# Patient Record
Sex: FEMALE | Race: WHITE | NOT HISPANIC OR LATINO | Employment: OTHER | URBAN - METROPOLITAN AREA
[De-identification: names, ages, dates, MRNs, and addresses within clinical notes are randomized per-mention and may not be internally consistent; named-entity substitution may affect disease eponyms.]

---

## 2022-09-16 ENCOUNTER — TELEPHONE (OUTPATIENT)
Dept: TRANSPLANT | Facility: CLINIC | Age: 72
End: 2022-09-16

## 2022-09-16 NOTE — TELEPHONE ENCOUNTER
----- Message from Trent Garza sent at 9/16/2022  3:42 PM CDT -----    Hepatology referral received and scanned into media; pt chart sent to referral nurse for medical review.       Referring Provider: Iggy Contreras MD   Phone: [phone]   Fax: [phone]        .

## 2022-09-21 ENCOUNTER — TELEPHONE (OUTPATIENT)
Dept: TRANSPLANT | Facility: CLINIC | Age: 72
End: 2022-09-21
Payer: MEDICARE

## 2022-09-21 NOTE — TELEPHONE ENCOUNTER
Referral received from Referring Provider: Iggy Contreras MD   Phone: [phone]   Fax: [phone]     Patient with BROWN. MELD 23  ICD-10:  k74.60  Referred for liver transplant for EVALUATION    Referral completed and forwarded to [facility].          Insurance: medicare     ID:[id number]  Contact #           SECONDARY:   ID:  Contact #

## 2022-09-22 ENCOUNTER — TELEPHONE (OUTPATIENT)
Dept: HEPATOLOGY | Facility: CLINIC | Age: 72
End: 2022-09-22
Payer: MEDICARE

## 2022-09-22 NOTE — TELEPHONE ENCOUNTER
Patient been calling to the red phone to schedule an appointment. She said that if there is available this week she will be happy to come. She said she badly need this appointment.

## 2022-09-26 ENCOUNTER — TELEPHONE (OUTPATIENT)
Dept: TRANSPLANT | Facility: CLINIC | Age: 72
End: 2022-09-26
Payer: MEDICARE

## 2022-09-26 NOTE — TELEPHONE ENCOUNTER
----- Message from Trent Garza sent at 9/26/2022 10:39 AM CDT -----    Called and sp to pt; appt yasmin'ed 10/3.  .

## 2022-09-30 DIAGNOSIS — Z76.82 ORGAN TRANSPLANT CANDIDATE: ICD-10-CM

## 2022-09-30 DIAGNOSIS — K74.60 HEPATIC CIRRHOSIS, UNSPECIFIED HEPATIC CIRRHOSIS TYPE, UNSPECIFIED WHETHER ASCITES PRESENT: Primary | ICD-10-CM

## 2022-09-30 RX ORDER — CHOLECALCIFEROL (VITAMIN D3) 50 MCG
2000 TABLET ORAL DAILY
Status: ON HOLD | COMMUNITY
End: 2023-02-15 | Stop reason: HOSPADM

## 2022-09-30 RX ORDER — AZILSARTAN KAMEDOXOMIL 40 MG/1
1 TABLET ORAL DAILY
Status: ON HOLD | COMMUNITY
Start: 2022-08-26 | End: 2023-02-15 | Stop reason: HOSPADM

## 2022-09-30 RX ORDER — PROCHLORPERAZINE MALEATE 10 MG
10 TABLET ORAL EVERY 6 HOURS PRN
COMMUNITY
Start: 2022-08-23 | End: 2022-09-30

## 2022-09-30 RX ORDER — PNV NO.95/FERROUS FUM/FOLIC AC 28MG-0.8MG
1000 TABLET ORAL DAILY
Status: ON HOLD | COMMUNITY
End: 2023-02-15 | Stop reason: HOSPADM

## 2022-09-30 RX ORDER — MECLIZINE HCL 12.5 MG 12.5 MG/1
12.5 TABLET ORAL 3 TIMES DAILY PRN
COMMUNITY
Start: 2021-12-20 | End: 2022-09-30

## 2022-09-30 RX ORDER — ASPIRIN 81 MG/1
81 TABLET ORAL
Status: ON HOLD | COMMUNITY
End: 2023-02-15 | Stop reason: SDUPTHER

## 2022-09-30 RX ORDER — LORATADINE 10 MG/1
10 TABLET ORAL DAILY PRN
COMMUNITY
End: 2022-09-30

## 2022-09-30 RX ORDER — ESCITALOPRAM OXALATE 10 MG/1
10 TABLET ORAL
COMMUNITY
Start: 2022-08-25 | End: 2022-09-30

## 2022-09-30 RX ORDER — HYDROCHLOROTHIAZIDE 25 MG/1
12.5 TABLET ORAL DAILY
COMMUNITY
Start: 2022-07-08 | End: 2022-12-23

## 2022-09-30 RX ORDER — FOLIC ACID 1 MG/1
1 TABLET ORAL DAILY
COMMUNITY
Start: 2022-08-22 | End: 2022-09-30

## 2022-09-30 RX ORDER — PANTOPRAZOLE SODIUM 40 MG/10ML
INJECTION, POWDER, LYOPHILIZED, FOR SOLUTION INTRAVENOUS
COMMUNITY
End: 2022-10-20

## 2022-10-03 ENCOUNTER — OFFICE VISIT (OUTPATIENT)
Dept: TRANSPLANT | Facility: CLINIC | Age: 72
End: 2022-10-03
Payer: MEDICARE

## 2022-10-03 ENCOUNTER — LAB VISIT (OUTPATIENT)
Dept: LAB | Facility: HOSPITAL | Age: 72
End: 2022-10-03
Payer: MEDICARE

## 2022-10-03 VITALS
DIASTOLIC BLOOD PRESSURE: 72 MMHG | TEMPERATURE: 97 F | HEART RATE: 80 BPM | HEIGHT: 62 IN | WEIGHT: 174.38 LBS | SYSTOLIC BLOOD PRESSURE: 171 MMHG | BODY MASS INDEX: 32.09 KG/M2 | OXYGEN SATURATION: 98 % | RESPIRATION RATE: 17 BRPM

## 2022-10-03 DIAGNOSIS — Z76.82 ORGAN TRANSPLANT CANDIDATE: ICD-10-CM

## 2022-10-03 DIAGNOSIS — K74.60 HEPATIC CIRRHOSIS, UNSPECIFIED HEPATIC CIRRHOSIS TYPE, UNSPECIFIED WHETHER ASCITES PRESENT: ICD-10-CM

## 2022-10-03 LAB
ABO + RH BLD: NORMAL
AFP SERPL-MCNC: 7.6 NG/ML (ref 0–8.4)
ALBUMIN SERPL BCP-MCNC: 3.2 G/DL (ref 3.5–5.2)
ALP SERPL-CCNC: 158 U/L (ref 55–135)
ALT SERPL W/O P-5'-P-CCNC: 35 U/L (ref 10–44)
AMPHET+METHAMPHET UR QL: NEGATIVE
ANION GAP SERPL CALC-SCNC: 12 MMOL/L (ref 8–16)
AST SERPL-CCNC: 69 U/L (ref 10–40)
BARBITURATES UR QL SCN>200 NG/ML: NEGATIVE
BASOPHILS # BLD AUTO: 0.14 K/UL (ref 0–0.2)
BASOPHILS NFR BLD: 2.1 % (ref 0–1.9)
BENZODIAZ UR QL SCN>200 NG/ML: NEGATIVE
BILIRUB DIRECT SERPL-MCNC: 2.1 MG/DL (ref 0.1–0.3)
BILIRUB SERPL-MCNC: 8.2 MG/DL (ref 0.1–1)
BILIRUB UR QL STRIP: NEGATIVE
BLD GP AB SCN CELLS X3 SERPL QL: NORMAL
BUN SERPL-MCNC: 8 MG/DL (ref 8–23)
BZE UR QL SCN: NEGATIVE
CALCIUM SERPL-MCNC: 8.8 MG/DL (ref 8.7–10.5)
CANNABINOIDS UR QL SCN: NEGATIVE
CHLORIDE SERPL-SCNC: 95 MMOL/L (ref 95–110)
CLARITY UR REFRACT.AUTO: ABNORMAL
CO2 SERPL-SCNC: 26 MMOL/L (ref 23–29)
COLOR UR AUTO: YELLOW
CREAT SERPL-MCNC: 0.6 MG/DL (ref 0.5–1.4)
CREAT UR-MCNC: 109 MG/DL (ref 15–325)
DIFFERENTIAL METHOD: ABNORMAL
EOSINOPHIL # BLD AUTO: 0.9 K/UL (ref 0–0.5)
EOSINOPHIL NFR BLD: 12.5 % (ref 0–8)
ERYTHROCYTE [DISTWIDTH] IN BLOOD BY AUTOMATED COUNT: 14.9 % (ref 11.5–14.5)
EST. GFR  (NO RACE VARIABLE): >60 ML/MIN/1.73 M^2
ETHANOL UR-MCNC: <10 MG/DL
GGT SERPL-CCNC: 36 U/L (ref 8–55)
GLUCOSE SERPL-MCNC: 91 MG/DL (ref 70–110)
GLUCOSE UR QL STRIP: NEGATIVE
HAV IGG SER QL IA: NORMAL
HBV CORE AB SERPL QL IA: NORMAL
HBV SURFACE AB SER-ACNC: <3 MIU/ML
HBV SURFACE AB SER-ACNC: NORMAL M[IU]/ML
HBV SURFACE AG SERPL QL IA: NORMAL
HCT VFR BLD AUTO: 35.9 % (ref 37–48.5)
HCV AB SERPL QL IA: NORMAL
HGB BLD-MCNC: 12.9 G/DL (ref 12–16)
HGB UR QL STRIP: NEGATIVE
IMM GRANULOCYTES # BLD AUTO: 0.02 K/UL (ref 0–0.04)
IMM GRANULOCYTES NFR BLD AUTO: 0.3 % (ref 0–0.5)
INR PPP: 1.5 (ref 0.8–1.2)
KETONES UR QL STRIP: NEGATIVE
LEUKOCYTE ESTERASE UR QL STRIP: NEGATIVE
LYMPHOCYTES # BLD AUTO: 2.1 K/UL (ref 1–4.8)
LYMPHOCYTES NFR BLD: 30.4 % (ref 18–48)
MCH RBC QN AUTO: 35.9 PG (ref 27–31)
MCHC RBC AUTO-ENTMCNC: 35.9 G/DL (ref 32–36)
MCV RBC AUTO: 100 FL (ref 82–98)
METHADONE UR QL SCN>300 NG/ML: NEGATIVE
MONOCYTES # BLD AUTO: 0.9 K/UL (ref 0.3–1)
MONOCYTES NFR BLD: 13.2 % (ref 4–15)
NEUTROPHILS # BLD AUTO: 2.8 K/UL (ref 1.8–7.7)
NEUTROPHILS NFR BLD: 41.5 % (ref 38–73)
NITRITE UR QL STRIP: NEGATIVE
NRBC BLD-RTO: 0 /100 WBC
OPIATES UR QL SCN: NEGATIVE
PCP UR QL SCN>25 NG/ML: NEGATIVE
PH UR STRIP: 7 [PH] (ref 5–8)
PLATELET # BLD AUTO: 111 K/UL (ref 150–450)
PMV BLD AUTO: 10.6 FL (ref 9.2–12.9)
POTASSIUM SERPL-SCNC: 3.1 MMOL/L (ref 3.5–5.1)
PROT SERPL-MCNC: 6.5 G/DL (ref 6–8.4)
PROT UR QL STRIP: NEGATIVE
PROTHROMBIN TIME: 15.6 SEC (ref 9–12.5)
RBC # BLD AUTO: 3.59 M/UL (ref 4–5.4)
SODIUM SERPL-SCNC: 133 MMOL/L (ref 136–145)
SP GR UR STRIP: 1.01 (ref 1–1.03)
TOXICOLOGY INFORMATION: NORMAL
URN SPEC COLLECT METH UR: ABNORMAL
WBC # BLD AUTO: 6.82 K/UL (ref 3.9–12.7)

## 2022-10-03 PROCEDURE — 86706 HEP B SURFACE ANTIBODY: CPT | Mod: TXP | Performed by: INTERNAL MEDICINE

## 2022-10-03 PROCEDURE — 99205 PR OFFICE/OUTPT VISIT, NEW, LEVL V, 60-74 MIN: ICD-10-PCS | Mod: S$PBB,TXP,, | Performed by: INTERNAL MEDICINE

## 2022-10-03 PROCEDURE — 99999 PR PBB SHADOW E&M-EST. PATIENT-LVL III: ICD-10-PCS | Mod: PBBFAC,TXP,, | Performed by: INTERNAL MEDICINE

## 2022-10-03 PROCEDURE — 80321 ALCOHOLS BIOMARKERS 1OR 2: CPT | Mod: TXP | Performed by: INTERNAL MEDICINE

## 2022-10-03 PROCEDURE — 82248 BILIRUBIN DIRECT: CPT | Mod: TXP | Performed by: INTERNAL MEDICINE

## 2022-10-03 PROCEDURE — 85025 COMPLETE CBC W/AUTO DIFF WBC: CPT | Mod: TXP | Performed by: INTERNAL MEDICINE

## 2022-10-03 PROCEDURE — 99205 OFFICE O/P NEW HI 60 MIN: CPT | Mod: S$PBB,TXP,, | Performed by: INTERNAL MEDICINE

## 2022-10-03 PROCEDURE — 86803 HEPATITIS C AB TEST: CPT | Mod: TXP | Performed by: INTERNAL MEDICINE

## 2022-10-03 PROCEDURE — 80307 DRUG TEST PRSMV CHEM ANLYZR: CPT | Mod: TXP | Performed by: INTERNAL MEDICINE

## 2022-10-03 PROCEDURE — 82977 ASSAY OF GGT: CPT | Mod: TXP | Performed by: INTERNAL MEDICINE

## 2022-10-03 PROCEDURE — 85610 PROTHROMBIN TIME: CPT | Mod: TXP | Performed by: INTERNAL MEDICINE

## 2022-10-03 PROCEDURE — 81003 URINALYSIS AUTO W/O SCOPE: CPT | Mod: TXP | Performed by: INTERNAL MEDICINE

## 2022-10-03 PROCEDURE — 99999 PR PBB SHADOW E&M-EST. PATIENT-LVL III: CPT | Mod: PBBFAC,TXP,, | Performed by: INTERNAL MEDICINE

## 2022-10-03 PROCEDURE — 87340 HEPATITIS B SURFACE AG IA: CPT | Mod: TXP | Performed by: INTERNAL MEDICINE

## 2022-10-03 PROCEDURE — 80053 COMPREHEN METABOLIC PANEL: CPT | Mod: TXP | Performed by: INTERNAL MEDICINE

## 2022-10-03 PROCEDURE — 86901 BLOOD TYPING SEROLOGIC RH(D): CPT | Mod: TXP | Performed by: INTERNAL MEDICINE

## 2022-10-03 PROCEDURE — 36415 COLL VENOUS BLD VENIPUNCTURE: CPT | Mod: TXP | Performed by: INTERNAL MEDICINE

## 2022-10-03 PROCEDURE — 86704 HEP B CORE ANTIBODY TOTAL: CPT | Mod: TXP | Performed by: INTERNAL MEDICINE

## 2022-10-03 PROCEDURE — 99213 OFFICE O/P EST LOW 20 MIN: CPT | Mod: PBBFAC,TXP | Performed by: INTERNAL MEDICINE

## 2022-10-03 PROCEDURE — 82105 ALPHA-FETOPROTEIN SERUM: CPT | Mod: TXP | Performed by: INTERNAL MEDICINE

## 2022-10-03 PROCEDURE — 86790 VIRUS ANTIBODY NOS: CPT | Mod: TXP | Performed by: INTERNAL MEDICINE

## 2022-10-03 RX ORDER — PANTOPRAZOLE SODIUM 40 MG/1
40 TABLET, DELAYED RELEASE ORAL DAILY
Status: ON HOLD | COMMUNITY
End: 2023-02-15 | Stop reason: HOSPADM

## 2022-10-03 NOTE — PROGRESS NOTES
Subjective:       Patient ID: Argelia Sevilla is a 72 y.o. female.    Chief Complaint: No chief complaint on file.    HPI  I saw this 72 y.o. lady who was referred to us with a diagnosis of BROWN related cirrhosis.    She was diagnosed with cirrhosis in June of 2020 when she first developed abdominal pain generalized in nature.  In addition she started feeling very weak and tired all the time.  She was found to be jaundiced but had no significant ascites or edema.  She was referred to Hematology who felt that she was hemolyzing.    She continues to feel tired and I note her prolonged INR as well as her high bilirubin.  She describes ankle edema and abdominal distension although there is no obvious ascites.    She had imaging with a CT scan and an MRI that confirmed cirrhosis but did not show ascites.  An EGD showed only PHG..    Since June 2022, she has been able to lose 30 lb in weight following a Mediterranean diet.  She believes that her weight loss is intentional but it is possible that this has coincided with her deterioration of her liver condition.    She has not had any hepatic encephalopathy although her  describes an odor that would be typical of fetor hepaticus.      MELD-Na score: 22 at 10/3/2022 12:31 PM  MELD score: 19 at 10/3/2022 12:31 PM  Calculated from:  Serum Creatinine: 0.6 mg/dL (Using min of 1 mg/dL) at 10/3/2022 12:31 PM  Serum Sodium: 133 mmol/L at 10/3/2022 12:31 PM  Total Bilirubin: 8.2 mg/dL at 10/3/2022 12:31 PM  INR(ratio): 1.5 at 10/3/2022 12:31 PM  Age: 72 years    PMH:  CAD stent - Sep 2021  Back surgery  Lap cholecystectomy- 1996  Hysterectomy    No DM    SH:  No alcohol  Never smoked    2 kids- 45 + 42- healthy      FH:  Grandmother had cirrhosis      Review of Systems   Constitutional:  Positive for activity change and fatigue. Negative for appetite change, chills, fever and unexpected weight change.   HENT:  Negative for ear pain, hearing loss, nosebleeds, sore throat  and trouble swallowing.    Eyes:  Negative for redness and visual disturbance.   Respiratory:  Negative for cough, chest tightness, shortness of breath and wheezing.    Cardiovascular:  Positive for leg swelling. Negative for chest pain and palpitations.   Gastrointestinal:  Positive for abdominal distention. Negative for abdominal pain, blood in stool, constipation, diarrhea, nausea and vomiting.   Genitourinary:  Negative for difficulty urinating, dysuria, frequency, hematuria and urgency.   Musculoskeletal:  Negative for arthralgias, back pain, gait problem, joint swelling and myalgias.   Skin:  Negative for rash.   Neurological:  Negative for tremors, seizures, speech difficulty, weakness and headaches.   Hematological:  Negative for adenopathy.   Psychiatric/Behavioral:  Negative for confusion, decreased concentration and sleep disturbance. The patient is not nervous/anxious.          Lab Results   Component Value Date    ALT 35 10/03/2022    AST 69 (H) 10/03/2022    GGT 36 10/03/2022    ALKPHOS 158 (H) 10/03/2022    BILITOT 8.2 (H) 10/03/2022     Past Medical History:   Diagnosis Date    Anxiety disorder, unspecified     CAD (coronary artery disease)     DIC (disseminated intravascular coagulation)     Dyslipidemia     GERD (gastroesophageal reflux disease)     Hereditary hemolytic anemia, unspecified     History of coronary artery stent placement     HTN (hypertension)     Liver cirrhosis secondary to BROWN     Portal hypertensive gastropathy     Primary localized osteoarthrosis of right lower leg     Statin intolerance      Past Surgical History:   Procedure Laterality Date    ADENOIDECTOMY      ANTERIOR CRUCIATE LIGAMENT REPAIR      BACK SURGERY      CARDIAC CATHETERIZATION  05/31/2018    CARDIAC CATHETERIZATION  09/28/2021    coronary angioplasty    CHOLECYSTECTOMY      COLONOSCOPY  05/25/2022    GALLBLADDER SURGERY      HYSTERECTOMY      KNEE ARTHROSCOPY Right     OOPHORECTOMY      REDUCTION OF BOTH  BREASTS  01/01/2011    TONSILLECTOMY      TUBAL LIGATION       Current Outpatient Medications   Medication Sig    aspirin (ECOTRIN) 81 MG EC tablet Take 81 mg by mouth.    cholecalciferol, vitamin D3, (VITAMIN D3) 50 mcg (2,000 unit) Tab Take 2,000 Units by mouth once daily.    EDARBI 40 mg Tab Take 1 tablet by mouth once daily.    hydroCHLOROthiazide (HYDRODIURIL) 25 MG tablet Take 12.5 mg by mouth once daily.    omega-3s/dha/epa/fish oil/D3 (VITAMIN-D + OMEGA-3 ORAL) Take 1 tablet by mouth once daily.    pantoprazole (PROTONIX) 40 MG tablet Take 40 mg by mouth once daily.    vitamin E 1000 UNIT capsule Take 1,000 Units by mouth once daily.    pantoprazole (PROTONIX) 40 mg injection      No current facility-administered medications for this visit.       Objective:      Physical Exam  Constitutional:       General: She is not in acute distress.  HENT:      Head: Normocephalic.   Eyes:      Pupils: Pupils are equal, round, and reactive to light.   Neck:      Thyroid: No thyromegaly.      Vascular: No JVD.      Trachea: No tracheal deviation.   Cardiovascular:      Rate and Rhythm: Normal rate and regular rhythm.      Heart sounds: Normal heart sounds. No murmur heard.  Pulmonary:      Effort: Pulmonary effort is normal.      Breath sounds: Normal breath sounds. No stridor.   Abdominal:      General: There is distension.      Palpations: Abdomen is soft.      Tenderness: There is left CVA tenderness.      Comments: Appears distended but no obvious ascites.   Lymphadenopathy:      Head:      Right side of head: No submental, submandibular, tonsillar, preauricular, posterior auricular or occipital adenopathy.      Left side of head: No submental, submandibular, tonsillar, preauricular, posterior auricular or occipital adenopathy.      Cervical: No cervical adenopathy.   Neurological:      Mental Status: She is alert. She is not disoriented.      Cranial Nerves: No cranial nerve deficit.      Sensory: No sensory deficit.        Assessment:       1. Hepatic cirrhosis, unspecified hepatic cirrhosis type, unspecified whether ascites present    2. Organ transplant candidate          Plan:   This lady with BROWN cirrhosis is currently jaundiced and has a MELD score of 22 due to her prolonged INR and elevated bilirubin.    Although she was found to be hemolyzing, I think that she has worsening liver function and we agreed that she needs an evaluation for liver transplant.    She does have a cardiac history and will likely need a left heart cath but we will initially proceed with a stress test.    Her functional status is good although she occasionally uses a cane due to severe right knee ostaoarthritis.    We will proceed with a liver Tx evaluation and I already discussed with her the possibility of living donor liver Tx.    UNOS Patient Status  Functional Status: 80% - Normal activity with effort: some symptoms of disease  Physical Capacity: No Limitations    Patient on life support: No  Diabetes: No  Any previous malignancy: No  Neoadjuvant Therapy: no  Has patient ever had a dx of HCC: no  Previous Abdominal Surgery: yes  Spontaneous Bacterial Peritonitis: no  History of Portal Vein Thrombosis: no  Transjugular Intrahepatic Portosystemic Shunt: no

## 2022-10-03 NOTE — LETTER
October 4, 2022        Iggy Contreras  [address] [facility] [place] [address]  Phone: [phone]  Fax: [phone]             [facility]  [address]  [facility] [address]  Phone: [phone]   Patient: Argelia Sevilla   MR Number: [id number]   YOB: 1950   Date of Visit: 10/3/2022       Dear Dr. Iggy Contreras    Thank you for referring Argelia Sevilla to me for evaluation. Attached you will find relevant portions of my assessment and plan of care.    If you have questions, please do not hesitate to call me. I look forward to following Argelia Sevilla along with you.    Sincerely,    Houston Crawford MD    Enclosure    If you would like to receive this communication electronically, please contact [email or web address] or [phone] to request [employer] access.    [employer] is a tool which provides read-only access to select patient information with whom you have a relationship. Its easy to use and provides real time access to review your patients record including encounter summaries, notes, results, and demographic information.    If you feel you have received this communication in error or would no longer like to receive these types of communications, please e-mail [email or web address]

## 2022-10-06 ENCOUNTER — PATIENT MESSAGE (OUTPATIENT)
Dept: TRANSPLANT | Facility: CLINIC | Age: 72
End: 2022-10-06
Payer: MEDICARE

## 2022-10-06 LAB
PETH 16:0/18.1 (POPETH): <10 NG/ML
PETH 16:0/18.2 (PLPETH): <10 NG/ML

## 2022-10-11 ENCOUNTER — TELEPHONE (OUTPATIENT)
Dept: TRANSPLANT | Facility: CLINIC | Age: 72
End: 2022-10-11
Payer: MEDICARE

## 2022-10-11 ENCOUNTER — DOCUMENTATION ONLY (OUTPATIENT)
Dept: TRANSPLANT | Facility: CLINIC | Age: 72
End: 2022-10-11
Payer: MEDICARE

## 2022-10-11 DIAGNOSIS — K75.81 NONALCOHOLIC STEATOHEPATITIS (NASH): ICD-10-CM

## 2022-10-11 DIAGNOSIS — Z94.4 LIVER TRANSPLANT STATUS: Primary | ICD-10-CM

## 2022-10-11 DIAGNOSIS — Z76.82 ORGAN TRANSPLANT CANDIDATE: ICD-10-CM

## 2022-10-11 NOTE — TELEPHONE ENCOUNTER
UPDATE:  Financially cleared for outpatient liver transplant evaluation.   Called pt to discuss Fast Pass liver transplant evaluation.  Informed patient  that evaluation will take approx  2 to 3 days to complete, depending on rather additional consult.  Informed her that all testing will be performed outpatient.  Patient notified that some testing may be completed outside.  Patient informed of which tests that can be scheduled locally such as DEXA scan and OBGYN appt.  Patient voiced understanding of the need to have a caregiver present for the  and surgeon consult, as well as for the patient education.    All questions/ concerns regarding liver transplant evaluation answered/ addressed.     Patient states that she has had a complete hysterectomy.  Informed patient that she does not need a pap smear since she has had a hysterectomy however she does need an updated visit with OBGYN or PCP for a female wellness visit and checkup. Patient states she will scheduled that appointment.    Patient has a history of CAD.  Patient  had LHC and drug eluting stent in September 2021.  Patient states that she was taken off Plavix in June 2022.   She takes 81 mg Asprin daily.   Cardiology H&P in Care Everywhere.     Patient states she had a recent mammogram last December or January at [facility].     Had EGD and Colonoscopy in May 2022. Done by GI doctor, Dr. Kev Altamirano.     FAST PASS EVALUATION DATES October 20th and 21st.   Coordinator will put orders of OLT eval in [facility] and ask transplant  to schedule appts.

## 2022-10-11 NOTE — TELEPHONE ENCOUNTER
Cardiology Note from Care Everywhere:    Progress Notes  - documented in this encounter  Nilton Rodriguez MD - 07/08/2022 11:00 AM CDT  Formatting of this note is different from the original.  REASON FOR VISIT: Follow-up abnormal calcium score and chest pain and shortness of breath    HPI: The patient is a 72 y.o.female.  She has a history of chronic intermittent chest pain. She was previously seen by Dr. Farias but is switched for convenience since I see her family members. She had a PET scan 5/26/2017 that was normal. She was admitted in May 2018 with chest pain. SPECT scan 5/5/2018 showed normal perfusion and ejection fraction. Calcium score in 2012 was 98. She continued to have chest pain. Left heart catheterization 5/31/2018 showed mild nonobstructive coronary artery disease. EDP 18. EF 80%. Holter monitor showed rare PVCs and rare PACs.    She was seen preoperatively. She had occasional chest pain and chronic shortness of breath she has untreated sleep apnea. Echocardiogram 6/18/2021 showed an EF of 70% with grade 1 diastolic dysfunction. Estimated RVSP normal. No significant valvular disease. PET scan 9/15/2021 was abnormal with possible ischemia in the mid anterior, anteroapical, apical septal, and apical segments. EF was 52% at rest and 59% at stress. Left heart catheterization was performed 9/28/2021. There was a 70% distal LAD stenosis. She underwent PCI with a 2.5 x 20 mm Synergy XD drug-eluting stent.    From a cardiac standpoint, she has been stable. She has had no chest pain, shortness of breath, orthopnea, PND, lower extremity edema, palpitations or syncope. She does feel depressed, though. She is tearful in the office today. She reports she was recently diagnosed with nonalcoholic cirrhosis due to fatty liver disease. She has had an MRI of her liver and has followup with Dr. Altamirano next week. She feels tired and fatigued all the time. She has been diagnosed with sleep apnea, but is still awaiting her  CPAP. She has also been having increased ecchymoses with little or no trauma.  She has having lightheadedness when she stands. Her blood pressure at home has been as low as 118/44.    Patient Active Problem List   Diagnosis    Essential hypertension    GERD (gastroesophageal reflux disease)    Coronary artery disease involving native coronary artery of native heart without angina pectoris    Pure hypercholesterolemia    Elevated SGOT (AST)    Alexandra bullosa    Hypertrophy of inferior nasal turbinate    Anxiety and depression    Vitamin D deficiency    Intrinsic atopic dermatitis    Statin intolerance    Fatty liver    Thrombocytopenia (HCC Code)    Grade I diastolic dysfunction    Primary localized osteoarthrosis of right lower leg     Current Outpatient Medications on File Prior to Visit   Medication Sig Dispense Refill    aspirin 81 MG EC tablet Take 81 mg by mouth daily.    azilsartan (EDARBI) 80 MG TABS Take 1 tablet (80 mg total) by mouth daily. (Patient taking differently: Take 40 mg by mouth daily.) 90 tablet 1    Cholecalciferol (Vitamin D3) 50 MCG (2000 UT) TABS Take 2,000 Units by mouth daily.    levalbuterol (XOPENEX) 1.25 MG/3ML nebulizer solution Take 3 mLs (1.25 mg total) by nebulization every 6 (six) hours as needed for Wheezing or Shortness of Breath. 72 mL 0    loratadine (CLARITIN) 10 MG tablet Take 10 mg by mouth daily as needed.    meclizine (ANTIVERT) 12.5 MG tablet Take 1 tablet (12.5 mg total) by mouth 3 (three) times daily as needed for Dizziness or Nausea. 30 tablet 1    pantoprazole (PROTONIX) 40 MG tablet TAKE 1 TABLET (40 MG TOTAL) BY MOUTH DAILY. 90 tablet 1    VITAMIN E PO    [DISCONTINUED] clopidogrel (PLAVIX) 75 MG tablet Take 1 tablet (75 mg total) by mouth daily. 90 tablet 3    [DISCONTINUED] hydroCHLOROthiazide (HYDRODIURIL) 25 MG tablet Take 1 tablet (25 mg total) by mouth daily. 30 tablet 5    C-PAP ResMed AirSense 10 or 11 AutoSet;  CPAP 9 cmH20  EPR 3;  Ramp AUTO;  Humidifier  "AUTO;  MASK -   resmed F20 airtouch or  Mask of choice;  Chin strap if needed (Patient taking differently: ResMed AirSense 10 or 11 AutoSet;HAS NOT STARTED  CPAP 9 cmH20  EPR 3;  Ramp AUTO;  Humidifier AUTO;  MASK -   resmed F20 airtouch or  Mask of choice;  Chin strap if needed) 1 each 0    [DISCONTINUED] acetaminophen (TYLENOL) 500 MG tablet Take 2 tablets morning of surgery with sip of water 2 tablet 0    [DISCONTINUED] famotidine (PEPCID) 20 MG tablet Take 1 tablet at bedtime the night before surgery and 1 tablet morning of surgery with sip of water. 2 tablet 0    [DISCONTINUED] metoclopramide (REGLAN) 10 MG tablet Take 1 tablet at bedtime the night before surgery and 1 tablet morning of surgery with sip of water. 2 tablet 0    [DISCONTINUED] simvastatin (ZOCOR) 40 MG tablet Take 1 tablet (40 mg total) by mouth every evening. 30 tablet 11     No current facility-administered medications on file prior to visit.     Past medical history, problem list, medications, allergies, family history, and social history were reviewed and updated as appropriate in EPIC.    PHYSICAL EXAMINATION:  Vitals:   07/08/22 1123   BP: 122/66   Pulse: 71   Weight: 179 lb 12.8 oz (81.6 kg)   Height: 5' 2" (1.575 m)     Body mass index is 32.89 kg/m².    GENERAL: No acute distress. Morbidly obese.  LUNGS: Clear to ascultation bilaterally.  CARDIOVASCULAR: Regular rate and rhythm. No murmurs gallops or rubs. No edema.    LABS/STUDIES:    Lab Results   Component Value Date   CHOL 145 11/22/2021   HDL 60 11/22/2021   LDL 74 11/22/2021   TRIG 49 11/22/2021   CHOLHDL 2 09/22/2021   HDLPERCENT 41 11/22/2021   HDLPERCENT 37 05/05/2021   HDLPERCENT 41 01/29/2020     Lab Results   Component Value Date   CREATS 0.48 (L) 06/09/2022     Lab Results   Component Value Date   K 4.0 06/09/2022     Lab Results   Component Value Date   ALT 30 05/02/2022   AST 55 (H) 05/02/2022    (H) 05/02/2022     Lab Results   Component Value Date   WBC 4.3 (L) " 06/09/2022   HGB 12.3 06/09/2022   HCT 35.2 (L) 06/09/2022    (H) 06/09/2022   PLTA 110 (L) 06/09/2022     IMPRESSION:   1. Coronary artery disease involving native coronary artery of native heart with other form of angina pectoris (HCC Code)   2. S/P coronary artery stent placement   3. Essential hypertension   4. Pure hypercholesterolemia   5. PAC (premature atrial contraction)   6. PVC (premature ventricular contraction)   7. Peripheral edema   8. Statin intolerance   9. Bilateral lower extremity edema   10. Non-alcoholic cirrhosis (HCC Code)   11. Depression, unspecified depression type   12. Orthostasis     PLAN:  Orders Placed This Encounter    Ambulatory referral to Primary Care    hydroCHLOROthiazide (HYDRODIURIL) 25 MG tablet     It has been over 6 months since PCI. Given her thrombocytopenia, ecchymoses, and recent diagnoses of cirrhosis. We will discontinue her Plavix. Continue aspirin 81 mg daily. Given her orthostasis, I am going to decrease her hydrochlorothiazide to 12.5 mg daily. I will refer her back to her primary care provider to follow-up on her depression. She is not had this before but she seems overwhelmed with her recent medical diagnoses.    Follow up in 3 months.    Patient instructed to call with questions or problems.      Electronically signed by Nilton Rodriguez MD at 07/11/2022 7:14 AM CDT

## 2022-10-14 ENCOUNTER — TELEPHONE (OUTPATIENT)
Dept: TRANSPLANT | Facility: CLINIC | Age: 72
End: 2022-10-14
Payer: MEDICARE

## 2022-10-14 NOTE — TELEPHONE ENCOUNTER
[facility] Pre-Liver Transplant Clinical  Caller: chris (Today,  9:06 AM)  Pt is calling to speak with coordinator..[phone] (home) [phone] (work)     Appt with OBGYN at 1:20pm today.  H/O complete hysterectomy. Having a well-woman's exam today to clear.

## 2022-10-19 ENCOUNTER — TELEPHONE (OUTPATIENT)
Dept: CARDIOLOGY | Facility: HOSPITAL | Age: 72
End: 2022-10-19
Payer: MEDICARE

## 2022-10-19 ENCOUNTER — TELEPHONE (OUTPATIENT)
Dept: TRANSPLANT | Facility: CLINIC | Age: 72
End: 2022-10-19
Payer: MEDICARE

## 2022-10-19 NOTE — TELEPHONE ENCOUNTER
Patient called to confirm that they will be attending the scheduled appointments for Liver Transplant Fast Pass Evaluation scheduled to start 10/20/22  at 0730.  Patient confirms that caregiver will be present for the scheduled appointments.  Patient appointments reviewed along with location and special instructions.  Patient questions answered at this time and number provided to call the office if there is any problem.

## 2022-10-20 ENCOUNTER — HOSPITAL ENCOUNTER (OUTPATIENT)
Dept: RADIOLOGY | Facility: HOSPITAL | Age: 72
Discharge: HOME | End: 2022-10-20
Attending: INTERNAL MEDICINE
Payer: MEDICARE

## 2022-10-20 ENCOUNTER — CLINICAL SUPPORT (OUTPATIENT)
Dept: TRANSPLANT | Facility: CLINIC | Age: 72
End: 2022-10-20
Payer: MEDICARE

## 2022-10-20 ENCOUNTER — EVALUATION (OUTPATIENT)
Dept: TRANSPLANT | Facility: CLINIC | Age: 72
End: 2022-10-20
Payer: MEDICARE

## 2022-10-20 VITALS
TEMPERATURE: 97 F | OXYGEN SATURATION: 97 % | SYSTOLIC BLOOD PRESSURE: 153 MMHG | OXYGEN SATURATION: 97 % | SYSTOLIC BLOOD PRESSURE: 153 MMHG | HEIGHT: 62 IN | HEIGHT: 62 IN | DIASTOLIC BLOOD PRESSURE: 70 MMHG | WEIGHT: 171.94 LBS | RESPIRATION RATE: 16 BRPM | RESPIRATION RATE: 16 BRPM | HEART RATE: 89 BPM | DIASTOLIC BLOOD PRESSURE: 70 MMHG | HEART RATE: 89 BPM | BODY MASS INDEX: 31.64 KG/M2 | TEMPERATURE: 97 F | BODY MASS INDEX: 31.64 KG/M2 | WEIGHT: 171.94 LBS

## 2022-10-20 DIAGNOSIS — Z76.82 ORGAN TRANSPLANT CANDIDATE: ICD-10-CM

## 2022-10-20 DIAGNOSIS — Z94.4 LIVER TRANSPLANT STATUS: ICD-10-CM

## 2022-10-20 DIAGNOSIS — Z01.818 ENCOUNTER FOR PRE-TRANSPLANT EVALUATION FOR LIVER TRANSPLANT: Primary | ICD-10-CM

## 2022-10-20 LAB
AMPHET+METHAMPHET UR QL: NEGATIVE
BARBITURATES UR QL SCN>200 NG/ML: NEGATIVE
BENZODIAZ UR QL SCN>200 NG/ML: NEGATIVE
BILIRUB UR QL STRIP: NEGATIVE
BZE UR QL SCN: NEGATIVE
CANNABINOIDS UR QL SCN: NEGATIVE
CLARITY UR REFRACT.AUTO: ABNORMAL
COLOR UR AUTO: YELLOW
CREAT UR-MCNC: 133 MG/DL (ref 15–325)
ETHANOL UR-MCNC: <10 MG/DL
GLUCOSE UR QL STRIP: NEGATIVE
HGB UR QL STRIP: NEGATIVE
KETONES UR QL STRIP: ABNORMAL
LEUKOCYTE ESTERASE UR QL STRIP: NEGATIVE
METHADONE UR QL SCN>300 NG/ML: NEGATIVE
NITRITE UR QL STRIP: NEGATIVE
OPIATES UR QL SCN: NEGATIVE
PCP UR QL SCN>25 NG/ML: NEGATIVE
PH UR STRIP: 6 [PH] (ref 5–8)
PROT UR QL STRIP: ABNORMAL
SP GR UR STRIP: 1.01 (ref 1–1.03)
TOXICOLOGY INFORMATION: NORMAL
URN SPEC COLLECT METH UR: ABNORMAL

## 2022-10-20 PROCEDURE — 99999 PR PBB SHADOW E&M-EST. PATIENT-LVL III: ICD-10-PCS | Mod: PBBFAC,TXP,,

## 2022-10-20 PROCEDURE — 99244 OFF/OP CNSLTJ NEW/EST MOD 40: CPT | Mod: S$PBB,TXP,, | Performed by: SURGERY

## 2022-10-20 PROCEDURE — 25500020 PHARM REV CODE 255: Mod: TXP | Performed by: INTERNAL MEDICINE

## 2022-10-20 PROCEDURE — 71046 XR CHEST PA AND LATERAL: ICD-10-PCS | Mod: 26,TXP,, | Performed by: RADIOLOGY

## 2022-10-20 PROCEDURE — 99213 OFFICE O/P EST LOW 20 MIN: CPT | Mod: PBBFAC,25,TXP

## 2022-10-20 PROCEDURE — 99999 PR PBB SHADOW E&M-EST. PATIENT-LVL III: CPT | Mod: PBBFAC,TXP,,

## 2022-10-20 PROCEDURE — 99204 OFFICE O/P NEW MOD 45 MIN: CPT | Mod: S$PBB,TXP,, | Performed by: SURGERY

## 2022-10-20 PROCEDURE — 80307 DRUG TEST PRSMV CHEM ANLYZR: CPT | Mod: TXP | Performed by: INTERNAL MEDICINE

## 2022-10-20 PROCEDURE — 99244 PR OFFICE CONSULTATION,LEVEL IV: ICD-10-PCS | Mod: S$PBB,TXP,, | Performed by: SURGERY

## 2022-10-20 PROCEDURE — 71046 X-RAY EXAM CHEST 2 VIEWS: CPT | Mod: TC,FY,TXP

## 2022-10-20 PROCEDURE — 99213 OFFICE O/P EST LOW 20 MIN: CPT | Mod: PBBFAC,25,27,TXP

## 2022-10-20 PROCEDURE — 71046 X-RAY EXAM CHEST 2 VIEWS: CPT | Mod: 26,TXP,, | Performed by: RADIOLOGY

## 2022-10-20 PROCEDURE — 74160 CT ABDOMEN WITH CONTRAST: ICD-10-PCS | Mod: 26,TXP,, | Performed by: RADIOLOGY

## 2022-10-20 PROCEDURE — 74160 CT ABDOMEN W/CONTRAST: CPT | Mod: 26,TXP,, | Performed by: RADIOLOGY

## 2022-10-20 PROCEDURE — 81003 URINALYSIS AUTO W/O SCOPE: CPT | Mod: TXP | Performed by: INTERNAL MEDICINE

## 2022-10-20 PROCEDURE — 97802 MEDICAL NUTRITION INDIV IN: CPT | Mod: PBBFAC,TXP | Performed by: DIETITIAN, REGISTERED

## 2022-10-20 PROCEDURE — 74160 CT ABDOMEN W/CONTRAST: CPT | Mod: TC,TXP

## 2022-10-20 PROCEDURE — 99204 PR OFFICE/OUTPT VISIT, NEW, LEVL IV, 45-59 MIN: ICD-10-PCS | Mod: S$PBB,TXP,, | Performed by: SURGERY

## 2022-10-20 RX ADMIN — IOHEXOL 100 ML: 350 INJECTION, SOLUTION INTRAVENOUS at 05:10

## 2022-10-20 NOTE — PROGRESS NOTES
Transplant Surgery Liver Transplant Recipient Evaluation    Referring Physician: Iggy Contreras  Corresponding Physician: Iggy Contreras    Subjective:     Reason for Visit: evaluate liver transplant candidacy    History of Present Illness: Argelia Sevilla is a 72 y.o. year old female who is being evaluated for liver transplantation.    Transplant History: N/A    Native Liver Disease (UNOS terminology):  (based on medical records from referral).    Manifestations of liver disease: fatigue and muscle wasting and jaundice and hyponatremia  MELD-Na score: 23 at 10/20/2022  8:05 AM  MELD score: 19 at 10/20/2022  8:05 AM  Calculated from:  Serum Creatinine: 0.7 mg/dL (Using min of 1 mg/dL) at 10/20/2022  8:05 AM  Serum Sodium: 131 mmol/L at 10/20/2022  8:05 AM  Total Bilirubin: 8.8 mg/dL at 10/20/2022  8:05 AM  INR(ratio): 1.5 at 10/20/2022  8:05 AM  Age: 72 years    External provider notes reviewed: No    PMH: reviewed  PSH: perla kirk  Review of Systems   Constitutional:  Positive for fatigue.   HENT:  Negative for drooling, postnasal drip and sore throat.    Eyes:  Negative for discharge and itching.   Respiratory:  Negative for choking and stridor.    Gastrointestinal:  Negative for rectal pain.   Endocrine: Negative for polydipsia.   Genitourinary:  Negative for enuresis and genital sores.   Musculoskeletal:  Negative for back pain, neck pain and neck stiffness.   Allergic/Immunologic: Negative for immunocompromised state.   Neurological:  Negative for facial asymmetry and numbness.   Hematological:  Negative for adenopathy.   Psychiatric/Behavioral:  Negative for behavioral problems, self-injury and suicidal ideas.    Objective:     Physical Exam:  Constitutional:   Vitals reviewed: yes   Well-nourished and well-groomed: yes  Eyes:   Sclerae icteric: no   Extraocular movements intact: yes  GI:    Bowel sounds normal: yes   Tenderness: no    If yes, quadrant/location: not applicable   Palpable masses: no    If  yes, quadrant/location: not applicable   Hepatosplenomegaly: no   Ascites: no   Hernia: no    If yes, type/location: not applicable   Surgical scars: yes    If yes, type/location: Pfannenstiel  laparoscopic port sites  Resp:   Effort normal: yes   Breath sounds normal: yes    CV:   Regular rate and rhythm: yes   Heart sounds normal: yes   Femoral pulses normal: yes   Extremities edematous: no  Skin:   Rashes or lesions present: no    If yes, describe:not applicable   Jaundice:: no    Musculoskeletal:   Gait normal: yes   Strength normal: yes  Psych:   Oriented to person, place, and time: yes   Affect and mood normal: yes    Additional comments: not applicable    Diagnostics:  The following labs have been reviewed: CBC  CMP           Transplant Surgery - Candidacy   Assessment/Plan:   I see no surgical contraindication to liver transplantation. Based on available information, Argelia Sevilla is a suitable liver transplant candidate. Final determination of transplant candidacy will be made once evaluation is complete and reviewed by the Liver Transplant Selection Committee.    Patient advised that it is recommended that all transplant candidates, and their close contacts and household members receive Covid vaccination.    Additional testing to be completed according to Liver : Written Order Guideline for Adult Liver Transplant Evaluation (LI-02)    Interpretation of tests and discussion of patient management involves all members of the multidisciplinary transplant team.    Mark Brasher MD       Surgcial complexity ? - imaging not done yet  Medical comlexity B - cardiac stent    Counseling: We provided Argelia Sevilla with a group education session today.  We discussed liver transplantation at length with her, including risks, potential complications, and alternatives in the management of her liver disease.  The discussion included complications related to anesthesia, bleeding, infection, primary nonfunction,  and vascular thrombosis.  I discussed the typical postoperative course, length of hospitalization, the need for long-term immunosuppression, and the need for long-term routine follow-up.  I discussed living-donor and -donor transplantation and the relative advantages and disadvantages of each.  I also discussed average waiting times for both living donation and  donation.  I discussed national and center-specific survival rates.  I also mentioned the potential benefit of multicenter listing to candidates listed with centers within more than one organ procurement organization.  All questions were answered.    Coronavirus disease (COVID-19) caused by severe acute respiratory virus coronavirus 2 (SARS-C0V 2) is associated with increased mortality in solid organ transplant recipients (SOT) compared to non-transplant patients. Vaccine responses to vaccination are depressed against SARS-CoV2 compared to normal individuals but improve with third vaccination doses. Vaccination prior to SOT provides both the best opportunity for transplant candidates to develop protective immunity and to reduce the risk of serious COVID19 infections post transplantation. Organ transplant candidates at [facility] will be required to receive SARS-CoV-2 vaccination prior to being listed with a an active status, whenever possible. Exceptions will be made for disability related reasons or for sincerely held L[employer]-day beliefs.     PHS: I discussed the use of organs from donors with PHS risk criteria, including the testing protocols utilized, as well as data from the literature regarding the likelihood of transmission of hepatitis or HIV.  The patient that she is willing to consider such grafts.  DCD: I discussed the use of organs recovered by donation after cardiac death (DCD), including slightly decreased graft survival and greater risk of arterial and biliary complications. The potential  advantage to the recipient is possibly receiving a transplant sooner by accepting such an organ. The patient that she is willing to consider such grafts.  HBcAb: I discussed the use of organs from donors with HBcAb in conjunction with long term use of HBV antiviral drugs, such as lamivudine. The small but measurable risk of hepatitis B seroconversion was discussed as well as the potentially life long need to continue antiviral drugs. The patient that she is willing to consider such grafts.  HCV Non-viremic recipient: I discussed the use of HCV-positive organs in naive recipients, including the risk of viral transmission to the patients or others, potential insurance barriers for antiviral medication coverage, risk for fibrosing cholestatic hepatitis, death or graft loss. The potential advantage to the recipient is the possibility of receiving a transplant sooner with decreased mortality risk by accepting such an organ. The patient that she is willing to consider such grafts.  LDLT: I discussed the nature of living donor liver transplant, including donor risks and more frequent recipient complications. The patient that she is willing to consider such grafts.  Covid: I discussed that this donor has tested positive for the covid virus, but with very low levels of virus and no evidence of covid disease. Although the risk of transmission is unknown, we believe this donor is not infectious and use of the abdominal organs is safe.  To date, these organs have been used with no evidence of transmission. The patient that she is willing to consider such grafts.

## 2022-10-20 NOTE — PROGRESS NOTES
"TRANSPLANT NUTRITIONAL ASSESSMENT    Referring Provider: Houston Crawford MD    Reason for Visit: Pre-liver transplant work-up    Age: 72 y.o.  Sex: female    There is no problem list on file for this patient.    Past Medical History:   Diagnosis Date    Anxiety disorder, unspecified     CAD (coronary artery disease)     DIC (disseminated intravascular coagulation)     Dyslipidemia     GERD (gastroesophageal reflux disease)     Hereditary hemolytic anemia, unspecified     History of coronary artery stent placement     HTN (hypertension)     Liver cirrhosis secondary to BROWN     Portal hypertensive gastropathy     Primary localized osteoarthrosis of right lower leg     Statin intolerance      Lab Results   Component Value Date    GLU 91 10/20/2022    K 3.1 (L) 10/20/2022    ALBUMIN 3.3 (L) 10/20/2022    HGBA1C 4.3 10/20/2022    CALCIUM 9.0 10/20/2022     Other Pertinent Labs: none    Current Outpatient Medications   Medication Sig    aspirin (ECOTRIN) 81 MG EC tablet Take 81 mg by mouth.    cholecalciferol, vitamin D3, (VITAMIN D3) 50 mcg (2,000 unit) Tab Take 2,000 Units by mouth once daily.    EDARBI 40 mg Tab Take 1 tablet by mouth once daily.    hydroCHLOROthiazide (HYDRODIURIL) 25 MG tablet Take 12.5 mg by mouth once daily.    pantoprazole (PROTONIX) 40 MG tablet Take 40 mg by mouth once daily.    vitamin E 1000 UNIT capsule Take 1,000 Units by mouth once daily.     No current facility-administered medications for this visit.     Allergies: Codeine    Ht Readings from Last 1 Encounters:   10/20/22 5' 2.01" (1.575 m)     Wt Readings from Last 1 Encounters:   10/20/22 78 kg (171 lb 15.3 oz)      BMI: Body mass index is 31.44 kg/m².    Usual Weight: 170-175 lb  Weight Change/Time: generally stable  Current Diet: regular  Appetite/Current Intake: good   Exercise/Physical Activity: some knee pain, can walk, has stationary bike at home, uses can for stability  LFI: 3.96    Nutritional/Herbal Supplements: Vit D, Vit " "E  Potential Food/Medication Interactions: reviewed  Chewing/Swallowing Problems: none  Symptoms: constipation (takes mirilax as needed)  Assessment of Lab Values: K 3.1, Alb 3.3  Support System: caregiver present, supportive    Estimated Kcal Need: 4176-2406 kcal (20-25 kcal/kg)  Estimated Protein Need:  gm (1-1.5 gm/kg)    Nutritional History:   Pt reports to have a good appetite. She goes out to eat about 1 x /week. She does not eat fast food. Pt has been previously advised to follow a "Mediterranean diet" and has made some changes to adhere better to this. She reported the following current food recall:  B: fruit, whoel meat/ mutli grain bread w/ peanut butter 1 Tbsp, water o r 1 egg w/ toast  Snack; fresh fruit, multi grain crackers, walnuts/almonds, likes yogurt/asking if OK to have  L: chicken/ / left overs  D: chicken/ fish/ occasional spaghetti whole wheat  with meat sauce / crock pot or baking mostly / butter beans, corn, potatoes, salad w/ LF or NF Ranch dressing / saltines  Snack: oranges, nectarines  Beverages      Nutritional Diagnoses  Problem: food- and nutrition-related knowledge deficit  Etiology: r/t no prior edu on pre liver transplant nutrition recommendations  Symptoms: aeb diet recall    Educational Need? yes  Barriers: none identified  Discussed with: patient and spouse  Interventions: Patient taught nutrition information regarding Pre-liver transplant work-up  .  Pre liver transplant nutrition packet provided and discussed. Pt advised on the recommendations to follow a low sodium diet while taking in adequate protein.  This packet includes label low sodium reading tips, seasoning suggestions, protein intake goal amount (gm) for the individual patient, as well as oral supplement suggestions.   Exercise and physical activity are encouraged.    Goals/Recommendations: diet adherence and small frequent meals and snacks  Actions Taken: instruct/provide written information  Strategies Used: " problem solving, goal setting, motivational interviewing  Patient and/or family comprehend instructions: yes , adherence expected  Outcome: Verbalizes understanding  Monitoring: Contact information provided, will f/u in clinic and communicate with the care team as needed.     Counseling Time: 15 minutes

## 2022-10-20 NOTE — PROGRESS NOTES
Arianna was  seen in clinic for [facility] evaluation.  Handbook on pre-liver transplant information (see outline below) was given to the patient.  Patient's caregiver accompanied her to scheduled appointments.. Patient viewed pre-liver transplant education slides via desktop in transplant clinic.  Informed consent signed and written information given on selection criteria.    LIVER TRANSPLANT WORK-UP EDUCATION   I. UNDERSTANDING THE TRANSPLANT PROCESS     A. Transplant team      B. MELD score      C. Balancing urgency and outcome     D. Liver Transplant Options         1.  Donor         2. Living Donor--rationale, benefits     E. Transplant Work-up         1. Medical         2. Psychological and Social--lifetime commitment, life changes, personal plan/ goal         3. Financial--fundraising     F.  Completed work-up and Next Steps    G. Wait Time         1.  Can be listed at more than one center         2.  Can transfer wait time     H. The Call       I. Possible donor options         1. DCD         2. Hep B Core and Hep C Positive         3. Increased Risk     J.  Liver Transplant Surgery         1. Length         2. Transfusions, cell saver         3. Surgical risks         4. What to expect after sugery--Central lines, drains, Mckenna catheter, incision, endotracheal              tube, NG tube, length of stay in ICU/ TSU  II.  HOW TO BEST CARE FOR YOURSELF (Take Five To Thrive)  III. UNDERSTANDING LIFE AFTER TRANSPLANT  A. Medicines after transplant      1. Immunosuppression--infection and rejection  B. Labs   IV. ADULT LIVER SURVIVAL RATES

## 2022-10-20 NOTE — PROGRESS NOTES
Patient seen in clinic with caregiver.  Consents reviewed and signatures obtained.   Patient given supplies needed to obtain urine specimen.    Patient's name and date of birth verified along with contact information..   Instructions reviewed with the patient on the way the specimen should be obtained.   Patient stated that  they understood the information provided for the collection and  placement of the specimen. Specimen collected in clinic.  Patient given supplies and printed instructions for the collection of the 24 hour urine specimen.  Patient reports understanding the instructions provided to obtain the specimen and the storage of the specimen while at home.  Patient given instructed to bring the container to 2nd floor lab when the collection is completed.  Patient questions answered and concerns addressed.

## 2022-10-20 NOTE — PROGRESS NOTES
Transplant Recipient Adult Psychosocial Assessment    [address]  [place] [address]  Telephone Information:   Mobile [phone]   Home  [phone] (home)  Work  [phone] (work)  E-mail  [email or web address]    Sex: female  YOB: 1950  Age: 72 y.o.    Encounter Date: 10/20/2022  [place] Citizen: yes  Primary Language: English   Needed: no    Emergency Contact:  Name: Iggy Sevilla  Relationship:   Address: same as patient  Phone Numbers:  [phone] (mobile)    Family/Social Support:   Number of dependents/: Patient has no dependents in need of   Marital history:   Other family dynamics: Patient and , Iggy, have been  for 50 years. Patient has 2 adults sons, Jayjay (age 46) and Arnoldo (age 42), who live in [place], [place]. Patient's parents are . Patient has 1 brother who lives in [place] and had 1 sister who is . Patient has 6 grandchildren. Patient reports having a large family and very strong support from family members, friends and Yazidi members.     Household Composition:  Name: Arianna Sevilla  Age: 72  Relationship: patient  Does person drive? yes    Name: Iggy Sevilla  Age: 72  Relationship:   Does person drive? yes    Patient has 2 dogs in her home who will have pet care from family/friends.     Do you and your caregivers have access to reliable transportation? yes    PRIMARY CAREGIVER: Iggy Sevilla () will be primary caregiver, phone number [phone]. Patient's  was present during this assessment and verbalizes commitment and availability to be patient's primary caregiver.      provided in-depth information to patient and caregiver regarding pre- and post-transplant caregiver role.   strongly encourages patient and caregiver to have concrete plan regarding post-transplant care giving, including back-up caregiver(s) to ensure care giving needs are met  as needed.    Patient and Caregiver states understanding all aspects of caregiver role/commitment and is able/willing/committed to being caregiver to the fullest extent necessary.    Patient and Caregiver verbalizes understanding of the education provided today and caregiver responsibilities.         remains available. Patient and Caregiver agree to contact  in a timely manner if concerns arise.      Able to take time off work without financial concerns: yes.     Additional Significant Others who will Assist with Transplant:  Name: Bernadette Sevilla (confirmed via phone call)  Age: 42  City: [place] State: [place]  Relationship:  daughter-in-law  Does person drive? yes  Phone: [phone]    Name: Renata Rolon  City: [place] State: [place]  Relationship: friend  Does person drive? yes  Phone: [phone]    Living Will: yes  Healthcare Power of : yes  Advance Directives on file: <<no information> per medical record.  Patient reports her adult sons, Arnoldo Sevilla and Jayjay Sevilla, are her POA's. Patient reports having documents at home and will file will medical records.    Highest Education Level: Associate/Bachelor Degree  Reading Ability: college  Reports difficulty with:  vision (wears prescription glasses)  Learns Best By: visual information     Status: no  VA Benefits: no     Working for Income: No  If no, reason not working: Patient Choice - Retired  Patient is retired from teaching.    Spouse/Significant Other Employment: retired    Disabled: no    Monthly Income:  residential: $6,300/mo  Able to afford all costs now and if transplanted, including medications: yes  Patient and Caregiver verbalizes understanding of personal responsibilities related to transplant costs and the importance of having a financial plan to ensure that patients transplant costs are fully covered.       provided fundraising information/education. Patient and  Caregiververbalizes understanding.   remains available.    Insurance:   Payer/Plan Subscr  Sex Relation Sub. Ins. ID Effective Group Num   1. MEDICARE - [place]* DELANO MOSES* 1950 Female Self [id number] 3/1/17                                    [address]   2. GENERIC COMME* DELANO MOSES* 1950 Female Self [id number] 3/1/17                                    [address] [facility] [place] [address]     Primary Insurance (for UNOS reporting): Public Insurance - Medicare & Choice  Secondary Insurance (for UNOS reporting): Public Insurance - Medicare & Choice  Patient and Caregiver verbalizes clear understanding that patient may experience difficulty obtaining and/or be denied insurance coverage post-surgery. This includes and is not limited to disability insurance, life insurance, health insurance, burial insurance, long term care insurance, and other insurances.      Patient and Caregiver also reports understanding that future health concerns related to or unrelated to transplantation may not be covered by patient's insurance.  Resources and information provided and reviewed.     Patient and Caregiver provides verbal permission to release any necessary information to outside resources for patient care and discharge planning.  Resources and information provided are reviewed.      Dialysis Adherence: Patient denies any history of dialysis  Infusion Service: patient utilizing? no  Home Health: patient utilizing? no  DME:  Patient utilizes a cane for knee/balance issues  Pulmonary/Cardiac Rehab: no   ADLS: Patient reports being independent with all ADLs and drives.     Adherence: Patient reports adhering with all medical and medication regimens.  Adherence education and counseling provided.     Per History Section:  Past Medical History:   Diagnosis Date    Anxiety disorder, unspecified     CAD (coronary artery disease)     DIC (disseminated intravascular coagulation)     Dyslipidemia     GERD  (gastroesophageal reflux disease)     Hereditary hemolytic anemia, unspecified     History of coronary artery stent placement     HTN (hypertension)     Liver cirrhosis secondary to BROWN     Portal hypertensive gastropathy     Primary localized osteoarthrosis of right lower leg     Statin intolerance      Social History     Tobacco Use    Smoking status: Never    Smokeless tobacco: Never   Substance Use Topics    Alcohol use: Never     Social History     Substance and Sexual Activity   Drug Use Not on file     Social History     Substance and Sexual Activity   Sexual Activity Not on file       Per Today's Psychosocial:  Tobacco: none, patient denies any use.  Alcohol: none, patient denies any use.  Illicit Drugs/Non-prescribed Medications: none, patient denies any use.    Patient and Caregiver states clear understanding of the potential impact of substance use as it relates to transplant candidacy and is aware of possible random substance screening.  Substance abstinence/cessation counseling, education and resources provided and reviewed.     Arrests/DWI/Treatment/Rehab: patient denies    Psychiatric History:    Mental Health:  Patient reports some mild anxiety since learning of cirrhosis. Patient reports she is coping well with support from family and [employer].  Psychiatrist/Counselor: Patient denies any mental health services  Medications: Patient reports she was given medication for anxiety but does not take it due to feeling dizzy. Patient reports no needs for medications for management.   Suicide/Homicide Issues: Patient denies any past or current SI or HI   Safety at home: Patient reports living in a safe environment at home and denies any safety concerns.     Knowledge: Patient and Caregiver states having clear understanding and realistic expectations regarding the potential risks and potential benefits of organ transplantation and organ donation and agrees to discuss with health care team members and support  system members, as well as to utilize available resources and express questions and/or concerns in order to further facilitate the pt informed decision-making.  Resources and information provided and reviewed.    Patient and Caregiver is aware of [facility]'s affiliation and/or partnership with agencies in home health care, LTAC, SNF, [facility], and other hospitals and clinics.    Understanding: Patient and Caregiver reports having a clear understanding of the many lifetime commitments involved with being a transplant recipient, including costs, compliance, medications, lab work, procedures, appointments, concrete and financial planning, preparedness, timely and appropriate communication of concerns, abstinence (ETOH, tobacco, illicit non-prescribed drugs), adherence to all health care team recommendations, support system and caregiver involvement, appropriate and timely resource utilization and follow-through, mental health counseling as needed/recommended, and patient and caregiver responsibilities.  Social Service Handbook, resources and detailed educational information provided and reviewed.  Educational information provided.    Patient and Caregiver also reports current and expected compliance with health care regime and states having a clear understanding of the importance of compliance.      Patient and Caregiver reports a clear understanding that risks and benefits may be involved with organ transplantation and with organ donation.       Patient and Caregiver also reports clear understanding that psychosocial risk factors may affect patient, and include but are not limited to feelings of depression, generalized anxiety, anxiety regarding dependence on others, post traumatic stress disorder, feelings of guilt and other emotional and/or mental concerns, and/or exacerbation of existing mental health concerns.  Detailed resources provided and discussed.      Patient and Caregiver agrees to access appropriate resources in  a timely manner as needed and/or as recommended, and to communicate concerns appropriately.  Patient and Caregiver also reports a clear understanding of treatment options available.     Patient and Caregiver received education in a group setting.   reviewed education, provided additional information, and answered questions.    Feelings or Concerns: Patient reports being ready for transplant     Coping: Identify Patient & Caregiver Strategies to [place]:   1. Currently & Pre-transplant - Patient enjoys reading, cooking, traveling and visiting family land in [place]   2. At the time of surgery - Patient plans to have support from family   3. During post-Transplant & Recovery Period - Patient plans to have support from family and read     Goals: Patient has goals to improve her health and life. Patient is also hoping to have her knee issue fixed after transplant. Patient and  plan to travel to the [facility].      Interview Behavior: Patient and Caregiver presents as alert and oriented x 4, pleasant, calm, communicative, and asking and answering questions appropriately.          Transplant Social Work - Candidacy  Assessment/Plan:     Psychosocial Suitability: Patient presents as  a low risk  candidate for transplant at this time. Based on psychosocial risk factors, patient presents as low risk, due to having strong support system and confirmed caregiver plan. Patient denies any tobacco, alcohol or illicit substance use. Patient reports stable finances and denies financial concerns. Patient denies significant mental health concerns.    Recommendations/Additional Comments:   -Fundraising  -Local Lodging (patient has a son who lives about 1 hour away that she may stay with if LR does not have availability)  -Patient to call transplant SW with any concerns or obstacles  -Plan was created in collaboration with patient and patient/caregiver verbalize agreement with plan as discussed.      MEKHI GIFFORD,  LCSW

## 2022-10-21 ENCOUNTER — OFFICE VISIT (OUTPATIENT)
Dept: INFECTIOUS DISEASES | Facility: CLINIC | Age: 72
End: 2022-10-21
Payer: MEDICARE

## 2022-10-21 ENCOUNTER — HOSPITAL ENCOUNTER (OUTPATIENT)
Dept: CARDIOLOGY | Facility: HOSPITAL | Age: 72
Discharge: HOME | End: 2022-10-21
Attending: INTERNAL MEDICINE
Payer: MEDICARE

## 2022-10-21 ENCOUNTER — HOSPITAL ENCOUNTER (OUTPATIENT)
Dept: RADIOLOGY | Facility: HOSPITAL | Age: 72
Discharge: HOME | End: 2022-10-21
Attending: INTERNAL MEDICINE
Payer: MEDICARE

## 2022-10-21 VITALS
HEART RATE: 73 BPM | DIASTOLIC BLOOD PRESSURE: 72 MMHG | HEIGHT: 62 IN | WEIGHT: 174 LBS | SYSTOLIC BLOOD PRESSURE: 190 MMHG | BODY MASS INDEX: 32.02 KG/M2

## 2022-10-21 VITALS
DIASTOLIC BLOOD PRESSURE: 73 MMHG | BODY MASS INDEX: 32.02 KG/M2 | HEIGHT: 62 IN | HEART RATE: 82 BPM | SYSTOLIC BLOOD PRESSURE: 148 MMHG | WEIGHT: 174 LBS

## 2022-10-21 VITALS
SYSTOLIC BLOOD PRESSURE: 144 MMHG | BODY MASS INDEX: 32.14 KG/M2 | WEIGHT: 174.63 LBS | DIASTOLIC BLOOD PRESSURE: 65 MMHG | HEART RATE: 80 BPM | HEIGHT: 62 IN | TEMPERATURE: 98 F

## 2022-10-21 DIAGNOSIS — Z76.82 ORGAN TRANSPLANT CANDIDATE: ICD-10-CM

## 2022-10-21 DIAGNOSIS — Z01.818 ENCOUNTER FOR PRE-TRANSPLANT EVALUATION FOR CHRONIC LIVER DISEASE: Primary | ICD-10-CM

## 2022-10-21 LAB
ASCENDING AORTA: 3.46 CM
AV INDEX (PROSTH): 0.83
AV MEAN GRADIENT: 5 MMHG
AV PEAK GRADIENT: 11 MMHG
AV VALVE AREA: 2.78 CM2
AV VELOCITY RATIO: 0.75
BSA FOR ECHO PROCEDURE: 1.86 M2
CV ECHO LV RWT: 0.33 CM
CV PHARM DOSE: 0.4 MG
CV STRESS BASE HR: 73 BPM
DIASTOLIC BLOOD PRESSURE: 72 MMHG
DOP CALC AO PEAK VEL: 1.69 M/S
DOP CALC AO VTI: 34.85 CM
DOP CALC LVOT AREA: 3.3 CM2
DOP CALC LVOT DIAMETER: 2.06 CM
DOP CALC LVOT PEAK VEL: 1.27 M/S
DOP CALC LVOT STROKE VOLUME: 96.81 CM3
DOP CALCLVOT PEAK VEL VTI: 29.06 CM
E WAVE DECELERATION TIME: 227 MSEC
E/A RATIO: 0.86
E/E' RATIO: 9.37 M/S
ECHO LV POSTERIOR WALL: 0.72 CM (ref 0.6–1.1)
EJECTION FRACTION- HIGH: 65 %
EJECTION FRACTION: 65 %
END DIASTOLIC INDEX-HIGH: 153 ML/M2
END DIASTOLIC INDEX-LOW: 93 ML/M2
END SYSTOLIC INDEX-HIGH: 71 ML/M2
END SYSTOLIC INDEX-LOW: 31 ML/M2
FRACTIONAL SHORTENING: 34 % (ref 28–44)
INTERVENTRICULAR SEPTUM: 0.7 CM (ref 0.6–1.1)
IVRT: 82.78 MSEC
LA MAJOR: 4.78 CM
LA MINOR: 5.27 CM
LA WIDTH: 4.11 CM
LEFT ATRIUM SIZE: 4.19 CM
LEFT ATRIUM VOLUME INDEX MOD: 33.3 ML/M2
LEFT ATRIUM VOLUME INDEX: 40.8 ML/M2
LEFT ATRIUM VOLUME MOD: 59.85 CM3
LEFT ATRIUM VOLUME: 73.38 CM3
LEFT INTERNAL DIMENSION IN SYSTOLE: 2.92 CM (ref 2.1–4)
LEFT VENTRICLE DIASTOLIC VOLUME INDEX: 49.47 ML/M2
LEFT VENTRICLE DIASTOLIC VOLUME: 89.05 ML
LEFT VENTRICLE MASS INDEX: 53 G/M2
LEFT VENTRICLE SYSTOLIC VOLUME INDEX: 18.1 ML/M2
LEFT VENTRICLE SYSTOLIC VOLUME: 32.64 ML
LEFT VENTRICULAR INTERNAL DIMENSION IN DIASTOLE: 4.43 CM (ref 3.5–6)
LEFT VENTRICULAR MASS: 94.84 G
LV LATERAL E/E' RATIO: 8.09 M/S
LV SEPTAL E/E' RATIO: 11.13 M/S
MV A" WAVE DURATION": 12.84 MSEC
MV PEAK A VEL: 1.03 M/S
MV PEAK E VEL: 0.89 M/S
MV STENOSIS PRESSURE HALF TIME: 65.83 MS
MV VALVE AREA P 1/2 METHOD: 3.34 CM2
OHS CV CPX 1 MINUTE RECOVERY HEART RATE: 93 BPM
OHS CV CPX 85 PERCENT MAX PREDICTED HEART RATE MALE: 123
OHS CV CPX MAX PREDICTED HEART RATE: 144
OHS CV CPX PATIENT IS FEMALE: 1
OHS CV CPX PATIENT IS MALE: 0
OHS CV CPX PEAK DIASTOLIC BLOOD PRESSURE: 61 MMHG
OHS CV CPX PEAK HEAR RATE: 94 BPM
OHS CV CPX PEAK RATE PRESSURE PRODUCT: NORMAL
OHS CV CPX PEAK SYSTOLIC BLOOD PRESSURE: 196 MMHG
OHS CV CPX PERCENT MAX PREDICTED HEART RATE ACHIEVED: 65
OHS CV CPX RATE PRESSURE PRODUCT PRESENTING: NORMAL
PISA TR MAX VEL: 2.6 M/S
PULM VEIN S/D RATIO: 2.02
PV PEAK D VEL: 0.48 M/S
PV PEAK S VEL: 0.97 M/S
RA MAJOR: 4.24 CM
RA PRESSURE: 3 MMHG
RA WIDTH: 3.86 CM
RETIRED EF AND QEF - SEE NOTES: 53 %
RIGHT VENTRICULAR END-DIASTOLIC DIMENSION: 3.75 CM
RV TISSUE DOPPLER FREE WALL SYSTOLIC VELOCITY 1 (APICAL 4 CHAMBER VIEW): 16.2 CM/S
SINUS: 3.32 CM
STJ: 2.76 CM
SUMMED DIFFERENCE: 0
SUMMED REST SCORE: 0
SUMMED STRESS SCORE: 0
SYSTOLIC BLOOD PRESSURE: 190 MMHG
TDI LATERAL: 0.11 M/S
TDI SEPTAL: 0.08 M/S
TDI: 0.1 M/S
TR MAX PG: 27 MMHG
TRICUSPID ANNULAR PLANE SYSTOLIC EXCURSION: 2.92 CM
TV REST PULMONARY ARTERY PRESSURE: 30 MMHG

## 2022-10-21 PROCEDURE — 99213 OFFICE O/P EST LOW 20 MIN: CPT | Mod: PBBFAC,25,TXP | Performed by: INTERNAL MEDICINE

## 2022-10-21 PROCEDURE — 99205 OFFICE O/P NEW HI 60 MIN: CPT | Mod: S$PBB,TXP,, | Performed by: INTERNAL MEDICINE

## 2022-10-21 PROCEDURE — A9502 TC99M TETROFOSMIN: HCPCS | Mod: TXP

## 2022-10-21 PROCEDURE — 93018 NUCLEAR STRESS - CARDIOLOGY INTERPRETED (CUPID ONLY): ICD-10-PCS | Mod: TXP,,, | Performed by: INTERNAL MEDICINE

## 2022-10-21 PROCEDURE — 93975 US ABDOMEN COMP WITH DOPPLER_PRE LIVER TRANSPLANT: ICD-10-PCS | Mod: 26,TXP,, | Performed by: STUDENT IN AN ORGANIZED HEALTH CARE EDUCATION/TRAINING PROGRAM

## 2022-10-21 PROCEDURE — 78452 NUCLEAR STRESS - CARDIOLOGY INTERPRETED (CUPID ONLY): ICD-10-PCS | Mod: 26,TXP,, | Performed by: INTERNAL MEDICINE

## 2022-10-21 PROCEDURE — 93306 ECHO (CUPID ONLY): ICD-10-PCS | Mod: 26,TXP,, | Performed by: INTERNAL MEDICINE

## 2022-10-21 PROCEDURE — 93016 CV STRESS TEST SUPVJ ONLY: CPT | Mod: TXP,,, | Performed by: INTERNAL MEDICINE

## 2022-10-21 PROCEDURE — 76700 US EXAM ABDOM COMPLETE: CPT | Mod: 26,TXP,, | Performed by: STUDENT IN AN ORGANIZED HEALTH CARE EDUCATION/TRAINING PROGRAM

## 2022-10-21 PROCEDURE — 99999 PR PBB SHADOW E&M-EST. PATIENT-LVL III: ICD-10-PCS | Mod: PBBFAC,TXP,, | Performed by: INTERNAL MEDICINE

## 2022-10-21 PROCEDURE — 99205 PR OFFICE/OUTPT VISIT, NEW, LEVL V, 60-74 MIN: ICD-10-PCS | Mod: S$PBB,TXP,, | Performed by: INTERNAL MEDICINE

## 2022-10-21 PROCEDURE — 99999 PR PBB SHADOW E&M-EST. PATIENT-LVL III: CPT | Mod: PBBFAC,TXP,, | Performed by: INTERNAL MEDICINE

## 2022-10-21 PROCEDURE — 93975 VASCULAR STUDY: CPT | Mod: 26,TXP,, | Performed by: STUDENT IN AN ORGANIZED HEALTH CARE EDUCATION/TRAINING PROGRAM

## 2022-10-21 PROCEDURE — 76700 US ABDOMEN COMP WITH DOPPLER_PRE LIVER TRANSPLANT: ICD-10-PCS | Mod: 26,TXP,, | Performed by: STUDENT IN AN ORGANIZED HEALTH CARE EDUCATION/TRAINING PROGRAM

## 2022-10-21 PROCEDURE — 93306 TTE W/DOPPLER COMPLETE: CPT | Mod: TXP

## 2022-10-21 PROCEDURE — 93018 CV STRESS TEST I&R ONLY: CPT | Mod: TXP,,, | Performed by: INTERNAL MEDICINE

## 2022-10-21 PROCEDURE — 63600175 PHARM REV CODE 636 W HCPCS: Mod: TXP | Performed by: INTERNAL MEDICINE

## 2022-10-21 PROCEDURE — 93016 NUCLEAR STRESS - CARDIOLOGY INTERPRETED (CUPID ONLY): ICD-10-PCS | Mod: TXP,,, | Performed by: INTERNAL MEDICINE

## 2022-10-21 PROCEDURE — 93975 VASCULAR STUDY: CPT | Mod: TC,TXP

## 2022-10-21 PROCEDURE — 78452 HT MUSCLE IMAGE SPECT MULT: CPT | Mod: 26,TXP,, | Performed by: INTERNAL MEDICINE

## 2022-10-21 PROCEDURE — 93306 TTE W/DOPPLER COMPLETE: CPT | Mod: 26,TXP,, | Performed by: INTERNAL MEDICINE

## 2022-10-21 RX ORDER — CAFFEINE CITRATE 20 MG/ML
60 SOLUTION INTRAVENOUS
Status: COMPLETED | OUTPATIENT
Start: 2022-10-21 | End: 2022-10-21

## 2022-10-21 RX ORDER — PANTOPRAZOLE SODIUM 40 MG/1
40 TABLET, DELAYED RELEASE ORAL DAILY
COMMUNITY
Start: 2022-08-25 | End: 2023-04-17 | Stop reason: SDUPTHER

## 2022-10-21 RX ORDER — REGADENOSON 0.08 MG/ML
0.4 INJECTION, SOLUTION INTRAVENOUS ONCE
Status: COMPLETED | OUTPATIENT
Start: 2022-10-21 | End: 2022-10-21

## 2022-10-21 RX ORDER — AZILSARTAN KAMEDOXOMIL 80 MG/1
1 TABLET ORAL DAILY
Status: ON HOLD | COMMUNITY
Start: 2022-06-09 | End: 2023-02-15 | Stop reason: HOSPADM

## 2022-10-21 RX ADMIN — REGADENOSON 0.4 MG: 0.08 INJECTION, SOLUTION INTRAVENOUS at 10:10

## 2022-10-21 RX ADMIN — CAFFEINE CITRATE 60 MG: 20 INJECTION INTRAVENOUS at 10:10

## 2022-10-21 NOTE — PROGRESS NOTES
PRE-TRANSPLANT INFECTIOUS DISEASE CONSULT    Reason for Visit:  Pre-transplant evaluation  Referring Provider: Aaareferral Self     History of Present Illness:    72 y.o. female with a history of BROWN presents for pre-liver transplant evaluation.    Infectious History:  Recent hospital admissions: No  Recent infections: No  Recent or current antibiotic use: No  History of recurrent infections *(sinus / pneumonia / UTI / SBP)*: No  Recent dental infections, issues or procedures: No  History of chicken pox or shingles: Yes  History of STI: No  History of COVID infection: Yes    History of Immunosuppression:  Prior chemotherapy / immunosuppression: No  Prior transplant: No  History of splenectomy: No    Tuberculosis:  Prior screening for latent TB: No  Prior diagnosis of latent TB: No  Risk factors for TB *known exposure, incarceration, homelessness*: No    Geographical exposures:  Currently lives in [place] with   Lived in the following states: [place]  Lived in [facility] [place]: No  International travel: No  Travel-associated illness: No    Social/Environmental:  Occupation:  retired teacher  Pets: Yes - dogs  Livestock: No  Fishing / hunting: No  Hobbies: reading  Water: [place]  Consumption of raw/undercooked meat or seafood?  No  Tobacco: No  Alcohol: No  Recreational drug use:  No  Sexual partners:       Past Histories:   Past Medical History:   Diagnosis Date    Anxiety disorder, unspecified     CAD (coronary artery disease)     DIC (disseminated intravascular coagulation)     Dyslipidemia     GERD (gastroesophageal reflux disease)     Hereditary hemolytic anemia, unspecified     History of coronary artery stent placement     HTN (hypertension)     Liver cirrhosis secondary to BROWN     Portal hypertensive gastropathy     Primary localized osteoarthrosis of right lower leg     Statin intolerance      Past Surgical History:   Procedure Laterality Date    ADENOIDECTOMY      ANTERIOR CRUCIATE LIGAMENT REPAIR       BACK SURGERY      CARDIAC CATHETERIZATION  05/31/2018    CARDIAC CATHETERIZATION  09/28/2021    coronary angioplasty    CHOLECYSTECTOMY      COLONOSCOPY  05/25/2022    GALLBLADDER SURGERY      HYSTERECTOMY      KNEE ARTHROSCOPY Right     OOPHORECTOMY      REDUCTION OF BOTH BREASTS  01/01/2011    TONSILLECTOMY      TUBAL LIGATION       Family History   Problem Relation Age of Onset    Stroke Mother     Hyperlipidemia Mother     Asthma Mother     Hyperlipidemia Father     Stroke Father     Hyperlipidemia Brother     Cancer Maternal Grandmother      Social History     Tobacco Use    Smoking status: Never    Smokeless tobacco: Never   Substance Use Topics    Alcohol use: Never     Review of patient's allergies indicates:   Allergen Reactions    Codeine Anxiety         Immunization History:  Received all childhood vaccines: Yes  All household members receive annual flu vaccine: Yes  All household members are up to date on COVID vaccine: Yes    Immunization History   Administered Date(s) Administered    COVID-19, MRNA, LN-S, PF (MODERNA FULL 0.5 ML DOSE) 01/29/2021, 02/26/2021, 12/07/2021    Influenza - High Dose - PF (65 years and older) 09/13/2018, 11/02/2021    Influenza - Quadrivalent - PF *Preferred* (6 months and older) 09/26/2013, 10/08/2014, 11/19/2015, 10/03/2016, 09/05/2017, 10/02/2019, 09/14/2020    Influenza - Trivalent - PF (ADULT) 10/28/2008, 11/09/2009, 10/22/2010, 09/25/2012    Pneumococcal Conjugate - 13 Valent 04/20/2017    Pneumococcal Polysaccharide - 23 Valent 06/15/2018    Tdap 09/19/2013    Zoster 07/21/2015          Current antibiotics:  Antibiotics (From admission, onward)      None              Review of Systems  Review of Systems   All other systems reviewed and are negative.       Objective  Physical Exam  Constitutional:       Appearance: She is well-developed.   HENT:      Head: Normocephalic and atraumatic.   Eyes:      Pupils: Pupils are equal, round, and reactive to light.    Cardiovascular:      Rate and Rhythm: Normal rate.   Pulmonary:      Effort: Pulmonary effort is normal.      Breath sounds: Normal breath sounds.   Abdominal:      General: Bowel sounds are normal.      Palpations: Abdomen is soft.   Musculoskeletal:         General: No tenderness.      Cervical back: Normal range of motion and neck supple.   Skin:     General: Skin is warm and dry.   Neurological:      Mental Status: She is alert and oriented to person, place, and time.         MELD: *liver transplant only*  MELD-Na score: 23 at 10/20/2022  8:05 AM  MELD score: 19 at 10/20/2022  8:05 AM  Calculated from:  Serum Creatinine: 0.7 mg/dL (Using min of 1 mg/dL) at 10/20/2022  8:05 AM  Serum Sodium: 131 mmol/L at 10/20/2022  8:05 AM  Total Bilirubin: 8.8 mg/dL at 10/20/2022  8:05 AM  INR(ratio): 1.5 at 10/20/2022  8:05 AM  Age: 72 years      Labs:    CBC:   Lab Results   Component Value Date    WBC 6.82 10/03/2022    HGB 12.9 10/03/2022    HCT 35.9 (L) 10/03/2022     (H) 10/03/2022     (L) 10/03/2022    GRAN 2.8 10/03/2022    GRAN 41.5 10/03/2022    LYMPH 2.1 10/03/2022    LYMPH 30.4 10/03/2022    MONO 0.9 10/03/2022    MONO 13.2 10/03/2022    EOSINOPHIL 12.5 (H) 10/03/2022       CMP:   Lab Results   Component Value Date     (L) 10/20/2022    K 3.1 (L) 10/20/2022    CL 97 10/20/2022    CO2 25 10/20/2022    GLU 91 10/20/2022    BUN 8 10/20/2022    CREATININE 0.7 10/20/2022    CALCIUM 9.0 10/20/2022    ALBUMIN 3.3 (L) 10/20/2022    PROT 6.9 10/20/2022    ALT 27 10/20/2022    AST 56 (H) 10/20/2022    GGT 35 10/20/2022    ALKPHOS 163 (H) 10/20/2022    BILITOT 8.8 (H) 10/20/2022       Syphilis screening: No results found for: RPR, PRPQ, FTAABS     TB screening: No results found for: TBGOLDPLUS, TSPOTSCREN    HIV screening:   Lab Results   Component Value Date    [id number] Non-reactive 10/20/2022       Strongyloides IgG: No results found for: STRONGANTIGG    Hepatitis Serologies:   Lab Results   Component  Value Date    HEPAIGG Non-reactive 10/03/2022    HEPBCAB Non-reactive 10/03/2022    HEPBSAB <3.00 10/20/2022    HEPBSAB Non-reactive 10/20/2022    HEPCAB Non-reactive 10/03/2022        Varicella IgG: No results found for: VARICELLAINT    Recent Microbiology:   Microbiology Results (last 7 days)       ** No results found for the last 168 hours. **              Radiology:          Assessment and Plan    1. Risks of Infection: Available serologies were reviewed. No unusual risks of infection or significant barriers to transplantation were identified from the infectious disease standpoint.   - Pending serologies: none    2. Immunizations:  Based on the patient's immunization history and serologies, the following immunizations are recommended:  - Hepatitis A    Patient does not have immunity to hepatitis A    Vaccination required: Yes (getting 3rd dose of twinrix 2/2023   - Hepatitis B    Patient does not have immunity to hepatitis B    Vaccination ordered today: No    If not ordered, reason for not vaccinating: Other (specify) getting 3rd dose of twinrix 1/2023   - COVID    Current CDC vaccination recommendations were discussed with the patient   - Influenza     Annual, high dose preferred   - Prevnar 20   - Tdap   - Shingrix    Recommended Pre-Transplant Immunization Schedule  Vaccine  0m 1m 2m 6m   Pneumococcal conjugate vaccine (Prevnar 20) X      Tetanus-diphtheria-pertussis (Tdap)* X      Hepatitis A Vaccine (Havrix)** X   X   Hepatitis B Vaccine (Heplisav)** X X     Influenza X      Zoster Recombinant Vaccine (Shingrix) X  X           *Administer booster every 10 years.       **Administer if no immunity demonstrated on serologies                 3. Counseling:   I discussed with the patient the risk for increased susceptibility to infections following transplantation including increased risk for infection right after transplant and if rejection should occur.  The patient has been counseled on the importance of  vaccinations including but not limited to a yearly flu vaccine. Patient was also instructed to encourage that family/caretakers receive their flu vaccine yearly. The patient was encouraged to contact us about any problems that may develop after immunizations and possible side effects were reviewed.     Specific guidance has been provided to the patient regarding the patient's occupation, hobbies and activities to avoid future infectious complications. These include but are not limited to: avoiding raw/undercooked meats and seafood, avoiding unpasteurized milk/cheeses, proper (hand) hygiene, contact with animals and appropriate vaccination of animals, use of mosquito/tick precautions, avoiding walking barefoot, avoiding sick contacts, and seeking medical advice prior to foreign travel (specifically developing countries).     4. Transplant Candidacy: Based on available information, there are no identified significant barriers to transplantation from an infectious disease standpoint.  Final determination of transplant candidacy will be made once evaluation is complete and reviewed by the Selection Committee.      Follow up with infectious disease as needed. Please call if any pending serologic testing is positive.    Discussed care with txp team/provider

## 2022-10-28 ENCOUNTER — DOCUMENTATION ONLY (OUTPATIENT)
Dept: TRANSPLANT | Facility: CLINIC | Age: 72
End: 2022-10-28

## 2022-11-01 ENCOUNTER — DOCUMENTATION ONLY (OUTPATIENT)
Dept: TRANSPLANT | Facility: CLINIC | Age: 72
End: 2022-11-01
Payer: MEDICARE

## 2022-11-02 ENCOUNTER — TELEPHONE (OUTPATIENT)
Dept: TRANSPLANT | Facility: CLINIC | Age: 72
End: 2022-11-02
Payer: MEDICARE

## 2022-11-02 ENCOUNTER — COMMITTEE REVIEW (OUTPATIENT)
Dept: TRANSPLANT | Facility: CLINIC | Age: 72
End: 2022-11-02

## 2022-11-02 ENCOUNTER — DOCUMENTATION ONLY (OUTPATIENT)
Dept: TRANSPLANT | Facility: CLINIC | Age: 72
End: 2022-11-02
Payer: MEDICARE

## 2022-11-03 NOTE — TELEPHONE ENCOUNTER
Patient notified that she has been approved for liver transplant pending insurance drug coverage that will take affect on January 1, 2023

## 2022-11-03 NOTE — COMMITTEE REVIEW
Argelia Sevilla's case presented to selection committee.  Patient has been accepted for liver transplant due to BROWN cirrhosis and complications of end stage liver disease including Anemia, Coagulopathy, Thrombocytopenia, Hyponatremia, Hypoalbuminemia, Hyperbilirubinemia, Ascites, splenomegaly with a MELD score of 23.  Patient has no absolute contraindications for liver transplant.  Patient will be listed pending financial approval. Patient can be listed when her medication insurance coverage begins on January 1, 2022.     Patient will accept HBcAb positive livers.  Patient will accept HCVAB positive livers.  Patient will accept DCD livers.  Patient will accept HCV FRANNIE positive livers  Patient will accept HBV FRANNIE positive livers  I was present at the committee meeting and attest to the decision of the committee.    Houston Crawford  11/04/2022

## 2022-11-09 NOTE — PROGRESS NOTES
Pre-liver transplant education provided via slide show in exam room of transplant clinic.  E-consents obtained by Aishwarya Sinclair LPN and can be found under the Media tab.  HEYDI and HIV consent submitted to MIKO Singh MA for scanning.

## 2022-12-05 ENCOUNTER — TELEPHONE (OUTPATIENT)
Dept: TRANSPLANT | Facility: CLINIC | Age: 72
End: 2022-12-05
Payer: MEDICARE

## 2022-12-05 DIAGNOSIS — Z76.82 ORGAN TRANSPLANT CANDIDATE: Primary | ICD-10-CM

## 2022-12-05 NOTE — TELEPHONE ENCOUNTER
Update:  Liver transplant committee APPROVED patient on 11/4/22.  Insurance approved patient for listing.   [facility] will  list patient for transplant after January 1st when drug insurance coverage takes affect.     Patient has outpatient MELD labs scheduled at [facility] on 12/6/22.    Patient has follow-up appointment with Dr. Crawford on 12/23/22  with labs and is aware of appt.

## 2022-12-06 LAB
EXT ALBUMIN: 3.4
EXT ALT: 23
EXT AST: 54
EXT BILIRUBIN DIRECT: 1.9 MG/DL
EXT BILIRUBIN TOTAL: 7.7
EXT BUN: 9
EXT CHLORIDE: 96
EXT CO2: 96
EXT GFR MDRD NON AF AMER: 112
EXT GLUCOSE: 96
EXT HEMATOCRIT: 34
EXT HEMOGLOBIN: 11.9
EXT INR: 2
EXT PLATELETS: 98
EXT POTASSIUM: 3.4
EXT PROTEIN TOTAL: 6.9
EXT PT: 23.4
EXT SODIUM: 134 MMOL/L
EXT WBC: 3.2

## 2022-12-08 ENCOUNTER — DOCUMENTATION ONLY (OUTPATIENT)
Dept: TRANSPLANT | Facility: CLINIC | Age: 72
End: 2022-12-08
Payer: MEDICARE

## 2022-12-12 ENCOUNTER — TELEPHONE (OUTPATIENT)
Dept: TRANSPLANT | Facility: CLINIC | Age: 72
End: 2022-12-12
Payer: MEDICARE

## 2022-12-12 NOTE — TELEPHONE ENCOUNTER
----- Message from Zarina Sinclair sent at 12/12/2022  3:22 PM CST -----  Regarding: Pt Call Back  Pt called stating that she received a letter and didn't understand it. She would like to speak with Paul Andersen      Contact Arianna [phone]

## 2022-12-12 NOTE — TELEPHONE ENCOUNTER
"Patient received a text from "[facility]" & was wondering if it came from [facility]. Explained to pt that our messages would have "[facility]" in the message. Asked if this was perhaps a place she gets labs done for Paul. Pt stated that it was not. Advised her that if it's not somewhere she's been recently, it's probably OK to ignore as it is not from the transplant team & is likely a scam or a mistake. Understanding expressed. No other questions at this time.  "

## 2022-12-23 ENCOUNTER — OFFICE VISIT (OUTPATIENT)
Dept: TRANSPLANT | Facility: CLINIC | Age: 72
End: 2022-12-23
Payer: MEDICARE

## 2022-12-23 ENCOUNTER — LAB VISIT (OUTPATIENT)
Dept: LAB | Facility: HOSPITAL | Age: 72
End: 2022-12-23
Attending: INTERNAL MEDICINE
Payer: MEDICARE

## 2022-12-23 VITALS
RESPIRATION RATE: 18 BRPM | DIASTOLIC BLOOD PRESSURE: 70 MMHG | HEART RATE: 84 BPM | OXYGEN SATURATION: 97 % | HEIGHT: 62 IN | SYSTOLIC BLOOD PRESSURE: 152 MMHG | WEIGHT: 168.19 LBS | TEMPERATURE: 97 F | BODY MASS INDEX: 30.95 KG/M2

## 2022-12-23 DIAGNOSIS — Z76.82 ORGAN TRANSPLANT CANDIDATE: ICD-10-CM

## 2022-12-23 DIAGNOSIS — R18.8 OTHER ASCITES: Primary | ICD-10-CM

## 2022-12-23 LAB
ALBUMIN SERPL BCP-MCNC: 2.9 G/DL (ref 3.5–5.2)
ALP SERPL-CCNC: 114 U/L (ref 55–135)
ALT SERPL W/O P-5'-P-CCNC: 23 U/L (ref 10–44)
AMPHET+METHAMPHET UR QL: NEGATIVE
ANION GAP SERPL CALC-SCNC: 13 MMOL/L (ref 8–16)
AST SERPL-CCNC: 55 U/L (ref 10–40)
BARBITURATES UR QL SCN>200 NG/ML: NEGATIVE
BASOPHILS # BLD AUTO: 0.1 K/UL (ref 0–0.2)
BASOPHILS NFR BLD: 1.5 % (ref 0–1.9)
BENZODIAZ UR QL SCN>200 NG/ML: NEGATIVE
BILIRUB SERPL-MCNC: 11.2 MG/DL (ref 0.1–1)
BUN SERPL-MCNC: 8 MG/DL (ref 8–23)
BZE UR QL SCN: NEGATIVE
CALCIUM SERPL-MCNC: 8.4 MG/DL (ref 8.7–10.5)
CANNABINOIDS UR QL SCN: NEGATIVE
CHLORIDE SERPL-SCNC: 95 MMOL/L (ref 95–110)
CO2 SERPL-SCNC: 27 MMOL/L (ref 23–29)
CREAT SERPL-MCNC: 0.7 MG/DL (ref 0.5–1.4)
CREAT UR-MCNC: 116 MG/DL (ref 15–325)
DIFFERENTIAL METHOD: ABNORMAL
EOSINOPHIL # BLD AUTO: 0.5 K/UL (ref 0–0.5)
EOSINOPHIL NFR BLD: 7.3 % (ref 0–8)
ERYTHROCYTE [DISTWIDTH] IN BLOOD BY AUTOMATED COUNT: 15.3 % (ref 11.5–14.5)
EST. GFR  (NO RACE VARIABLE): >60 ML/MIN/1.73 M^2
ETHANOL UR-MCNC: <10 MG/DL
GLUCOSE SERPL-MCNC: 98 MG/DL (ref 70–110)
HCT VFR BLD AUTO: 32.1 % (ref 37–48.5)
HGB BLD-MCNC: 11.2 G/DL (ref 12–16)
IMM GRANULOCYTES # BLD AUTO: 0.11 K/UL (ref 0–0.04)
IMM GRANULOCYTES NFR BLD AUTO: 1.6 % (ref 0–0.5)
INR PPP: 1.7 (ref 0.8–1.2)
LYMPHOCYTES # BLD AUTO: 1.7 K/UL (ref 1–4.8)
LYMPHOCYTES NFR BLD: 24.7 % (ref 18–48)
MCH RBC QN AUTO: 37.2 PG (ref 27–31)
MCHC RBC AUTO-ENTMCNC: 34.9 G/DL (ref 32–36)
MCV RBC AUTO: 107 FL (ref 82–98)
METHADONE UR QL SCN>300 NG/ML: NEGATIVE
MONOCYTES # BLD AUTO: 0.9 K/UL (ref 0.3–1)
MONOCYTES NFR BLD: 13.5 % (ref 4–15)
NEUTROPHILS # BLD AUTO: 3.6 K/UL (ref 1.8–7.7)
NEUTROPHILS NFR BLD: 51.4 % (ref 38–73)
NRBC BLD-RTO: 0 /100 WBC
OPIATES UR QL SCN: NEGATIVE
PCP UR QL SCN>25 NG/ML: NEGATIVE
PLATELET # BLD AUTO: 121 K/UL (ref 150–450)
PMV BLD AUTO: 9.9 FL (ref 9.2–12.9)
POTASSIUM SERPL-SCNC: 2.8 MMOL/L (ref 3.5–5.1)
PROT SERPL-MCNC: 6.7 G/DL (ref 6–8.4)
PROTHROMBIN TIME: 17.2 SEC (ref 9–12.5)
RBC # BLD AUTO: 3.01 M/UL (ref 4–5.4)
SODIUM SERPL-SCNC: 135 MMOL/L (ref 136–145)
TOXICOLOGY INFORMATION: NORMAL
WBC # BLD AUTO: 6.89 K/UL (ref 3.9–12.7)

## 2022-12-23 PROCEDURE — 36415 COLL VENOUS BLD VENIPUNCTURE: CPT | Mod: TXP | Performed by: INTERNAL MEDICINE

## 2022-12-23 PROCEDURE — 99213 OFFICE O/P EST LOW 20 MIN: CPT | Mod: PBBFAC,TXP | Performed by: INTERNAL MEDICINE

## 2022-12-23 PROCEDURE — 85025 COMPLETE CBC W/AUTO DIFF WBC: CPT | Mod: TXP | Performed by: INTERNAL MEDICINE

## 2022-12-23 PROCEDURE — 99215 PR OFFICE/OUTPT VISIT, EST, LEVL V, 40-54 MIN: ICD-10-PCS | Mod: S$PBB,TXP,, | Performed by: INTERNAL MEDICINE

## 2022-12-23 PROCEDURE — 99999 PR PBB SHADOW E&M-EST. PATIENT-LVL III: ICD-10-PCS | Mod: PBBFAC,TXP,, | Performed by: INTERNAL MEDICINE

## 2022-12-23 PROCEDURE — 80307 DRUG TEST PRSMV CHEM ANLYZR: CPT | Mod: TXP | Performed by: INTERNAL MEDICINE

## 2022-12-23 PROCEDURE — 99215 OFFICE O/P EST HI 40 MIN: CPT | Mod: S$PBB,TXP,, | Performed by: INTERNAL MEDICINE

## 2022-12-23 PROCEDURE — 80053 COMPREHEN METABOLIC PANEL: CPT | Mod: TXP | Performed by: INTERNAL MEDICINE

## 2022-12-23 PROCEDURE — 80321 ALCOHOLS BIOMARKERS 1OR 2: CPT | Mod: TXP | Performed by: INTERNAL MEDICINE

## 2022-12-23 PROCEDURE — 99999 PR PBB SHADOW E&M-EST. PATIENT-LVL III: CPT | Mod: PBBFAC,TXP,, | Performed by: INTERNAL MEDICINE

## 2022-12-23 PROCEDURE — 85610 PROTHROMBIN TIME: CPT | Mod: TXP | Performed by: INTERNAL MEDICINE

## 2022-12-23 RX ORDER — SPIRONOLACTONE 50 MG/1
50 TABLET, FILM COATED ORAL DAILY
Qty: 30 TABLET | Refills: 11 | Status: SHIPPED | OUTPATIENT
Start: 2022-12-23 | End: 2022-12-23 | Stop reason: SDUPTHER

## 2022-12-23 RX ORDER — FUROSEMIDE 20 MG/1
20 TABLET ORAL DAILY
Qty: 30 TABLET | Refills: 11 | Status: ON HOLD | OUTPATIENT
Start: 2022-12-23 | End: 2023-02-15 | Stop reason: HOSPADM

## 2022-12-23 RX ORDER — SPIRONOLACTONE 50 MG/1
50 TABLET, FILM COATED ORAL DAILY
Qty: 30 TABLET | Refills: 11 | Status: ON HOLD | OUTPATIENT
Start: 2022-12-23 | End: 2023-02-15 | Stop reason: HOSPADM

## 2022-12-23 RX ORDER — FUROSEMIDE 20 MG/1
20 TABLET ORAL DAILY
Qty: 30 TABLET | Refills: 11 | Status: SHIPPED | OUTPATIENT
Start: 2022-12-23 | End: 2022-12-23 | Stop reason: SDUPTHER

## 2022-12-23 NOTE — LETTER
December 23, 2022        Iggy Contreras  [address]  [facility] [place] [address]  Phone: [phone]  Fax: [phone]             [facility]st Fl  [address]  [facility] [address]  Phone: [phone]   Patient: Argelia Sevilla   MR Number: [id number]   YOB: 1950   Date of Visit: 12/23/2022       Dear Dr. Iggy Contreras    Thank you for referring Argelia Sevilla to me for evaluation. Attached you will find relevant portions of my assessment and plan of care.    If you have questions, please do not hesitate to call me. I look forward to following Argelia Sevilla along with you.    Sincerely,    Houston Crawford MD    Enclosure    If you would like to receive this communication electronically, please contact [email or web address] or [phone] to request [facility] access.    [facility] is a tool which provides read-only access to select patient information with whom you have a relationship. Its easy to use and provides real time access to review your patients record including encounter summaries, notes, results, and demographic information.    If you feel you have received this communication in error or would no longer like to receive these types of communications, please e-mail [email or web address]

## 2022-12-23 NOTE — PROGRESS NOTES
Subjective:       Patient ID: Argelia Sevilla is a 72 y.o. female.    Chief Complaint: Liver Transplant Evaluation    HPI  I saw this 72 y.o. lady who was referred to us with a diagnosis of BROWN related cirrhosis.    She was diagnosed with cirrhosis in June of 2020 when she first developed abdominal pain generalized in nature.  In addition she started feeling very weak and tired all the time.  She was found to be jaundiced but had no significant ascites or edema.  She was referred to Hematology who felt that she was hemolyzing.    She continues to feel tired and I note her prolonged INR as well as her high bilirubin.  She describes ankle edema and abdominal distension although there is no obvious ascites.    She had imaging with a CT scan and an MRI that confirmed cirrhosis but did not show ascites.  An EGD showed only PHG..    Since June 2022, she has been able to lose 30 lb in weight following a Mediterranean diet.  She believes that her weight loss is intentional but it is possible that this has coincided with her deterioration of her liver condition.    She has not had any hepatic encephalopathy although her  describes an odor that would be typical of fetor hepaticus.    She describes increasing lethargy and abdominal distension.      MELD-Na score: 23 at 12/23/2022 10:56 AM  MELD score: 22 at 12/23/2022 10:56 AM  Calculated from:  Serum Creatinine: 0.7 mg/dL (Using min of 1 mg/dL) at 12/23/2022 10:56 AM  Serum Sodium: 135 mmol/L at 12/23/2022 10:56 AM  Total Bilirubin: 11.2 mg/dL at 12/23/2022 10:56 AM  INR(ratio): 1.7 at 12/23/2022 10:56 AM  Age: 72 years    PMH:  CAD stent - Sep 2021  Back surgery  Lap cholecystectomy- 1996  Hysterectomy    No DM    SH:  No alcohol  Never smoked    2 kids- 45 + 42- healthy      FH:  Grandmother had cirrhosis      Review of Systems   Constitutional:  Positive for activity change and fatigue. Negative for appetite change, chills, fever and unexpected weight change.    HENT:  Negative for ear pain, hearing loss, nosebleeds, sore throat and trouble swallowing.    Eyes:  Negative for redness and visual disturbance.   Respiratory:  Negative for cough, chest tightness, shortness of breath and wheezing.    Cardiovascular:  Positive for leg swelling. Negative for chest pain and palpitations.   Gastrointestinal:  Positive for abdominal distention. Negative for abdominal pain, blood in stool, constipation, diarrhea, nausea and vomiting.   Genitourinary:  Negative for difficulty urinating, dysuria, frequency, hematuria and urgency.   Musculoskeletal:  Negative for arthralgias, back pain, gait problem, joint swelling and myalgias.   Skin:  Negative for rash.   Neurological:  Negative for tremors, seizures, speech difficulty, weakness and headaches.   Hematological:  Negative for adenopathy.   Psychiatric/Behavioral:  Negative for confusion, decreased concentration and sleep disturbance. The patient is not nervous/anxious.          Lab Results   Component Value Date    ALT 23 12/23/2022    AST 55 (H) 12/23/2022    GGT 35 10/20/2022    ALKPHOS 114 12/23/2022    BILITOT 11.2 (H) 12/23/2022     Past Medical History:   Diagnosis Date    Anxiety disorder, unspecified     CAD (coronary artery disease)     DIC (disseminated intravascular coagulation)     Dyslipidemia     GERD (gastroesophageal reflux disease)     Hereditary hemolytic anemia, unspecified     History of coronary artery stent placement     HTN (hypertension)     Liver cirrhosis secondary to BROWN     Portal hypertensive gastropathy     Primary localized osteoarthrosis of right lower leg     Statin intolerance      Past Surgical History:   Procedure Laterality Date    ADENOIDECTOMY      ANTERIOR CRUCIATE LIGAMENT REPAIR      BACK SURGERY      CARDIAC CATHETERIZATION  05/31/2018    CARDIAC CATHETERIZATION  09/28/2021    coronary angioplasty    CHOLECYSTECTOMY      COLONOSCOPY  05/25/2022    GALLBLADDER SURGERY      HYSTERECTOMY       KNEE ARTHROSCOPY Right     OOPHORECTOMY      REDUCTION OF BOTH BREASTS  01/01/2011    TONSILLECTOMY      TUBAL LIGATION       Current Outpatient Medications   Medication Sig    aspirin (ECOTRIN) 81 MG EC tablet Take 81 mg by mouth.    cholecalciferol, vitamin D3, (VITAMIN D3) 50 mcg (2,000 unit) Tab Take 2,000 Units by mouth once daily.    EDARBI 40 mg Tab Take 1 tablet by mouth once daily.    EDARBI 80 mg Tab Take 1 tablet by mouth once daily.    pantoprazole (PROTONIX) 40 MG tablet Take 40 mg by mouth.    vitamin E 1000 UNIT capsule Take 1,000 Units by mouth once daily.    furosemide (LASIX) 20 MG tablet Take 1 tablet (20 mg total) by mouth once daily.    pantoprazole (PROTONIX) 40 MG tablet Take 40 mg by mouth once daily.    spironolactone (ALDACTONE) 50 MG tablet Take 1 tablet (50 mg total) by mouth once daily.     No current facility-administered medications for this visit.       Objective:      Physical Exam  Constitutional:       General: She is not in acute distress.  HENT:      Head: Normocephalic.   Eyes:      Pupils: Pupils are equal, round, and reactive to light.   Neck:      Thyroid: No thyromegaly.      Vascular: No JVD.      Trachea: No tracheal deviation.   Cardiovascular:      Rate and Rhythm: Normal rate and regular rhythm.      Heart sounds: Normal heart sounds. No murmur heard.  Pulmonary:      Effort: Pulmonary effort is normal.      Breath sounds: Normal breath sounds. No stridor.   Abdominal:      General: There is distension.      Palpations: Abdomen is soft.      Tenderness: There is no left CVA tenderness.      Comments: Appears distended but no obvious ascites.   Lymphadenopathy:      Head:      Right side of head: No submental, submandibular, tonsillar, preauricular, posterior auricular or occipital adenopathy.      Left side of head: No submental, submandibular, tonsillar, preauricular, posterior auricular or occipital adenopathy.      Cervical: No cervical adenopathy.   Neurological:       Mental Status: She is alert. She is not disoriented.      Cranial Nerves: No cranial nerve deficit.      Sensory: No sensory deficit.       Assessment:       1. Other ascites    2. Organ transplant candidate          Plan:   This lady with BROWN cirrhosis is currently jaundiced and has a MELD score of 23 due to her prolonged INR and elevated bilirubin.    She is approved for liver transplant and we are waiting for financial approval before listing.    Her functional status is good although she occasionally uses a cane due to severe right knee ostaoarthritis.    - she is on hydrochlorothiazide and I note that she is hypokalemic today.  1) Stop hydrocholorothiazide  2) Start low dose furosemide (20mg) and wood 50 mg daily.  3) Labs next week     UNOS Patient Status  Functional Status: 80% - Normal activity with effort: some symptoms of disease  Physical Capacity: No Limitations    Patient on life support: No  Diabetes: No  Any previous malignancy: No  Neoadjuvant Therapy: no  Has patient ever had a dx of HCC: no  Previous Abdominal Surgery: yes  Spontaneous Bacterial Peritonitis: no  History of Portal Vein Thrombosis: no  Transjugular Intrahepatic Portosystemic Shunt: no

## 2022-12-28 LAB
CLINICAL BIOCHEMIST REVIEW: NORMAL
PLPETH BLD-MCNC: <10 NG/ML
POPETH BLD-MCNC: <10 NG/ML

## 2022-12-29 ENCOUNTER — TELEPHONE (OUTPATIENT)
Dept: TRANSPLANT | Facility: CLINIC | Age: 72
End: 2022-12-29
Payer: MEDICARE

## 2022-12-29 ENCOUNTER — DOCUMENTATION ONLY (OUTPATIENT)
Dept: TRANSPLANT | Facility: CLINIC | Age: 72
End: 2022-12-29
Payer: MEDICARE

## 2022-12-29 ENCOUNTER — DOCUMENTATION ONLY (OUTPATIENT)
Dept: TRANSPLANT | Facility: CLINIC | Age: 72
End: 2022-12-29

## 2022-12-29 LAB
EXT ALBUMIN: 2.9
EXT ALKALINE PHOSPHATASE: 82
EXT ALT: 28
EXT AST: 60
EXT BILIRUBIN DIRECT: 4.2 MG/DL
EXT BILIRUBIN TOTAL: 17.6
EXT BUN: 13
EXT CALCIUM: 9.4
EXT CHLORIDE: 95
EXT CO2: 22
EXT CREATININE: 0.68 MG/DL
EXT GFR MDRD NON AF AMER: >60
EXT GLUCOSE: 102
EXT HEMATOCRIT: 31.5
EXT HEMOGLOBIN: 11
EXT INR: 2.94
EXT PLATELETS: 101
EXT POTASSIUM: 3.1
EXT PROTEIN TOTAL: 6.7
EXT PT: 30.9
EXT SODIUM: 133 MMOL/L
EXT WBC: 19.9

## 2022-12-29 NOTE — TELEPHONE ENCOUNTER
[facility] called with critical Total bili of 17.6.    Also reviewed with lab verbally on the phone with read back  other MELD labs:  Na 133  Creatinine 0.68  INR 2.94    MELD 30  Patient reports that she is feeling much better today. Feels stronger. Able to ambulate with cane. Less nauseous. Mild stomach pain compared to yesterday when her stomach pain was moderate.   Explained to patient that as soon as her drug coverage starts next week, we will ask transplant if we can list patient in UNET.   Discussed the above with Dr. Crawford. He would like patient to repeat labs on Tuesday Jan 3rd.

## 2022-12-29 NOTE — TELEPHONE ENCOUNTER
Spoke to patient and  this week.  Patient has been complaining of weakness and some abdominal pain with nausea.  Patient states she is feeling much better today. Sending patient to local clinic to get labs on her. Dr. Crawford notified

## 2022-12-30 ENCOUNTER — DOCUMENTATION ONLY (OUTPATIENT)
Dept: TRANSPLANT | Facility: CLINIC | Age: 72
End: 2022-12-30

## 2022-12-30 ENCOUNTER — TELEPHONE (OUTPATIENT)
Dept: TRANSPLANT | Facility: CLINIC | Age: 72
End: 2022-12-30
Payer: MEDICARE

## 2023-01-03 ENCOUNTER — TELEPHONE (OUTPATIENT)
Dept: TRANSPLANT | Facility: CLINIC | Age: 73
End: 2023-01-03
Payer: MEDICARE

## 2023-01-03 ENCOUNTER — DOCUMENTATION ONLY (OUTPATIENT)
Dept: TRANSPLANT | Facility: CLINIC | Age: 73
End: 2023-01-03
Payer: MEDICARE

## 2023-01-03 LAB
EXT ALBUMIN: 2.7
EXT ALT: 25
EXT AST: 52
EXT BILIRUBIN DIRECT: 3.4 MG/DL
EXT BILIRUBIN TOTAL: 12.8
EXT BUN: 10
EXT CALCIUM: 8.5
EXT CHLORIDE: 99
EXT CO2: 24
EXT CREATININE: 0.69 MG/DL
EXT GLUCOSE: 92
EXT HEMATOCRIT: 33
EXT HEMOGLOBIN: 11.5
EXT INR: 2.02
EXT PLATELETS: 133
EXT POTASSIUM: 3.4
EXT PT: 23
EXT SODIUM: 135 MMOL/L
EXT WBC: 7.6

## 2023-01-03 NOTE — TELEPHONE ENCOUNTER
"  LIVER WAIT LISTING NOTE    **NOTE:   IF ANY EXTERNAL LABS ARE USED FOR LISTING THE VALUES AND DATES MUST BE ENTERED IN EPIC TO GENERATE THIS NOTE**    Date of Financial clearance to list: 1/3/2023    [facility]:        Organ: Liver    Last Name: Roel  First Name: Argelia     : 1950      Gender:     female         MRN#: [id number]                                   State of Permanent Residence:  [address] [place] [address]  [place] [address]    Ethnicity: Not  or /a   Race:      White    CLINICAL INFORMATION       ABO  ABO Blood Group:   O POS     ABO Confirmation: (THESE DATES MUST BE PRIOR TO THE LIST DATE AND SUPPORTED BY SEPARATE LAB REPORTS)    Internal Results    Lab Results   Component Value Date    GROUPTRH O POS 10/21/2022    GROUPTRH O POS 10/20/2022     No results found for: ABO    External Results    ABO Date 1:  ABO Result 1:  ABO Date 2:  ABO Result 2:     Are either of these ABO results based on External Labs? no  (If Yes, STOP and go to source document in Media Tab for verification).    VITALS  Height:    Ht Readings from Last 1 Encounters:   22 5' 2" (1.575 m)     Weight:    Wt Readings from Last 1 Encounters:   22 76.3 kg (168 lb 3.4 oz)       LIVER ORGAN INFORMATION  Candidate Medical Urgency Status:  active    Number of Previous Transplants: 0    MELD/PELD Data Collection:  Had dialysis twice, or 24 hours of CVVHD, within 1 week prior to the serum creatinine test: No  Encephalopathy: none Date: 1/3/2023  Ascites: controlled Date: 1/3/23          MELD Score:   25    Lab Results   Component Value Date    EXTINR 2.02 (H) 2023    EXTCREATININ 0.69 2023    EXTSODIUM 135 2023    EXTBILITOTAL 12.8 (H) 2023    EXTALBUMIN 2.7 (L) 2023       Additional Organs: none  Kidney: No    If Kidney is "Yes" above, check Diagnosis and enter the medical eligibility below for a simultaneous liver/kidney:    Diagnosis: Chronic kidney disease (CKD) with " measured or calculated GFR less than or equal to 60 ml/min for greater than 90 consecutive days. At least one of the following must qualify for CKD:  Date Begun Dialysis CrCl (ml/min)  Must be < or = 30 eGFR (ml/min) Must be < or = 30    Yes/no:                    Diagnosis: Sustained acute kidney injury (must be confirmed at least once every 7 days). Please select at least one of the following criteria:  Date of test or treatment Dialysis received CrCl (ml/min) Must be < or = 30 eGFR (ml/min) Must be < or = 25 Number of days since previous test or treatment (must be less than or equal to 7 days)    Yes/No:                  Diagnosis: Metabolic disease, Check all diagnosis that apply:     Hyperoxaluria    Atypical hemolytic uremic syndrome (HUS) from mutations in factor H or factor I    Familial non-neuropathic systemic amyloidosis    Methylmalonic aciduria     Transplant nephrologist confirming candidate's most recent diagnosis for SLK: N/A               ## Please submit a separate Kidney Listing note for combined Liver/Kidney patients. ##    Will Recipient Accept?   Accept HBcAB Positive Organ:  yes  Accept HBV FRANNIE Positive Organ:  yes  Accept HCV Antibody Positive Organ: yes   Accept HCV FRANNIE Positive Organ:  yes                        Local: No                           Import: No  Accept DCD Organ:    yes  Minimum acceptable donor age:  5 years  Maximum acceptable donor age:  99 years  Minimum acceptable donor weight:  40 lbs    Maximum acceptable donor weight:  440 lb  Maximum miles the organ or  Recovery team will travel:   5000 miles    TCR Information    Citizenship: [place] Citizen   Country of permanent residence:   Year of entry to the [place]:   Highest education level: Associate/Bachelor Degree    Patient on Life Support: No  Functional Status: 80% - normal activity with effort: some symptoms of disease   Working for income: No  If no, reason not working: Patient Choice - Retired  Previous [facility]  - No  Source of payment: Public Insurance - Medicare & Choice  Diabetes: No  Any previous malignancy: No  Neoadjuvant Therapy: No  Has candidate ever had a diagnosis of HCC: No    Liver Medical Factors  Previous abdominal surgery: Yes  Spontaneous Bacterial Peritonitis: No  History of Portal Vein Thrombosis: No  Transjugular Intrahepatic Portosystemic Shunt: No    Blood Type x2 was verified by myself and (Akila Tee RN).  Blood type determination and reporting was completed according to the programs protocols and OPTN requirements.

## 2023-01-04 NOTE — TELEPHONE ENCOUNTER
Patient notified that she has been listed in UNOS.  Patient expressed how happy she is to be listed.  Patient has not further questions.

## 2023-01-10 ENCOUNTER — TELEPHONE (OUTPATIENT)
Dept: TRANSPLANT | Facility: CLINIC | Age: 73
End: 2023-01-10
Payer: MEDICARE

## 2023-01-10 DIAGNOSIS — Z76.82 ORGAN TRANSPLANT CANDIDATE: Primary | ICD-10-CM

## 2023-01-11 ENCOUNTER — TELEPHONE (OUTPATIENT)
Dept: TRANSPLANT | Facility: CLINIC | Age: 73
End: 2023-01-11
Payer: MEDICARE

## 2023-01-11 LAB
EXT ALBUMIN: 2.8
EXT ALKALINE PHOSPHATASE: 91
EXT ALT: 27
EXT AST: 53
EXT BILIRUBIN DIRECT: 2.8 MG/DL
EXT BILIRUBIN TOTAL: 9.1
EXT BUN: 7
EXT CALCIUM: 9.9
EXT CHLORIDE: 102
EXT CO2: 26
EXT CREATININE: 0.69 MG/DL
EXT GLUCOSE: 84
EXT HEMATOCRIT: 32.5
EXT HEMOGLOBIN: 11.2
EXT PLATELETS: 122
EXT POTASSIUM: 4.1
EXT PROTEIN TOTAL: 6.8
EXT SODIUM: 137 MMOL/L
EXT WBC: 6.7

## 2023-01-12 ENCOUNTER — TELEPHONE (OUTPATIENT)
Dept: TRANSPLANT | Facility: CLINIC | Age: 73
End: 2023-01-12
Payer: MEDICARE

## 2023-01-12 NOTE — TELEPHONE ENCOUNTER
PATIENT NAME: Argelia Sevilla  [facility] #: [id number]    Lab Results   Component Value Date    EXTINR 2.00 01/11/2023    EXTCREATININ 0.69 01/11/2023    EXTSODIUM 137 01/11/2023    EXTBILITOTAL 9.1 01/11/2023    EXTALBUMIN 2.8 01/11/2023     MELD 23  Encephalopathy: none  Ascites: slight  Dialysis: no     Recertification requestor: Aishwarya Sinclair

## 2023-01-19 ENCOUNTER — DOCUMENTATION ONLY (OUTPATIENT)
Dept: TRANSPLANT | Facility: CLINIC | Age: 73
End: 2023-01-19
Payer: MEDICARE

## 2023-02-02 ENCOUNTER — TELEPHONE (OUTPATIENT)
Dept: TRANSPLANT | Facility: CLINIC | Age: 73
End: 2023-02-02
Payer: MEDICARE

## 2023-02-02 NOTE — TELEPHONE ENCOUNTER
Called to inform patient of upcoming appointments in March for MELD labs, abdominal US and hepatology appointment.    Patient updated me on her symptoms. States that her local GI doctor sent her for x-ray for SOB 2 weeks ago. X-ray showed fluid on left and right lung.   States that diuretics are helping to improve her SOB. Patient will discuss with Dr. Altamirano if she needs a thoracentesis scheduled. Patient will keep me updated.

## 2023-02-07 ENCOUNTER — TELEPHONE (OUTPATIENT)
Dept: TRANSPLANT | Facility: CLINIC | Age: 73
End: 2023-02-07
Payer: MEDICARE

## 2023-02-07 NOTE — TELEPHONE ENCOUNTER
Patient called and informed that the labs have been received and she can call and make her appointment prior to 2/14 when her MELD score expires

## 2023-02-07 NOTE — TELEPHONE ENCOUNTER
Spoke with patient about having labs done at [facility].  Orders faxed to the clinic for the patient.  Spoke with the lab to confirm receipt of the order.

## 2023-02-07 NOTE — TELEPHONE ENCOUNTER
----- Message from Yosvany Isbell sent at 2/7/2023  2:25 PM CST -----  Regarding: call back  Pt call to speak with Paul in regards to needing las order    Call

## 2023-02-08 ENCOUNTER — HOSPITAL ENCOUNTER (INPATIENT)
Facility: HOSPITAL | Age: 73
LOS: 7 days | Discharge: HOME HEALTH | DRG: 006 | End: 2023-02-15
Attending: TRANSPLANT SURGERY | Admitting: SURGERY
Payer: MEDICARE

## 2023-02-08 ENCOUNTER — TELEPHONE (OUTPATIENT)
Dept: TRANSPLANT | Facility: CLINIC | Age: 73
End: 2023-02-08
Payer: MEDICARE

## 2023-02-08 ENCOUNTER — DOCUMENTATION ONLY (OUTPATIENT)
Dept: TRANSPLANT | Facility: CLINIC | Age: 73
End: 2023-02-08
Payer: MEDICARE

## 2023-02-08 ENCOUNTER — ANESTHESIA EVENT (OUTPATIENT)
Dept: SURGERY | Facility: HOSPITAL | Age: 73
DRG: 006 | End: 2023-02-08
Payer: MEDICARE

## 2023-02-08 DIAGNOSIS — Z94.4 S/P LIVER TRANSPLANT: Primary | ICD-10-CM

## 2023-02-08 DIAGNOSIS — R73.9 STEROID-INDUCED HYPERGLYCEMIA: ICD-10-CM

## 2023-02-08 DIAGNOSIS — D63.8 ANEMIA, CHRONIC DISEASE: ICD-10-CM

## 2023-02-08 DIAGNOSIS — R18.8 OTHER ASCITES: ICD-10-CM

## 2023-02-08 DIAGNOSIS — Z91.89 AT RISK FOR INFECTION TRANSMITTED FROM DONOR: ICD-10-CM

## 2023-02-08 DIAGNOSIS — D62 ACUTE BLOOD LOSS ANEMIA: ICD-10-CM

## 2023-02-08 DIAGNOSIS — T38.0X5A STEROID-INDUCED HYPERGLYCEMIA: ICD-10-CM

## 2023-02-08 DIAGNOSIS — Z79.60 LONG-TERM USE OF IMMUNOSUPPRESSANT MEDICATION: ICD-10-CM

## 2023-02-08 DIAGNOSIS — I25.10 CORONARY ARTERY DISEASE INVOLVING NATIVE CORONARY ARTERY OF NATIVE HEART WITHOUT ANGINA PECTORIS: ICD-10-CM

## 2023-02-08 DIAGNOSIS — K72.10 END STAGE LIVER DISEASE: ICD-10-CM

## 2023-02-08 DIAGNOSIS — Z91.89 AT RISK FOR OPPORTUNISTIC INFECTIONS: ICD-10-CM

## 2023-02-08 DIAGNOSIS — D69.6 THROMBOCYTOPENIA: ICD-10-CM

## 2023-02-08 DIAGNOSIS — K72.10 END STAGE LIVER DISEASE: Primary | ICD-10-CM

## 2023-02-08 DIAGNOSIS — Z01.818 PRE-OP TESTING: ICD-10-CM

## 2023-02-08 DIAGNOSIS — R94.31 QT PROLONGATION: ICD-10-CM

## 2023-02-08 DIAGNOSIS — Z29.89 PROPHYLACTIC IMMUNOTHERAPY: ICD-10-CM

## 2023-02-08 LAB
ABO + RH BLD: NORMAL
ALBUMIN SERPL BCP-MCNC: 2.9 G/DL (ref 3.5–5.2)
ALP SERPL-CCNC: 111 U/L (ref 55–135)
ALT SERPL W/O P-5'-P-CCNC: 23 U/L (ref 10–44)
ANION GAP SERPL CALC-SCNC: 12 MMOL/L (ref 8–16)
APTT BLDCRRT: 28.8 SEC (ref 21–32)
AST SERPL-CCNC: 44 U/L (ref 10–40)
BASOPHILS # BLD AUTO: 0.08 K/UL (ref 0–0.2)
BASOPHILS NFR BLD: 1.2 % (ref 0–1.9)
BILIRUB DIRECT SERPL-MCNC: 2.7 MG/DL (ref 0.1–0.3)
BILIRUB SERPL-MCNC: 9.7 MG/DL (ref 0.1–1)
BLD GP AB SCN CELLS X3 SERPL QL: NORMAL
BLOOD BANK HEPATITIS FREEZE AND HOLD: NORMAL
BUN SERPL-MCNC: 13 MG/DL (ref 8–23)
CALCIUM SERPL-MCNC: 9.1 MG/DL (ref 8.7–10.5)
CHLORIDE SERPL-SCNC: 101 MMOL/L (ref 95–110)
CO2 SERPL-SCNC: 20 MMOL/L (ref 23–29)
CREAT SERPL-MCNC: 0.7 MG/DL (ref 0.5–1.4)
DIFFERENTIAL METHOD: ABNORMAL
EOSINOPHIL # BLD AUTO: 0.1 K/UL (ref 0–0.5)
EOSINOPHIL NFR BLD: 2.1 % (ref 0–8)
ERYTHROCYTE [DISTWIDTH] IN BLOOD BY AUTOMATED COUNT: 14.6 % (ref 11.5–14.5)
EST. GFR  (NO RACE VARIABLE): >60 ML/MIN/1.73 M^2
ESTIMATED AVG GLUCOSE: ABNORMAL MG/DL (ref 68–131)
FIBRINOGEN PPP-MCNC: 117 MG/DL (ref 182–400)
GLUCOSE SERPL-MCNC: 104 MG/DL (ref 70–110)
HBA1C MFR BLD: <4 % (ref 4–5.6)
HBV CORE AB SERPL QL IA: NORMAL
HBV SURFACE AB SER-ACNC: <3 MIU/ML
HBV SURFACE AB SER-ACNC: NORMAL M[IU]/ML
HBV SURFACE AG SERPL QL IA: NORMAL
HCT VFR BLD AUTO: 30.3 % (ref 37–48.5)
HCV AB SERPL QL IA: NORMAL
HGB BLD-MCNC: 10.4 G/DL (ref 12–16)
HIV 1+2 AB+HIV1 P24 AG SERPL QL IA: NORMAL
IMM GRANULOCYTES # BLD AUTO: 0.05 K/UL (ref 0–0.04)
IMM GRANULOCYTES NFR BLD AUTO: 0.7 % (ref 0–0.5)
INR PPP: 1.7 (ref 0.8–1.2)
LYMPHOCYTES # BLD AUTO: 1 K/UL (ref 1–4.8)
LYMPHOCYTES NFR BLD: 14 % (ref 18–48)
MAGNESIUM SERPL-MCNC: 1.3 MG/DL (ref 1.6–2.6)
MCH RBC QN AUTO: 36.2 PG (ref 27–31)
MCHC RBC AUTO-ENTMCNC: 34.3 G/DL (ref 32–36)
MCV RBC AUTO: 106 FL (ref 82–98)
MONOCYTES # BLD AUTO: 1.1 K/UL (ref 0.3–1)
MONOCYTES NFR BLD: 15.6 % (ref 4–15)
NEUTROPHILS # BLD AUTO: 4.5 K/UL (ref 1.8–7.7)
NEUTROPHILS NFR BLD: 66.4 % (ref 38–73)
NRBC BLD-RTO: 0 /100 WBC
PLATELET # BLD AUTO: 132 K/UL (ref 150–450)
PMV BLD AUTO: 10.7 FL (ref 9.2–12.9)
POTASSIUM SERPL-SCNC: 4.1 MMOL/L (ref 3.5–5.1)
PROT SERPL-MCNC: 7.2 G/DL (ref 6–8.4)
PROTHROMBIN TIME: 17.5 SEC (ref 9–12.5)
RBC # BLD AUTO: 2.87 M/UL (ref 4–5.4)
SARS-COV-2 RNA RESP QL NAA+PROBE: NOT DETECTED
SODIUM SERPL-SCNC: 133 MMOL/L (ref 136–145)
WBC # BLD AUTO: 6.81 K/UL (ref 3.9–12.7)

## 2023-02-08 PROCEDURE — 86920 COMPATIBILITY TEST SPIN: CPT | Mod: NTX | Performed by: PHYSICIAN ASSISTANT

## 2023-02-08 PROCEDURE — 20600001 HC STEP DOWN PRIVATE ROOM: Mod: NTX

## 2023-02-08 PROCEDURE — 80053 COMPREHEN METABOLIC PANEL: CPT | Mod: NTX | Performed by: PHYSICIAN ASSISTANT

## 2023-02-08 PROCEDURE — 87522 HEPATITIS C REVRS TRNSCRPJ: CPT | Mod: NTX | Performed by: PHYSICIAN ASSISTANT

## 2023-02-08 PROCEDURE — 85025 COMPLETE CBC W/AUTO DIFF WBC: CPT | Mod: NTX | Performed by: PHYSICIAN ASSISTANT

## 2023-02-08 PROCEDURE — 86704 HEP B CORE ANTIBODY TOTAL: CPT | Mod: NTX | Performed by: PHYSICIAN ASSISTANT

## 2023-02-08 PROCEDURE — 85384 FIBRINOGEN ACTIVITY: CPT | Mod: NTX | Performed by: PHYSICIAN ASSISTANT

## 2023-02-08 PROCEDURE — 36415 COLL VENOUS BLD VENIPUNCTURE: CPT | Mod: NTX | Performed by: PHYSICIAN ASSISTANT

## 2023-02-08 PROCEDURE — 93010 ELECTROCARDIOGRAM REPORT: CPT | Mod: NTX,,, | Performed by: INTERNAL MEDICINE

## 2023-02-08 PROCEDURE — 93005 ELECTROCARDIOGRAM TRACING: CPT | Mod: NTX

## 2023-02-08 PROCEDURE — U0005 INFEC AGEN DETEC AMPLI PROBE: HCPCS | Performed by: PHYSICIAN ASSISTANT

## 2023-02-08 PROCEDURE — 85610 PROTHROMBIN TIME: CPT | Mod: NTX | Performed by: PHYSICIAN ASSISTANT

## 2023-02-08 PROCEDURE — 82248 BILIRUBIN DIRECT: CPT | Mod: NTX | Performed by: PHYSICIAN ASSISTANT

## 2023-02-08 PROCEDURE — 85730 THROMBOPLASTIN TIME PARTIAL: CPT | Mod: NTX | Performed by: PHYSICIAN ASSISTANT

## 2023-02-08 PROCEDURE — 83735 ASSAY OF MAGNESIUM: CPT | Mod: NTX | Performed by: PHYSICIAN ASSISTANT

## 2023-02-08 PROCEDURE — 83036 HEMOGLOBIN GLYCOSYLATED A1C: CPT | Mod: NTX | Performed by: PHYSICIAN ASSISTANT

## 2023-02-08 PROCEDURE — 86900 BLOOD TYPING SEROLOGIC ABO: CPT | Mod: NTX | Performed by: PHYSICIAN ASSISTANT

## 2023-02-08 PROCEDURE — 87340 HEPATITIS B SURFACE AG IA: CPT | Mod: NTX | Performed by: PHYSICIAN ASSISTANT

## 2023-02-08 PROCEDURE — 86803 HEPATITIS C AB TEST: CPT | Mod: NTX | Performed by: PHYSICIAN ASSISTANT

## 2023-02-08 PROCEDURE — 86706 HEP B SURFACE ANTIBODY: CPT | Mod: NTX | Performed by: PHYSICIAN ASSISTANT

## 2023-02-08 PROCEDURE — 99000 SPECIMEN HANDLING OFFICE-LAB: CPT | Mod: NTX | Performed by: PHYSICIAN ASSISTANT

## 2023-02-08 PROCEDURE — 87389 HIV-1 AG W/HIV-1&-2 AB AG IA: CPT | Mod: NTX | Performed by: PHYSICIAN ASSISTANT

## 2023-02-08 PROCEDURE — 93010 EKG 12-LEAD: ICD-10-PCS | Mod: NTX,,, | Performed by: INTERNAL MEDICINE

## 2023-02-08 RX ORDER — HYDROCODONE BITARTRATE AND ACETAMINOPHEN 500; 5 MG/1; MG/1
TABLET ORAL
Status: DISCONTINUED | OUTPATIENT
Start: 2023-02-08 | End: 2023-02-09

## 2023-02-08 RX ORDER — MUPIROCIN 20 MG/G
OINTMENT TOPICAL
Status: DISCONTINUED | OUTPATIENT
Start: 2023-02-08 | End: 2023-02-09

## 2023-02-08 RX ORDER — METHYLPREDNISOLONE SODIUM SUCCINATE 500 MG/8ML
500 INJECTION INTRAMUSCULAR; INTRAVENOUS
Status: DISCONTINUED | OUTPATIENT
Start: 2023-02-08 | End: 2023-02-09

## 2023-02-08 NOTE — HPI
Ms. 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 She presents as a primary candidate for liver transplant with a MELD of 23. Denies recent fever/chills and recent hospitalizations. OR time is 7:30/9:00 AM with Dr. Adhikari. No intraop HD. Of note, donor is RPR+, recipient will need ID consult.

## 2023-02-08 NOTE — ASSESSMENT & PLAN NOTE
- 2/2 BROWN   - primary candidate for LTX   - /9AM with Dr. Adhikari  - pre op labs and imaging pending, to be reviewed

## 2023-02-08 NOTE — TELEPHONE ENCOUNTER
ORGAN OFFER NOTE    Notified by Andrez Minor, , of liver offer with donor information.  Donor and recipient information read back and verified.  Spoke with Argelia Sevilla and identified no acute medical issues during telephone assessment.  Patient instructed she can still eat at this time.  . Patient instructed to leave within 30 minutes of this phone call and report to the ER/admit office.  Patient verbalized understanding and willingness to come in for transplant.  ETA: 2hours 530pm .    Patient was asked if they have had a positive COVID-19 test or if they have any signs or symptoms. Informed patient that they will be tested for COVID-19 upon arrival to the hospital, unless have a previous positive result. If tested and result is positive, the transplant will not be able to occur, they will be inactivated on the wait list for 28 days per protocol and required to quarantine.

## 2023-02-09 ENCOUNTER — ANESTHESIA (OUTPATIENT)
Dept: SURGERY | Facility: HOSPITAL | Age: 73
DRG: 006 | End: 2023-02-09
Payer: MEDICARE

## 2023-02-09 PROBLEM — T38.0X5A STEROID-INDUCED HYPERGLYCEMIA: Status: ACTIVE | Noted: 2023-02-09

## 2023-02-09 PROBLEM — Z91.89 AT RISK FOR OPPORTUNISTIC INFECTIONS: Status: ACTIVE | Noted: 2023-02-09

## 2023-02-09 PROBLEM — R73.9 STEROID-INDUCED HYPERGLYCEMIA: Status: ACTIVE | Noted: 2023-02-09

## 2023-02-09 PROBLEM — Z94.4 S/P LIVER TRANSPLANT: Status: ACTIVE | Noted: 2023-02-09

## 2023-02-09 PROBLEM — Z29.89 PROPHYLACTIC IMMUNOTHERAPY: Status: ACTIVE | Noted: 2023-02-09

## 2023-02-09 LAB
ALBUMIN SERPL BCP-MCNC: 2 G/DL (ref 3.5–5.2)
ALBUMIN SERPL BCP-MCNC: 2.3 G/DL (ref 3.5–5.2)
ALBUMIN SERPL BCP-MCNC: 2.7 G/DL (ref 3.5–5.2)
ALBUMIN SERPL BCP-MCNC: 2.8 G/DL (ref 3.5–5.2)
ALLENS TEST: ABNORMAL
ALLENS TEST: ABNORMAL
ALLENS TEST: NORMAL
ALP SERPL-CCNC: 75 U/L (ref 55–135)
ALT SERPL W/O P-5'-P-CCNC: 514 U/L (ref 10–44)
ALT SERPL W/O P-5'-P-CCNC: 866 U/L (ref 10–44)
ALT SERPL W/O P-5'-P-CCNC: 876 U/L (ref 10–44)
AMYLASE SERPL-CCNC: 33 U/L (ref 20–110)
ANION GAP SERPL CALC-SCNC: 11 MMOL/L (ref 8–16)
ANION GAP SERPL CALC-SCNC: 12 MMOL/L (ref 8–16)
APPEARANCE FLD: CLEAR
APTT BLDCRRT: 121.8 SEC (ref 21–32)
APTT BLDCRRT: 33.8 SEC (ref 21–32)
APTT BLDCRRT: 45.3 SEC (ref 21–32)
APTT BLDCRRT: 50.8 SEC (ref 21–32)
AST SERPL-CCNC: 1630 U/L (ref 10–40)
AST SERPL-CCNC: 2771 U/L (ref 10–40)
AST SERPL-CCNC: 3042 U/L (ref 10–40)
AST SERPL-CCNC: 3083 U/L (ref 10–40)
BASOPHILS # BLD AUTO: 0.01 K/UL (ref 0–0.2)
BASOPHILS # BLD AUTO: 0.02 K/UL (ref 0–0.2)
BASOPHILS NFR BLD: 0.1 % (ref 0–1.9)
BASOPHILS NFR BLD: 0.2 % (ref 0–1.9)
BILIRUB SERPL-MCNC: 7.8 MG/DL (ref 0.1–1)
BILIRUB UR QL STRIP: NEGATIVE
BLD PROD TYP BPU: NORMAL
BLOOD UNIT EXPIRATION DATE: NORMAL
BLOOD UNIT TYPE CODE: 1700
BLOOD UNIT TYPE CODE: 5100
BLOOD UNIT TYPE: NORMAL
BODY FLD TYPE: NORMAL
BUN SERPL-MCNC: 12 MG/DL (ref 8–23)
BUN SERPL-MCNC: 14 MG/DL (ref 8–23)
CA-I BLDV-SCNC: 1.02 MMOL/L (ref 1.06–1.42)
CA-I BLDV-SCNC: 1.14 MMOL/L (ref 1.06–1.42)
CA-I BLDV-SCNC: 1.19 MMOL/L (ref 1.06–1.42)
CALCIUM SERPL-MCNC: 8.2 MG/DL (ref 8.7–10.5)
CALCIUM SERPL-MCNC: 8.4 MG/DL (ref 8.7–10.5)
CHLORIDE SERPL-SCNC: 102 MMOL/L (ref 95–110)
CHLORIDE SERPL-SCNC: 103 MMOL/L (ref 95–110)
CLARITY UR REFRACT.AUTO: CLEAR
CO2 SERPL-SCNC: 22 MMOL/L (ref 23–29)
CO2 SERPL-SCNC: 23 MMOL/L (ref 23–29)
CODING SYSTEM: NORMAL
COLOR FLD: YELLOW
COLOR UR AUTO: YELLOW
CREAT SERPL-MCNC: 0.7 MG/DL (ref 0.5–1.4)
CREAT SERPL-MCNC: 0.7 MG/DL (ref 0.5–1.4)
CROSSMATCH INTERPRETATION: NORMAL
DELSYS: ABNORMAL
DIFFERENTIAL METHOD: ABNORMAL
DIFFERENTIAL METHOD: ABNORMAL
DISPENSE STATUS: NORMAL
EOSINOPHIL # BLD AUTO: 0 K/UL (ref 0–0.5)
EOSINOPHIL # BLD AUTO: 0 K/UL (ref 0–0.5)
EOSINOPHIL NFR BLD: 0 % (ref 0–8)
EOSINOPHIL NFR BLD: 0.1 % (ref 0–8)
ERYTHROCYTE [DISTWIDTH] IN BLOOD BY AUTOMATED COUNT: 18.8 % (ref 11.5–14.5)
ERYTHROCYTE [DISTWIDTH] IN BLOOD BY AUTOMATED COUNT: 19.5 % (ref 11.5–14.5)
EST. GFR  (NO RACE VARIABLE): >60 ML/MIN/1.73 M^2
EST. GFR  (NO RACE VARIABLE): >60 ML/MIN/1.73 M^2
FACT X PPP CHRO-ACNC: <0.1 IU/ML (ref 0.3–0.7)
FIBRINOGEN PPP-MCNC: 252 MG/DL (ref 182–400)
FIBRINOGEN PPP-MCNC: 92 MG/DL (ref 182–400)
FIBRINOGEN PPP-MCNC: <70 MG/DL (ref 182–400)
GLUCOSE SERPL-MCNC: 139 MG/DL (ref 70–110)
GLUCOSE SERPL-MCNC: 144 MG/DL (ref 70–110)
GLUCOSE SERPL-MCNC: 156 MG/DL (ref 70–110)
GLUCOSE SERPL-MCNC: 227 MG/DL (ref 70–110)
GLUCOSE SERPL-MCNC: 96 MG/DL (ref 70–110)
GLUCOSE UR QL STRIP: NEGATIVE
GRAM STN SPEC: NORMAL
GRAM STN SPEC: NORMAL
HCO3 UR-SCNC: 25.2 MMOL/L (ref 24–28)
HCO3 UR-SCNC: 25.7 MMOL/L (ref 24–28)
HCT VFR BLD AUTO: 22 % (ref 37–48.5)
HCT VFR BLD AUTO: 23.4 % (ref 37–48.5)
HCT VFR BLD AUTO: 24.8 % (ref 37–48.5)
HCT VFR BLD AUTO: 25 % (ref 37–48.5)
HCT VFR BLD AUTO: 26.5 % (ref 37–48.5)
HCT VFR BLD CALC: 25 %PCV (ref 36–54)
HCV RNA SERPL QL NAA+PROBE: NOT DETECTED
HCV RNA SPEC NAA+PROBE-ACNC: NOT DETECTED IU/ML
HGB BLD-MCNC: 7.6 G/DL (ref 12–16)
HGB BLD-MCNC: 8.2 G/DL (ref 12–16)
HGB BLD-MCNC: 8.4 G/DL (ref 12–16)
HGB BLD-MCNC: 8.8 G/DL (ref 12–16)
HGB BLD-MCNC: 9.4 G/DL (ref 12–16)
HGB UR QL STRIP: NEGATIVE
IMM GRANULOCYTES # BLD AUTO: 0.04 K/UL (ref 0–0.04)
IMM GRANULOCYTES # BLD AUTO: 0.04 K/UL (ref 0–0.04)
IMM GRANULOCYTES NFR BLD AUTO: 0.5 % (ref 0–0.5)
IMM GRANULOCYTES NFR BLD AUTO: 0.5 % (ref 0–0.5)
INR PPP: 1.4 (ref 0.8–1.2)
INR PPP: 1.6 (ref 0.8–1.2)
INR PPP: 1.7 (ref 0.8–1.2)
INR PPP: 1.7 (ref 0.8–1.2)
INR PPP: 1.9 (ref 0.8–1.2)
INR PPP: 3 (ref 0.8–1.2)
KETONES UR QL STRIP: NEGATIVE
LDH SERPL L TO P-CCNC: 1.12 MMOL/L (ref 0.36–1.25)
LDH SERPL L TO P-CCNC: 5983 U/L (ref 110–260)
LEUKOCYTE ESTERASE UR QL STRIP: NEGATIVE
LYMPHOCYTES # BLD AUTO: 0.3 K/UL (ref 1–4.8)
LYMPHOCYTES # BLD AUTO: 0.3 K/UL (ref 1–4.8)
LYMPHOCYTES NFR BLD: 2.9 % (ref 18–48)
LYMPHOCYTES NFR BLD: 3.6 % (ref 18–48)
LYMPHOCYTES NFR FLD MANUAL: 46 %
MAGNESIUM SERPL-MCNC: 1.3 MG/DL (ref 1.6–2.6)
MAGNESIUM SERPL-MCNC: 1.6 MG/DL (ref 1.6–2.6)
MAGNESIUM SERPL-MCNC: 1.7 MG/DL (ref 1.6–2.6)
MAGNESIUM SERPL-MCNC: 1.8 MG/DL (ref 1.6–2.6)
MCH RBC QN AUTO: 33.1 PG (ref 27–31)
MCH RBC QN AUTO: 34.9 PG (ref 27–31)
MCHC RBC AUTO-ENTMCNC: 33.6 G/DL (ref 32–36)
MCHC RBC AUTO-ENTMCNC: 35 G/DL (ref 32–36)
MCV RBC AUTO: 100 FL (ref 82–98)
MCV RBC AUTO: 98 FL (ref 82–98)
MONOCYTES # BLD AUTO: 0.4 K/UL (ref 0.3–1)
MONOCYTES # BLD AUTO: 1.1 K/UL (ref 0.3–1)
MONOCYTES NFR BLD: 13.3 % (ref 4–15)
MONOCYTES NFR BLD: 5 % (ref 4–15)
MONOS+MACROS NFR FLD MANUAL: 38 %
NEUTROPHILS # BLD AUTO: 6.9 K/UL (ref 1.8–7.7)
NEUTROPHILS # BLD AUTO: 7.9 K/UL (ref 1.8–7.7)
NEUTROPHILS NFR BLD: 82.3 % (ref 38–73)
NEUTROPHILS NFR BLD: 91.5 % (ref 38–73)
NEUTROPHILS NFR FLD MANUAL: 16 %
NITRITE UR QL STRIP: NEGATIVE
NRBC BLD-RTO: 0 /100 WBC
NRBC BLD-RTO: 0 /100 WBC
PCO2 BLDA: 38.1 MMHG (ref 35–45)
PCO2 BLDA: 38.2 MMHG (ref 35–45)
PH SMN: 7.43 [PH] (ref 7.35–7.45)
PH SMN: 7.44 [PH] (ref 7.35–7.45)
PH UR STRIP: 7 [PH] (ref 5–8)
PHOSPHATE SERPL-MCNC: 3.7 MG/DL (ref 2.7–4.5)
PLATELET # BLD AUTO: 120 K/UL (ref 150–450)
PLATELET # BLD AUTO: 159 K/UL (ref 150–450)
PLATELET # BLD AUTO: 61 K/UL (ref 150–450)
PLATELET # BLD AUTO: 86 K/UL (ref 150–450)
PLATELET # BLD AUTO: 95 K/UL (ref 150–450)
PMV BLD AUTO: 10.2 FL (ref 9.2–12.9)
PMV BLD AUTO: 10.5 FL (ref 9.2–12.9)
PMV BLD AUTO: 10.5 FL (ref 9.2–12.9)
PMV BLD AUTO: 10.6 FL (ref 9.2–12.9)
PMV BLD AUTO: 9.9 FL (ref 9.2–12.9)
PO2 BLDA: 39 MMHG (ref 40–60)
PO2 BLDA: 83 MMHG (ref 80–100)
POC BE: 1 MMOL/L
POC BE: 1 MMOL/L
POC IONIZED CALCIUM: 1.16 MMOL/L (ref 1.06–1.42)
POC SATURATED O2: 74 % (ref 95–100)
POC SATURATED O2: 97 % (ref 95–100)
POC TCO2: 26 MMOL/L (ref 24–29)
POC TCO2: 27 MMOL/L (ref 23–27)
POCT GLUCOSE: 171 MG/DL (ref 70–110)
POCT GLUCOSE: 192 MG/DL (ref 70–110)
POCT GLUCOSE: 216 MG/DL (ref 70–110)
POCT GLUCOSE: 239 MG/DL (ref 70–110)
POCT GLUCOSE: 251 MG/DL (ref 70–110)
POCT GLUCOSE: 253 MG/DL (ref 70–110)
POCT GLUCOSE: 256 MG/DL (ref 70–110)
POCT GLUCOSE: 258 MG/DL (ref 70–110)
POCT GLUCOSE: 267 MG/DL (ref 70–110)
POOLED CRYOPPT GVN BPU: NORMAL
POOLED CRYOPPT GVN BPU: NORMAL
POTASSIUM BLD-SCNC: 3.6 MMOL/L (ref 3.5–5.1)
POTASSIUM SERPL-SCNC: 3.5 MMOL/L (ref 3.5–5.1)
POTASSIUM SERPL-SCNC: 3.7 MMOL/L (ref 3.5–5.1)
POTASSIUM SERPL-SCNC: 3.7 MMOL/L (ref 3.5–5.1)
POTASSIUM SERPL-SCNC: 3.8 MMOL/L (ref 3.5–5.1)
POTASSIUM SERPL-SCNC: 3.9 MMOL/L (ref 3.5–5.1)
PROT SERPL-MCNC: 4.6 G/DL (ref 6–8.4)
PROT UR QL STRIP: NEGATIVE
PROTHROMBIN TIME: 14 SEC (ref 9–12.5)
PROTHROMBIN TIME: 16 SEC (ref 9–12.5)
PROTHROMBIN TIME: 17.4 SEC (ref 9–12.5)
PROTHROMBIN TIME: 17.6 SEC (ref 9–12.5)
PROTHROMBIN TIME: 18.8 SEC (ref 9–12.5)
PROTHROMBIN TIME: 29.6 SEC (ref 9–12.5)
RBC # BLD AUTO: 2.35 M/UL (ref 4–5.4)
RBC # BLD AUTO: 2.54 M/UL (ref 4–5.4)
SAMPLE: ABNORMAL
SAMPLE: ABNORMAL
SAMPLE: NORMAL
SITE: ABNORMAL
SITE: ABNORMAL
SITE: NORMAL
SODIUM BLD-SCNC: 137 MMOL/L (ref 136–145)
SODIUM SERPL-SCNC: 133 MMOL/L (ref 136–145)
SODIUM SERPL-SCNC: 134 MMOL/L (ref 136–145)
SODIUM SERPL-SCNC: 135 MMOL/L (ref 136–145)
SODIUM SERPL-SCNC: 138 MMOL/L (ref 136–145)
SODIUM SERPL-SCNC: 138 MMOL/L (ref 136–145)
SP GR UR STRIP: 1.01 (ref 1–1.03)
UNIT NUMBER: NORMAL
UNIT NUMBER: NORMAL
URN SPEC COLLECT METH UR: NORMAL
WBC # BLD AUTO: 8.35 K/UL (ref 3.9–12.7)
WBC # BLD AUTO: 8.66 K/UL (ref 3.9–12.7)
WBC # FLD: 202 /CU MM

## 2023-02-09 PROCEDURE — 63600175 PHARM REV CODE 636 W HCPCS: Performed by: NURSE ANESTHETIST, CERTIFIED REGISTERED

## 2023-02-09 PROCEDURE — D9220A PRA ANESTHESIA: ICD-10-PCS | Mod: ANES,,, | Performed by: ANESTHESIOLOGY

## 2023-02-09 PROCEDURE — 27100088 HC CELL SAVER

## 2023-02-09 PROCEDURE — 25000003 PHARM REV CODE 250: Performed by: NURSE ANESTHETIST, CERTIFIED REGISTERED

## 2023-02-09 PROCEDURE — D9220A PRA ANESTHESIA: ICD-10-PCS | Mod: CRNA,,, | Performed by: NURSE ANESTHETIST, CERTIFIED REGISTERED

## 2023-02-09 PROCEDURE — 36620 INSERTION CATHETER ARTERY: CPT | Mod: 59,,, | Performed by: ANESTHESIOLOGY

## 2023-02-09 PROCEDURE — 85049 AUTOMATED PLATELET COUNT: CPT | Performed by: SURGERY

## 2023-02-09 PROCEDURE — 85730 THROMBOPLASTIN TIME PARTIAL: CPT | Mod: 91 | Performed by: STUDENT IN AN ORGANIZED HEALTH CARE EDUCATION/TRAINING PROGRAM

## 2023-02-09 PROCEDURE — 27201423 OPTIME MED/SURG SUP & DEVICES STERILE SUPPLY: Performed by: TRANSPLANT SURGERY

## 2023-02-09 PROCEDURE — 63600175 PHARM REV CODE 636 W HCPCS: Performed by: STUDENT IN AN ORGANIZED HEALTH CARE EDUCATION/TRAINING PROGRAM

## 2023-02-09 PROCEDURE — 85384 FIBRINOGEN ACTIVITY: CPT | Performed by: SURGERY

## 2023-02-09 PROCEDURE — 47135 PR TRANSPLANT LIVER,ALLOTRANSPLANT: ICD-10-PCS | Mod: 82,,, | Performed by: TRANSPLANT SURGERY

## 2023-02-09 PROCEDURE — 85025 COMPLETE CBC W/AUTO DIFF WBC: CPT | Performed by: SURGERY

## 2023-02-09 PROCEDURE — 36620: ICD-10-PCS | Mod: 59,,, | Performed by: ANESTHESIOLOGY

## 2023-02-09 PROCEDURE — 80048 BASIC METABOLIC PNL TOTAL CA: CPT | Mod: 91,XB | Performed by: STUDENT IN AN ORGANIZED HEALTH CARE EDUCATION/TRAINING PROGRAM

## 2023-02-09 PROCEDURE — 81300005 HC LIVER TRANSPORT, GROUND 4-5 HOURS

## 2023-02-09 PROCEDURE — 85014 HEMATOCRIT: CPT | Mod: 91 | Performed by: SURGERY

## 2023-02-09 PROCEDURE — 85610 PROTHROMBIN TIME: CPT | Mod: 91 | Performed by: STUDENT IN AN ORGANIZED HEALTH CARE EDUCATION/TRAINING PROGRAM

## 2023-02-09 PROCEDURE — 85730 THROMBOPLASTIN TIME PARTIAL: CPT | Mod: 91 | Performed by: SURGERY

## 2023-02-09 PROCEDURE — 88309 PR  SURG PATH,LEVEL VI: ICD-10-PCS | Mod: 26,,, | Performed by: PATHOLOGY

## 2023-02-09 PROCEDURE — 25000003 PHARM REV CODE 250: Performed by: TRANSPLANT SURGERY

## 2023-02-09 PROCEDURE — 81003 URINALYSIS AUTO W/O SCOPE: CPT | Performed by: PHYSICIAN ASSISTANT

## 2023-02-09 PROCEDURE — 88309 TISSUE EXAM BY PATHOLOGIST: CPT | Mod: 26,,, | Performed by: PATHOLOGY

## 2023-02-09 PROCEDURE — 27000221 HC OXYGEN, UP TO 24 HOURS

## 2023-02-09 PROCEDURE — P9017 PLASMA 1 DONOR FRZ W/IN 8 HR: HCPCS | Performed by: PHYSICIAN ASSISTANT

## 2023-02-09 PROCEDURE — C1751 CATH, INF, PER/CENT/MIDLINE: HCPCS | Mod: NTX | Performed by: ANESTHESIOLOGY

## 2023-02-09 PROCEDURE — 25000003 PHARM REV CODE 250: Performed by: SURGERY

## 2023-02-09 PROCEDURE — P9045 ALBUMIN (HUMAN), 5%, 250 ML: HCPCS | Mod: JG | Performed by: TRANSPLANT SURGERY

## 2023-02-09 PROCEDURE — 93503 SWAN GANZ LINE: ICD-10-PCS | Mod: 59,,, | Performed by: ANESTHESIOLOGY

## 2023-02-09 PROCEDURE — 85520 HEPARIN ASSAY: CPT

## 2023-02-09 PROCEDURE — P9047 ALBUMIN (HUMAN), 25%, 50ML: HCPCS | Mod: JG | Performed by: NURSE ANESTHETIST, CERTIFIED REGISTERED

## 2023-02-09 PROCEDURE — 82150 ASSAY OF AMYLASE: CPT | Performed by: STUDENT IN AN ORGANIZED HEALTH CARE EDUCATION/TRAINING PROGRAM

## 2023-02-09 PROCEDURE — D9220A PRA ANESTHESIA: Mod: CRNA,,, | Performed by: NURSE ANESTHETIST, CERTIFIED REGISTERED

## 2023-02-09 PROCEDURE — 25000242 PHARM REV CODE 250 ALT 637 W/ HCPCS: Performed by: STUDENT IN AN ORGANIZED HEALTH CARE EDUCATION/TRAINING PROGRAM

## 2023-02-09 PROCEDURE — 83735 ASSAY OF MAGNESIUM: CPT | Mod: 91 | Performed by: SURGERY

## 2023-02-09 PROCEDURE — 27800506 HC CATH, RAPID INFUSION (7.5&8.5): Mod: NTX | Performed by: ANESTHESIOLOGY

## 2023-02-09 PROCEDURE — 80053 COMPREHEN METABOLIC PANEL: CPT | Performed by: STUDENT IN AN ORGANIZED HEALTH CARE EDUCATION/TRAINING PROGRAM

## 2023-02-09 PROCEDURE — 82330 ASSAY OF CALCIUM: CPT

## 2023-02-09 PROCEDURE — P9012 CRYOPRECIPITATE EACH UNIT: HCPCS | Performed by: ANESTHESIOLOGY

## 2023-02-09 PROCEDURE — 36000931 HC OR TIME LEV VII EA ADD 15 MIN: Performed by: TRANSPLANT SURGERY

## 2023-02-09 PROCEDURE — 88307 TISSUE EXAM BY PATHOLOGIST: CPT | Performed by: PATHOLOGY

## 2023-02-09 PROCEDURE — 85002 BLEEDING TIME TEST: CPT

## 2023-02-09 PROCEDURE — 36415 COLL VENOUS BLD VENIPUNCTURE: CPT | Performed by: SURGERY

## 2023-02-09 PROCEDURE — 87205 SMEAR GRAM STAIN: CPT | Performed by: TRANSPLANT SURGERY

## 2023-02-09 PROCEDURE — 36000930 HC OR TIME LEV VII 1ST 15 MIN: Performed by: TRANSPLANT SURGERY

## 2023-02-09 PROCEDURE — 88309 TISSUE EXAM BY PATHOLOGIST: CPT | Performed by: PATHOLOGY

## 2023-02-09 PROCEDURE — P9045 ALBUMIN (HUMAN), 5%, 250 ML: HCPCS | Mod: JG | Performed by: STUDENT IN AN ORGANIZED HEALTH CARE EDUCATION/TRAINING PROGRAM

## 2023-02-09 PROCEDURE — 93503 INSERT/PLACE HEART CATHETER: CPT | Mod: 59,,, | Performed by: ANESTHESIOLOGY

## 2023-02-09 PROCEDURE — 25000003 PHARM REV CODE 250: Performed by: STUDENT IN AN ORGANIZED HEALTH CARE EDUCATION/TRAINING PROGRAM

## 2023-02-09 PROCEDURE — 47135 TRANSPLANTATION OF LIVER: CPT | Mod: ,,, | Performed by: TRANSPLANT SURGERY

## 2023-02-09 PROCEDURE — 87075 CULTR BACTERIA EXCEPT BLOOD: CPT | Performed by: TRANSPLANT SURGERY

## 2023-02-09 PROCEDURE — 85520 HEPARIN ASSAY: CPT | Performed by: SURGERY

## 2023-02-09 PROCEDURE — 85014 HEMATOCRIT: CPT

## 2023-02-09 PROCEDURE — 99900035 HC TECH TIME PER 15 MIN (STAT)

## 2023-02-09 PROCEDURE — 82330 ASSAY OF CALCIUM: CPT | Performed by: SURGERY

## 2023-02-09 PROCEDURE — 88313 SPECIAL STAINS GROUP 2: CPT | Performed by: PATHOLOGY

## 2023-02-09 PROCEDURE — 20000000 HC ICU ROOM

## 2023-02-09 PROCEDURE — 27100021 HC MULTIPORT INFUSION MANIFOLD: Mod: NTX | Performed by: ANESTHESIOLOGY

## 2023-02-09 PROCEDURE — 27100025 HC TUBING, SET FLUID WARMER: Mod: NTX | Performed by: ANESTHESIOLOGY

## 2023-02-09 PROCEDURE — 82800 BLOOD PH: CPT

## 2023-02-09 PROCEDURE — 84100 ASSAY OF PHOSPHORUS: CPT | Performed by: SURGERY

## 2023-02-09 PROCEDURE — 37000009 HC ANESTHESIA EA ADD 15 MINS: Performed by: TRANSPLANT SURGERY

## 2023-02-09 PROCEDURE — 84132 ASSAY OF SERUM POTASSIUM: CPT | Mod: 91 | Performed by: SURGERY

## 2023-02-09 PROCEDURE — 99233 SBSQ HOSP IP/OBS HIGH 50: CPT | Mod: 24,,, | Performed by: STUDENT IN AN ORGANIZED HEALTH CARE EDUCATION/TRAINING PROGRAM

## 2023-02-09 PROCEDURE — 86965 POOLING BLOOD PLATELETS: CPT | Performed by: ANESTHESIOLOGY

## 2023-02-09 PROCEDURE — 27201041 HC RESERVOIR, CARDIOTOMY

## 2023-02-09 PROCEDURE — P9035 PLATELET PHERES LEUKOREDUCED: HCPCS | Performed by: ANESTHESIOLOGY

## 2023-02-09 PROCEDURE — 85610 PROTHROMBIN TIME: CPT | Performed by: SURGERY

## 2023-02-09 PROCEDURE — 76937 US GUIDE VASCULAR ACCESS: CPT | Mod: 26,,, | Performed by: ANESTHESIOLOGY

## 2023-02-09 PROCEDURE — 84295 ASSAY OF SERUM SODIUM: CPT

## 2023-02-09 PROCEDURE — 76937: ICD-10-PCS | Mod: 26,,, | Performed by: ANESTHESIOLOGY

## 2023-02-09 PROCEDURE — 84450 TRANSFERASE (AST) (SGOT): CPT | Mod: 91 | Performed by: SURGERY

## 2023-02-09 PROCEDURE — 63600175 PHARM REV CODE 636 W HCPCS: Mod: JG | Performed by: STUDENT IN AN ORGANIZED HEALTH CARE EDUCATION/TRAINING PROGRAM

## 2023-02-09 PROCEDURE — 63600175 PHARM REV CODE 636 W HCPCS: Performed by: SURGERY

## 2023-02-09 PROCEDURE — 36415 COLL VENOUS BLD VENIPUNCTURE: CPT | Mod: NTX | Performed by: PHYSICIAN ASSISTANT

## 2023-02-09 PROCEDURE — S5010 5% DEXTROSE AND 0.45% SALINE: HCPCS | Performed by: STUDENT IN AN ORGANIZED HEALTH CARE EDUCATION/TRAINING PROGRAM

## 2023-02-09 PROCEDURE — 82803 BLOOD GASES ANY COMBINATION: CPT

## 2023-02-09 PROCEDURE — 99223 1ST HOSP IP/OBS HIGH 75: CPT | Mod: ,,, | Performed by: NURSE PRACTITIONER

## 2023-02-09 PROCEDURE — 88307 TISSUE EXAM BY PATHOLOGIST: CPT | Mod: 26,,, | Performed by: PATHOLOGY

## 2023-02-09 PROCEDURE — 87102 FUNGUS ISOLATION CULTURE: CPT | Performed by: TRANSPLANT SURGERY

## 2023-02-09 PROCEDURE — 87206 SMEAR FLUORESCENT/ACID STAI: CPT | Performed by: TRANSPLANT SURGERY

## 2023-02-09 PROCEDURE — 37000008 HC ANESTHESIA 1ST 15 MINUTES: Performed by: TRANSPLANT SURGERY

## 2023-02-09 PROCEDURE — D9220A PRA ANESTHESIA: Mod: ANES,,, | Performed by: ANESTHESIOLOGY

## 2023-02-09 PROCEDURE — C1729 CATH, DRAINAGE: HCPCS | Performed by: TRANSPLANT SURGERY

## 2023-02-09 PROCEDURE — 84460 ALANINE AMINO (ALT) (SGPT): CPT | Performed by: SURGERY

## 2023-02-09 PROCEDURE — 27000191 HC C-V MONITORING

## 2023-02-09 PROCEDURE — 82947 ASSAY GLUCOSE BLOOD QUANT: CPT | Performed by: SURGERY

## 2023-02-09 PROCEDURE — 84132 ASSAY OF SERUM POTASSIUM: CPT

## 2023-02-09 PROCEDURE — 87070 CULTURE OTHR SPECIMN AEROBIC: CPT | Performed by: TRANSPLANT SURGERY

## 2023-02-09 PROCEDURE — 85018 HEMOGLOBIN: CPT | Mod: 91 | Performed by: SURGERY

## 2023-02-09 PROCEDURE — 83605 ASSAY OF LACTIC ACID: CPT

## 2023-02-09 PROCEDURE — 82040 ASSAY OF SERUM ALBUMIN: CPT | Mod: 91 | Performed by: SURGERY

## 2023-02-09 PROCEDURE — 80048 BASIC METABOLIC PNL TOTAL CA: CPT | Mod: XB | Performed by: SURGERY

## 2023-02-09 PROCEDURE — 47143 PR TRANSPLANT,PREP DONOR LIVER, WHOLE: ICD-10-PCS | Mod: 51,,, | Performed by: TRANSPLANT SURGERY

## 2023-02-09 PROCEDURE — 89051 BODY FLUID CELL COUNT: CPT | Performed by: TRANSPLANT SURGERY

## 2023-02-09 PROCEDURE — 85025 COMPLETE CBC W/AUTO DIFF WBC: CPT | Mod: 91 | Performed by: STUDENT IN AN ORGANIZED HEALTH CARE EDUCATION/TRAINING PROGRAM

## 2023-02-09 PROCEDURE — 88313 PR  SPECIAL STAINS,GROUP II: ICD-10-PCS | Mod: 26,,, | Performed by: PATHOLOGY

## 2023-02-09 PROCEDURE — 83615 LACTATE (LD) (LDH) ENZYME: CPT | Performed by: STUDENT IN AN ORGANIZED HEALTH CARE EDUCATION/TRAINING PROGRAM

## 2023-02-09 PROCEDURE — 94761 N-INVAS EAR/PLS OXIMETRY MLT: CPT

## 2023-02-09 PROCEDURE — 63600175 PHARM REV CODE 636 W HCPCS: Mod: NTX | Performed by: PHYSICIAN ASSISTANT

## 2023-02-09 PROCEDURE — P9016 RBC LEUKOCYTES REDUCED: HCPCS | Performed by: PHYSICIAN ASSISTANT

## 2023-02-09 PROCEDURE — 27800505 HC CATH, RADIAL ARTERY KIT: Mod: NTX | Performed by: ANESTHESIOLOGY

## 2023-02-09 PROCEDURE — 87116 MYCOBACTERIA CULTURE: CPT | Performed by: TRANSPLANT SURGERY

## 2023-02-09 PROCEDURE — 63600175 PHARM REV CODE 636 W HCPCS: Performed by: TRANSPLANT SURGERY

## 2023-02-09 PROCEDURE — 84295 ASSAY OF SERUM SODIUM: CPT | Mod: 91 | Performed by: SURGERY

## 2023-02-09 PROCEDURE — 25000003 PHARM REV CODE 250: Performed by: NURSE PRACTITIONER

## 2023-02-09 PROCEDURE — 27200656 HC CATH, FEMORAL ARTERY: Mod: NTX | Performed by: ANESTHESIOLOGY

## 2023-02-09 PROCEDURE — 99223 PR INITIAL HOSPITAL CARE,LEVL III: ICD-10-PCS | Mod: ,,, | Performed by: NURSE PRACTITIONER

## 2023-02-09 PROCEDURE — 99233 PR SUBSEQUENT HOSPITAL CARE,LEVL III: ICD-10-PCS | Mod: 24,,, | Performed by: STUDENT IN AN ORGANIZED HEALTH CARE EDUCATION/TRAINING PROGRAM

## 2023-02-09 PROCEDURE — 88313 SPECIAL STAINS GROUP 2: CPT | Mod: 26,,, | Performed by: PATHOLOGY

## 2023-02-09 PROCEDURE — 81300000 HC LIVER ACQUISITION CHARGE

## 2023-02-09 PROCEDURE — 88307 PR  SURG PATH,LEVEL V: ICD-10-PCS | Mod: 26,,, | Performed by: PATHOLOGY

## 2023-02-09 PROCEDURE — 37799 UNLISTED PX VASCULAR SURGERY: CPT

## 2023-02-09 PROCEDURE — 47135 TRANSPLANTATION OF LIVER: CPT | Mod: 82,,, | Performed by: TRANSPLANT SURGERY

## 2023-02-09 PROCEDURE — 47135 PR TRANSPLANT LIVER,ALLOTRANSPLANT: ICD-10-PCS | Mod: ,,, | Performed by: TRANSPLANT SURGERY

## 2023-02-09 RX ORDER — VALGANCICLOVIR 450 MG/1
450 TABLET, FILM COATED ORAL DAILY
Status: DISCONTINUED | OUTPATIENT
Start: 2023-02-19 | End: 2023-02-15 | Stop reason: HOSPADM

## 2023-02-09 RX ORDER — HEPARIN SODIUM 1000 [USP'U]/ML
INJECTION, SOLUTION INTRAVENOUS; SUBCUTANEOUS
Status: DISCONTINUED | OUTPATIENT
Start: 2023-02-09 | End: 2023-02-09 | Stop reason: HOSPADM

## 2023-02-09 RX ORDER — HEPARIN SODIUM 5000 [USP'U]/ML
5000 INJECTION, SOLUTION INTRAVENOUS; SUBCUTANEOUS EVERY 8 HOURS
Status: DISCONTINUED | OUTPATIENT
Start: 2023-02-09 | End: 2023-02-15 | Stop reason: HOSPADM

## 2023-02-09 RX ORDER — EPINEPHRINE 1 MG/ML
INJECTION, SOLUTION, CONCENTRATE INTRAVENOUS
Status: DISCONTINUED | OUTPATIENT
Start: 2023-02-09 | End: 2023-02-09

## 2023-02-09 RX ORDER — MAGNESIUM SULFATE HEPTAHYDRATE 40 MG/ML
2 INJECTION, SOLUTION INTRAVENOUS ONCE
Status: COMPLETED | OUTPATIENT
Start: 2023-02-09 | End: 2023-02-09

## 2023-02-09 RX ORDER — ALBUMIN HUMAN 50 G/1000ML
SOLUTION INTRAVENOUS CONTINUOUS PRN
Status: DISCONTINUED | OUTPATIENT
Start: 2023-02-09 | End: 2023-02-09

## 2023-02-09 RX ORDER — MYCOPHENOLATE MOFETIL 200 MG/ML
1000 POWDER, FOR SUSPENSION ORAL 2 TIMES DAILY
Status: DISCONTINUED | OUTPATIENT
Start: 2023-02-09 | End: 2023-02-10

## 2023-02-09 RX ORDER — ONDANSETRON 2 MG/ML
INJECTION INTRAMUSCULAR; INTRAVENOUS
Status: DISCONTINUED | OUTPATIENT
Start: 2023-02-09 | End: 2023-02-09

## 2023-02-09 RX ORDER — ALBUMIN HUMAN 250 G/1000ML
SOLUTION INTRAVENOUS CONTINUOUS PRN
Status: DISCONTINUED | OUTPATIENT
Start: 2023-02-09 | End: 2023-02-09

## 2023-02-09 RX ORDER — NOREPINEPHRINE BITARTRATE 1 MG/ML
INJECTION, SOLUTION INTRAVENOUS
Status: DISCONTINUED | OUTPATIENT
Start: 2023-02-09 | End: 2023-02-09

## 2023-02-09 RX ORDER — ALBUMIN HUMAN 50 G/1000ML
25 SOLUTION INTRAVENOUS ONCE
Status: COMPLETED | OUTPATIENT
Start: 2023-02-09 | End: 2023-02-09

## 2023-02-09 RX ORDER — MANNITOL 250 MG/ML
INJECTION, SOLUTION INTRAVENOUS
Status: DISCONTINUED | OUTPATIENT
Start: 2023-02-09 | End: 2023-02-09

## 2023-02-09 RX ORDER — FUROSEMIDE 10 MG/ML
INJECTION INTRAMUSCULAR; INTRAVENOUS
Status: DISCONTINUED | OUTPATIENT
Start: 2023-02-09 | End: 2023-02-09

## 2023-02-09 RX ORDER — NYSTATIN 100000 [USP'U]/ML
500000 SUSPENSION ORAL
Status: DISCONTINUED | OUTPATIENT
Start: 2023-02-09 | End: 2023-02-15 | Stop reason: HOSPADM

## 2023-02-09 RX ORDER — HALOPERIDOL 5 MG/ML
INJECTION INTRAMUSCULAR
Status: DISCONTINUED | OUTPATIENT
Start: 2023-02-09 | End: 2023-02-09

## 2023-02-09 RX ORDER — KETAMINE HCL IN 0.9 % NACL 50 MG/5 ML
SYRINGE (ML) INTRAVENOUS
Status: DISCONTINUED | OUTPATIENT
Start: 2023-02-09 | End: 2023-02-09

## 2023-02-09 RX ORDER — LAMIVUDINE 150 MG/1
150 TABLET, FILM COATED ORAL DAILY
Status: DISCONTINUED | OUTPATIENT
Start: 2023-02-09 | End: 2023-02-15 | Stop reason: HOSPADM

## 2023-02-09 RX ORDER — OXYCODONE HYDROCHLORIDE 5 MG/1
5 TABLET ORAL EVERY 4 HOURS PRN
Status: DISCONTINUED | OUTPATIENT
Start: 2023-02-09 | End: 2023-02-15 | Stop reason: HOSPADM

## 2023-02-09 RX ORDER — OXYCODONE HCL 5 MG/5 ML
5 SOLUTION, ORAL ORAL EVERY 4 HOURS PRN
Status: DISCONTINUED | OUTPATIENT
Start: 2023-02-09 | End: 2023-02-10

## 2023-02-09 RX ORDER — PHENYLEPHRINE HYDROCHLORIDE 10 MG/ML
INJECTION INTRAVENOUS
Status: DISCONTINUED | OUTPATIENT
Start: 2023-02-09 | End: 2023-02-09

## 2023-02-09 RX ORDER — LABETALOL HCL 20 MG/4 ML
10 SYRINGE (ML) INTRAVENOUS EVERY 4 HOURS PRN
Status: DISCONTINUED | OUTPATIENT
Start: 2023-02-09 | End: 2023-02-15 | Stop reason: HOSPADM

## 2023-02-09 RX ORDER — SODIUM CHLORIDE 9 MG/ML
INJECTION, SOLUTION INTRAVENOUS
Status: DISCONTINUED | OUTPATIENT
Start: 2023-02-09 | End: 2023-02-15 | Stop reason: HOSPADM

## 2023-02-09 RX ORDER — AMPICILLIN AND SULBACTAM 2; 1 G/1; G/1
INJECTION, POWDER, FOR SOLUTION INTRAMUSCULAR; INTRAVENOUS
Status: DISCONTINUED | OUTPATIENT
Start: 2023-02-09 | End: 2023-02-09

## 2023-02-09 RX ORDER — MAGNESIUM SULFATE HEPTAHYDRATE 500 MG/ML
INJECTION, SOLUTION INTRAMUSCULAR; INTRAVENOUS
Status: DISCONTINUED | OUTPATIENT
Start: 2023-02-09 | End: 2023-02-09

## 2023-02-09 RX ORDER — SULFAMETHOXAZOLE AND TRIMETHOPRIM 400; 80 MG/1; MG/1
1 TABLET ORAL EVERY MORNING
Status: DISCONTINUED | OUTPATIENT
Start: 2023-02-16 | End: 2023-02-15 | Stop reason: HOSPADM

## 2023-02-09 RX ORDER — DEXMEDETOMIDINE HYDROCHLORIDE 100 UG/ML
INJECTION, SOLUTION INTRAVENOUS
Status: DISCONTINUED | OUTPATIENT
Start: 2023-02-09 | End: 2023-02-09

## 2023-02-09 RX ORDER — LIDOCAINE HCL/PF 100 MG/5ML
SYRINGE (ML) INTRAVENOUS
Status: DISCONTINUED | OUTPATIENT
Start: 2023-02-09 | End: 2023-02-09

## 2023-02-09 RX ORDER — MIDAZOLAM HYDROCHLORIDE 1 MG/ML
INJECTION INTRAMUSCULAR; INTRAVENOUS
Status: DISCONTINUED | OUTPATIENT
Start: 2023-02-09 | End: 2023-02-09

## 2023-02-09 RX ORDER — METHYLPREDNISOLONE SOD SUCC 125 MG
120 VIAL (EA) INJECTION DAILY
Status: COMPLETED | OUTPATIENT
Start: 2023-02-12 | End: 2023-02-12

## 2023-02-09 RX ORDER — DEXTROSE MONOHYDRATE AND SODIUM CHLORIDE 5; .9 G/100ML; G/100ML
INJECTION, SOLUTION INTRAVENOUS CONTINUOUS
Status: DISCONTINUED | OUTPATIENT
Start: 2023-02-09 | End: 2023-02-09

## 2023-02-09 RX ORDER — HEPARIN SODIUM 1000 [USP'U]/ML
INJECTION, SOLUTION INTRAVENOUS; SUBCUTANEOUS
Status: DISCONTINUED | OUTPATIENT
Start: 2023-02-09 | End: 2023-02-09

## 2023-02-09 RX ORDER — ROCURONIUM BROMIDE 10 MG/ML
INJECTION, SOLUTION INTRAVENOUS
Status: DISCONTINUED | OUTPATIENT
Start: 2023-02-09 | End: 2023-02-09

## 2023-02-09 RX ORDER — METHYLPREDNISOLONE SOD SUCC 125 MG
200 VIAL (EA) INJECTION DAILY
Status: COMPLETED | OUTPATIENT
Start: 2023-02-10 | End: 2023-02-10

## 2023-02-09 RX ORDER — DEXTROSE MONOHYDRATE AND SODIUM CHLORIDE 5; .45 G/100ML; G/100ML
INJECTION, SOLUTION INTRAVENOUS CONTINUOUS
Status: DISCONTINUED | OUTPATIENT
Start: 2023-02-09 | End: 2023-02-10

## 2023-02-09 RX ORDER — VASOPRESSIN 20 [USP'U]/ML
INJECTION, SOLUTION INTRAMUSCULAR; SUBCUTANEOUS
Status: DISCONTINUED | OUTPATIENT
Start: 2023-02-09 | End: 2023-02-09

## 2023-02-09 RX ORDER — FAMOTIDINE 10 MG/ML
20 INJECTION INTRAVENOUS EVERY 12 HOURS
Status: DISCONTINUED | OUTPATIENT
Start: 2023-02-09 | End: 2023-02-10

## 2023-02-09 RX ORDER — HYDROMORPHONE HYDROCHLORIDE 1 MG/ML
0.2 INJECTION, SOLUTION INTRAMUSCULAR; INTRAVENOUS; SUBCUTANEOUS
Status: DISCONTINUED | OUTPATIENT
Start: 2023-02-09 | End: 2023-02-10

## 2023-02-09 RX ORDER — FENTANYL CITRATE 50 UG/ML
INJECTION, SOLUTION INTRAMUSCULAR; INTRAVENOUS
Status: DISCONTINUED | OUTPATIENT
Start: 2023-02-09 | End: 2023-02-09

## 2023-02-09 RX ORDER — DEXTROSE MONOHYDRATE 100 MG/ML
INJECTION, SOLUTION INTRAVENOUS CONTINUOUS PRN
Status: DISCONTINUED | OUTPATIENT
Start: 2023-02-09 | End: 2023-02-09

## 2023-02-09 RX ORDER — SODIUM CHLORIDE 0.9 % (FLUSH) 0.9 %
10 SYRINGE (ML) INJECTION
Status: DISCONTINUED | OUTPATIENT
Start: 2023-02-09 | End: 2023-02-15 | Stop reason: HOSPADM

## 2023-02-09 RX ORDER — MUPIROCIN 20 MG/G
1 OINTMENT TOPICAL 2 TIMES DAILY
Status: COMPLETED | OUTPATIENT
Start: 2023-02-09 | End: 2023-02-14

## 2023-02-09 RX ORDER — BUPIVACAINE HYDROCHLORIDE 2.5 MG/ML
INJECTION, SOLUTION EPIDURAL; INFILTRATION; INTRACAUDAL
Status: DISCONTINUED | OUTPATIENT
Start: 2023-02-09 | End: 2023-02-09 | Stop reason: HOSPADM

## 2023-02-09 RX ORDER — POTASSIUM CHLORIDE 14.9 MG/ML
20 INJECTION INTRAVENOUS ONCE
Status: COMPLETED | OUTPATIENT
Start: 2023-02-09 | End: 2023-02-09

## 2023-02-09 RX ORDER — ALBUMIN HUMAN 50 G/1000ML
SOLUTION INTRAVENOUS
Status: COMPLETED | OUTPATIENT
Start: 2023-02-09 | End: 2023-02-09

## 2023-02-09 RX ORDER — PREDNISONE 20 MG/1
20 TABLET ORAL DAILY
Status: DISCONTINUED | OUTPATIENT
Start: 2023-02-15 | End: 2023-02-15 | Stop reason: HOSPADM

## 2023-02-09 RX ORDER — PROPOFOL 10 MG/ML
VIAL (ML) INTRAVENOUS
Status: DISCONTINUED | OUTPATIENT
Start: 2023-02-09 | End: 2023-02-09

## 2023-02-09 RX ADMIN — ALBUMIN (HUMAN) 25 G: 12.5 SOLUTION INTRAVENOUS at 04:02

## 2023-02-09 RX ADMIN — METHYLPREDNISOLONE SODIUM SUCCINATE 500 MG: 1 INJECTION, POWDER, LYOPHILIZED, FOR SOLUTION INTRAMUSCULAR; INTRAVENOUS at 09:02

## 2023-02-09 RX ADMIN — MAGNESIUM SULFATE HEPTAHYDRATE 1 G: 500 INJECTION, SOLUTION INTRAMUSCULAR; INTRAVENOUS at 08:02

## 2023-02-09 RX ADMIN — VASOPRESSIN 2 UNITS: 20 INJECTION INTRAVENOUS at 10:02

## 2023-02-09 RX ADMIN — CALCIUM CHLORIDE 1000 MG: 100 INJECTION, SOLUTION INTRAVENOUS at 10:02

## 2023-02-09 RX ADMIN — SUGAMMADEX 200 MG: 100 INJECTION, SOLUTION INTRAVENOUS at 02:02

## 2023-02-09 RX ADMIN — PROPOFOL 70 MG: 10 INJECTION, EMULSION INTRAVENOUS at 07:02

## 2023-02-09 RX ADMIN — METHOCARBAMOL 500 MG: 100 INJECTION, SOLUTION INTRAMUSCULAR; INTRAVENOUS at 11:02

## 2023-02-09 RX ADMIN — FUROSEMIDE 70 MG: 10 INJECTION, SOLUTION INTRAMUSCULAR; INTRAVENOUS at 09:02

## 2023-02-09 RX ADMIN — EPINEPHRINE 10 MCG: 1 INJECTION, SOLUTION, CONCENTRATE INTRAVENOUS at 11:02

## 2023-02-09 RX ADMIN — HALOPERIDOL LACTATE 0.5 MG: 5 INJECTION, SOLUTION INTRAMUSCULAR at 09:02

## 2023-02-09 RX ADMIN — HEPARIN SODIUM 5000 UNITS: 5000 INJECTION INTRAVENOUS; SUBCUTANEOUS at 09:02

## 2023-02-09 RX ADMIN — FENTANYL CITRATE 100 MCG: 50 INJECTION, SOLUTION INTRAMUSCULAR; INTRAVENOUS at 09:02

## 2023-02-09 RX ADMIN — DEXTROSE AND SODIUM CHLORIDE: 5; .45 INJECTION, SOLUTION INTRAVENOUS at 03:02

## 2023-02-09 RX ADMIN — VASOPRESSIN 0.04 UNITS/MIN: 20 INJECTION INTRAVENOUS at 09:02

## 2023-02-09 RX ADMIN — AMPICILLIN SODIUM AND SULBACTAM SODIUM 3 G: 2; 1 INJECTION, POWDER, FOR SOLUTION INTRAMUSCULAR; INTRAVENOUS at 05:02

## 2023-02-09 RX ADMIN — MANNITOL 50 ML: 12.5 INJECTION, SOLUTION INTRAVENOUS at 11:02

## 2023-02-09 RX ADMIN — AMPICILLIN SODIUM AND SULBACTAM SODIUM 3 G: 2; 1 INJECTION, POWDER, FOR SOLUTION INTRAMUSCULAR; INTRAVENOUS at 09:02

## 2023-02-09 RX ADMIN — VASOPRESSIN 3 UNITS: 20 INJECTION INTRAVENOUS at 10:02

## 2023-02-09 RX ADMIN — LIDOCAINE HYDROCHLORIDE 75 MG: 20 INJECTION, SOLUTION INTRAVENOUS at 07:02

## 2023-02-09 RX ADMIN — FENTANYL CITRATE 50 MCG: 50 INJECTION, SOLUTION INTRAMUSCULAR; INTRAVENOUS at 11:02

## 2023-02-09 RX ADMIN — MYCOPHENOLATE MOFETIL 1000 MG: 200 POWDER, FOR SUSPENSION ORAL at 09:02

## 2023-02-09 RX ADMIN — DEXTROSE MONOHYDRATE: 10 INJECTION, SOLUTION INTRAVENOUS at 10:02

## 2023-02-09 RX ADMIN — INSULIN HUMAN 10 UNITS: 100 INJECTION, SOLUTION PARENTERAL at 11:02

## 2023-02-09 RX ADMIN — PHENYLEPHRINE HYDROCHLORIDE 200 MCG: 10 INJECTION INTRAVENOUS at 10:02

## 2023-02-09 RX ADMIN — NYSTATIN 500000 UNITS: 500000 SUSPENSION ORAL at 07:02

## 2023-02-09 RX ADMIN — PHENYLEPHRINE HYDROCHLORIDE 150 MCG: 10 INJECTION INTRAVENOUS at 08:02

## 2023-02-09 RX ADMIN — PHYTONADIONE 10 MG: 10 INJECTION, EMULSION INTRAMUSCULAR; INTRAVENOUS; SUBCUTANEOUS at 10:02

## 2023-02-09 RX ADMIN — FUROSEMIDE 70 MG: 10 INJECTION, SOLUTION INTRAMUSCULAR; INTRAVENOUS at 11:02

## 2023-02-09 RX ADMIN — HEPARIN SODIUM 2000 UNITS: 1000 INJECTION, SOLUTION INTRAVENOUS; SUBCUTANEOUS at 10:02

## 2023-02-09 RX ADMIN — INSULIN HUMAN 1 UNITS/HR: 1 INJECTION, SOLUTION INTRAVENOUS at 05:02

## 2023-02-09 RX ADMIN — NOREPINEPHRINE BITARTRATE 32 MCG: 1 INJECTION, SOLUTION, CONCENTRATE INTRAVENOUS at 12:02

## 2023-02-09 RX ADMIN — MUPIROCIN 1 G: 20 OINTMENT TOPICAL at 09:02

## 2023-02-09 RX ADMIN — LABETALOL HYDROCHLORIDE 10 MG: 5 INJECTION, SOLUTION INTRAVENOUS at 08:02

## 2023-02-09 RX ADMIN — Medication 20 MG: at 11:02

## 2023-02-09 RX ADMIN — DEXMEDETOMIDINE HYDROCHLORIDE 12 MCG: 100 INJECTION, SOLUTION INTRAVENOUS at 10:02

## 2023-02-09 RX ADMIN — ROCURONIUM BROMIDE 100 MG: 10 INJECTION INTRAVENOUS at 07:02

## 2023-02-09 RX ADMIN — HYDROMORPHONE HYDROCHLORIDE 0.2 MG: 1 INJECTION, SOLUTION INTRAMUSCULAR; INTRAVENOUS; SUBCUTANEOUS at 08:02

## 2023-02-09 RX ADMIN — MANNITOL 75 ML: 12.5 INJECTION, SOLUTION INTRAVENOUS at 09:02

## 2023-02-09 RX ADMIN — POTASSIUM CHLORIDE 20 MEQ: 14.9 INJECTION, SOLUTION INTRAVENOUS at 08:02

## 2023-02-09 RX ADMIN — PHYTONADIONE 10 MG: 10 INJECTION, EMULSION INTRAMUSCULAR; INTRAVENOUS; SUBCUTANEOUS at 04:02

## 2023-02-09 RX ADMIN — FAMOTIDINE 20 MG: 10 INJECTION INTRAVENOUS at 08:02

## 2023-02-09 RX ADMIN — AMPICILLIN SODIUM AND SULBACTAM SODIUM 3 G: 2; 1 INJECTION, POWDER, FOR SOLUTION INTRAMUSCULAR; INTRAVENOUS at 12:02

## 2023-02-09 RX ADMIN — TACROLIMUS 1 MG: 1 CAPSULE ORAL at 05:02

## 2023-02-09 RX ADMIN — MIDAZOLAM HYDROCHLORIDE 2 MG: 1 INJECTION, SOLUTION INTRAMUSCULAR; INTRAVENOUS at 07:02

## 2023-02-09 RX ADMIN — ALBUMIN HUMAN: 50 SOLUTION INTRAVENOUS at 12:02

## 2023-02-09 RX ADMIN — ALBUMIN HUMAN: 0.25 SOLUTION INTRAVENOUS at 09:02

## 2023-02-09 RX ADMIN — METHOCARBAMOL 500 MG: 100 INJECTION, SOLUTION INTRAMUSCULAR; INTRAVENOUS at 05:02

## 2023-02-09 RX ADMIN — NOREPINEPHRINE BITARTRATE 32 MCG: 1 INJECTION, SOLUTION, CONCENTRATE INTRAVENOUS at 11:02

## 2023-02-09 RX ADMIN — LAMIVUDINE 150 MG: 150 TABLET, FILM COATED ORAL at 05:02

## 2023-02-09 RX ADMIN — CALCIUM CHLORIDE 1000 MG: 100 INJECTION, SOLUTION INTRAVENOUS at 11:02

## 2023-02-09 RX ADMIN — DEXMEDETOMIDINE HYDROCHLORIDE 12 MCG: 100 INJECTION, SOLUTION INTRAVENOUS at 02:02

## 2023-02-09 RX ADMIN — ALBUMIN HUMAN: 50 SOLUTION INTRAVENOUS at 10:02

## 2023-02-09 RX ADMIN — HYDROMORPHONE HYDROCHLORIDE 0.2 MG: 1 INJECTION, SOLUTION INTRAMUSCULAR; INTRAVENOUS; SUBCUTANEOUS at 03:02

## 2023-02-09 RX ADMIN — EPINEPHRINE 10 MCG: 1 INJECTION, SOLUTION, CONCENTRATE INTRAVENOUS at 10:02

## 2023-02-09 RX ADMIN — NYSTATIN 500000 UNITS: 500000 SUSPENSION ORAL at 05:02

## 2023-02-09 RX ADMIN — ONDANSETRON 4 MG: 2 INJECTION INTRAMUSCULAR; INTRAVENOUS at 01:02

## 2023-02-09 RX ADMIN — HYDROMORPHONE HYDROCHLORIDE 0.2 MG: 1 INJECTION, SOLUTION INTRAMUSCULAR; INTRAVENOUS; SUBCUTANEOUS at 05:02

## 2023-02-09 RX ADMIN — FENTANYL CITRATE 50 MCG: 50 INJECTION, SOLUTION INTRAMUSCULAR; INTRAVENOUS at 07:02

## 2023-02-09 RX ADMIN — Medication 30 MG: at 09:02

## 2023-02-09 RX ADMIN — MAGNESIUM SULFATE 2 G: 2 INJECTION INTRAVENOUS at 08:02

## 2023-02-09 RX ADMIN — HEPARIN SODIUM 5000 UNITS: 5000 INJECTION INTRAVENOUS; SUBCUTANEOUS at 04:02

## 2023-02-09 RX ADMIN — SODIUM CHLORIDE: 9 INJECTION, SOLUTION INTRAVENOUS at 04:02

## 2023-02-09 RX ADMIN — OXYCODONE HYDROCHLORIDE 5 MG: 5 SOLUTION ORAL at 05:02

## 2023-02-09 RX ADMIN — NOREPINEPHRINE BITARTRATE 0.02 MCG/KG/MIN: 1 INJECTION, SOLUTION, CONCENTRATE INTRAVENOUS at 09:02

## 2023-02-09 NOTE — HPI
Reason for Consult: Management of Hyperglycemia     Surgical Procedure and Date:   2/9/23  -  TRANSPLANT, LIVER (N/A)      HPI:   Patient is a 72 y.o. 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 She presents as a primary candidate for liver transplant with a MELD of 23. Endocrinology consulted for management of hyperglycemia.       Lab Results   Component Value Date    HGBA1C <4.0 (A) 02/08/2023

## 2023-02-09 NOTE — TRANSFER OF CARE
"Anesthesia Transfer of Care Note    Patient: Argelia Sevilla    Procedure(s) Performed: Procedure(s) (LRB):  TRANSPLANT, LIVER (N/A)    Patient location: ICU    Anesthesia Type: general    Transport from OR: Transported from OR on 6-10 L/min O2 by face mask with adequate spontaneous ventilation. Continuous ECG monitoring in transport. Continuous SpO2 monitoring in transport. Continuos invasive BP monitoring in transport    Post pain: adequate analgesia    Post assessment: no apparent anesthetic complications and tolerated procedure well    Post vital signs: stable    Level of consciousness: awake    Nausea/Vomiting: no nausea/vomiting    Complications: none    Transfer of care protocol was followed      Last vitals:   Visit Vitals  /60   Pulse 91   Temp 37 °C (98.6 °F)   Resp 16   Ht 5' 2" (1.575 m)   Wt 75.7 kg (166 lb 14.6 oz)   SpO2 97%   BMI 30.53 kg/m²     "

## 2023-02-09 NOTE — PROGRESS NOTES
Autotransfusion/Rapid Infusion Record:      02/09/2023  Autotransfusionist:  Iggy Núñez    Surgeon(s) and Role:     * Iggy Adhikari MD - Primary     * Laureano Lipscomb MD - Assisting     * Damian Santos MD - Fellow  Anesthesiologist:  No responsible provider has been recorded for the case.    Past Medical History:   Diagnosis Date    Anxiety disorder, unspecified     CAD (coronary artery disease)     DIC (disseminated intravascular coagulation)     Dyslipidemia     GERD (gastroesophageal reflux disease)     Hereditary hemolytic anemia, unspecified     History of coronary artery stent placement     HTN (hypertension)     Liver cirrhosis secondary to BROWN     Portal hypertensive gastropathy     Primary localized osteoarthrosis of right lower leg     Statin intolerance        Procedure(s) (LRB):  TRANSPLANT, LIVER (N/A)     2:49 PM    Equipment:    Cell Saver     R.I.S.  : [employer] Model: CATSmart or CATSplus : [place]   Model: [id number]     Serial number: [id number]   Serial number:    Disposable lot #: [id number]   Disposable lot #:      Were extra cardiotomies used for cell saver?  yes   if yes, #:  1    Solutions:  Anticoagulant: ACD-A   Expiration date: 02/24 Volume used: 2000ml   Wash solution: 0.9% NaCl   Expiration date: 07/25 Volume used: 2000ml     Cell saver checklist  Time completed:           []   Circuit assembled correctly     []   Cell saver powered and operational     []   Vacuum connected, functional, adjust to max -150mmHg     []   Anticoagulant drip rate adjusted     []   Transfer bag properly labeled with patient name, expiration time, volume,       anticoagulant, OR number, and initials     []   Cell saver disinfected after use (completed at end of case)       Cell Saver volumes:    Total volume processed:     6332 mL     Total volume pRBCs recovered     781 mL     Volume pRBCs infused     781 mL         RIS checklist   Time completed:  []   RIS circuit assembled  correctly     []   RIS power and operational     []   RIS disinfected after use (completed at end of case)       RIS volumes:    Total volume infused:    (see anesthesia record for blood       product information)    mL       Additional comments:

## 2023-02-09 NOTE — SUBJECTIVE & OBJECTIVE
Follow-up For: Procedure(s) (LRB):  TRANSPLANT, LIVER (N/A)    Post-Operative Day: Day of Surgery    Objective:     Vital Signs (Most Recent):  Temp: 98.5 °F (36.9 °C) (02/09/23 1455)  Pulse: 98 (02/09/23 1455)  Resp: (!) 26 (02/09/23 1502)  BP: 133/60 (02/09/23 0322)  SpO2: 98 % (02/09/23 1455)   Vital Signs (24h Range):  Temp:  [98.3 °F (36.8 °C)-98.6 °F (37 °C)] 98.5 °F (36.9 °C)  Pulse:  [89-98] 98  Resp:  [16-28] 26  SpO2:  [96 %-98 %] 98 %  BP: (133-140)/(60-64) 133/60  Arterial Line BP: (136-154)/(42-46) 154/46     Weight: 75.7 kg (166 lb 14.6 oz)  Body mass index is 30.53 kg/m².      Intake/Output Summary (Last 24 hours) at 2/9/2023 1509  Last data filed at 2/9/2023 1445  Gross per 24 hour   Intake 7995 ml   Output 8285 ml   Net -290 ml       Physical Exam  Vitals and nursing note reviewed.   Constitutional:       Appearance: She is ill-appearing.   HENT:      Head: Normocephalic and atraumatic.   Neck:      Comments: CVC present with introducer and swan  Cardiovascular:      Rate and Rhythm: Normal rate and regular rhythm.      Pulses: Normal pulses.   Pulmonary:      Effort: Pulmonary effort is normal.   Abdominal:      Palpations: Abdomen is soft.      Comments: Appropriately tender to palpation. ZACKERY drains in place with serosanguinous output. Chevron incision with dressings in place, clean, dry, intact.   Genitourinary:     Comments: Mckenna in place  Musculoskeletal:         General: Normal range of motion.      Cervical back: Normal range of motion and neck supple.      Comments: Radial and femoral a-lines in place   Skin:     General: Skin is warm and dry.      Capillary Refill: Capillary refill takes less than 2 seconds.      Coloration: Skin is jaundiced.   Neurological:      General: No focal deficit present.      Mental Status: She is alert.       Vents:       Lines/Drains/Airways       Central Venous Catheter Line  Duration                  Hemodialysis Catheter 02/09/23 0935 <1 day    Pulmonary  Artery Catheter Assessment  02/09/23 0822 <1 day              Drain  Duration                  Closed/Suction Drain 02/09/23 1403 Right RLQ Bulb 19 Fr. <1 day         Closed/Suction Drain 02/09/23 1404 Right;Anterior RLQ Bulb 19 Fr. <1 day         Urethral Catheter 02/09/23 0750 Silicone;Non-latex 16 Fr. <1 day              Arterial Line  Duration             Arterial Line 02/09/23 0758 Left Radial <1 day    Arterial Line 02/09/23 0803 Right Femoral <1 day              Peripheral Intravenous Line  Duration                  Peripheral IV - Single Lumen 02/09/23 0030 18 G Right Antecubital <1 day         CARMEN 02/09/23 0823 Right Antecubital <1 day                    Significant Labs:    CBC/Anemia Profile:  Recent Labs   Lab 02/08/23 2007 02/09/23 0813 02/09/23 1200 02/09/23  1317   WBC 6.81  --   --   --    HGB 10.4* 8.8* 9.4* 7.6*   HCT 30.3* 24.8* 26.5* 22.0*   * 86* 61* 159   *  --   --   --    RDW 14.6*  --   --   --         Chemistries:  Recent Labs   Lab 02/08/23 2007 02/09/23 0813 02/09/23 1200 02/09/23  1317   * 133* 134* 138   K 4.1 3.5 3.9 3.7     --   --   --    CO2 20*  --   --   --    BUN 13  --   --   --    CREATININE 0.7  --   --   --    CALCIUM 9.1  --   --   --    ALBUMIN 2.9* 2.3* 2.0* 2.7*   PROT 7.2  --   --   --    BILITOT 9.7*  --   --   --    ALKPHOS 111  --   --   --    ALT 23  --  514* 866*   AST 44*  --  1,630* 2,771*   MG 1.3* 1.3* 1.7 1.8       All pertinent labs within the past 24 hours have been reviewed.    Significant Imaging:  I have reviewed all pertinent imaging results/findings within the past 24 hours.

## 2023-02-09 NOTE — ANESTHESIA PROCEDURE NOTES
[facility]    Diagnosis: BROWN Cirrhosis  Patient location during procedure: done in OR  Timeout: 2/9/2023 8:22 AM  Procedure end time: 2/9/2023 9:00 AM    Staffing  Authorizing Provider: David Anderson MD  Performing Provider: David Anderson MD    Staffing  Performed: anesthesiologist   Anesthesiologist: David Anderson MD  Anesthesiologist was present at the time of the procedure.  Preanesthetic Checklist  Completed: patient identified, IV checked, site marked, risks and benefits discussed, surgical consent, monitors and equipment checked, pre-op evaluation, timeout performed and anesthesia consent given  Indication   Indication: vascular access, hemodialysis, med administration     Anesthesia   general anesthesia    Central Line   Skin Prep: skin prepped with ChloraPrep, skin prep agent completely dried prior to procedure  Sterile Barriers Followed: Yes    All five maximal barriers used- gloves, gown, cap, mask, and large sterile sheet    hand hygiene performed prior to central venous catheter insertion  Location: right internal jugular.   Catheter type: dialysis  Catheter Size: 12 Fr  Ultrasound: vascular probe with ultrasound   Vessel Caliber: large, patent  Vascular Doppler:  not done, compressibility normal  Needle advanced into vessel with real time Ultrasound guidance.  Guidewire confirmed in vessel.  sterile gel and probe cover used in ultrasound-guided central venous catheter insertion   Manometry: Venous cannualation confirmed by visual estimation of blood vessel pressure using manometry.  Insertion Attempts: 1   Securement:line sutured, sterile dressing applied and blood return through all ports (stat seal patch)    Post-Procedure    Adverse Events:none      Guidewire  Guidewire removed intact, verified with nurse.

## 2023-02-09 NOTE — H&P
[facility] - Telemetry Stepdown  Liver Transplant  History & Physical    Patient Name: Argelia Sevilla  MRN: [id number]  Admission Date: 2/8/2023  Code Status: Full Code  Primary Care Provider: Primary Doctor No    Subjective:     History of Present Illness:  Ms. 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 She presents as a primary candidate for liver transplant with a MELD of 23. Denies recent fever/chills and recent hospitalizations. OR time is 7:30/9:00 AM with Dr. Adhikari. No intraop HD. Of note, donor is RPR+, recipient will need ID consult.     Of note, pre op EKG pre gardner reading Qtc 712. Reviewed with on call cardiology who calculated Qtc at 401; within normal limits. Discsussed with Dr Adhikari, no need to repeat.       Past Medical History:   Diagnosis Date    Anxiety disorder, unspecified     CAD (coronary artery disease)     DIC (disseminated intravascular coagulation)     Dyslipidemia     GERD (gastroesophageal reflux disease)     Hereditary hemolytic anemia, unspecified     History of coronary artery stent placement     HTN (hypertension)     Liver cirrhosis secondary to BROWN     Portal hypertensive gastropathy     Primary localized osteoarthrosis of right lower leg     Statin intolerance        Past Surgical History:   Procedure Laterality Date    ADENOIDECTOMY      ANTERIOR CRUCIATE LIGAMENT REPAIR      BACK SURGERY      CARDIAC CATHETERIZATION  05/31/2018    CARDIAC CATHETERIZATION  09/28/2021    coronary angioplasty    CHOLECYSTECTOMY      COLONOSCOPY  05/25/2022    GALLBLADDER SURGERY      HYSTERECTOMY      KNEE ARTHROSCOPY Right     OOPHORECTOMY      REDUCTION OF BOTH BREASTS  01/01/2011    TONSILLECTOMY      TUBAL LIGATION         Review of patient's allergies indicates:   Allergen Reactions    Codeine Anxiety    Adhesive tape-silicones Rash       Family History       Problem Relation (Age of Onset)    Asthma Mother    Cancer Maternal  Grandmother    Hyperlipidemia Mother, Father, Brother    Stroke Mother, Father          Tobacco Use    Smoking status: Never    Smokeless tobacco: Never   Substance and Sexual Activity    Alcohol use: Never    Drug use: Not on file    Sexual activity: Not on file       PTA Medications   Medication Sig    aspirin (ECOTRIN) 81 MG EC tablet Take 81 mg by mouth.    cholecalciferol, vitamin D3, (VITAMIN D3) 50 mcg (2,000 unit) Tab Take 2,000 Units by mouth once daily.    EDARBI 40 mg Tab Take 1 tablet by mouth once daily.    EDARBI 80 mg Tab Take 1 tablet by mouth once daily.    furosemide (LASIX) 20 MG tablet Take 1 tablet (20 mg total) by mouth once daily.    pantoprazole (PROTONIX) 40 MG tablet Take 40 mg by mouth once daily.    pantoprazole (PROTONIX) 40 MG tablet Take 40 mg by mouth.    spironolactone (ALDACTONE) 50 MG tablet Take 1 tablet (50 mg total) by mouth once daily.    vitamin E 1000 UNIT capsule Take 1,000 Units by mouth once daily.       Review of Systems   Constitutional:  Positive for fatigue. Negative for activity change, appetite change, chills, diaphoresis and fever.   HENT:  Negative for congestion, sinus pressure, sinus pain, sore throat and trouble swallowing.    Eyes:  Negative for visual disturbance.   Respiratory:  Negative for chest tightness, shortness of breath and stridor.    Cardiovascular:  Positive for leg swelling. Negative for chest pain and palpitations.   Gastrointestinal:  Positive for nausea. Negative for abdominal distention, abdominal pain, constipation, diarrhea and vomiting.   Genitourinary:  Negative for decreased urine volume, difficulty urinating, dysuria and flank pain.   Musculoskeletal:  Negative for neck pain and neck stiffness.   Skin:  Negative for color change and rash.   Neurological:  Negative for dizziness, tremors, syncope, light-headedness and headaches.   Psychiatric/Behavioral:  Negative for agitation, confusion, decreased concentration and hallucinations. The  patient is not nervous/anxious.    Objective:     Vital Signs (Most Recent):  Temp: 98.4 °F (36.9 °C) (02/08/23 1953)  Pulse: 95 (02/08/23 1953)  Resp: 16 (02/08/23 1953)  BP: 138/64 (02/08/23 1953)  SpO2: 98 % (02/08/23 1953) Vital Signs (24h Range):  Temp:  [98.4 °F (36.9 °C)] 98.4 °F (36.9 °C)  Pulse:  [95] 95  Resp:  [16] 16  SpO2:  [98 %] 98 %  BP: (138)/(64) 138/64     Weight: 75.7 kg (166 lb 14.6 oz)  Body mass index is 30.53 kg/m².    No intake or output data in the 24 hours ending 02/08/23 2123    Physical Exam  Vitals and nursing note reviewed.   Constitutional:       General: She is not in acute distress.     Appearance: She is not diaphoretic.   HENT:      Head: Normocephalic and atraumatic.   Eyes:      General: No scleral icterus.        Right eye: No discharge.         Left eye: No discharge.   Cardiovascular:      Rate and Rhythm: Normal rate and regular rhythm.      Heart sounds: Normal heart sounds.   Pulmonary:      Effort: Pulmonary effort is normal.      Breath sounds: Normal breath sounds. No wheezing or rales.   Abdominal:      General: Bowel sounds are normal. There is no distension.      Palpations: Abdomen is soft.      Tenderness: There is no abdominal tenderness. There is no guarding.   Musculoskeletal:         General: Swelling present.      Cervical back: Normal range of motion and neck supple.      Right lower leg: Edema present.      Left lower leg: Edema present.      Comments: Bilateral 1-2+ pitting edema in LE   Skin:     General: Skin is warm and dry.      Capillary Refill: Capillary refill takes less than 2 seconds.   Neurological:      Mental Status: She is alert and oriented to person, place, and time.      Cranial Nerves: No cranial nerve deficit.   Psychiatric:         Thought Content: Thought content normal.         Judgment: Judgment normal.       Laboratory: pending  CBC:   Recent Labs   Lab 02/08/23 2007   WBC 6.81   RBC 2.87*   HGB 10.4*   HCT 30.3*   *   MCV  106*   [facility] 36.2[facility]   [facility]     Diagnostic Results:  I have personally reviewed all pertinent imaging studies.    Assessment/Plan:     * End stage liver disease  - 2/2 BROWN   - primary candidate for LTX   - /9AM with Dr. Adhikari  - Of note, pre op EKG pre gardner reading Qtc 712. Reviewed with on call cardiology who calculated Qtc at 401; within normal limits. Discsussed with Dr Adhikari, no need to repeat.   - pre op labs and imaging reviewed      Thrombocytopenia  - 2/2 ELSD   - stable   - monitor with CBC       Coronary artery disease involving native coronary artery of native heart without angina pectoris  - ASA held for surgery       Anemia, chronic disease  - h/h stable   - 2/2 liver disease., should improve post transplant   - monitor with CBC           The patient presents for liver transplant.  There are no apparent contraindications to proceeding with the planned transplant.  The patient understands that the transplant could potentially be cancelled pending detailed assessment of the donor organ.    MELD-Na score: 23 at 12/23/2022 10:56 AM  MELD score: 22 at 12/23/2022 10:56 AM  Calculated from:  Serum Creatinine: 0.7 mg/dL (Using min of 1 mg/dL) at 12/23/2022 10:56 AM  Serum Sodium: 135 mmol/L at 12/23/2022 10:56 AM  Total Bilirubin: 11.2 mg/dL at 12/23/2022 10:56 AM  INR(ratio): 1.7 at 12/23/2022 10:56 AM  Age: 72 years    She will receive IV steroids pulse induction.          Becky Wood PA-C  Liver Transplant  [facility] - Telemetry Stepdown

## 2023-02-09 NOTE — ASSESSMENT & PLAN NOTE
Argelia Sevilla is a 71yo F with history of BROWN s/p orthotopic DBD RPR+ liver transplant.      Neuro/Psych:   -- Sedation: none  -- Pain: PRN oxycodone, dilaudid, robaxin  -- GCS 15              Cards:   -- 2600cc EBL  -- Arrives HDS, off pressors  -- MAP > 65, Syst < 160  -- Flotrac and CVP monitoring  -- A line monitoring  -- Hodges in place       Pulm:   -- Goal O2 sat > 90%  -- No indication for ABG at this time, patient extuabted  -- CXR now and then tomorrow AM  -- O2 supplement as needed  -- Continuous pulse ox      Renal:  -- Labs pending  -- Trend BUN/Cr   -- Keep parham in place at this time      FEN / GI:   -- Replace lytes as needed  -- Nutrition: NPO except sips with meds  -- GI ppx: famotidine  -- Bowel reg: miralax  -- 5% Albumin as needed      ID:   -- Tm: Afebrile; WBC pending  -- Unasyn, bactrim, valganciclovir, nystatin ordered  -- Immunosuppression ordered: methylprednisolone, tacrolimus      Heme/Onc:   -- H/H pending  -- Daily CBC  -- 3u pRBC, 2u FFP, 2u plt, 2u cryo, intra-op product administered  -- 780 mL Cell saver given back  -- 3500cc crystalloid given  -- TEGs to guide resuscitation, if needed  -- Post-op coags pending      Endo:  -- BG goal 140-180  -- Insulin gtt      PPx:   Feeding: NPO except sips with meds  Analgesia/Sedation: none / PRN oxycodone, dilaudid, robaxin  Thromboembolic prevention: SCDs, heparin  HOB >30: Yes  Stress Ulcer ppx: famotidine  Glucose control: Critical care goal 140-180 g/dl, ISS     Lines/Drains/Airway: CVC RIJ, Yung-Benny RIJ, R femoral ginny, L femoral ginny, abdominal ZACKERY drains x2, PIV/CARMEN      Dispo/Code Status/Palliative:   -- SICU / Full Code  -- Flotrac/CVP monitoring  -- FU post op labs  -- AM liver transplant

## 2023-02-09 NOTE — ASSESSMENT & PLAN NOTE
BG goal 140 - 180     Start IV insulin infusion protocol with 2 consecutive BG readings > 180  Requires intensive BG monitoring while on protocol (q hourly)    ** Please call Endocrine for any BG related issues **    Discharge planning: LIZBETH

## 2023-02-09 NOTE — ANESTHESIA PREPROCEDURE EVALUATION
[facility]  Anesthesia Pre-Operative Evaluation         Patient Name: Argelia Sevilla  YOB: 1950  MRN: [id number]    SUBJECTIVE:     Pre-operative evaluation for Procedure(s) (LRB):  TRANSPLANT, LIVER (N/A)     02/08/2023    Argelia Sevilla is a 72 y.o. female w/ a significant PMHx of HTN, CAD s/p PCI w/ ANDRES to LAD 2021, ESLD 2/2 BROWN related cirrhosis c/b edema (treated with diuretics). She presents as a primary candidate for liver transplant with a MELD of 23. OR time is 7:30/9:00 AM with Dr. Adhikari. No intraop HD.      Patient now presents for the above procedure(s).    TTE Summary 10/21/22:   The left ventricle is normal in size with normal systolic function.   The estimated ejection fraction is 65%.   Normal left ventricular diastolic function.   Mild left atrial enlargement.   Normal right ventricular size with normal right ventricular systolic function.   Mild mitral regurgitation.   Normal central venous pressure (3 mmHg).   The estimated PA systolic pressure is 30 mmHg.    Nuclear Stress Summary 10/21/22:    Normal myocardial perfusion scan. There is no evidence of myocardial ischemia or infarction.    The visually estimated ejection fraction is normal at rest and normal during stress.    There is normal wall motion at rest and post stress.    LV cavity size is normal at rest and normal at stress.    The EKG portion of this study is negative for ischemia.    The patient reported chest pain during the stress test.    There were no arrhythmias during stress.    There are no prior studies for comparison.    Prev airway: None documented.    LDA: None documented.     Drips: None documented.      Patient Active Problem List   Diagnosis    End stage liver disease    Anemia, chronic disease    Coronary artery disease involving native coronary artery of native heart without angina pectoris    Thrombocytopenia       Review of patient's allergies indicates:    Allergen Reactions    Codeine Anxiety    Adhesive tape-silicones Rash       Current Inpatient Medications:      No current facility-administered medications on file prior to encounter.     Current Outpatient Medications on File Prior to Encounter   Medication Sig Dispense Refill    aspirin (ECOTRIN) 81 MG EC tablet Take 81 mg by mouth.      cholecalciferol, vitamin D3, (VITAMIN D3) 50 mcg (2,000 unit) Tab Take 2,000 Units by mouth once daily.      EDARBI 40 mg Tab Take 1 tablet by mouth once daily.      EDARBI 80 mg Tab Take 1 tablet by mouth once daily.      furosemide (LASIX) 20 MG tablet Take 1 tablet (20 mg total) by mouth once daily. 30 tablet 11    pantoprazole (PROTONIX) 40 MG tablet Take 40 mg by mouth once daily.      pantoprazole (PROTONIX) 40 MG tablet Take 40 mg by mouth.      spironolactone (ALDACTONE) 50 MG tablet Take 1 tablet (50 mg total) by mouth once daily. 30 tablet 11    vitamin E 1000 UNIT capsule Take 1,000 Units by mouth once daily.         Past Surgical History:   Procedure Laterality Date    ADENOIDECTOMY      ANTERIOR CRUCIATE LIGAMENT REPAIR      BACK SURGERY      CARDIAC CATHETERIZATION  05/31/2018    CARDIAC CATHETERIZATION  09/28/2021    coronary angioplasty    CHOLECYSTECTOMY      COLONOSCOPY  05/25/2022    GALLBLADDER SURGERY      HYSTERECTOMY      KNEE ARTHROSCOPY Right     OOPHORECTOMY      REDUCTION OF BOTH BREASTS  01/01/2011    TONSILLECTOMY      TUBAL LIGATION         Social History:  Tobacco Use: Low Risk     Smoking Tobacco Use: Never    Smokeless Tobacco Use: Never    Passive Exposure: Not on file      Alcohol Use: Not on file        OBJECTIVE:     Vital Signs Range (Last 24H):  Temp:  [36.8 °C (98.3 °F)-36.9 °C (98.4 °F)]   Pulse:  [95-96]   Resp:  [16]   BP: (138-140)/(63-64)   SpO2:  [96 %-98 %]       Significant Labs:  Lab Results   Component Value Date    WBC 6.81 02/08/2023    HGB 10.4 (L) 02/08/2023    HCT 30.3 (L) 02/08/2023      "(L) 02/08/2023    ALT 23 02/08/2023    AST 44 (H) 02/08/2023     (L) 02/08/2023    K 4.1 02/08/2023     02/08/2023    CREATININE 0.7 02/08/2023    BUN 13 02/08/2023    CO2 20 (L) 02/08/2023    TSH 2.137 10/20/2022    INR 1.7 (H) 02/08/2023    HGBA1C <4.0 (A) 02/08/2023       Diagnostic Studies: No relevant studies.    EKG:   No results found for this or any previous visit.    2D ECHO:  TTE:  Results for orders placed or performed during the hospital encounter of 10/21/22   Echo   Result Value Ref Range    Ascending aorta 3.46 cm    STJ 2.76 cm    AV mean gradient 5 mmHg    Ao peak jae 1.69 m/s    Ao VTI 34.85 cm    IVRT 82.78 msec    IVS 0.70 0.6 - 1.1 cm    LA size 4.19 cm    Left Atrium Major Axis 4.78 cm    Left Atrium Minor Axis 5.27 cm    LVIDd 4.43 3.5 - 6.0 cm    LVIDs 2.92 2.1 - 4.0 cm    LVOT diameter 2.06 cm    LVOT peak VTI 29.06 cm    Posterior Wall 0.72 0.6 - 1.1 cm    MV Peak A Jae 1.03 m/s    E wave deceleration time 227.00 msec    MV Peak E Jae 0.89 m/s    PV Peak D Jae 0.48 m/s    PV Peak S Jae 0.97 m/s    RA Major Axis 4.24 cm    RA Width 3.86 cm    RVDD 3.75 cm    Sinus 3.32 cm    TAPSE 2.92 cm    TR Max Jae 2.60 m/s    TDI LATERAL 0.11 m/s    TDI SEPTAL 0.08 m/s    LA WIDTH 4.11 cm    MV stenosis pressure 1/2 time 65.83 ms    LV Diastolic Volume 89.05 mL    LV Systolic Volume 32.64 mL    RV S' 16.20 cm/s    LVOT peak jae 1.27 m/s    LA volume (mod) 59.85 cm3    MV "A" wave duration 12.84 msec    LV LATERAL E/E' RATIO 8.09 m/s    LV SEPTAL E/E' RATIO 11.13 m/s    FS 34 %    LA volume 73.38 cm3    LV mass 94.84 g    Left Ventricle Relative Wall Thickness 0.33 cm    AV valve area 2.78 cm2    AV Velocity Ratio 0.75     AV index (prosthetic) 0.83     MV valve area p 1/2 method 3.34 cm2    E/A ratio 0.86     Mean e' 0.10 m/s    Pulm vein S/D ratio 2.02     LVOT area 3.3 cm2    LVOT stroke volume 96.81 cm3    AV peak gradient 11 mmHg    E/E' ratio 9.37 m/s    Triscuspid Valve Regurgitation " Peak Gradient 27 mmHg    BSA 1.86 m2    LV Systolic Volume Index 18.1 mL/m2    LV Diastolic Volume Index 49.47 mL/m2    LA Volume Index 40.8 mL/m2    LV Mass Index 53 g/m2    LA Volume Index (Mod) 33.3 mL/m2    Right Atrial Pressure (from IVC) 3 mmHg    EF 65 %    TV rest pulmonary artery pressure 30 mmHg    Narrative    · The left ventricle is normal in size with normal systolic function.  · The estimated ejection fraction is 65%.  · Normal left ventricular diastolic function.  · Mild left atrial enlargement.  · Normal right ventricular size with normal right ventricular systolic   function.  · Mild mitral regurgitation.  · Normal central venous pressure (3 mmHg).  · The estimated PA systolic pressure is 30 mmHg.          BRIANA:  No results found for this or any previous visit.    ASSESSMENT/PLAN:           Pre-op Assessment    I have reviewed the Patient Summary Reports.     I have reviewed the Nursing Notes. I have reviewed the NPO Status.   I have reviewed the Medications.     Review of Systems  Anesthesia Hx:  No problems with previous Anesthesia  History of prior surgery of interest to airway management or planning: Denies Family Hx of Anesthesia complications.   Denies Personal Hx of Anesthesia complications.   Hematology/Oncology:  Hematology Normal   Oncology Normal     EENT/Dental:EENT/Dental Normal   Cardiovascular:   Hypertension CAD  CABG/stent     Pulmonary:  Pulmonary Normal    Renal/:  Renal/ Normal     Hepatic/GI:   GERD Liver Disease,    Musculoskeletal:  Musculoskeletal Normal    Neurological:  Neurology Normal    Endocrine:  Endocrine Normal    Dermatological:  Skin Normal    Psych:  Psychiatric Normal           Physical Exam  General: Well nourished    Airway:  Mallampati: II   Mouth Opening: Normal  TM Distance: Normal  Tongue: Normal  Neck ROM: Normal ROM    Dental:  Intact    Chest/Lungs:  Clear to auscultation, Normal Respiratory Rate    Heart:  Rate: Normal  Rhythm: Regular  Rhythm  Sounds: Normal    Abdomen:  Normal, Nontender        Anesthesia Plan  Type of Anesthesia, risks & benefits discussed:    Anesthesia Type: Gen ETT  Intra-op Monitoring Plan: Standard ASA Monitors, Art Line, Central Line and PA  Post Op Pain Control Plan: multimodal analgesia  Induction:  IV  Airway Plan: Direct, Post-Induction  Informed Consent: Informed consent signed with the Patient and all parties understand the risks and agree with anesthesia plan.  All questions answered.   ASA Score: 4  Day of Surgery Review of History & Physical: H&P Update referred to the surgeon/provider.    Ready For Surgery From Anesthesia Perspective.     .

## 2023-02-09 NOTE — HPI
Ms. 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 She presents as a primary candidate for liver transplant with a MELD of 23. Denies recent fever/chills and recent hospitalizations. No intraop HD. Of note, donor is HBcAb+ and CMV+.      Of note, pre op EKG pre gardner reading Qtc 712. Reviewed with on call cardiology who calculated Qtc at 401; within normal limit.    Patient is now s/p orthotopic DBD liver transplant with standard anatomy. The patient was brought to the ICU extubated in stable condition. Intraoperatively, 2600cc of blood loss was documented in addition to the evacuation of 3000cc of ascites.    She received:  Crystalloid (mL) 3,500   RBC (Units) 3   FFP (Units) 2   Cryoprecipitate (Units)  2   Platelets (Units) 2   Cell Saver (CCs) 780

## 2023-02-09 NOTE — OP NOTE
Operative Report    Date of Procedure: 2/9/2023  Date of Transplant (UNOS): 2/9/23    Surgeons:  Surgeon(s) and Role:     * Iggy Adhikari MD - Primary     * Laureano Lipscomb MD - Assisting     * Damian Santos MD - Fellow    First Assistant Attestation:  The presence of an additional attending surgeon functioning as first assistant was required due to the complexity of the procedure relative to any available residents. I certify that no resident was available who was qualified to serve as first assistant. Duties performed by the assistant included assisting the primary surgeon.    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 year-old female with Cirrhosis: Fatty Liver (BROWN),  (UNOS terminology) who has been evaluated for a liver transplant.  The procedure was thoroughly discussed with the patient, including potential risks, complications, and alternatives. Specific complications mentioned included death, graft non-function, bleeding, infection, rejection, renal failure, respiratory failure, and neurologic deficits, as well as the possibility of other complications not specifically mentioned.    Donor Risk Factors:  Prior to the operation, the patient was advised of any donor-specific risk factors requiring specific disclosure. Factors in this case included donor HBcAb+.      Specific [facility] Donor Risk criteria for the organ donor include:  None    All questions were  answered, the patient voiced appropriate understanding, and she agreed to proceed with the planned procedure.    ABO Confirmation: Immediately following arrival of the donor organ and prior to implantation, a formal ABO confirmation was done according to hospital and UNOS policies.  I confirmed the UNOS ID number of the donor organ ([id number]) and the donor and recipient ABO types, directly verifying these data by comparison with the UNOS Match Run report ([id number].  This confirmation was personally done by an attending surgeon and circulating nurse, and is officially documented elsewhere.    Time-Out: A complete time out was carried out prior to incision, with confirmation of patient identity, correct procedure, correct operative site, appropriate antibiotic prophylaxis, review of any known allergies, and presence of all needed equipment.    Procedure in Detail  Following the induction of general endotracheal anesthesia, appropriate arterial and venous lines were placed by the anesthesia team.  Care was taken to pad all pressure points and avoid any potential traction injuries from positioning. The urinary bladder was catheterized.  Sequential compression boots and Maxime Huggers were used. The chest, abdomen, and upper thighs were sterilely prepped and draped.     The abdomen was entered via a wide bilateral subcostal incision. 3,000 mL of ascites was removed. The round ligament was ligated and divided. The falciform, left triangular, and gastrohepatic ligaments were divided using the electrocautery, with 2-0 silk ties as needed. The Gould self-retaining retractor was placed to provide exposure. Adhesions between the abdominal viscera and the abdominal wall and the undersurface of the liver were divided as needed using cautery dissection and silk ties or suture ligatures. Hilar dissection was then carried out, with ligation of the right and left hepatic arteries as well as the cystic duct and the common hepatic  duct. The recipient arterial anatomy was found to be: standard. The portal vein was exposed circumferentially from its bifurcation down to the superior border of the pancreas. The portal vein was found to be patent.  Attention was next directed to the right side of the liver where the right triangular ligament was taken down, exposing the bare area of the liver, and the IVC. The infrahepatic IVC was isolated, followed by mobilization of the retrohepatic cava, division of the right adrenal vein, and isolation of the suprahepatic IVC. The patient was given 2000 units of heparin prior to caval cross-clamping. . After assuring adequate stability after cross-clamping, the native liver was excised and the allograft liver was brought to the operative field.  The liver was perfused with cold 5% albumin during implantation to displace the organ preservation solution.  IVC reconstruction technique consisted of end to end ivc using 3-0 and 4-0 polypropylene as appropriate.  The donor portal vein was then anastomosed to the recipient portal inflow site (end portal vein) with 5-0 polypropylene. Once this was completed, anesthesia was notified and the liver was re-perfused. Copious irrigation with warm saline and temporary finger occlusion of portal inflow were used as needed to avoid any problems with hypothermia. Temporary packing, electrocautery, and polypropylene suture ligatures were used as appropriate to provide hemostasis. Once this was satisfactory, attention was directed to the arterial reconstruction. This liver did not come from a DCD donor. Arterial inflow was provided to the graft by anastomosing the recipient proper hepatic artery to the donor  common hepatic artery using 6-0 polypropylene. The liver was assessed for adequacy of perfusion, which was satisfactory. The gallbladder was then removed from the allograft liver, and a temporary packing period was carried out to help assure hemostatis. Attention was next  directed toward the bile duct. The donor bile duct was prepared and assessed for adequate vascular supply. Biliary reconstruction was then performed by anastomosing the recipient common bile duct to the donor duct using 6-0 PDS sutures.  The biliary stent used was: none.  A final check for hemostasis was made. 2 19f Brock drains were placed through separate incisions below the transverse abdominal incision and placed in the usual positions. The cavity was irrigated with saline. The abdomen was closed in layers using running #1 PDS suture. The incision was irrigated and the skin was closed with staples. At the end of the case all instrument, needle, and sponge counts were correct. The patient was taken to the ICU in good condition.     The recipient bile duct was larger than the donor. The recipient duct was everted to create parity in diameter without additional plasty.       Fluids Administered:   Crystalloid (mL) 3,500   RBC (Units) 3   FFP (Units) 2   Cryoprecipitate (Units)  2   Platelets (Units) 2   Cell Saver (CCs) 780      Specimens: Explant liver  Drains: 19f Brock drains x 2    Additional Findings:    Estimated Blood Loss: 2,600 mL  Ascites Evacuated: 3,000 mL    Ischemic Times:  Time of portal reperfusion: 2/9/2023 11:12 AM  Time of arterial reperfusion: 2/9/2023 11:51 AM  Anastomosis (warm ischemia) time: 37 minutes  Cold ischemia time: 250 minutes    Surgical Data:  Graft type (whole vs. partial): Whole  IVC reconstruction: end to end ivc  Portal vein status: patent  Portal graft performed? no  Donor arterial anatomy: standard  Donor arterial inflow: common hepatic artery  Recipient arterial anatomy: standard  Recipient arterial inflow: proper hepatic artery  Arterial graft performed? no  Biliary reconstruction: end to end (donor common bile duct to recipient common bile duct)  Biliary stent: none  Disposition of Vessels from donor of transplanted organ: sent to blood bank  Damage during procurement:  no  Procurement damage comments: None    Donor Factors:  UNOS ID: [id number]  UNOS Match Run: [id number]  Donor type: donation after brain death  Donor with reported PHS risk criteria: no  Donor CMV serologic status: Positive  Donor with known cancer: no  Donor HCV serologic status: Negative  Donor HBcAb: Positive  Donor HBV FRANNIE: Negative  Donor HCV FRANNIE: Negative  Liver weight: 1463 grams

## 2023-02-09 NOTE — ANESTHESIA PROCEDURE NOTES
Intubation    Date/Time: 2/9/2023 7:47 AM  Performed by: Amy Leavitt CRNA  Authorized by: David Anderson MD     Intubation:     Induction:  Intravenous    Intubated:  Postinduction    Mask Ventilation:  Easy mask    Attempts:  1    Attempted By:  CRNA    Method of Intubation:  Video laryngoscopy    Blade:  Griffin 3    Laryngeal View Grade: Grade I - full view of cords      Difficult Airway Encountered?: No      Complications:  None    Airway Device:  Oral endotracheal tube    Airway Device Size:  7.5    Style/Cuff Inflation:  Cuffed (inflated to minimal occlusive pressure)    Tube secured:  22    Secured at:  The lips    Placement Verified By:  Capnometry    Complicating Factors:  None    Findings Post-Intubation:  BS equal bilateral and atraumatic/condition of teeth unchanged

## 2023-02-09 NOTE — H&P
[facility] - Surgical Intensive Care  Critical Care - Surgery  Progress Note    Patient Name: Argelia Sevilla  MRN: [id number]  Admission Date: 2/8/2023  Hospital Length of Stay: 1 days  Code Status: Prior  Attending Provider: Mark Brasher MD  Primary Care Provider: Primary Doctor No   Principal Problem: End stage liver disease    Subjective:     Hospital/ICU Course:  Ms. 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 She presents as a primary candidate for liver transplant with a MELD of 23. Denies recent fever/chills and recent hospitalizations. No intraop HD. Of note, donor is HBcAb+ and CMV serology positive.     Of note, pre op EKG pre gardner reading Qtc 712. Reviewed with on call cardiology who calculated Qtc at 401; within normal limit.    Patient is now s/p orthotopic DBD RPR+ liver transplant with standard anatomy. The patient was brought to the ICU extubated in stable condition. Intraoperatively, 2600cc of blood loss was documented in addition to the evacuation of 3000cc of ascites.    She received:  Crystalloid (mL) 3,500   RBC (Units) 3   FFP (Units) 2   Cryoprecipitate (Units)  2   Platelets (Units) 2   Cell Saver (CCs) 780       Follow-up For: Procedure(s) (LRB):  TRANSPLANT, LIVER (N/A)    Post-Operative Day: Day of Surgery    Objective:     Vital Signs (Most Recent):  Temp: 98.5 °F (36.9 °C) (02/09/23 1455)  Pulse: 98 (02/09/23 1455)  Resp: (!) 26 (02/09/23 1502)  BP: 133/60 (02/09/23 0322)  SpO2: 98 % (02/09/23 1455)   Vital Signs (24h Range):  Temp:  [98.3 °F (36.8 °C)-98.6 °F (37 °C)] 98.5 °F (36.9 °C)  Pulse:  [89-98] 98  Resp:  [16-28] 26  SpO2:  [96 %-98 %] 98 %  BP: (133-140)/(60-64) 133/60  Arterial Line BP: (136-154)/(42-46) 154/46     Weight: 75.7 kg (166 lb 14.6 oz)  Body mass index is 30.53 kg/m².      Intake/Output Summary (Last 24 hours) at 2/9/2023 1509  Last data filed at 2/9/2023 1445  Gross per 24 hour   Intake 7995 ml    Output 8285 ml   Net -290 ml       Physical Exam  Vitals and nursing note reviewed.   Constitutional:       Appearance: She is ill-appearing.   HENT:      Head: Normocephalic and atraumatic.   Neck:      Comments: CVC present with introducer and swan  Cardiovascular:      Rate and Rhythm: Normal rate and regular rhythm.      Pulses: Normal pulses.   Pulmonary:      Effort: Pulmonary effort is normal.   Abdominal:      Palpations: Abdomen is soft.      Comments: Appropriately tender to palpation. ZACKERY drains in place with serosanguinous output. Chevron incision with dressings in place, clean, dry, intact.   Genitourinary:     Comments: Mckenna in place  Musculoskeletal:         General: Normal range of motion.      Cervical back: Normal range of motion and neck supple.      Comments: Radial and femoral a-lines in place   Skin:     General: Skin is warm and dry.      Capillary Refill: Capillary refill takes less than 2 seconds.      Coloration: Skin is jaundiced.   Neurological:      General: No focal deficit present.      Mental Status: She is alert.       Vents:       Lines/Drains/Airways       Central Venous Catheter Line  Duration                  Hemodialysis Catheter 02/09/23 0935 <1 day    Pulmonary Artery Catheter Assessment  02/09/23 0822 <1 day              Drain  Duration                  Closed/Suction Drain 02/09/23 1403 Right RLQ Bulb 19 Fr. <1 day         Closed/Suction Drain 02/09/23 1404 Right;Anterior RLQ Bulb 19 Fr. <1 day         Urethral Catheter 02/09/23 0750 Silicone;Non-latex 16 Fr. <1 day              Arterial Line  Duration             Arterial Line 02/09/23 0758 Left Radial <1 day    Arterial Line 02/09/23 0803 Right Femoral <1 day              Peripheral Intravenous Line  Duration                  Peripheral IV - Single Lumen 02/09/23 0030 18 G Right Antecubital <1 day         CARMEN 02/09/23 0823 Right Antecubital <1 day                    Significant Labs:    CBC/Anemia Profile:  Recent Labs    Lab 02/08/23 2007 02/09/23 0813 02/09/23 1200 02/09/23  1317   WBC 6.81  --   --   --    HGB 10.4* 8.8* 9.4* 7.6*   HCT 30.3* 24.8* 26.5* 22.0*   * 86* 61* 159   *  --   --   --    RDW 14.6*  --   --   --         Chemistries:  Recent Labs   Lab 02/08/23 2007 02/09/23 0813 02/09/23 1200 02/09/23  1317   * 133* 134* 138   K 4.1 3.5 3.9 3.7     --   --   --    CO2 20*  --   --   --    BUN 13  --   --   --    CREATININE 0.7  --   --   --    CALCIUM 9.1  --   --   --    ALBUMIN 2.9* 2.3* 2.0* 2.7*   PROT 7.2  --   --   --    BILITOT 9.7*  --   --   --    ALKPHOS 111  --   --   --    ALT 23  --  514* 866*   AST 44*  --  1,630* 2,771*   MG 1.3* 1.3* 1.7 1.8       All pertinent labs within the past 24 hours have been reviewed.    Significant Imaging:  I have reviewed all pertinent imaging results/findings within the past 24 hours.    Assessment/Plan:     * End stage liver disease  Argelia Sevilla is a 73yo F with history of BROWN s/p orthotopic DBD HBcAb+ and CMV+ liver transplant.      Neuro/Psych:   -- Sedation: none  -- Pain: PRN oxycodone, dilaudid, robaxin  -- GCS 15              Cards:   -- 2600cc EBL  -- Arrives HDS, off pressors  -- MAP > 65, Syst < 160  -- Flotrac and CVP monitoring  -- A line monitoring  -- Tarzan in place       Pulm:   -- Goal O2 sat > 90%  -- No indication for ABG at this time, patient extuabted  -- CXR now and then tomorrow AM  -- O2 supplement as needed  -- Continuous pulse ox      Renal:  -- Labs pending  -- Trend BUN/Cr   -- Keep parham in place at this time      FEN / GI:   -- Replace lytes as needed  -- Nutrition: NPO except sips with meds  -- GI ppx: famotidine  -- Bowel reg: miralax  -- 5% Albumin as needed      ID:   -- Tm: Afebrile; WBC pending  -- Unasyn, bactrim, valganciclovir, nystatin ordered  -- Immunosuppression ordered: methylprednisolone, tacrolimus  -- Lamivudine for HBcAb+ donor status      Heme/Onc:   -- H/H pending  -- Daily CBC  -- 3u  pRBC, 2u FFP, 2u plt, 2u cryo, intra-op product administered  -- 780 mL Cell saver given back  -- 3500cc crystalloid given  -- TEGs to guide resuscitation, if needed  -- Post-op coags pending      Endo:  -- BG goal 140-180  -- Insulin gtt      PPx:   Feeding: NPO except sips with meds  Analgesia/Sedation: none / PRN oxycodone, dilaudid, robaxin  Thromboembolic prevention: SCDs, heparin  HOB >30: Yes  Stress Ulcer ppx: famotidine  Glucose control: Critical care goal 140-180 g/dl, ISS     Lines/Drains/Airway: CVC RIJ, Yung-Benny RIJ, R femoral ginny, L femoral ginny, abdominal ZACKERY drains x2, PIV/CARMEN      Dispo/Code Status/Palliative:   -- SICU / Full Code  -- Flotrac/CVP monitoring  -- FU post op labs  -- AM liver transplant         Critical care was time spent personally by me on the following activities: development of treatment plan with patient or surrogate and bedside caregivers, discussions with consultants, evaluation of patient's response to treatment, examination of patient, ordering and performing treatments and interventions, ordering and review of laboratory studies, ordering and review of radiographic studies, pulse oximetry, re-evaluation of patient's condition.  This critical care time did not overlap with that of any other provider or involve time for any procedures.     Ben Lobo MD  Critical Care - Surgery  [facility] - Surgical Intensive Care

## 2023-02-09 NOTE — ASSESSMENT & PLAN NOTE
Argelia Sevilla is a 71yo F with history of BROWN s/p orthotopic DBD HBcAb+ and CMV+ liver transplant.      Neuro/Psych:   -- Sedation: none  -- Pain: PRN oxycodone, dilaudid, robaxin  -- GCS 15              Cards:   -- No pressor requirement  -- MAP > 65, Syst < 160  -- Discontinue Flotrac and CVP monitoring  -- Remove swan and introducer, remove all groin lines       Pulm:   -- Goal O2 sat > 90%  -- No indication for ABG at this time, patient extubated  -- CXR with a right plural effusion; however, patient asymptomatic on RA  -- O2 supplement as needed  -- Continuous pulse ox      Renal:  -- Trend BUN/Cr - WNL  -- Remove parham catheter  -- Daily labs      FEN / GI:   -- POD 1 liver US today  -- Continue to trend AST q4h, which peaked around 3000 and was around 2000 on last check  -- Replace lytes per protocol  -- Nutrition: CLD with advancement to regular diet later today if patient tolerates  -- GI ppx: famotidine  -- Bowel reg: miralax      ID:   -- Tm: Afebrile; WBC 9.57  -- Unasyn periop, bactrim, valganciclovir, nystatin ordered  -- Immunosuppression ordered: methylprednisolone, tacrolimus      Heme/Onc:   -- H/H stable 8.3/24/4  -- Daily CBC  -- Transfuse hgb <7.0      Endo:  -- BG goal 140-180  -- Insulin gtt  -- Endocrine consulted and managing      PPx:   Feeding: CLD  Analgesia/Sedation: none / PRN oxycodone, dilaudid, robaxin  Thromboembolic prevention: SCDs, heparin  HOB >30: Yes  Stress Ulcer ppx: famotidine  Glucose control: Critical care goal 140-180 g/dl, ISS     Lines/Drains/Airway: CVC RIJ, Yung-Benny RIJ, R femoral ginny, L femoral ginny, abdominal ZACKERY drains x2, PIV/CARMEN      Dispo/Code Status/Palliative:   -- SICU / Full Code  -- POD 1 Liver US this AM  -- If all looks good, de-line patient  -- Continue to trend AST  -- Clear liquid diet  -- Remove parham  -- Step down to the floor pending the above

## 2023-02-09 NOTE — OP NOTE
Pre-operative Discussion Note  Liver Transplant Surgery    Argelia Sevilla is a 72 y.o. female admitted for liver transplant.  I discussed the planned procedure in detail, including expected hospital course and outcomes, benefits, risks, and potential complications.  Complications discussed included death, graft failure, bleeding, infection, rejection, and neurologic problems.  I discussed the risks of anesthesia, as well as the potential need for re-operation.  The possibility of other complications not specifically mentioned was also discussed.  Also, I discussed the need for lifelong immunosuppression and the possibility of serious complications from immunosuppressive drugs.    The discussion included the risks that the patient will incur if she elects to not have the proposed procedure.    Relevant donor-specific risk factors were disclosed and discussed with the patient, including:   HBcAb: I discussed the use of organs from donors with HBcAb in conjunction with long term use of HBV antiviral drugs, such as lamivudine. The small but measurable risk of hepatitis B seroconversion was discussed as well as the potentially life long need to continue antiviral drugs. The patient is willing to consider such grafts.    Specific PHS donor risk criteria for the organ donor include:  None    HCV Infection Not applicable.    Hepatocellular Carcinoma Recurrence: Not applicable.    The patient was SARS-CoV-2 /COVID-19 tested with negative results.    COVID-19: I discussed the possibility of COVID-19 transmission with the patient. Although FRANNIE testing is available for the virus using a technique which in theory should be very accurate, there is no data yet regarding the likelihood of a  false-negative test leading to virus transmission. Based on accuracy of testing for other viruses, it is expected that this risk is extremely small.     I also discussed that transplant immunosuppression will increase susceptibility to  COVID-19 and other viruses, and that although we use stringent precautions to protect patients from infection, it is possible for a transplant recipient to contract this infection. If COVID-19 infection should occur, it would be a serious matter with a significant risk of death.    All questions were answered.  The patient and available family members voice understanding and agree to proceed with the transplant.    UNOS Patient Status  Note on scores:  ICU = 10 = total assistance  TSU = 20-30 = partial assistance  Outpatient admitted for transplant requiring medical care in last year = 40-50 = partial assistance  Scores 60 or higher indicate no assistance, meaning no need for medical care in last year. This would be very unusual for a transplant candidate.    Functional Status: 50% - Requires considerable assistance and frequent medical care  Physical Capacity: No Limitations

## 2023-02-09 NOTE — SUBJECTIVE & OBJECTIVE
Interval HPI:   Overnight events: Admitted to SICU s/p liver transplant. Day of Surgery. BG within goal ranges. IV intensive insulin protocol orders in place. Received Solu Medrol 500 mg this morning. Diet NPO    Eating:   NPO  Nausea: No  Hypoglycemia and intervention: No  Fever: No  TPN and/or TF: No  If yes, type of TF/TPN and rate: n/a    PMH, PSH, FH, SH reviewed     ROS:  Unable to obtain due to: Sedation,Intubation,Altered mental status,Critical illness,Reviewed ROS from note dated 2/8/23 by  Becky Wood PA-C    Review of Systems    Current Medications and/or Treatments Impacting Glycemic Control  Immunotherapy:    Immunosuppressants           Stop Route Frequency     mycophenolate mofetil 200 mg/mL suspension 1,000 mg         -- Oral 2 times daily     tacrolimus (PROGRAF) 1 mg/mL oral syringe         -- Oral 2 times daily          Steroids:   Hormones (From admission, onward)      Start     Stop Route Frequency Ordered    02/15/23 0900  predniSONE tablet 20 mg  (methylprednisolone taper panel)        See Hyperspace for full Linked Orders Report.    -- Oral Daily 02/09/23 1445    02/14/23 0900  methylPREDNISolone sodium succinate injection 40 mg  (methylprednisolone taper panel)        See Hyperspace for full Linked Orders Report.    02/15 0859 IV Daily 02/09/23 1445    02/13/23 0900  methylPREDNISolone sodium succinate injection 80 mg  (methylprednisolone taper panel)        See Hyperspace for full Linked Orders Report.    02/14 0859 IV Daily 02/09/23 1445    02/12/23 0900  methylPREDNISolone sodium succinate injection 120 mg  (methylprednisolone taper panel)        See Hyperspace for full Linked Orders Report.    02/13 0859 IV Daily 02/09/23 1445    02/11/23 0900  methylPREDNISolone sodium succinate injection 160 mg  (methylprednisolone taper panel)        See Hyperspace for full Linked Orders Report.    02/12 0859 IV Daily 02/09/23 1445    02/10/23 0900  methylPREDNISolone sodium succinate  injection 200 mg  (methylprednisolone taper panel)        See Hyperspace for full Linked Orders Report.    02/11 0859 IV Daily 02/09/23 1445          Pressors:    Autonomic Drugs (From admission, onward)      None          Hyperglycemia/Diabetes Medications:   Antihyperglycemics (From admission, onward)      Start     Stop Route Frequency Ordered    02/09/23 1545  insulin regular in 0.9 % NaCl 100 unit/100 mL (1 unit/mL) infusion        Question:  Enter initial dose from Infusion Protocol Chart (Units/hr):  Answer:  1    -- IV Continuous 02/09/23 1445             PHYSICAL EXAMINATION:  Vitals:    02/09/23 1511   BP:    Pulse: 98   Resp: (!) 24   Temp:      Body mass index is 30.53 kg/m².    Physical Exam  Constitutional:  Well developed, well nourished, NAD.  ENT: External ears no masses with nose patent; normal hearing.   Neck:  Supple; trachea midline  Cardiovascular: Normal heart sounds, no LE edema.     Lungs:  Normal effort; lungs anterior bilaterally clear to auscultation.  Abdomen:  Soft, no masses, no hernias.  Hypoactive BS noted.  MS: No clubbing or cyanosis of nails noted; unable to assess gait.  Skin: No rashes, lesions, or ulcers; no nodules. Chevron abdominal incision with dressing; ZACKERY x 2 to RLQ.  Psychiatric: Good judgement and insight; normal mood and affect.  Neurological: Cranial nerves are grossly intact.  Foot: nails in good condition, no amputations noted.

## 2023-02-09 NOTE — CONSULTS
[facility] - [facility]  Endocrinology  Diabetes Consult Note    Consult Requested by: Mark Brasher MD   Reason for admit: End stage liver disease    HISTORY OF PRESENT ILLNESS:  Reason for Consult: Management of Hyperglycemia     Surgical Procedure and Date:   2/9/23  -  TRANSPLANT, LIVER (N/A)      HPI:   Patient is a 72 y.o. 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 She presents as a primary candidate for liver transplant with a MELD of 23. Endocrinology consulted for management of hyperglycemia.       Lab Results   Component Value Date    HGBA1C <4.0 (A) 02/08/2023           Interval HPI:   Overnight events: Admitted to SICU s/p liver transplant. Day of Surgery. BG within goal ranges. IV intensive insulin protocol orders in place. Received Solu Medrol 500 mg this morning. Diet NPO    Eating:   NPO  Nausea: No  Hypoglycemia and intervention: No  Fever: No  TPN and/or TF: No  If yes, type of TF/TPN and rate: n/a    PMH, PSH, FH, SH reviewed     ROS:  Unable to obtain due to: Sedation,Intubation,Altered mental status,Critical illness,Reviewed ROS from note dated 2/8/23 by  Becky Wood PA-C    Review of Systems    Current Medications and/or Treatments Impacting Glycemic Control  Immunotherapy:    Immunosuppressants           Stop Route Frequency     mycophenolate mofetil 200 mg/mL suspension 1,000 mg         -- Oral 2 times daily     tacrolimus (PROGRAF) 1 mg/mL oral syringe         -- Oral 2 times daily          Steroids:   Hormones (From admission, onward)      Start     Stop Route Frequency Ordered    02/15/23 0900  predniSONE tablet 20 mg  (methylprednisolone taper panel)        See Hyperspace for full Linked Orders Report.    -- Oral Daily 02/09/23 1445    02/14/23 0900  methylPREDNISolone sodium succinate injection 40 mg  (methylprednisolone taper panel)        See Hyperspace for full Linked Orders  Report.    02/15 0859 IV Daily 02/09/23 1445    02/13/23 0900  methylPREDNISolone sodium succinate injection 80 mg  (methylprednisolone taper panel)        See Hyperspace for full Linked Orders Report.    02/14 0859 IV Daily 02/09/23 1445    02/12/23 0900  methylPREDNISolone sodium succinate injection 120 mg  (methylprednisolone taper panel)        See Hyperspace for full Linked Orders Report.    02/13 0859 IV Daily 02/09/23 1445    02/11/23 0900  methylPREDNISolone sodium succinate injection 160 mg  (methylprednisolone taper panel)        See Hyperspace for full Linked Orders Report.    02/12 0859 IV Daily 02/09/23 1445    02/10/23 0900  methylPREDNISolone sodium succinate injection 200 mg  (methylprednisolone taper panel)        See Hyperspace for full Linked Orders Report.    02/11 0859 IV Daily 02/09/23 1445          Pressors:    Autonomic Drugs (From admission, onward)      None          Hyperglycemia/Diabetes Medications:   Antihyperglycemics (From admission, onward)      Start     Stop Route Frequency Ordered    02/09/23 1545  insulin regular in 0.9 % NaCl 100 unit/100 mL (1 unit/mL) infusion        Question:  Enter initial dose from Infusion Protocol Chart (Units/hr):  Answer:  1    -- IV Continuous 02/09/23 1445             PHYSICAL EXAMINATION:  Vitals:    02/09/23 1511   BP:    Pulse: 98   Resp: (!) 24   Temp:      Body mass index is 30.53 kg/m².    Physical Exam  Constitutional:  Well developed, well nourished, NAD.  ENT: External ears no masses with nose patent; normal hearing.   Neck:  Supple; trachea midline  Cardiovascular: Normal heart sounds, no LE edema.     Lungs:  Normal effort; lungs anterior bilaterally clear to auscultation.  Abdomen:  Soft, no masses, no hernias.  Hypoactive BS noted.  MS: No clubbing or cyanosis of nails noted; unable to assess gait.  Skin: No rashes, lesions, or ulcers; no nodules. Chevron abdominal incision with dressing; ZACKERY x 2 to RLQ.  Psychiatric: Good judgement and  insight; normal mood and affect.  Neurological: Cranial nerves are grossly intact.  Foot: nails in good condition, no amputations noted.        Labs Reviewed and Include   Recent Labs   Lab 02/08/23 2007 02/09/23  0813 02/09/23  1317 02/09/23  1449      < > 139*  --    CALCIUM 9.1  --   --  8.4*   ALBUMIN 2.9*   < > 2.7* 2.8*   PROT 7.2  --   --   --    *   < > 138 138   K 4.1   < > 3.7 3.7   CO2 20*  --   --   --      --   --  103   BUN 13  --   --   --    CREATININE 0.7  --   --   --    ALKPHOS 111  --   --   --    ALT 23   < > 866*  --    AST 44*   < > 2,771*  --    BILITOT 9.7*  --   --   --     < > = values in this interval not displayed.     Lab Results   Component Value Date    WBC 6.81 02/08/2023    HGB 7.6 (L) 02/09/2023    HCT 22.0 (L) 02/09/2023     (H) 02/08/2023     02/09/2023     No results for input(s): TSH, FREET4 in the last 168 hours.  Lab Results   Component Value Date    HGBA1C <4.0 (A) 02/08/2023       Nutritional status:   Body mass index is 30.53 kg/m².  Lab Results   Component Value Date    ALBUMIN 2.8 (L) 02/09/2023    ALBUMIN 2.7 (L) 02/09/2023    ALBUMIN 2.0 (L) 02/09/2023     No results found for: PREALBUMIN    Estimated Creatinine Clearance: 71.2 mL/min (based on SCr of 0.7 mg/dL).    Accu-Checks  No results for input(s): POCTGLUCOSE in the last 72 hours.     ASSESSMENT and PLAN    * End stage liver disease  Managed per primary team  S/p liver transplant  Optimize BG control        Steroid-induced hyperglycemia  BG goal 140 - 180     Start IV insulin infusion protocol with 2 consecutive BG readings > 180  Requires intensive BG monitoring while on protocol (q hourly)    ** Please call Endocrine for any BG related issues **    Discharge planning: TBD        S/P liver transplant  Managed per primary team  Avoid hypoglycemia        Prophylactic immunotherapy  May increase insulin resistance.             Plan discussed with patient, family, and RN at bedside.      Blanquita Soto, NP  Endocrinology  [facility]

## 2023-02-09 NOTE — SUBJECTIVE & OBJECTIVE
Past Medical History:   Diagnosis Date    Anxiety disorder, unspecified     CAD (coronary artery disease)     DIC (disseminated intravascular coagulation)     Dyslipidemia     GERD (gastroesophageal reflux disease)     Hereditary hemolytic anemia, unspecified     History of coronary artery stent placement     HTN (hypertension)     Liver cirrhosis secondary to BROWN     Portal hypertensive gastropathy     Primary localized osteoarthrosis of right lower leg     Statin intolerance        Past Surgical History:   Procedure Laterality Date    ADENOIDECTOMY      ANTERIOR CRUCIATE LIGAMENT REPAIR      BACK SURGERY      CARDIAC CATHETERIZATION  05/31/2018    CARDIAC CATHETERIZATION  09/28/2021    coronary angioplasty    CHOLECYSTECTOMY      COLONOSCOPY  05/25/2022    GALLBLADDER SURGERY      HYSTERECTOMY      KNEE ARTHROSCOPY Right     OOPHORECTOMY      REDUCTION OF BOTH BREASTS  01/01/2011    TONSILLECTOMY      TUBAL LIGATION         Review of patient's allergies indicates:   Allergen Reactions    Codeine Anxiety    Adhesive tape-silicones Rash       Family History       Problem Relation (Age of Onset)    Asthma Mother    Cancer Maternal Grandmother    Hyperlipidemia Mother, Father, Brother    Stroke Mother, Father          Tobacco Use    Smoking status: Never    Smokeless tobacco: Never   Substance and Sexual Activity    Alcohol use: Never    Drug use: Not on file    Sexual activity: Not on file       PTA Medications   Medication Sig    aspirin (ECOTRIN) 81 MG EC tablet Take 81 mg by mouth.    cholecalciferol, vitamin D3, (VITAMIN D3) 50 mcg (2,000 unit) Tab Take 2,000 Units by mouth once daily.    EDARBI 40 mg Tab Take 1 tablet by mouth once daily.    EDARBI 80 mg Tab Take 1 tablet by mouth once daily.    furosemide (LASIX) 20 MG tablet Take 1 tablet (20 mg total) by mouth once daily.    pantoprazole (PROTONIX) 40 MG tablet Take 40 mg by mouth once daily.    pantoprazole (PROTONIX) 40 MG tablet Take 40 mg by mouth.     spironolactone (ALDACTONE) 50 MG tablet Take 1 tablet (50 mg total) by mouth once daily.    vitamin E 1000 UNIT capsule Take 1,000 Units by mouth once daily.       Review of Systems   Constitutional:  Positive for fatigue. Negative for activity change, appetite change, chills, diaphoresis and fever.   HENT:  Negative for congestion, sinus pressure, sinus pain, sore throat and trouble swallowing.    Eyes:  Negative for visual disturbance.   Respiratory:  Negative for chest tightness, shortness of breath and stridor.    Cardiovascular:  Positive for leg swelling. Negative for chest pain and palpitations.   Gastrointestinal:  Positive for nausea. Negative for abdominal distention, abdominal pain, constipation, diarrhea and vomiting.   Genitourinary:  Negative for decreased urine volume, difficulty urinating, dysuria and flank pain.   Musculoskeletal:  Negative for neck pain and neck stiffness.   Skin:  Negative for color change and rash.   Neurological:  Negative for dizziness, tremors, syncope, light-headedness and headaches.   Psychiatric/Behavioral:  Negative for agitation, confusion, decreased concentration and hallucinations. The patient is not nervous/anxious.    Objective:     Vital Signs (Most Recent):  Temp: 98.4 °F (36.9 °C) (02/08/23 1953)  Pulse: 95 (02/08/23 1953)  Resp: 16 (02/08/23 1953)  BP: 138/64 (02/08/23 1953)  SpO2: 98 % (02/08/23 1953) Vital Signs (24h Range):  Temp:  [98.4 °F (36.9 °C)] 98.4 °F (36.9 °C)  Pulse:  [95] 95  Resp:  [16] 16  SpO2:  [98 %] 98 %  BP: (138)/(64) 138/64     Weight: 75.7 kg (166 lb 14.6 oz)  Body mass index is 30.53 kg/m².    No intake or output data in the 24 hours ending 02/08/23 2123    Physical Exam  Vitals and nursing note reviewed.   Constitutional:       General: She is not in acute distress.     Appearance: She is not diaphoretic.   HENT:      Head: Normocephalic and atraumatic.   Eyes:      General: No scleral icterus.        Right eye: No discharge.         Left  eye: No discharge.   Cardiovascular:      Rate and Rhythm: Normal rate and regular rhythm.      Heart sounds: Normal heart sounds.   Pulmonary:      Effort: Pulmonary effort is normal.      Breath sounds: Normal breath sounds. No wheezing or rales.   Abdominal:      General: Bowel sounds are normal. There is no distension.      Palpations: Abdomen is soft.      Tenderness: There is no abdominal tenderness. There is no guarding.   Musculoskeletal:         General: Swelling present.      Cervical back: Normal range of motion and neck supple.      Right lower leg: Edema present.      Left lower leg: Edema present.      Comments: Bilateral 1-2+ pitting edema in LE   Skin:     General: Skin is warm and dry.      Capillary Refill: Capillary refill takes less than 2 seconds.   Neurological:      Mental Status: She is alert and oriented to person, place, and time.      Cranial Nerves: No cranial nerve deficit.   Psychiatric:         Thought Content: Thought content normal.         Judgment: Judgment normal.       Laboratory: pending  CBC:   Recent Labs   Lab 02/08/23 2007   WBC 6.81   RBC 2.87*   HGB 10.4*   HCT 30.3*   *   *   MCH 36.2*   MCHC 34.3     Diagnostic Results:  I have personally reviewed all pertinent imaging studies.

## 2023-02-09 NOTE — ANESTHESIA PROCEDURE NOTES
Femoral Arterial    Diagnosis: BROWN Cirrhosis    Patient location during procedure: done in OR  Procedure start time: 2/9/2023 8:03 AM  Timeout: 2/9/2023 8:02 AM  Procedure end time: 2/9/2023 8:15 AM    Staffing  Authorizing Provider: David Anderson MD  Performing Provider: David Anderson MD    Anesthesiologist was present at the time of the procedure.    Preanesthetic Checklist  Completed: patient identified, IV checked, site marked, risks and benefits discussed, surgical consent, monitors and equipment checked, pre-op evaluation, timeout performed and anesthesia consent givenFemoral Arterial  Skin Prep: chlorhexidine gluconate  Local Infiltration: none  Orientation: right  Location: femoral    Catheter Size: 20 G  Catheter placement by Ultrasound guidance. Heme positive aspiration all ports.   Vessel Caliber: large, patent, compressibility normal  Vascular Doppler:  flow caudad  Needle advanced into vessel with real time Ultrasound guidance.  Guidewire confirmed in vessel.  Sterile sheath used.  Image recorded and saved.Insertion Attempts: 1  Assessment  Dressing: secured with tape and tegaderm and sutured in place and taped  Patient: Tolerated well

## 2023-02-09 NOTE — ANESTHESIA PROCEDURE NOTES
Arterial    Diagnosis: ESLD    Patient location during procedure: done in OR  Procedure start time: 2/9/2023 7:58 AM  Timeout: 2/9/2023 7:58 AM  Procedure end time: 2/9/2023 7:59 AM    Staffing  Authorizing Provider: David Anderson MD  Performing Provider: Amy Leavitt CRNA    Anesthesiologist was present at the time of the procedure.  Arterial  Skin Prep: chlorhexidine gluconate  Local Infiltration: none  Orientation: left  Location: radial    Catheter Size: 20 G  Catheter placement by Anatomical landmarks. Heme positive aspiration all ports. Insertion Attempts: 1  Assessment  Dressing: secured with tape and tegaderm

## 2023-02-10 PROBLEM — Z91.89 AT RISK FOR INFECTION TRANSMITTED FROM DONOR: Status: ACTIVE | Noted: 2023-02-10

## 2023-02-10 LAB
ALBUMIN SERPL BCP-MCNC: 3 G/DL (ref 3.5–5.2)
ALP SERPL-CCNC: 67 U/L (ref 55–135)
ALT SERPL W/O P-5'-P-CCNC: 659 U/L (ref 10–44)
ANION GAP SERPL CALC-SCNC: 12 MMOL/L (ref 8–16)
ANION GAP SERPL CALC-SCNC: 13 MMOL/L (ref 8–16)
ANION GAP SERPL CALC-SCNC: 8 MMOL/L (ref 8–16)
APTT BLDCRRT: 42.9 SEC (ref 21–32)
AST SERPL-CCNC: 1075 U/L (ref 10–40)
AST SERPL-CCNC: 1187 U/L (ref 10–40)
AST SERPL-CCNC: 1583 U/L (ref 10–40)
AST SERPL-CCNC: 2069 U/L (ref 10–40)
AST SERPL-CCNC: 2613 U/L (ref 10–40)
BASOPHILS # BLD AUTO: 0.02 K/UL (ref 0–0.2)
BASOPHILS # BLD AUTO: 0.03 K/UL (ref 0–0.2)
BASOPHILS NFR BLD: 0.2 % (ref 0–1.9)
BASOPHILS NFR BLD: 0.3 % (ref 0–1.9)
BILIRUB DIRECT SERPL-MCNC: 4 MG/DL (ref 0.1–0.3)
BILIRUB SERPL-MCNC: 6 MG/DL (ref 0.1–1)
BUN SERPL-MCNC: 15 MG/DL (ref 8–23)
BUN SERPL-MCNC: 16 MG/DL (ref 8–23)
BUN SERPL-MCNC: 16 MG/DL (ref 8–23)
CALCIUM SERPL-MCNC: 7.6 MG/DL (ref 8.7–10.5)
CALCIUM SERPL-MCNC: 7.9 MG/DL (ref 8.7–10.5)
CALCIUM SERPL-MCNC: 8 MG/DL (ref 8.7–10.5)
CHLORIDE SERPL-SCNC: 100 MMOL/L (ref 95–110)
CHLORIDE SERPL-SCNC: 102 MMOL/L (ref 95–110)
CHLORIDE SERPL-SCNC: 102 MMOL/L (ref 95–110)
CO2 SERPL-SCNC: 19 MMOL/L (ref 23–29)
CO2 SERPL-SCNC: 21 MMOL/L (ref 23–29)
CO2 SERPL-SCNC: 22 MMOL/L (ref 23–29)
CREAT SERPL-MCNC: 0.7 MG/DL (ref 0.5–1.4)
CREAT SERPL-MCNC: 0.7 MG/DL (ref 0.5–1.4)
CREAT SERPL-MCNC: 0.8 MG/DL (ref 0.5–1.4)
DIFFERENTIAL METHOD: ABNORMAL
EOSINOPHIL # BLD AUTO: 0 K/UL (ref 0–0.5)
EOSINOPHIL NFR BLD: 0 % (ref 0–8)
EOSINOPHIL NFR BLD: 0.1 % (ref 0–8)
ERYTHROCYTE [DISTWIDTH] IN BLOOD BY AUTOMATED COUNT: 18.7 % (ref 11.5–14.5)
ERYTHROCYTE [DISTWIDTH] IN BLOOD BY AUTOMATED COUNT: 18.8 % (ref 11.5–14.5)
ERYTHROCYTE [DISTWIDTH] IN BLOOD BY AUTOMATED COUNT: 19.2 % (ref 11.5–14.5)
ERYTHROCYTE [DISTWIDTH] IN BLOOD BY AUTOMATED COUNT: 19.4 % (ref 11.5–14.5)
ERYTHROCYTE [DISTWIDTH] IN BLOOD BY AUTOMATED COUNT: 19.7 % (ref 11.5–14.5)
EST. GFR  (NO RACE VARIABLE): >60 ML/MIN/1.73 M^2
GLUCOSE SERPL-MCNC: 159 MG/DL (ref 70–110)
GLUCOSE SERPL-MCNC: 196 MG/DL (ref 70–110)
GLUCOSE SERPL-MCNC: 242 MG/DL (ref 70–110)
HCT VFR BLD AUTO: 23.9 % (ref 37–48.5)
HCT VFR BLD AUTO: 24.2 % (ref 37–48.5)
HCT VFR BLD AUTO: 24.4 % (ref 37–48.5)
HCT VFR BLD AUTO: 24.5 % (ref 37–48.5)
HCT VFR BLD AUTO: 25.4 % (ref 37–48.5)
HGB BLD-MCNC: 8.2 G/DL (ref 12–16)
HGB BLD-MCNC: 8.2 G/DL (ref 12–16)
HGB BLD-MCNC: 8.3 G/DL (ref 12–16)
HGB BLD-MCNC: 8.3 G/DL (ref 12–16)
HGB BLD-MCNC: 8.4 G/DL (ref 12–16)
IMM GRANULOCYTES # BLD AUTO: 0.03 K/UL (ref 0–0.04)
IMM GRANULOCYTES # BLD AUTO: 0.04 K/UL (ref 0–0.04)
IMM GRANULOCYTES # BLD AUTO: 0.06 K/UL (ref 0–0.04)
IMM GRANULOCYTES # BLD AUTO: 0.06 K/UL (ref 0–0.04)
IMM GRANULOCYTES # BLD AUTO: 0.07 K/UL (ref 0–0.04)
IMM GRANULOCYTES NFR BLD AUTO: 0.3 % (ref 0–0.5)
IMM GRANULOCYTES NFR BLD AUTO: 0.5 % (ref 0–0.5)
IMM GRANULOCYTES NFR BLD AUTO: 0.6 % (ref 0–0.5)
IMM GRANULOCYTES NFR BLD AUTO: 0.6 % (ref 0–0.5)
IMM GRANULOCYTES NFR BLD AUTO: 0.7 % (ref 0–0.5)
INR PPP: 1.2 (ref 0.8–1.2)
INR PPP: 1.3 (ref 0.8–1.2)
LYMPHOCYTES # BLD AUTO: 0.2 K/UL (ref 1–4.8)
LYMPHOCYTES # BLD AUTO: 0.3 K/UL (ref 1–4.8)
LYMPHOCYTES NFR BLD: 2 % (ref 18–48)
LYMPHOCYTES NFR BLD: 2.1 % (ref 18–48)
LYMPHOCYTES NFR BLD: 2.4 % (ref 18–48)
LYMPHOCYTES NFR BLD: 2.6 % (ref 18–48)
LYMPHOCYTES NFR BLD: 2.7 % (ref 18–48)
MAGNESIUM SERPL-MCNC: 2 MG/DL (ref 1.6–2.6)
MAGNESIUM SERPL-MCNC: 2.1 MG/DL (ref 1.6–2.6)
MCH RBC QN AUTO: 33.5 PG (ref 27–31)
MCH RBC QN AUTO: 33.6 PG (ref 27–31)
MCH RBC QN AUTO: 34 PG (ref 27–31)
MCH RBC QN AUTO: 34.2 PG (ref 27–31)
MCH RBC QN AUTO: 34.3 PG (ref 27–31)
MCHC RBC AUTO-ENTMCNC: 33.1 G/DL (ref 32–36)
MCHC RBC AUTO-ENTMCNC: 33.9 G/DL (ref 32–36)
MCHC RBC AUTO-ENTMCNC: 33.9 G/DL (ref 32–36)
MCHC RBC AUTO-ENTMCNC: 34 G/DL (ref 32–36)
MCHC RBC AUTO-ENTMCNC: 34.3 G/DL (ref 32–36)
MCV RBC AUTO: 101 FL (ref 82–98)
MCV RBC AUTO: 99 FL (ref 82–98)
MCV RBC AUTO: 99 FL (ref 82–98)
MONOCYTES # BLD AUTO: 0.3 K/UL (ref 0.3–1)
MONOCYTES # BLD AUTO: 0.3 K/UL (ref 0.3–1)
MONOCYTES # BLD AUTO: 0.4 K/UL (ref 0.3–1)
MONOCYTES NFR BLD: 3.3 % (ref 4–15)
MONOCYTES NFR BLD: 3.6 % (ref 4–15)
MONOCYTES NFR BLD: 3.8 % (ref 4–15)
MONOCYTES NFR BLD: 3.8 % (ref 4–15)
MONOCYTES NFR BLD: 4.4 % (ref 4–15)
NEUTROPHILS # BLD AUTO: 10.1 K/UL (ref 1.8–7.7)
NEUTROPHILS # BLD AUTO: 8.1 K/UL (ref 1.8–7.7)
NEUTROPHILS # BLD AUTO: 8.8 K/UL (ref 1.8–7.7)
NEUTROPHILS # BLD AUTO: 8.9 K/UL (ref 1.8–7.7)
NEUTROPHILS # BLD AUTO: 8.9 K/UL (ref 1.8–7.7)
NEUTROPHILS NFR BLD: 92.5 % (ref 38–73)
NEUTROPHILS NFR BLD: 92.8 % (ref 38–73)
NEUTROPHILS NFR BLD: 93.1 % (ref 38–73)
NEUTROPHILS NFR BLD: 93.3 % (ref 38–73)
NEUTROPHILS NFR BLD: 93.4 % (ref 38–73)
NRBC BLD-RTO: 0 /100 WBC
PHOSPHATE SERPL-MCNC: 4.3 MG/DL (ref 2.7–4.5)
PLATELET # BLD AUTO: 82 K/UL (ref 150–450)
PLATELET # BLD AUTO: 84 K/UL (ref 150–450)
PLATELET # BLD AUTO: 93 K/UL (ref 150–450)
PLATELET # BLD AUTO: 95 K/UL (ref 150–450)
PLATELET # BLD AUTO: 96 K/UL (ref 150–450)
PMV BLD AUTO: 10.5 FL (ref 9.2–12.9)
PMV BLD AUTO: 10.6 FL (ref 9.2–12.9)
PMV BLD AUTO: 10.7 FL (ref 9.2–12.9)
PMV BLD AUTO: 10.8 FL (ref 9.2–12.9)
PMV BLD AUTO: 10.9 FL (ref 9.2–12.9)
POCT GLUCOSE: 117 MG/DL (ref 70–110)
POCT GLUCOSE: 118 MG/DL (ref 70–110)
POCT GLUCOSE: 136 MG/DL (ref 70–110)
POCT GLUCOSE: 138 MG/DL (ref 70–110)
POCT GLUCOSE: 150 MG/DL (ref 70–110)
POCT GLUCOSE: 151 MG/DL (ref 70–110)
POCT GLUCOSE: 155 MG/DL (ref 70–110)
POCT GLUCOSE: 159 MG/DL (ref 70–110)
POCT GLUCOSE: 161 MG/DL (ref 70–110)
POCT GLUCOSE: 162 MG/DL (ref 70–110)
POCT GLUCOSE: 164 MG/DL (ref 70–110)
POCT GLUCOSE: 164 MG/DL (ref 70–110)
POCT GLUCOSE: 179 MG/DL (ref 70–110)
POCT GLUCOSE: 179 MG/DL (ref 70–110)
POCT GLUCOSE: 189 MG/DL (ref 70–110)
POCT GLUCOSE: 192 MG/DL (ref 70–110)
POCT GLUCOSE: 208 MG/DL (ref 70–110)
POCT GLUCOSE: 238 MG/DL (ref 70–110)
POCT GLUCOSE: 253 MG/DL (ref 70–110)
POTASSIUM SERPL-SCNC: 3.7 MMOL/L (ref 3.5–5.1)
POTASSIUM SERPL-SCNC: 3.9 MMOL/L (ref 3.5–5.1)
POTASSIUM SERPL-SCNC: 4 MMOL/L (ref 3.5–5.1)
PROT SERPL-MCNC: 5.1 G/DL (ref 6–8.4)
PROTHROMBIN TIME: 12.6 SEC (ref 9–12.5)
PROTHROMBIN TIME: 12.9 SEC (ref 9–12.5)
PROTHROMBIN TIME: 13.1 SEC (ref 9–12.5)
PROTHROMBIN TIME: 13.5 SEC (ref 9–12.5)
RBC # BLD AUTO: 2.4 M/UL (ref 4–5.4)
RBC # BLD AUTO: 2.41 M/UL (ref 4–5.4)
RBC # BLD AUTO: 2.42 M/UL (ref 4–5.4)
RBC # BLD AUTO: 2.47 M/UL (ref 4–5.4)
RBC # BLD AUTO: 2.51 M/UL (ref 4–5.4)
SODIUM SERPL-SCNC: 130 MMOL/L (ref 136–145)
SODIUM SERPL-SCNC: 134 MMOL/L (ref 136–145)
SODIUM SERPL-SCNC: 135 MMOL/L (ref 136–145)
TACROLIMUS BLD-MCNC: 2 NG/ML (ref 5–15)
WBC # BLD AUTO: 10.81 K/UL (ref 3.9–12.7)
WBC # BLD AUTO: 8.7 K/UL (ref 3.9–12.7)
WBC # BLD AUTO: 9.47 K/UL (ref 3.9–12.7)
WBC # BLD AUTO: 9.58 K/UL (ref 3.9–12.7)
WBC # BLD AUTO: 9.64 K/UL (ref 3.9–12.7)

## 2023-02-10 PROCEDURE — 99233 SBSQ HOSP IP/OBS HIGH 50: CPT | Mod: ,,, | Performed by: NURSE PRACTITIONER

## 2023-02-10 PROCEDURE — 63600175 PHARM REV CODE 636 W HCPCS: Performed by: STUDENT IN AN ORGANIZED HEALTH CARE EDUCATION/TRAINING PROGRAM

## 2023-02-10 PROCEDURE — 85610 PROTHROMBIN TIME: CPT | Mod: 91 | Performed by: SURGERY

## 2023-02-10 PROCEDURE — P9047 ALBUMIN (HUMAN), 25%, 50ML: HCPCS | Mod: JG | Performed by: STUDENT IN AN ORGANIZED HEALTH CARE EDUCATION/TRAINING PROGRAM

## 2023-02-10 PROCEDURE — 99222 1ST HOSP IP/OBS MODERATE 55: CPT | Mod: ,,, | Performed by: INTERNAL MEDICINE

## 2023-02-10 PROCEDURE — 80053 COMPREHEN METABOLIC PANEL: CPT | Performed by: STUDENT IN AN ORGANIZED HEALTH CARE EDUCATION/TRAINING PROGRAM

## 2023-02-10 PROCEDURE — 85730 THROMBOPLASTIN TIME PARTIAL: CPT | Performed by: STUDENT IN AN ORGANIZED HEALTH CARE EDUCATION/TRAINING PROGRAM

## 2023-02-10 PROCEDURE — 99222 PR INITIAL HOSPITAL CARE,LEVL II: ICD-10-PCS | Mod: ,,, | Performed by: INTERNAL MEDICINE

## 2023-02-10 PROCEDURE — 25000003 PHARM REV CODE 250: Performed by: STUDENT IN AN ORGANIZED HEALTH CARE EDUCATION/TRAINING PROGRAM

## 2023-02-10 PROCEDURE — 25000003 PHARM REV CODE 250: Performed by: NURSE PRACTITIONER

## 2023-02-10 PROCEDURE — 84100 ASSAY OF PHOSPHORUS: CPT | Performed by: SURGERY

## 2023-02-10 PROCEDURE — 99233 PR SUBSEQUENT HOSPITAL CARE,LEVL III: ICD-10-PCS | Mod: 24,,, | Performed by: STUDENT IN AN ORGANIZED HEALTH CARE EDUCATION/TRAINING PROGRAM

## 2023-02-10 PROCEDURE — 63600175 PHARM REV CODE 636 W HCPCS: Performed by: SURGERY

## 2023-02-10 PROCEDURE — 25000003 PHARM REV CODE 250: Performed by: SURGERY

## 2023-02-10 PROCEDURE — 99233 PR SUBSEQUENT HOSPITAL CARE,LEVL III: ICD-10-PCS | Mod: ,,, | Performed by: NURSE PRACTITIONER

## 2023-02-10 PROCEDURE — 80197 ASSAY OF TACROLIMUS: CPT | Performed by: STUDENT IN AN ORGANIZED HEALTH CARE EDUCATION/TRAINING PROGRAM

## 2023-02-10 PROCEDURE — 85025 COMPLETE CBC W/AUTO DIFF WBC: CPT | Mod: 91 | Performed by: SURGERY

## 2023-02-10 PROCEDURE — S5010 5% DEXTROSE AND 0.45% SALINE: HCPCS | Performed by: STUDENT IN AN ORGANIZED HEALTH CARE EDUCATION/TRAINING PROGRAM

## 2023-02-10 PROCEDURE — 83735 ASSAY OF MAGNESIUM: CPT | Performed by: SURGERY

## 2023-02-10 PROCEDURE — 82248 BILIRUBIN DIRECT: CPT | Performed by: STUDENT IN AN ORGANIZED HEALTH CARE EDUCATION/TRAINING PROGRAM

## 2023-02-10 PROCEDURE — 84450 TRANSFERASE (AST) (SGOT): CPT | Mod: 91 | Performed by: SURGERY

## 2023-02-10 PROCEDURE — 80048 BASIC METABOLIC PNL TOTAL CA: CPT | Mod: XB | Performed by: STUDENT IN AN ORGANIZED HEALTH CARE EDUCATION/TRAINING PROGRAM

## 2023-02-10 PROCEDURE — 99233 SBSQ HOSP IP/OBS HIGH 50: CPT | Mod: 24,,, | Performed by: STUDENT IN AN ORGANIZED HEALTH CARE EDUCATION/TRAINING PROGRAM

## 2023-02-10 PROCEDURE — 20600001 HC STEP DOWN PRIVATE ROOM

## 2023-02-10 PROCEDURE — 63600175 PHARM REV CODE 636 W HCPCS: Mod: JG | Performed by: SURGERY

## 2023-02-10 RX ORDER — TACROLIMUS 1 MG/1
6 CAPSULE ORAL EVERY 12 HOURS
Qty: 360 CAPSULE | Refills: 11 | Status: SHIPPED | OUTPATIENT
Start: 2023-02-10 | End: 2023-02-15 | Stop reason: SDUPTHER

## 2023-02-10 RX ORDER — ALBUMIN HUMAN 250 G/1000ML
12.5 SOLUTION INTRAVENOUS ONCE
Status: COMPLETED | OUTPATIENT
Start: 2023-02-10 | End: 2023-02-10

## 2023-02-10 RX ORDER — HYDROMORPHONE HYDROCHLORIDE 1 MG/ML
0.2 INJECTION, SOLUTION INTRAMUSCULAR; INTRAVENOUS; SUBCUTANEOUS
Status: DISCONTINUED | OUTPATIENT
Start: 2023-02-10 | End: 2023-02-15

## 2023-02-10 RX ORDER — LAMIVUDINE 150 MG/1
150 TABLET, FILM COATED ORAL DAILY
Qty: 30 TABLET | Refills: 11 | Status: SHIPPED | OUTPATIENT
Start: 2023-02-11 | End: 2024-01-29 | Stop reason: SDUPTHER

## 2023-02-10 RX ORDER — METHOCARBAMOL 500 MG/1
500 TABLET, FILM COATED ORAL 4 TIMES DAILY
Status: COMPLETED | OUTPATIENT
Start: 2023-02-10 | End: 2023-02-11

## 2023-02-10 RX ORDER — MYCOPHENOLATE MOFETIL 250 MG/1
1000 CAPSULE ORAL 2 TIMES DAILY
Qty: 240 CAPSULE | Refills: 2 | Status: SHIPPED | OUTPATIENT
Start: 2023-02-10 | End: 2023-04-17 | Stop reason: ALTCHOICE

## 2023-02-10 RX ORDER — DOCUSATE SODIUM 100 MG/1
100 CAPSULE, LIQUID FILLED ORAL 2 TIMES DAILY
Status: DISCONTINUED | OUTPATIENT
Start: 2023-02-10 | End: 2023-02-10

## 2023-02-10 RX ORDER — SULFAMETHOXAZOLE AND TRIMETHOPRIM 400; 80 MG/1; MG/1
1 TABLET ORAL EVERY MORNING
Qty: 30 TABLET | Refills: 5 | Status: SHIPPED | OUTPATIENT
Start: 2023-02-16 | End: 2023-04-24

## 2023-02-10 RX ORDER — SENNOSIDES 8.6 MG/1
8.6 TABLET ORAL DAILY PRN
Status: DISCONTINUED | OUTPATIENT
Start: 2023-02-10 | End: 2023-02-10

## 2023-02-10 RX ORDER — MYCOPHENOLATE MOFETIL 250 MG/1
1000 CAPSULE ORAL 2 TIMES DAILY
Status: DISCONTINUED | OUTPATIENT
Start: 2023-02-10 | End: 2023-02-15 | Stop reason: HOSPADM

## 2023-02-10 RX ORDER — OXYCODONE HYDROCHLORIDE 10 MG/1
10 TABLET ORAL EVERY 4 HOURS PRN
Status: DISCONTINUED | OUTPATIENT
Start: 2023-02-10 | End: 2023-02-15 | Stop reason: HOSPADM

## 2023-02-10 RX ORDER — VALGANCICLOVIR 450 MG/1
450 TABLET, FILM COATED ORAL DAILY
Qty: 30 TABLET | Refills: 2 | Status: SHIPPED | OUTPATIENT
Start: 2023-02-19 | End: 2023-04-24

## 2023-02-10 RX ORDER — HYDRALAZINE HYDROCHLORIDE 20 MG/ML
10 INJECTION INTRAMUSCULAR; INTRAVENOUS EVERY 6 HOURS PRN
Status: DISCONTINUED | OUTPATIENT
Start: 2023-02-10 | End: 2023-02-15 | Stop reason: HOSPADM

## 2023-02-10 RX ORDER — POLYETHYLENE GLYCOL 3350 17 G/17G
17 POWDER, FOR SOLUTION ORAL DAILY
Status: DISCONTINUED | OUTPATIENT
Start: 2023-02-10 | End: 2023-02-15 | Stop reason: HOSPADM

## 2023-02-10 RX ORDER — TACROLIMUS 1 MG/1
1 CAPSULE ORAL ONCE
Status: DISCONTINUED | OUTPATIENT
Start: 2023-02-10 | End: 2023-02-11

## 2023-02-10 RX ORDER — PREDNISONE 5 MG/1
TABLET ORAL
Qty: 70 TABLET | Refills: 0 | Status: SHIPPED | OUTPATIENT
Start: 2023-02-10 | End: 2023-04-17 | Stop reason: ALTCHOICE

## 2023-02-10 RX ORDER — POTASSIUM CHLORIDE 14.9 MG/ML
20 INJECTION INTRAVENOUS ONCE
Status: COMPLETED | OUTPATIENT
Start: 2023-02-10 | End: 2023-02-10

## 2023-02-10 RX ORDER — TACROLIMUS 1 MG/1
2 CAPSULE ORAL 2 TIMES DAILY
Status: DISCONTINUED | OUTPATIENT
Start: 2023-02-10 | End: 2023-02-11

## 2023-02-10 RX ORDER — FUROSEMIDE 10 MG/ML
40 INJECTION INTRAMUSCULAR; INTRAVENOUS ONCE
Status: COMPLETED | OUTPATIENT
Start: 2023-02-10 | End: 2023-02-10

## 2023-02-10 RX ORDER — DEXTROSE MONOHYDRATE AND SODIUM CHLORIDE 5; .45 G/100ML; G/100ML
INJECTION, SOLUTION INTRAVENOUS CONTINUOUS
Status: DISCONTINUED | OUTPATIENT
Start: 2023-02-10 | End: 2023-02-11

## 2023-02-10 RX ORDER — SENNOSIDES 8.6 MG/1
8.6 TABLET ORAL DAILY
Status: DISCONTINUED | OUTPATIENT
Start: 2023-02-10 | End: 2023-02-15 | Stop reason: HOSPADM

## 2023-02-10 RX ORDER — FAMOTIDINE 20 MG/1
20 TABLET, FILM COATED ORAL NIGHTLY
Status: DISCONTINUED | OUTPATIENT
Start: 2023-02-10 | End: 2023-02-12

## 2023-02-10 RX ADMIN — ALBUMIN HUMAN 12.5 G: 0.25 SOLUTION INTRAVENOUS at 10:02

## 2023-02-10 RX ADMIN — HEPARIN SODIUM 5000 UNITS: 5000 INJECTION INTRAVENOUS; SUBCUTANEOUS at 05:02

## 2023-02-10 RX ADMIN — HYDROMORPHONE HYDROCHLORIDE 0.2 MG: 1 INJECTION, SOLUTION INTRAMUSCULAR; INTRAVENOUS; SUBCUTANEOUS at 10:02

## 2023-02-10 RX ADMIN — HYDROMORPHONE HYDROCHLORIDE 0.2 MG: 1 INJECTION, SOLUTION INTRAMUSCULAR; INTRAVENOUS; SUBCUTANEOUS at 05:02

## 2023-02-10 RX ADMIN — OXYCODONE 5 MG: 5 TABLET ORAL at 09:02

## 2023-02-10 RX ADMIN — MUPIROCIN 1 G: 20 OINTMENT TOPICAL at 08:02

## 2023-02-10 RX ADMIN — OXYCODONE 5 MG: 5 TABLET ORAL at 05:02

## 2023-02-10 RX ADMIN — AMPICILLIN SODIUM AND SULBACTAM SODIUM 3 G: 2; 1 INJECTION, POWDER, FOR SOLUTION INTRAMUSCULAR; INTRAVENOUS at 05:02

## 2023-02-10 RX ADMIN — PHYTONADIONE 10 MG: 10 INJECTION, EMULSION INTRAMUSCULAR; INTRAVENOUS; SUBCUTANEOUS at 05:02

## 2023-02-10 RX ADMIN — FUROSEMIDE 40 MG: 10 INJECTION, SOLUTION INTRAMUSCULAR; INTRAVENOUS at 11:02

## 2023-02-10 RX ADMIN — NYSTATIN 500000 UNITS: 500000 SUSPENSION ORAL at 01:02

## 2023-02-10 RX ADMIN — METHOCARBAMOL 500 MG: 500 TABLET ORAL at 04:02

## 2023-02-10 RX ADMIN — METHYLPREDNISOLONE SODIUM SUCCINATE 200 MG: 125 INJECTION, POWDER, FOR SOLUTION INTRAMUSCULAR; INTRAVENOUS at 09:02

## 2023-02-10 RX ADMIN — OXYCODONE 5 MG: 5 TABLET ORAL at 01:02

## 2023-02-10 RX ADMIN — OXYCODONE 5 MG: 5 TABLET ORAL at 08:02

## 2023-02-10 RX ADMIN — METHOCARBAMOL 500 MG: 500 TABLET ORAL at 10:02

## 2023-02-10 RX ADMIN — NYSTATIN 500000 UNITS: 500000 SUSPENSION ORAL at 08:02

## 2023-02-10 RX ADMIN — OXYCODONE 5 MG: 5 TABLET ORAL at 03:02

## 2023-02-10 RX ADMIN — AMPICILLIN SODIUM AND SULBACTAM SODIUM 3 G: 2; 1 INJECTION, POWDER, FOR SOLUTION INTRAMUSCULAR; INTRAVENOUS at 12:02

## 2023-02-10 RX ADMIN — POLYETHYLENE GLYCOL 3350 17 G: 17 POWDER, FOR SOLUTION ORAL at 12:02

## 2023-02-10 RX ADMIN — FAMOTIDINE 20 MG: 20 TABLET ORAL at 08:02

## 2023-02-10 RX ADMIN — FAMOTIDINE 20 MG: 10 INJECTION INTRAVENOUS at 08:02

## 2023-02-10 RX ADMIN — HEPARIN SODIUM 5000 UNITS: 5000 INJECTION INTRAVENOUS; SUBCUTANEOUS at 08:02

## 2023-02-10 RX ADMIN — METHOCARBAMOL 500 MG: 500 TABLET ORAL at 01:02

## 2023-02-10 RX ADMIN — MYCOPHENOLATE MOFETIL 1000 MG: 250 CAPSULE ORAL at 08:02

## 2023-02-10 RX ADMIN — DEXTROSE AND SODIUM CHLORIDE: 5; 450 INJECTION, SOLUTION INTRAVENOUS at 02:02

## 2023-02-10 RX ADMIN — INSULIN HUMAN 3.2 UNITS/HR: 1 INJECTION, SOLUTION INTRAVENOUS at 11:02

## 2023-02-10 RX ADMIN — LAMIVUDINE 150 MG: 150 TABLET, FILM COATED ORAL at 08:02

## 2023-02-10 RX ADMIN — METHOCARBAMOL 500 MG: 500 TABLET ORAL at 08:02

## 2023-02-10 RX ADMIN — PENICILLIN G BENZATHINE 2.4 MILLION UNITS: 2400000 INJECTION, SUSPENSION INTRAMUSCULAR at 03:02

## 2023-02-10 RX ADMIN — HEPARIN SODIUM 5000 UNITS: 5000 INJECTION INTRAVENOUS; SUBCUTANEOUS at 01:02

## 2023-02-10 RX ADMIN — SENNOSIDES 8.6 MG: 8.6 TABLET, FILM COATED ORAL at 12:02

## 2023-02-10 RX ADMIN — POTASSIUM CHLORIDE 20 MEQ: 14.9 INJECTION, SOLUTION INTRAVENOUS at 05:02

## 2023-02-10 RX ADMIN — TACROLIMUS 1 MG: 1 CAPSULE ORAL at 08:02

## 2023-02-10 RX ADMIN — NYSTATIN 500000 UNITS: 500000 SUSPENSION ORAL at 05:02

## 2023-02-10 RX ADMIN — MYCOPHENOLATE MOFETIL 1000 MG: 200 POWDER, FOR SUSPENSION ORAL at 09:02

## 2023-02-10 RX ADMIN — METHOCARBAMOL 500 MG: 100 INJECTION, SOLUTION INTRAMUSCULAR; INTRAVENOUS at 05:02

## 2023-02-10 RX ADMIN — TACROLIMUS 2 MG: 1 CAPSULE ORAL at 05:02

## 2023-02-10 NOTE — CONSULTS
[facility] - Transplant Stepdown  Infectious Disease  Consult Note    Patient Name: Argelia Sevilla  MRN: [id number]  Admission Date: 2/8/2023  Hospital Length of Stay: 2 days  Attending Physician: Mark Brasher MD  Primary Care Provider: Primary Doctor No     Isolation Status: No active isolations    Patient information was obtained from patient and ER records.      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 ID consulted post-transplant for donor + RPR.    Recommendations:  - bicillin 2.4 million units weekly x 3 weeks  - doses on 2/10, 2/17, 2/24  - please call me if patient is discharged prior to completion of all doses, and will arrange remaining doses to be given in clinic    Will sign off        Thank you for your consult. I will sign off. Please contact us if you have any additional questions.    Daily Jordan DO  Transplant Infectious Disease    Time: 55 minutes   50% of time spent on face-to-face counseling and coordination of care. Counseling included review of test results, diagnosis, and treatment plan with patient and/or family.        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 Patient doing well. ID consulted for + donor RPR. Discussed w/ patient and  at bedside.               Past Medical History:   Diagnosis Date    Anxiety disorder, unspecified     CAD (coronary artery disease)     DIC (disseminated intravascular coagulation)     Dyslipidemia     GERD (gastroesophageal reflux disease)     Hereditary hemolytic anemia, unspecified     History of coronary artery stent placement     HTN  (hypertension)     Liver cirrhosis secondary to BROWN     Portal hypertensive gastropathy     Primary localized osteoarthrosis of right lower leg     Statin intolerance        Past Surgical History:   Procedure Laterality Date    ADENOIDECTOMY      ANTERIOR CRUCIATE LIGAMENT REPAIR      BACK SURGERY      CARDIAC CATHETERIZATION  05/31/2018    CARDIAC CATHETERIZATION  09/28/2021    coronary angioplasty    CHOLECYSTECTOMY      COLONOSCOPY  05/25/2022    GALLBLADDER SURGERY      HYSTERECTOMY      KNEE ARTHROSCOPY Right     LIVER TRANSPLANT N/A 2/9/2023    Procedure: TRANSPLANT, LIVER;  Surgeon: Iggy Adhikari MD;  Location: [facility] OR [address] [place] [address];  Service: Transplant;  Laterality: N/A;    OOPHORECTOMY      REDUCTION OF BOTH BREASTS  01/01/2011    TONSILLECTOMY      TUBAL LIGATION         Review of patient's allergies indicates:   Allergen Reactions    Codeine Anxiety    Adhesive tape-silicones Rash       Medications:  Medications Prior to Admission   Medication Sig    aspirin (ECOTRIN) 81 MG EC tablet Take 81 mg by mouth.    cholecalciferol, vitamin D3, (VITAMIN D3) 50 mcg (2,000 unit) Tab Take 2,000 Units by mouth once daily.    EDARBI 40 mg Tab Take 1 tablet by mouth once daily.    EDARBI 80 mg Tab Take 1 tablet by mouth once daily.    furosemide (LASIX) 20 MG tablet Take 1 tablet (20 mg total) by mouth once daily.    pantoprazole (PROTONIX) 40 MG tablet Take 40 mg by mouth once daily.    pantoprazole (PROTONIX) 40 MG tablet Take 40 mg by mouth.    spironolactone (ALDACTONE) 50 MG tablet Take 1 tablet (50 mg total) by mouth once daily.    vitamin E 1000 UNIT capsule Take 1,000 Units by mouth once daily.     Antibiotics (From admission, onward)      Start     Stop Route Frequency Ordered    02/16/23 0800  sulfamethoxazole-trimethoprim 400-80mg per tablet 1 tablet         -- Oral Every morning 02/09/23 1445    02/10/23 1600  penicillin G benzathine (BICILLIN LA) injection 2.4 Million Units          03/03 0859 IM Weekly 02/10/23 1302    02/09/23 2100  mupirocin 2 % ointment 1 g         02/14 2059 Nasl 2 times daily 02/09/23 1445          Antifungals (From admission, onward)      Start     Stop Route Frequency Ordered    02/09/23 1500  nystatin 100,000 unit/mL suspension 500,000 Units         -- MT 3 times daily after meals 02/09/23 1445          Antivirals (From admission, onward)          Stop Route Frequency     valGANciclovir         -- Oral Daily     lamiVUDine         -- Oral Daily             Immunization History   Administered Date(s) Administered    COVID-19 Vaccine 12/07/2021    COVID-19, MRNA, LN-S, PF (MODERNA FULL 0.5 ML DOSE) 01/29/2021, 02/26/2021    Influenza 10/28/2008, 11/09/2009, 10/22/2010, 09/25/2012, 10/07/2022    Influenza - High Dose - PF (65 years and older) 09/13/2018, 11/02/2021    Influenza - Quadrivalent - PF *Preferred* (6 months and older) 09/26/2013, 10/08/2014, 11/19/2015, 10/03/2016, 09/05/2017, 10/02/2019, 09/14/2020    Influenza - Trivalent - PF (ADULT) 10/28/2008, 11/09/2009, 10/22/2010, 09/25/2012    Pneumococcal Conjugate - 13 Valent 04/20/2017    Pneumococcal Conjugate - 20 Valent 10/25/2022    Pneumococcal Polysaccharide - 23 Valent 06/15/2018    Tdap 09/19/2013    Zoster 07/21/2015       Family History       Problem Relation (Age of Onset)    Asthma Mother    Cancer Maternal Grandmother    Hyperlipidemia Mother, Father, Brother    Stroke Mother, Father          Social History     Socioeconomic History    Marital status:    Tobacco Use    Smoking status: Never    Smokeless tobacco: Never   Substance and Sexual Activity    Alcohol use: Never     Review of Systems   Constitutional:  Negative for activity change, appetite change, chills, fever and unexpected weight change.   HENT:  Negative for dental problem, ear discharge, ear pain, mouth sores, sinus pain, sore throat and trouble swallowing.    Eyes:  Negative for pain and discharge.   Respiratory:   Negative for cough, chest tightness, shortness of breath and wheezing.    Cardiovascular:  Negative for chest pain and leg swelling.   Gastrointestinal:  Positive for abdominal pain. Negative for abdominal distention, constipation, diarrhea, nausea and vomiting.   Genitourinary:  Negative for difficulty urinating, dysuria, flank pain, frequency, genital sores and hematuria.   Musculoskeletal:  Negative for arthralgias, joint swelling, neck pain and neck stiffness.   Skin:  Negative for color change, rash and wound.   Neurological:  Negative for dizziness, weakness, light-headedness, numbness and headaches.   Psychiatric/Behavioral:  Negative for confusion. The patient is not nervous/anxious.    Objective:     Vital Signs (Most Recent):  Temp: 98.3 °F (36.8 °C) (02/10/23 1100)  Pulse: 73 (02/10/23 1524)  Resp: 16 (02/10/23 1322)  BP: (!) 159/72 (02/10/23 1400)  SpO2: 98 % (02/10/23 1400)   Vital Signs (24h Range):  Temp:  [98.3 °F (36.8 °C)-99.5 °F (37.5 °C)] 98.3 °F (36.8 °C)  Pulse:  [58-90] 73  Resp:  [11-28] 16  SpO2:  [94 %-100 %] 98 %  BP: (123-159)/(56-72) 159/72  Arterial Line BP: (131-167)/(53-69) 165/64     Weight: 75.7 kg (166 lb 14.6 oz)  Body mass index is 30.53 kg/m².    Estimated Creatinine Clearance: 71.2 mL/min (based on SCr of 0.7 mg/dL).    Physical Exam  Constitutional:       General: She is not in acute distress.     Appearance: Normal appearance. She is well-developed. She is not ill-appearing or diaphoretic.   HENT:      Head: Normocephalic and atraumatic.      Right Ear: External ear normal.      Left Ear: External ear normal.      Nose: Nose normal.   Eyes:      General: No scleral icterus.        Right eye: No discharge.         Left eye: No discharge.      Extraocular Movements: Extraocular movements intact.      Conjunctiva/sclera: Conjunctivae normal.   Pulmonary:      Effort: Pulmonary effort is normal. No respiratory distress.      Breath sounds: No stridor.   Musculoskeletal:          General: Normal range of motion.   Skin:     Findings: No erythema or rash.   Neurological:      General: No focal deficit present.      Mental Status: She is alert and oriented to person, place, and time. Mental status is at baseline.      Cranial Nerves: No cranial nerve deficit.   Psychiatric:         Mood and Affect: Mood normal.         Behavior: Behavior normal.         Thought Content: Thought content normal.         Judgment: Judgment normal.       Significant Labs: CBC:   Recent Labs   Lab 02/10/23  0718 02/10/23  1200 02/10/23  1434   WBC 10.81 9.64 9.58   HGB 8.4* 8.3* 8.2*   HCT 25.4* 24.5* 24.2*   PLT 96* 82* 84*     CMP:   Recent Labs   Lab 02/08/23  2007 02/09/23  0813 02/09/23  1317 02/09/23  1449 02/09/23  1831 02/09/23  2334 02/10/23  0311 02/10/23  0718 02/10/23  0911 02/10/23  1200 02/10/23  1434   *   < > 138 138   < > 135* 134*  --  130*  --   --    K 4.1   < > 3.7 3.7   < > 4.0 3.7  --  3.9  --   --      --   --  103   < > 102 102  --  100  --   --    CO2 20*  --   --  23   < > 21* 19*  --  22*  --   --       < > 139* 156*   < > 242* 196*  --  159*  --   --    BUN 13  --   --  12   < > 16 15  --  16  --   --    CREATININE 0.7  --   --  0.7   < > 0.7 0.8  --  0.7  --   --    CALCIUM 9.1  --   --  8.4*   < > 8.0* 7.9*  --  7.6*  --   --    PROT 7.2  --   --  4.6*  --   --  5.1*  --   --   --   --    ALBUMIN 2.9*   < > 2.7* 2.8*  --   --  3.0*  --   --   --   --    BILITOT 9.7*  --   --  7.8*  --   --  6.0*  --   --   --   --    ALKPHOS 111  --   --  75  --   --  67  --   --   --   --    AST 44*   < > 2,771* 3,042*   < > 2,613* 2,069* 1,583*  --  1,187* 1,075*   ALT 23   < > 866* 876*  --   --  659*  --   --   --   --    ANIONGAP 12  --   --  12   < > 12 13  --  8  --   --     < > = values in this interval not displayed.     Microbiology Results (last 7 days)       Procedure Component Value Units Date/Time    Culture, Anaerobe [[id number]] Collected: 02/09/23 0924    Order  Status: Completed Specimen: Ascites Updated: 02/10/23 0712     Anaerobic Culture Culture in progress    Aerobic culture [[id number]] Collected: 02/09/23 0924    Order Status: Completed Specimen: Ascites Updated: 02/10/23 0711     Aerobic Bacterial Culture No growth    Gram stain [[id number]] Collected: 02/09/23 0924    Order Status: Completed Specimen: Ascites Updated: 02/09/23 1502     Gram Stain Result No WBC's      No organisms seen    Fungus culture [[id number]] Collected: 02/09/23 0924    Order Status: Sent Specimen: Ascites Updated: 02/09/23 0934    AFB Culture & Smear [[id number]] Collected: 02/09/23 0924    Order Status: Sent Specimen: Ascites Updated: 02/09/23 0933    Urine culture [[id number]] Collected: 02/09/23 0638    Order Status: Sent Specimen: Urine, Clean Catch Updated: 02/09/23 0639            Significant Imaging: I have reviewed all pertinent imaging results/findings within the past 24 hours.

## 2023-02-10 NOTE — PLAN OF CARE
Patient with a-lines, introducer, swan, and parham out this am. Tolerating po fluids well. Transferring to TSU.

## 2023-02-10 NOTE — ASSESSMENT & PLAN NOTE
BG goal 140 - 180     Continue IV insulin infusion protocol   Ok to change BG monitoring to q 2 hrs   Will plan to transition patient off intensive insulin protocol when diet is progressed     ** Please call Endocrine for any BG related issues **    Discharge planning: LIZBETH

## 2023-02-10 NOTE — CONSULTS
"[facility] - Surgical Intensive Care  Adult Nutrition  Consult Note    SUMMARY     Recommendations    1.) Recommend to ADAT with goal of Low Na diet.     2.) If PO intake is poor, <50%, recommend Boost Breeze TID to provide an additional 750kcal and 27gPRO.     3.) RD to monitor and f/u.      Goals: To meet % of EEN/EPN by next RD f/u  Nutrition Goal Status: new  Communication of RD Recs:  (POC)    Assessment and Plan    Nutrition Problem  Increased PRO/energy needs     Related to (etiology):   Physiological needs    Signs and Symptoms (as evidenced by):   S/p liver transplant    Interventions/Recommendations (treatment strategy):  Collaboration of nutritional care with other providers.  Low Na and Food safety education provided and ONS    Nutrition Diagnosis Status:   New    Reason for Assessment    Reason For Assessment: consult  Diagnosis: liver disease (ESLD in BROWN)  Relevant Medical History: Gout, HTN, CHF, CAD  Interdisciplinary Rounds: did not attend  General Information Comments: RD consult: Pt seen by RD. Pt denies n/v/d, but endorses constipation. Pt states that appetite is poor. Pt states that there have been no changes in weight recently. Generalized trace edema noted. Pt is at risk for wt shifts d/t edema. Pt and caregiver received Food Safety and Low Na diet education. Pt's and caregiver's questions were answered and RD's contact information was left on edu materials.  Nutrition Discharge Planning: Post transplant nutrition education provided on 2/10. Food safety/drug interactions emphasized. General healthy/low salt diet recommended. Education material left at bedside. No other needs identified.    Nutrition Risk Screen    Nutrition Risk Screen: no indicators present    Nutrition/Diet History    Patient Reported Diet/Restrictions/Preferences:  (Heart healthy)  Supplemental Drinks or Food Habits: Premier Protein    Anthropometrics    Temp: 98.3 °F (36.8 °C)  Height Method: Stated  Height: 5' 2" " (157.5 cm)  Height (inches): 62 in  Weight Method: Standard Scale  Weight: 75.7 kg (166 lb 14.6 oz)  Weight (lb): 166.91 lb  Ideal Body Weight (IBW), Female: 110 lb  % Ideal Body Weight, Female (lb): 151.74 %  BMI (Calculated): 30.5  BMI Grade: 30 - 34.9- obesity - grade I     Lab/Procedures/Meds    Pertinent Labs Reviewed: reviewed  Pertinent Labs Comments: 2/10: h/h: 8.4/25.4, Na: 134, gluc: 196, Ca: 7.9, alb: 3.0, AST: 1583, ALT: 659  Pertinent Medications Reviewed: reviewed  Pertinent Medications Comments: Famotidine, heparin, lamivudine, mycophenolate mofetil, nystatin, ABX, tacrolimus    Estimated/Assessed Needs    Weight Used For Calorie Calculations: 75.7 kg (166 lb 14.2 oz)  Energy Calorie Requirements (kcal): 1538 kcal (MSJx1.25)  Energy Need Method: [place]- [place]  Protein Requirements: 91- 114g (1.2-1.5g/kg)  Weight Used For Protein Calculations: 75.7 kg (166 lb 14.2 oz)  Fluid Requirements (mL): 1ml/1kcal or per MD  Estimated Fluid Requirement Method: RDA Method  RDA Method (mL): 1538     Nutrition Prescription Ordered    Current Diet Order: Clear liquid diet    Evaluation of Received Nutrient/Fluid Intake    I/O: -915ml since admit  Energy Calories Required: not meeting needs  Protein Required: not meeting needs  Fluid Required:  (as per MD)  Total Fluid Intake (mL/kg): as per MD  Comments: LBM 2/8  Tolerance: tolerating  % Intake of Estimated Energy Needs: 0 - 25 %  % Meal Intake: 0 - 25 %    Nutrition Risk    Level of Risk/Frequency of Follow-up:  (RD to f/u x1/week)     Monitor and Evaluation    Food and Nutrient Intake: energy intake, food and beverage intake  Food and Nutrient Adminstration: diet order  Knowledge/Beliefs/Attitudes: food and nutrition knowledge/skill, beliefs and attitudes  Physical Activity and Function: nutrition-related ADLs and IADLs  Anthropometric Measurements: weight, weight change, body mass index  Biochemical Data, Medical Tests and Procedures: electrolyte and renal  panel, gastrointestinal profile, glucose/endocrine profile, inflammatory profile, lipid profile  Nutrition-Focused Physical Findings: overall appearance, skin     Nutrition Follow-Up    RD Follow-up?: Yes

## 2023-02-10 NOTE — PLAN OF CARE
Significant events: Admitted s/p liver transplant. 500 cc Albumin administered. Patient passed bedside swallow study.   Vitals/Respiratory:  2 L NC. O2: >95%.   HR: 80-90's, NSR.  SBP: 130-160's.  CVP: 6-13. Temperature max: 100 F.   Gtts:  MIVF and Insulin.   UOP:  865 cc since admission to SICU.        Last Bowel Movement: 2/8/23.  ZACKERY drains: Dark red drainage, 510 cc total output.   Neuro: Pupils equal and reactive.  AAOx4, follows commands and moves all extremities purposefully.    Diet:  NPO except sips of water with medications.   Labs/Accuchecks:  ABG, CBC, AST, INR Q4 hrs. BMP Q12 hrs. Daily labs. Accuchecks Q1 hr.   Plan:   Trend lab values. Continue ICU care. Liver US in AM.     Skin:   No skin breakdown noted. Waffle mattress and heel boots in use.

## 2023-02-10 NOTE — NURSING
Dr. Brasher at  and updated on pt's vs, CVP=16, and UOP. New orders received and implemented. Will continue to monitor.

## 2023-02-10 NOTE — PLAN OF CARE
SICU PLAN OF CARE NOTE    Dx: End stage liver disease    Shift Events: NAEON. CVP 12,12,12    Goals of Care: -160    Neuro: AAOx4    Vital Signs: Afebrile. SBP 140s, labetalol PRN x1, NSR, SpO2 97%    Respiratory: RA    Diet: NPO, sips with meds    Gtts: MIVF, insulin    Urine Output: 1L/shift    Drains: ZACKERY 1 serosanguineous: 360cc/shift ZACKERY 2 serosanguineous: 280cc/shift     Labs/Accuchecks: CBC/BMP/liver enzymes trended. Accuchecks Q1.    Skin: No new skin breakdown noted. Heel and sacral foams in place. SCDs in place. Turned Q2hr. Waffle mattress inflated.

## 2023-02-10 NOTE — PROGRESS NOTES
AAOx4. VSS. Patient up w/ 2x assist OOB to chair. Chevron incision w/ dressing - w/ dried old drainage noted. Liver U/S 2/9 - satisfactory. RLQ ZACKERY drain w/ SS drainage noted - see flowsheet for exact output amount. D5 1/2NS infusing continuously @ 75ml/hr. Insulin gtt infusing continuously @ 2.2Units/hr. Accuchecks Q2hrs. Mckenna removed prior to transfer - patient voiding in BSC - see flowsheet for exact UOP amount. Penicillin G vaccine given IM to R buttocks per MAR. Blue card/self-med box setup - still needs education on how to pull self-meds. PRN oxy 5mg given x1 for c/o pain. Bed in low position. Non-skid socks on. Call light within reach.

## 2023-02-10 NOTE — PROGRESS NOTES
Patient admitted to SICU [id number] s/p Liver transplant. Patient transported by anesthesia and surgical team. Patient arrived extubated on simple face mask, no current infusions. Dr. Santos and Dr. Lobo at bedside. Patient connected to ICU monitoring and assessed. VSS.

## 2023-02-10 NOTE — PROGRESS NOTES
[facility]  Critical Care - Surgery  Progress Note    Patient Name: Argelia Sevilla  MRN: [id number]  Admission Date: 2/8/2023  Hospital Length of Stay: 2 days  Code Status: Full Code  Attending Provider: Mark Brasher MD  Primary Care Provider: Primary Doctor No   Principal Problem: End stage liver disease    Subjective:     Hospital/ICU Course:  Ms. 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 She presents as a primary candidate for liver transplant with a MELD of 23. Denies recent fever/chills and recent hospitalizations. No intraop HD. Of note, donor is HBcAb+ and CMV+.      Of note, pre op EKG pre gardner reading Qtc 712. Reviewed with on call cardiology who calculated Qtc at 401; within normal limit.    Patient is now s/p orthotopic DBD liver transplant with standard anatomy. The patient was brought to the ICU extubated in stable condition. Intraoperatively, 2600cc of blood loss was documented in addition to the evacuation of 3000cc of ascites.    She received:  Crystalloid (mL) 3,500   RBC (Units) 3   FFP (Units) 2   Cryoprecipitate (Units)  2   Platelets (Units) 2   Cell Saver (CCs) 780       Interval History: Patient with no acute events overnight. Pain well controlled. Denies nausea, vomiting, CP, SOB. No BM or flatus yet. Was given 500cc albumin yesterday evening. Afebrile, VSS.    Follow-up For: Procedure(s) (LRB):  TRANSPLANT, LIVER (N/A)    Post-Operative Day: Day of Surgery    Objective:     Vital Signs (Most Recent):  Temp: 98.6 °F (37 °C) (02/10/23 0300)  Pulse: 66 (02/10/23 0615)  Resp: 12 (02/10/23 0615)  BP: (!) 145/65 (02/10/23 0600)  SpO2: (!) 94 % (02/10/23 0615)   Vital Signs (24h Range):  Temp:  [98.5 °F (36.9 °C)-100 °F (37.8 °C)] 98.6 °F (37 °C)  Pulse:  [58-99] 66  Resp:  [11-28] 12  SpO2:  [94 %-100 %] 94 %  BP: (123-151)/(56-69) 145/65  Arterial Line BP: (131-162)/(42-69) 146/66     Weight: 75.7  kg (166 lb 14.6 oz)  Body mass index is 30.53 kg/m².      Intake/Output Summary (Last 24 hours) at 2/10/2023 0628  Last data filed at 2/10/2023 0600  Gross per 24 hour   Intake 42523.69 ml   Output 78072 ml   Net -522.31 ml         Physical Exam  Vitals and nursing note reviewed.   Constitutional:       Appearance: She is ill-appearing.   HENT:      Head: Normocephalic and atraumatic.   Neck:      Comments: CVC present with introducer and swan  Cardiovascular:      Rate and Rhythm: Normal rate and regular rhythm.      Pulses: Normal pulses.   Pulmonary:      Effort: Pulmonary effort is normal.   Abdominal:      Palpations: Abdomen is soft.      Comments: Appropriately tender to palpation. ZACKERY drains in place with serosanguinous output. Chevron incision with dressings in place, clean, dry, intact.   Genitourinary:     Comments: Mckenna in place  Musculoskeletal:         General: Normal range of motion.      Cervical back: Normal range of motion and neck supple.      Comments: Radial and femoral a-lines in place   Skin:     General: Skin is warm and dry.      Capillary Refill: Capillary refill takes less than 2 seconds.      Coloration: Skin is jaundiced.   Neurological:      General: No focal deficit present.      Mental Status: She is alert.     Vents:  Oxygen Concentration (%): 24 (02/10/23 0356)    Lines/Drains/Airways       Central Venous Catheter Line  Duration              Introducer with Double Lumen 02/09/23 0822 right internal jugular <1 day    Pulmonary Artery Catheter Assessment  02/09/23 0822 right internal jugular <1 day    Trialysis (Dialysis) Catheter 02/09/23 0935 right internal jugular <1 day              Drain  Duration                  Closed/Suction Drain 02/09/23 1403 Right RLQ Bulb 19 Fr. <1 day         Closed/Suction Drain 02/09/23 1404 Right;Anterior RLQ Bulb 19 Fr. <1 day         Urethral Catheter 02/09/23 0750 Silicone;Non-latex 16 Fr. <1 day              Arterial Line  Duration              Arterial Line 02/09/23 0758 Left Radial <1 day    Arterial Line 02/09/23 0803 Right Femoral <1 day              Peripheral Intravenous Line  Duration                  Peripheral IV - Single Lumen 02/09/23 0030 18 G Right Forearm 1 day         CARMEN 02/09/23 0823 Right Antecubital <1 day                    Significant Labs:    CBC/Anemia Profile:  Recent Labs   Lab 02/09/23  1831 02/09/23  2334 02/10/23  0311   WBC 8.66 8.70 9.47   HGB 8.2* 8.2* 8.3*   HCT 23.4* 23.9* 24.4*   PLT 95* 93* 95*   * 99* 99*   RDW 19.5* 19.7* 19.4*          Chemistries:  Recent Labs   Lab 02/08/23 2007 02/09/23  0813 02/09/23  1317 02/09/23  1449 02/09/23  1831 02/09/23  1913 02/09/23  2334 02/10/23  0311   *   < > 138 138  --  135* 135* 134*   K 4.1   < > 3.7 3.7  --  3.8 4.0 3.7     --   --  103  --  102 102 102   CO2 20*  --   --  23  --  22* 21* 19*   BUN 13  --   --  12  --  14 16 15   CREATININE 0.7  --   --  0.7  --  0.7 0.7 0.8   CALCIUM 9.1  --   --  8.4*  --  8.2* 8.0* 7.9*   ALBUMIN 2.9*   < > 2.7* 2.8*  --   --   --  3.0*   PROT 7.2  --   --  4.6*  --   --   --  5.1*   BILITOT 9.7*  --   --  7.8*  --   --   --  6.0*   ALKPHOS 111  --   --  75  --   --   --  67   ALT 23   < > 866* 876*  --   --   --  659*   AST 44*   < > 2,771* 3,042* 3,083*  --  2,613* 2,069*   MG 1.3*   < > 1.8  --   --  1.6 2.0 2.1   PHOS  --   --   --   --   --  3.7  --  4.3    < > = values in this interval not displayed.         All pertinent labs within the past 24 hours have been reviewed.    Significant Imaging:  I have reviewed all pertinent imaging results/findings within the past 24 hours.    Assessment/Plan:     * End stage liver disease  Argelia Sevilla is a 71yo F with history of BROWN s/p orthotopic DBD HBcAb+ and CMV+ liver transplant.      Neuro/Psych:   -- Sedation: none  -- Pain: PRN oxycodone, dilaudid, robaxin  -- GCS 15              Cards:   -- No pressor requirement  -- MAP > 65, Syst < 160  -- Discontinue Flotrac and  CVP monitoring  -- Remove swan and introducer, remove all groin lines       Pulm:   -- Goal O2 sat > 90%  -- No indication for ABG at this time, patient extubated  -- CXR with a right plural effusion; however, patient asymptomatic on RA  -- O2 supplement as needed  -- Continuous pulse ox      Renal:  -- Trend BUN/Cr - WNL  -- Remove parham catheter  -- Daily labs      FEN / GI:   -- POD 1 liver US today  -- Continue to trend AST q4h, which peaked around 3000 and was around 2000 on last check  -- Replace lytes per protocol  -- Nutrition: CLD with advancement to regular diet later today if patient tolerates  -- GI ppx: famotidine  -- Bowel reg: miralax      ID:   -- Tm: Afebrile; WBC 9.57  -- Unasyn periop, bactrim, valganciclovir, nystatin ordered  -- Immunosuppression ordered: methylprednisolone, tacrolimus  -- Lamivudine for HBV ab+ donor      Heme/Onc:   -- H/H stable 8.3/24/4  -- Daily CBC  -- Transfuse hgb <7.0      Endo:  -- BG goal 140-180  -- Insulin gtt  -- Endocrine consulted and managing      PPx:   Feeding: CLD  Analgesia/Sedation: none / PRN oxycodone, dilaudid, robaxin  Thromboembolic prevention: SCDs, heparin  HOB >30: Yes  Stress Ulcer ppx: famotidine  Glucose control: Critical care goal 140-180 g/dl, ISS     Lines/Drains/Airway: CVC RIJ, Yung-Benny RIJ, R femoral ginny, L femoral ginny, abdominal ZACKERY drains x2, PIV/CARMEN      Dispo/Code Status/Palliative:   -- SICU / Full Code  -- POD 1 Liver US this AM  -- If all looks good, de-line patient  -- Continue to trend AST  -- Clear liquid diet  -- Remove parham  -- Step down to the floor pending the above         Critical care was time spent personally by me on the following activities: development of treatment plan with patient or surrogate and bedside caregivers, discussions with consultants, evaluation of patient's response to treatment, examination of patient, ordering and performing treatments and interventions, ordering and review of laboratory studies,  ordering and review of radiographic studies, pulse oximetry, re-evaluation of patient's condition.  This critical care time did not overlap with that of any other provider or involve time for any procedures.     Ben Lobo MD  Critical Care - Surgery  [facility]

## 2023-02-10 NOTE — PLAN OF CARE
Recommendations     1.) Recommend to ADAT with goal of Low Na diet.      2.) If PO intake is poor, <50%, recommend Boost Breeze TID to provide an additional 750kcal and 27gPRO.      3.) RD to monitor and f/u.        Goals: To meet % of EEN/EPN by next RD f/u  Nutrition Goal Status: new  Communication of RD Recs:  (POC)

## 2023-02-10 NOTE — ASSESSMENT & PLAN NOTE
Argelia Sevilla is a 73yo F with history of BROWN s/p orthotopic DBD HBcAb+ and CMV+ liver transplant.      Neuro/Psych:   -- Sedation: none  -- Pain: PRN oxycodone, dilaudid, robaxin  -- GCS 15              Cards:   -- No pressor requirement  -- MAP > 65, Syst < 160  -- Discontinue Flotrac and CVP monitoring  -- Remove swan and introducer, remove all groin lines       Pulm:   -- Goal O2 sat > 90%  -- No indication for ABG at this time, patient extubated  -- CXR with a right plural effusion; however, patient asymptomatic on RA  -- O2 supplement as needed  -- Continuous pulse ox      Renal:  -- Trend BUN/Cr - WNL  -- Remove parham catheter  -- Daily labs      FEN / GI:   -- POD 1 liver US today  -- Continue to trend AST q4h, which peaked around 3000 and was around 2000 on last check  -- Replace lytes per protocol  -- Nutrition: CLD with advancement to regular diet later today if patient tolerates  -- GI ppx: famotidine  -- Bowel reg: miralax      ID:   -- Tm: Afebrile; WBC 9.57  -- Unasyn periop, bactrim, valganciclovir, nystatin ordered  -- Immunosuppression ordered: methylprednisolone, tacrolimus  -- Lamivudine for HBV ab+ donor      Heme/Onc:   -- H/H stable 8.3/24/4  -- Daily CBC  -- Transfuse hgb <7.0      Endo:  -- BG goal 140-180  -- Insulin gtt  -- Endocrine consulted and managing      PPx:   Feeding: CLD  Analgesia/Sedation: none / PRN oxycodone, dilaudid, robaxin  Thromboembolic prevention: SCDs, heparin  HOB >30: Yes  Stress Ulcer ppx: famotidine  Glucose control: Critical care goal 140-180 g/dl, ISS     Lines/Drains/Airway: CVC RIJ, Yung-Benny RIJ, R femoral ginny, L femoral ginny, abdominal ZACKERY drains x2, PIV/CARMEN      Dispo/Code Status/Palliative:   -- SICU / Full Code  -- POD 1 Liver US this AM  -- If all looks good, de-line patient  -- Continue to trend AST  -- Clear liquid diet  -- Remove parham  -- Step down to the floor pending the above

## 2023-02-10 NOTE — SUBJECTIVE & OBJECTIVE
Past Medical History:   Diagnosis Date    Anxiety disorder, unspecified     CAD (coronary artery disease)     DIC (disseminated intravascular coagulation)     Dyslipidemia     GERD (gastroesophageal reflux disease)     Hereditary hemolytic anemia, unspecified     History of coronary artery stent placement     HTN (hypertension)     Liver cirrhosis secondary to BROWN     Portal hypertensive gastropathy     Primary localized osteoarthrosis of right lower leg     Statin intolerance        Past Surgical History:   Procedure Laterality Date    ADENOIDECTOMY      ANTERIOR CRUCIATE LIGAMENT REPAIR      BACK SURGERY      CARDIAC CATHETERIZATION  05/31/2018    CARDIAC CATHETERIZATION  09/28/2021    coronary angioplasty    CHOLECYSTECTOMY      COLONOSCOPY  05/25/2022    GALLBLADDER SURGERY      HYSTERECTOMY      KNEE ARTHROSCOPY Right     LIVER TRANSPLANT N/A 2/9/2023    Procedure: TRANSPLANT, LIVER;  Surgeon: Iggy Adhikari MD;  Location: [facility] OR [address] [place] [address];  Service: Transplant;  Laterality: N/A;    OOPHORECTOMY      REDUCTION OF BOTH BREASTS  01/01/2011    TONSILLECTOMY      TUBAL LIGATION         Review of patient's allergies indicates:   Allergen Reactions    Codeine Anxiety    Adhesive tape-silicones Rash       Medications:  Medications Prior to Admission   Medication Sig    aspirin (ECOTRIN) 81 MG EC tablet Take 81 mg by mouth.    cholecalciferol, vitamin D3, (VITAMIN D3) 50 mcg (2,000 unit) Tab Take 2,000 Units by mouth once daily.    EDARBI 40 mg Tab Take 1 tablet by mouth once daily.    EDARBI 80 mg Tab Take 1 tablet by mouth once daily.    furosemide (LASIX) 20 MG tablet Take 1 tablet (20 mg total) by mouth once daily.    pantoprazole (PROTONIX) 40 MG tablet Take 40 mg by mouth once daily.    pantoprazole (PROTONIX) 40 MG tablet Take 40 mg by mouth.    spironolactone (ALDACTONE) 50 MG tablet Take 1 tablet (50 mg total) by mouth once daily.    vitamin E 1000 UNIT capsule Take 1,000 Units by mouth once daily.      Antibiotics (From admission, onward)      Start     Stop Route Frequency Ordered    02/16/23 0800  sulfamethoxazole-trimethoprim 400-80mg per tablet 1 tablet         -- Oral Every morning 02/09/23 1445    02/10/23 1600  penicillin G benzathine (BICILLIN LA) injection 2.4 Million Units         03/03 0859 IM Weekly 02/10/23 1302    02/09/23 2100  mupirocin 2 % ointment 1 g         02/14 2059 Nasl 2 times daily 02/09/23 1445          Antifungals (From admission, onward)      Start     Stop Route Frequency Ordered    02/09/23 1500  nystatin 100,000 unit/mL suspension 500,000 Units         -- MT 3 times daily after meals 02/09/23 1445          Antivirals (From admission, onward)          Stop Route Frequency     valGANciclovir         -- Oral Daily     lamiVUDine         -- Oral Daily             Immunization History   Administered Date(s) Administered    COVID-19 Vaccine 12/07/2021    COVID-19, MRNA, LN-S, PF (MODERNA FULL 0.5 ML DOSE) 01/29/2021, 02/26/2021    Influenza 10/28/2008, 11/09/2009, 10/22/2010, 09/25/2012, 10/07/2022    Influenza - High Dose - PF (65 years and older) 09/13/2018, 11/02/2021    Influenza - Quadrivalent - PF *Preferred* (6 months and older) 09/26/2013, 10/08/2014, 11/19/2015, 10/03/2016, 09/05/2017, 10/02/2019, 09/14/2020    Influenza - Trivalent - PF (ADULT) 10/28/2008, 11/09/2009, 10/22/2010, 09/25/2012    Pneumococcal Conjugate - 13 Valent 04/20/2017    Pneumococcal Conjugate - 20 Valent 10/25/2022    Pneumococcal Polysaccharide - 23 Valent 06/15/2018    Tdap 09/19/2013    Zoster 07/21/2015       Family History       Problem Relation (Age of Onset)    Asthma Mother    Cancer Maternal Grandmother    Hyperlipidemia Mother, Father, Brother    Stroke Mother, Father          Social History     Socioeconomic History    Marital status:    Tobacco Use    Smoking status: Never    Smokeless tobacco: Never   Substance and Sexual Activity    Alcohol use: Never     Review of Systems    Constitutional:  Negative for activity change, appetite change, chills, fever and unexpected weight change.   HENT:  Negative for dental problem, ear discharge, ear pain, mouth sores, sinus pain, sore throat and trouble swallowing.    Eyes:  Negative for pain and discharge.   Respiratory:  Negative for cough, chest tightness, shortness of breath and wheezing.    Cardiovascular:  Negative for chest pain and leg swelling.   Gastrointestinal:  Positive for abdominal pain. Negative for abdominal distention, constipation, diarrhea, nausea and vomiting.   Genitourinary:  Negative for difficulty urinating, dysuria, flank pain, frequency, genital sores and hematuria.   Musculoskeletal:  Negative for arthralgias, joint swelling, neck pain and neck stiffness.   Skin:  Negative for color change, rash and wound.   Neurological:  Negative for dizziness, weakness, light-headedness, numbness and headaches.   Psychiatric/Behavioral:  Negative for confusion. The patient is not nervous/anxious.    Objective:     Vital Signs (Most Recent):  Temp: 98.3 °F (36.8 °C) (02/10/23 1100)  Pulse: 73 (02/10/23 1524)  Resp: 16 (02/10/23 1322)  BP: (!) 159/72 (02/10/23 1400)  SpO2: 98 % (02/10/23 1400)   Vital Signs (24h Range):  Temp:  [98.3 °F (36.8 °C)-99.5 °F (37.5 °C)] 98.3 °F (36.8 °C)  Pulse:  [58-90] 73  Resp:  [11-28] 16  SpO2:  [94 %-100 %] 98 %  BP: (123-159)/(56-72) 159/72  Arterial Line BP: (131-167)/(53-69) 165/64     Weight: 75.7 kg (166 lb 14.6 oz)  Body mass index is 30.53 kg/m².    Estimated Creatinine Clearance: 71.2 mL/min (based on SCr of 0.7 mg/dL).    Physical Exam  Constitutional:       General: She is not in acute distress.     Appearance: Normal appearance. She is well-developed. She is not ill-appearing or diaphoretic.   HENT:      Head: Normocephalic and atraumatic.      Right Ear: External ear normal.      Left Ear: External ear normal.      Nose: Nose normal.   Eyes:      General: No scleral icterus.        Right  eye: No discharge.         Left eye: No discharge.      Extraocular Movements: Extraocular movements intact.      Conjunctiva/sclera: Conjunctivae normal.   Pulmonary:      Effort: Pulmonary effort is normal. No respiratory distress.      Breath sounds: No stridor.   Musculoskeletal:         General: Normal range of motion.   Skin:     Findings: No erythema or rash.   Neurological:      General: No focal deficit present.      Mental Status: She is alert and oriented to person, place, and time. Mental status is at baseline.      Cranial Nerves: No cranial nerve deficit.   Psychiatric:         Mood and Affect: Mood normal.         Behavior: Behavior normal.         Thought Content: Thought content normal.         Judgment: Judgment normal.       Significant Labs: CBC:   Recent Labs   Lab 02/10/23  0718 02/10/23  1200 02/10/23  1434   WBC 10.81 9.64 9.58   HGB 8.4* 8.3* 8.2*   HCT 25.4* 24.5* 24.2*   PLT 96* 82* 84*     CMP:   Recent Labs   Lab 02/08/23  2007 02/09/23  0813 02/09/23  1317 02/09/23  1449 02/09/23  1831 02/09/23  2334 02/10/23  0311 02/10/23  0718 02/10/23  0911 02/10/23  1200 02/10/23  1434   *   < > 138 138   < > 135* 134*  --  130*  --   --    K 4.1   < > 3.7 3.7   < > 4.0 3.7  --  3.9  --   --      --   --  103   < > 102 102  --  100  --   --    CO2 20*  --   --  23   < > 21* 19*  --  22*  --   --       < > 139* 156*   < > 242* 196*  --  159*  --   --    BUN 13  --   --  12   < > 16 15  --  16  --   --    CREATININE 0.7  --   --  0.7   < > 0.7 0.8  --  0.7  --   --    CALCIUM 9.1  --   --  8.4*   < > 8.0* 7.9*  --  7.6*  --   --    PROT 7.2  --   --  4.6*  --   --  5.1*  --   --   --   --    ALBUMIN 2.9*   < > 2.7* 2.8*  --   --  3.0*  --   --   --   --    BILITOT 9.7*  --   --  7.8*  --   --  6.0*  --   --   --   --    ALKPHOS 111  --   --  75  --   --  67  --   --   --   --    AST 44*   < > 2,771* 3,042*   < > 2,613* 2,069* 1,583*  --  1,187* 1,075*   ALT 23   < > 866* 876*  --    --  659*  --   --   --   --    ANIONGAP 12  --   --  12   < > 12 13  --  8  --   --     < > = values in this interval not displayed.     Microbiology Results (last 7 days)       Procedure Component Value Units Date/Time    Culture, Anaerobe [[id number]] Collected: 02/09/23 0924    Order Status: Completed Specimen: Ascites Updated: 02/10/23 0712     Anaerobic Culture Culture in progress    Aerobic culture [[id number]] Collected: 02/09/23 0924    Order Status: Completed Specimen: Ascites Updated: 02/10/23 0711     Aerobic Bacterial Culture No growth    Gram stain [[id number]] Collected: 02/09/23 0924    Order Status: Completed Specimen: Ascites Updated: 02/09/23 1502     Gram Stain Result No WBC's      No organisms seen    Fungus culture [[id number]] Collected: 02/09/23 0924    Order Status: Sent Specimen: Ascites Updated: 02/09/23 0934    AFB Culture & Smear [[id number]] Collected: 02/09/23 0924    Order Status: Sent Specimen: Ascites Updated: 02/09/23 0933    Urine culture [[id number]] Collected: 02/09/23 0638    Order Status: Sent Specimen: Urine, Clean Catch Updated: 02/09/23 0639            Significant Imaging: I have reviewed all pertinent imaging results/findings within the past 24 hours.

## 2023-02-10 NOTE — PROGRESS NOTES
Saw patient and her  in the ICU.  Other family members were also present.  Gave them My New Journey: Living Smart After My Liver Transplant.  Asked them to read the book in preparation for education Monday or Tuesday of next week.   states he will be present and back up caregivers may be able to listen by phone.  Allowed time for questions and answers.

## 2023-02-10 NOTE — PROGRESS NOTES
TRANSPLANT NOTE:    Admit Date: 2/8/2023    ORGAN:   LIVER  Disease Etiology: Cirrhosis: Fatty Liver (BROWN)  Donor CMV Status: Positive  Donor HCV Status: Negative  Donor HBcAb: Positive  Donor HBV FRANNIE: Negative  Donor HCV FRANNIE: Negative  Whole or Partial:   Biliary Anastomosis: End to End  Arterial Anatomy: Standard    Argelia Sevilla is a 72 y.o. female s/p    Donation after Brain Death liver transplant on 2/9/2023 (Liver) for Cirrhosis: Fatty Liver (BROWN).  This patient will follow the Steroid Induction protocol.  This patients immunosuppression will include a steroid taper over 5 weeks, Cellcept for 3 months and Prograf maintenance.  Opportunistic infection prophylaxis will include Valcyte for 3 months (CMV D+ R+), Bactrim for 6 months.  Osteoporosis risk assessment identifies *needs dexa, therapy will include daily ca/vit D.  I have reviewed the pre-op medications and those have been restarted those, as appropriate.

## 2023-02-10 NOTE — PROGRESS NOTES
Admit Note     Met with patient, spouse, and granddaughter  to assess needs. Patient is a 72 y.o.  female, admitted for for liver transplant.  Pt was transplanted on 2/9/23, pt is extubated and alert/oriented x4.    Patient admitted from home on 2/8/2023 .  At this time, patient presents as alert and oriented x 4, pleasant, and communicative.  At this time, patients caregiver presents as alert and oriented x 4, pleasant, and communicative.    Household/Family Systems     Patient resides with patient's spouse, at:     [address] 234 beds at the [facility] [place] [address].      Support system includes , 2 adult children, daughter in law, grandchildren and friends.  Patient does not have dependents that are need of being cared for.     Patients primary caregiver is Iggy Sevilla, patients , phone number [phone].  Confirmed patients contact information is [phone] (home);   Telephone Information:   Mobile [phone]       During admission, patient's caregiver plans to stay in patient's room.  Confirmed patient and patients caregivers do have access to reliable transportation.    Cognitive Status/Learning     Patient reports reading ability as college and states patient does have difficulty with seeing, and wears glasses.  Patient reports patient learns best by visual information.   Needed: No.   Highest education level: Associate/Bachelor Degree    Vocation/Disability     Working for Income: No  If no, reason not working: Patient Choice - Retired  Patient is  a retired teacher.    Adherence     Patient reports a high level of adherence to patients health care regimen.  Adherence counseling and education provided. Patient verbalizes understanding.    Substance Use    Patient reports the following substance usage.    Tobacco: none, patient denies any use.  Alcohol: none, patient denies any use.  Illicit Drugs/Non-prescribed Medications: none, patient denies any use.  Patient states clear  understanding of the potential impact of substance use.  Substance abstinence/cessation counseling, education and resources provided and reviewed.     Services Utilizing/ADLS    Infusion Service: Prior to admission, patient utilizing? no  Home Health: Prior to admission, patient utilizing? no  DME: Prior to admission,  cane  Pulmonary/Cardiac Rehab: Prior to admission, no  Dialysis:  Prior to admission, no  Transplant Specialty Pharmacy:  Prior to admission, no.    Prior to admission, patient reports patient was independent with ADLS and was driving.  Patient reports patient is not able to care for self at this time due to compromised medical condition (as documented in medical record) and physical weakness..  Patient indicates a willingness to care for self once medically cleared to do so.    Insurance/Medications    Insured by   Payer/Plan Subscr  Sex Relation Sub. Ins. ID Effective Group Num   1. MEDICARE - ME* DELANO MOSES* 1950 Female Self [id number] 3/1/17                                    [address]   2. GENERIC COMME* DELANO MOSES S* 1950 Female Self [id number] 3/1/17                                    [address] [facility] [place] [address]      Primary Insurance (for UNOS reporting): Public Insurance - Medicare FFS (Fee For Service)  Secondary Insurance (for UNOS reporting): Private Insurance    Patient reports patient is able to obtain and afford medications at this time and at time of discharge.    Living Will/Healthcare Power of     Patient states patient has a LW and/or HCPA.  Per report, pt's adult sons are her HC POAs.    Coping/Mental Health    Patient is coping adequately with the aid of  family members.   Patient denies mental health difficulties, at this time.     Discharge Planning    At time of discharge, patient plans to return to [facility] [address] under the care of  and family.  Patients  will transport patient.  Per rounds today, expected discharge  date has not been medically determined at this time. Patient and patients caregiver  verbalize understanding and are involved in treatment planning and discharge process.    Additional Concerns    Patient is being followed for needs, education, resources, information, emotional support, supportive counseling, and for supportive and skilled discharge plan of care.  providing ongoing psychosocial support, education, resources and d/c planning as needed.  SW remains available.

## 2023-02-10 NOTE — OP NOTE
Certification of Assistant at Surgery       Surgery Date: 2/9/2023     Participating Surgeons:  Surgeon(s) and Role:     * Iggy Adhikari MD - Primary     * Laureano Lipscomb MD - Assisting     * Damian Santos MD - Fellow    Procedures:  Procedure(s) (LRB):  TRANSPLANT, LIVER (N/A)    Assistant Surgeon's Certification of Necessity:  I understand that section 1842 (b) (6) (d) of the Social Security Act generally prohibits Medicare Part B reasonable charge payment for the services of assistants at surgery in teaching hospitals when qualified residents are available to furnish such services. I certify that the services for which payment is claimed were medically necessary, and that no qualified resident was available to perform the services. I further understand that these services are subject to post-payment review by the Medicare carrier.      Laureano Lipscomb MD    02/10/2023  4:23 PM

## 2023-02-10 NOTE — ASSESSMENT & PLAN NOTE
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 ID consulted post-transplant for donor + RPR.    Recommendations:  - bicillin 2.4 million units weekly x 3 weeks  - doses on 2/10, 2/17, 2/24  - please call me if patient is discharged prior to completion of all doses, and will arrange remaining doses to be given in clinic    Will sign off

## 2023-02-10 NOTE — HPI
72F with BROWN cirrhosis now s/p OLT 2/9/23 (donor HBcAb+, RPR+, CMV +/+, steroid induction), post op day 1, transferred out of SICU. Patient doing well. ID consulted for + donor RPR. Discussed w/ patient and  at bedside.

## 2023-02-10 NOTE — ANESTHESIA POSTPROCEDURE EVALUATION
Anesthesia Post Evaluation    Patient: Argelia Sevilla    Procedure(s) Performed: Procedure(s) (LRB):  TRANSPLANT, LIVER (N/A)    Final Anesthesia Type: general      Patient location during evaluation: ICU  Patient participation: Yes- Able to Participate  Level of consciousness: awake and alert and oriented  Post-procedure vital signs: reviewed and stable  Pain management: adequate  Airway patency: patent    PONV status at discharge: No PONV  Anesthetic complications: no      Cardiovascular status: blood pressure returned to baseline  Respiratory status: unassisted, spontaneous ventilation and room air  Hydration status: euvolemic  Follow-up not needed.          Vitals Value Taken Time   /65 02/09/23 2001   Temp 37.4 °C (99.3 °F) 02/09/23 1900   Pulse 72 02/09/23 2037   Resp 17 02/09/23 2037   SpO2 99 % 02/09/23 2037   Vitals shown include unvalidated device data.      No case tracking events are documented in the log.      Pain/Mary Jane Score: Pain Rating Prior to Med Admin: 10 (2/9/2023  8:35 PM)  Pain Rating Post Med Admin: 5 (2/9/2023  6:12 PM)

## 2023-02-10 NOTE — OP NOTE
Operative Report    Date of Procedure: 2/9/2023    Surgeon: Laureano Lipscomb MD  First Assistant:     Pre-operative Diagnosis: Allograft liver for transplantation  Post-operative Diagnosis: Same    Procedure(s) Performed:   Back Table Preparation of Liver with needle biopsy     Anesthesia: Not applicable  Estimated Blood Loss: Not applicable  Fluids Administered: Not applicable    Findings: Estimated steatosis 0-10%, normal vascular anatomy.  Drains: Not applicable  Specimens: Core biopsy of allograft liver      Preamble  Indications: This report describes only the backbench preparation of the liver prior to transplantation.  The transplant operation itself is described in a separate report.    ABO Confirmation: Immediately following arrival of the donor organ and prior to implantation, a formal ABO confirmation was done according to hospital and UNOS policies.  I confirmed the UNOS ID number of the donor organ and the donor and recipient ABO types, directly verifying these data by comparison with the UNOS [facility] [place] report.  This confirmation was personally done by an attending surgeon and circulating nurse, and is officially documented elsewhere.    Time-Out: A complete time out was carried out prior to the procedure, with confirmation of patient identity, correct procedure, correct operative site, appropriate antibiotic prophylaxis, review of any known allergies, and presence of all needed equipment.    Procedure in Detail  The liver was recovered from the transport cooler and inspected for vascular anatomy and overall suitability for transplantation, with findings as noted above.  The remnant of the diaphragm was dissected away.  The phrenic veins and adrenal vein were identified and ligated or sutured as appropriate.  The portal vein and hepatic artery were mobilized toward the hilum of the liver, and extrahepatic branches were ligated.  No vascular reconstruction was required.  The vessels were tested for  leaks.  A needle biopsy was obtained for permanent section histology using a spring-loaded biopsy device. The liver was kept in ice temperature organ preservation solution until the time of implantation.

## 2023-02-10 NOTE — SUBJECTIVE & OBJECTIVE
Interval History: Patient with no acute events overnight. Pain well controlled. Denies nausea, vomiting, CP, SOB. No BM or flatus yet. Was given 500cc albumin yesterday evening. Afebrile, VSS.    Follow-up For: Procedure(s) (LRB):  TRANSPLANT, LIVER (N/A)    Post-Operative Day: Day of Surgery    Objective:     Vital Signs (Most Recent):  Temp: 98.6 °F (37 °C) (02/10/23 0300)  Pulse: 66 (02/10/23 0615)  Resp: 12 (02/10/23 0615)  BP: (!) 145/65 (02/10/23 0600)  SpO2: (!) 94 % (02/10/23 0615)   Vital Signs (24h Range):  Temp:  [98.5 °F (36.9 °C)-100 °F (37.8 °C)] 98.6 °F (37 °C)  Pulse:  [58-99] 66  Resp:  [11-28] 12  SpO2:  [94 %-100 %] 94 %  BP: (123-151)/(56-69) 145/65  Arterial Line BP: (131-162)/(42-69) 146/66     Weight: 75.7 kg (166 lb 14.6 oz)  Body mass index is 30.53 kg/m².      Intake/Output Summary (Last 24 hours) at 2/10/2023 0628  Last data filed at 2/10/2023 0600  Gross per 24 hour   Intake 20175.69 ml   Output 76359 ml   Net -522.31 ml         Physical Exam  Vitals and nursing note reviewed.   Constitutional:       Appearance: She is ill-appearing.   HENT:      Head: Normocephalic and atraumatic.   Neck:      Comments: CVC present with introducer and swan  Cardiovascular:      Rate and Rhythm: Normal rate and regular rhythm.      Pulses: Normal pulses.   Pulmonary:      Effort: Pulmonary effort is normal.   Abdominal:      Palpations: Abdomen is soft.      Comments: Appropriately tender to palpation. ZACKERY drains in place with serosanguinous output. Chevron incision with dressings in place, clean, dry, intact.   Genitourinary:     Comments: Mckenna in place  Musculoskeletal:         General: Normal range of motion.      Cervical back: Normal range of motion and neck supple.      Comments: Radial and femoral a-lines in place   Skin:     General: Skin is warm and dry.      Capillary Refill: Capillary refill takes less than 2 seconds.      Coloration: Skin is jaundiced.   Neurological:      General: No focal  deficit present.      Mental Status: She is alert.     Vents:  Oxygen Concentration (%): 24 (02/10/23 0356)    Lines/Drains/Airways       Central Venous Catheter Line  Duration              Introducer with Double Lumen 02/09/23 0822 right internal jugular <1 day    Pulmonary Artery Catheter Assessment  02/09/23 0822 right internal jugular <1 day    Trialysis (Dialysis) Catheter 02/09/23 0935 right internal jugular <1 day              Drain  Duration                  Closed/Suction Drain 02/09/23 1403 Right RLQ Bulb 19 Fr. <1 day         Closed/Suction Drain 02/09/23 1404 Right;Anterior RLQ Bulb 19 Fr. <1 day         Urethral Catheter 02/09/23 0750 Silicone;Non-latex 16 Fr. <1 day              Arterial Line  Duration             Arterial Line 02/09/23 0758 Left Radial <1 day    Arterial Line 02/09/23 0803 Right Femoral <1 day              Peripheral Intravenous Line  Duration                  Peripheral IV - Single Lumen 02/09/23 0030 18 G Right Forearm 1 day         CARMEN 02/09/23 0823 Right Antecubital <1 day                    Significant Labs:    CBC/Anemia Profile:  Recent Labs   Lab 02/09/23  1831 02/09/23  2334 02/10/23  0311   WBC 8.66 8.70 9.47   HGB 8.2* 8.2* 8.3*   HCT 23.4* 23.9* 24.4*   PLT 95* 93* 95*   * 99* 99*   RDW 19.5* 19.7* 19.4*          Chemistries:  Recent Labs   Lab 02/08/23  2007 02/09/23  0813 02/09/23  1317 02/09/23  1449 02/09/23  1831 02/09/23  1913 02/09/23  2334 02/10/23  0311   *   < > 138 138  --  135* 135* 134*   K 4.1   < > 3.7 3.7  --  3.8 4.0 3.7     --   --  103  --  102 102 102   CO2 20*  --   --  23  --  22* 21* 19*   BUN 13  --   --  12  --  14 16 15   CREATININE 0.7  --   --  0.7  --  0.7 0.7 0.8   CALCIUM 9.1  --   --  8.4*  --  8.2* 8.0* 7.9*   ALBUMIN 2.9*   < > 2.7* 2.8*  --   --   --  3.0*   PROT 7.2  --   --  4.6*  --   --   --  5.1*   BILITOT 9.7*  --   --  7.8*  --   --   --  6.0*   ALKPHOS 111  --   --  75  --   --   --  67   ALT 23   < > 866* 876*   --   --   --  659*   AST 44*   < > 2,771* 3,042* 3,083*  --  2,613* 2,069*   MG 1.3*   < > 1.8  --   --  1.6 2.0 2.1   PHOS  --   --   --   --   --  3.7  --  4.3    < > = values in this interval not displayed.         All pertinent labs within the past 24 hours have been reviewed.    Significant Imaging:  I have reviewed all pertinent imaging results/findings within the past 24 hours.

## 2023-02-10 NOTE — PROGRESS NOTES
"[facility] - Transplant Stepdown  Endocrinology  Progress Note    Admit Date: 2/8/2023     Reason for Consult: Management of Hyperglycemia     Surgical Procedure and Date:   2/9/23  -  TRANSPLANT, LIVER (N/A)      HPI:   Patient is a 72 y.o. 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 She presents as a primary candidate for liver transplant with a MELD of 23. Endocrinology consulted for management of hyperglycemia.       Lab Results   Component Value Date    HGBA1C <4.0 (A) 02/08/2023           Interval HPI:   Overnight events: Remains in SICU. POD 1 BG reasonably well controlled on IV intensive insulin protocol with infusion rates ranging from 1.6-3.5 u/hr.   Eating:   Diet clear liquid    Nausea: No  Hypoglycemia and intervention: No  Fever: No  TPN and/or TF: No  If yes, type of TF/TPN and rate: n/a    /71   Pulse 69   Temp 98.7 °F (37.1 °C) (Core ([place]))   Resp 15   Ht 5' 2" (1.575 m)   Wt 75.7 kg (166 lb 14.6 oz)   SpO2 96%   BMI 30.53 kg/m²     Labs Reviewed and Include    Recent Labs   Lab 02/10/23  0311 02/10/23  0718   *  --    CALCIUM 7.9*  --    ALBUMIN 3.0*  --    PROT 5.1*  --    *  --    K 3.7  --    CO2 19*  --      --    BUN 15  --    CREATININE 0.8  --    ALKPHOS 67  --    *  --    AST 2,069* 1,583*   BILITOT 6.0*  --      Lab Results   Component Value Date    WBC 10.81 02/10/2023    HGB 8.4 (L) 02/10/2023    HCT 25.4 (L) 02/10/2023     (H) 02/10/2023    PLT 96 (L) 02/10/2023     No results for input(s): TSH, FREET4 in the last 168 hours.  Lab Results   Component Value Date    HGBA1C <4.0 (A) 02/08/2023       Nutritional status:   Body mass index is 30.53 kg/m².  Lab Results   Component Value Date    ALBUMIN 3.0 (L) 02/10/2023    ALBUMIN 2.8 (L) 02/09/2023    ALBUMIN 2.7 (L) 02/09/2023     No results found for: PREALBUMIN    Estimated Creatinine Clearance: 62.3 " mL/min (based on SCr of 0.8 mg/dL).    Accu-Checks  Recent Labs     02/09/23  2207 02/09/23  2335 02/10/23  0044 02/10/23  0217 02/10/23  0306 02/10/23  0502 02/10/23  0557 02/10/23  0715 02/10/23  0803 02/10/23  0909   POCTGLUCOSE 253* 258* 253* 238* 208* 179* 179* 159* 164* 192*       Current Medications and/or Treatments Impacting Glycemic Control  Immunotherapy:    Immunosuppressants           Stop Route Frequency     mycophenolate mofetil 200 mg/mL suspension 1,000 mg         -- Oral 2 times daily     tacrolimus (PROGRAF) 1 mg/mL oral syringe         -- Oral 2 times daily          Steroids:   Hormones (From admission, onward)      Start     Stop Route Frequency Ordered    02/15/23 0900  predniSONE tablet 20 mg  (methylprednisolone taper panel)        See Hyperspace for full Linked Orders Report.    -- Oral Daily 02/09/23 1445    02/14/23 0900  methylPREDNISolone sodium succinate injection 40 mg  (methylprednisolone taper panel)        See Hyperspace for full Linked Orders Report.    02/15 0859 IV Daily 02/09/23 1445    02/13/23 0900  methylPREDNISolone sodium succinate injection 80 mg  (methylprednisolone taper panel)        See Hyperspace for full Linked Orders Report.    02/14 0859 IV Daily 02/09/23 1445    02/12/23 0900  methylPREDNISolone sodium succinate injection 120 mg  (methylprednisolone taper panel)        See Hyperspace for full Linked Orders Report.    02/13 0859 IV Daily 02/09/23 1445    02/11/23 0900  methylPREDNISolone sodium succinate injection 160 mg  (methylprednisolone taper panel)        See Hyperspace for full Linked Orders Report.    02/12 0859 IV Daily 02/09/23 1445    02/10/23 0900  methylPREDNISolone sodium succinate injection 200 mg  (methylprednisolone taper panel)        See Hyperspace for full Linked Orders Report.    02/11 0859 IV Daily 02/09/23 1445          Pressors:    Autonomic Drugs (From admission, onward)      None          Hyperglycemia/Diabetes Medications:    Antihyperglycemics (From admission, onward)      Start     Stop Route Frequency Ordered    02/09/23 1630  insulin regular in 0.9 % NaCl 100 unit/100 mL (1 unit/mL) infusion        Question:  Enter initial dose from Infusion Protocol Chart (Units/hr):  Answer:  1    -- IV Continuous 02/09/23 1524            ASSESSMENT and PLAN    * End stage liver disease  Managed per primary team  S/p liver transplant  Optimize BG control        Steroid-induced hyperglycemia  BG goal 140 - 180     Continue IV insulin infusion protocol   Ok to change BG monitoring to q 2 hrs   Will plan to transition patient off intensive insulin protocol when diet is progressed     ** Please call Endocrine for any BG related issues **    Discharge planning: TBD        S/P liver transplant  Managed per primary team  Avoid hypoglycemia        Prophylactic immunotherapy  May increase insulin resistance.             Blanquita Soto NP  Endocrinology  [facility] - Trans[facility]

## 2023-02-10 NOTE — SUBJECTIVE & OBJECTIVE
"Interval HPI:   Overnight events: Remains in SICU. POD 1 BG reasonably well controlled on IV intensive insulin protocol with infusion rates ranging from 1.6-3.5 u/hr.   Eating:   Diet clear liquid    Nausea: No  Hypoglycemia and intervention: No  Fever: No  TPN and/or TF: No  If yes, type of TF/TPN and rate: n/a    /71   Pulse 69   Temp 98.7 °F (37.1 °C) (Core ([place]))   Resp 15   Ht 5' 2" (1.575 m)   Wt 75.7 kg (166 lb 14.6 oz)   SpO2 96%   BMI 30.53 kg/m²     Labs Reviewed and Include    Recent Labs   Lab 02/10/23  0311 02/10/23  0718   *  --    CALCIUM 7.9*  --    ALBUMIN 3.0*  --    PROT 5.1*  --    *  --    K 3.7  --    CO2 19*  --      --    BUN 15  --    CREATININE 0.8  --    ALKPHOS 67  --    *  --    AST 2,069* 1,583*   BILITOT 6.0*  --      Lab Results   Component Value Date    WBC 10.81 02/10/2023    HGB 8.4 (L) 02/10/2023    HCT 25.4 (L) 02/10/2023     (H) 02/10/2023    PLT 96 (L) 02/10/2023     No results for input(s): TSH, FREET4 in the last 168 hours.  Lab Results   Component Value Date    HGBA1C <4.0 (A) 02/08/2023       Nutritional status:   Body mass index is 30.53 kg/m².  Lab Results   Component Value Date    ALBUMIN 3.0 (L) 02/10/2023    ALBUMIN 2.8 (L) 02/09/2023    ALBUMIN 2.7 (L) 02/09/2023     No results found for: PREALBUMIN    Estimated Creatinine Clearance: 62.3 mL/min (based on SCr of 0.8 mg/dL).    Accu-Checks  Recent Labs     02/09/23  2207 02/09/23  2335 02/10/23  0044 02/10/23  0217 02/10/23  0306 02/10/23  0502 02/10/23  0557 02/10/23  0715 02/10/23  0803 02/10/23  0909   POCTGLUCOSE 253* 258* 253* 238* 208* 179* 179* 159* 164* 192*       Current Medications and/or Treatments Impacting Glycemic Control  Immunotherapy:    Immunosuppressants           Stop Route Frequency     mycophenolate mofetil 200 mg/mL suspension 1,000 mg         -- Oral 2 times daily     tacrolimus (PROGRAF) 1 mg/mL oral syringe         -- Oral 2 times daily      "     Steroids:   Hormones (From admission, onward)      Start     Stop Route Frequency Ordered    02/15/23 0900  predniSONE tablet 20 mg  (methylprednisolone taper panel)        See Hyperspace for full Linked Orders Report.    -- Oral Daily 02/09/23 1445    02/14/23 0900  methylPREDNISolone sodium succinate injection 40 mg  (methylprednisolone taper panel)        See Hyperspace for full Linked Orders Report.    02/15 0859 IV Daily 02/09/23 1445    02/13/23 0900  methylPREDNISolone sodium succinate injection 80 mg  (methylprednisolone taper panel)        See Hyperspace for full Linked Orders Report.    02/14 0859 IV Daily 02/09/23 1445    02/12/23 0900  methylPREDNISolone sodium succinate injection 120 mg  (methylprednisolone taper panel)        See Hyperspace for full Linked Orders Report.    02/13 0859 IV Daily 02/09/23 1445    02/11/23 0900  methylPREDNISolone sodium succinate injection 160 mg  (methylprednisolone taper panel)        See Hyperspace for full Linked Orders Report.    02/12 0859 IV Daily 02/09/23 1445    02/10/23 0900  methylPREDNISolone sodium succinate injection 200 mg  (methylprednisolone taper panel)        See Hyperspace for full Linked Orders Report.    02/11 0859 IV Daily 02/09/23 1445          Pressors:    Autonomic Drugs (From admission, onward)      None          Hyperglycemia/Diabetes Medications:   Antihyperglycemics (From admission, onward)      Start     Stop Route Frequency Ordered    02/09/23 1630  insulin regular in 0.9 % NaCl 100 unit/100 mL (1 unit/mL) infusion        Question:  Enter initial dose from Infusion Protocol Chart (Units/hr):  Answer:  1    -- IV Continuous 02/09/23 1529

## 2023-02-11 PROBLEM — K72.10 END STAGE LIVER DISEASE: Status: RESOLVED | Noted: 2023-02-08 | Resolved: 2023-02-11

## 2023-02-11 PROBLEM — D62 ACUTE BLOOD LOSS ANEMIA: Status: ACTIVE | Noted: 2023-02-11

## 2023-02-11 PROBLEM — Z79.60 LONG-TERM USE OF IMMUNOSUPPRESSANT MEDICATION: Status: ACTIVE | Noted: 2023-02-11

## 2023-02-11 LAB
ALBUMIN SERPL BCP-MCNC: 2.8 G/DL (ref 3.5–5.2)
ALP SERPL-CCNC: 113 U/L (ref 55–135)
ALT SERPL W/O P-5'-P-CCNC: 438 U/L (ref 10–44)
ANION GAP SERPL CALC-SCNC: 11 MMOL/L (ref 8–16)
APTT BLDCRRT: 36 SEC (ref 21–32)
AST SERPL-CCNC: 582 U/L (ref 10–40)
BASOPHILS # BLD AUTO: 0.01 K/UL (ref 0–0.2)
BASOPHILS # BLD AUTO: 0.01 K/UL (ref 0–0.2)
BASOPHILS NFR BLD: 0.1 % (ref 0–1.9)
BASOPHILS NFR BLD: 0.1 % (ref 0–1.9)
BILIRUB SERPL-MCNC: 3.1 MG/DL (ref 0.1–1)
BUN SERPL-MCNC: 24 MG/DL (ref 8–23)
CALCIUM SERPL-MCNC: 7.5 MG/DL (ref 8.7–10.5)
CHLORIDE SERPL-SCNC: 98 MMOL/L (ref 95–110)
CO2 SERPL-SCNC: 20 MMOL/L (ref 23–29)
CREAT SERPL-MCNC: 1 MG/DL (ref 0.5–1.4)
DIFFERENTIAL METHOD: ABNORMAL
DIFFERENTIAL METHOD: ABNORMAL
EOSINOPHIL # BLD AUTO: 0 K/UL (ref 0–0.5)
EOSINOPHIL # BLD AUTO: 0 K/UL (ref 0–0.5)
EOSINOPHIL NFR BLD: 0.1 % (ref 0–8)
EOSINOPHIL NFR BLD: 0.1 % (ref 0–8)
ERYTHROCYTE [DISTWIDTH] IN BLOOD BY AUTOMATED COUNT: 17.8 % (ref 11.5–14.5)
ERYTHROCYTE [DISTWIDTH] IN BLOOD BY AUTOMATED COUNT: 17.8 % (ref 11.5–14.5)
EST. GFR  (NO RACE VARIABLE): >60 ML/MIN/1.73 M^2
GLUCOSE SERPL-MCNC: 123 MG/DL (ref 70–110)
HCT VFR BLD AUTO: 22.3 % (ref 37–48.5)
HCT VFR BLD AUTO: 23.2 % (ref 37–48.5)
HCT VFR BLD AUTO: 23.2 % (ref 37–48.5)
HGB BLD-MCNC: 7.5 G/DL (ref 12–16)
HGB BLD-MCNC: 7.8 G/DL (ref 12–16)
HGB BLD-MCNC: 7.8 G/DL (ref 12–16)
IMM GRANULOCYTES # BLD AUTO: 0.04 K/UL (ref 0–0.04)
IMM GRANULOCYTES # BLD AUTO: 0.04 K/UL (ref 0–0.04)
IMM GRANULOCYTES NFR BLD AUTO: 0.5 % (ref 0–0.5)
IMM GRANULOCYTES NFR BLD AUTO: 0.5 % (ref 0–0.5)
INR PPP: 1.1 (ref 0.8–1.2)
LYMPHOCYTES # BLD AUTO: 0.3 K/UL (ref 1–4.8)
LYMPHOCYTES # BLD AUTO: 0.3 K/UL (ref 1–4.8)
LYMPHOCYTES NFR BLD: 3.8 % (ref 18–48)
LYMPHOCYTES NFR BLD: 3.8 % (ref 18–48)
MCH RBC QN AUTO: 34.1 PG (ref 27–31)
MCH RBC QN AUTO: 34.1 PG (ref 27–31)
MCHC RBC AUTO-ENTMCNC: 33.6 G/DL (ref 32–36)
MCHC RBC AUTO-ENTMCNC: 33.6 G/DL (ref 32–36)
MCV RBC AUTO: 101 FL (ref 82–98)
MCV RBC AUTO: 101 FL (ref 82–98)
MONOCYTES # BLD AUTO: 0.4 K/UL (ref 0.3–1)
MONOCYTES # BLD AUTO: 0.4 K/UL (ref 0.3–1)
MONOCYTES NFR BLD: 5.9 % (ref 4–15)
MONOCYTES NFR BLD: 5.9 % (ref 4–15)
NEUTROPHILS # BLD AUTO: 6.7 K/UL (ref 1.8–7.7)
NEUTROPHILS # BLD AUTO: 6.7 K/UL (ref 1.8–7.7)
NEUTROPHILS NFR BLD: 89.6 % (ref 38–73)
NEUTROPHILS NFR BLD: 89.6 % (ref 38–73)
NRBC BLD-RTO: 0 /100 WBC
NRBC BLD-RTO: 0 /100 WBC
PLATELET # BLD AUTO: 63 K/UL (ref 150–450)
PLATELET # BLD AUTO: 63 K/UL (ref 150–450)
PMV BLD AUTO: 11.3 FL (ref 9.2–12.9)
PMV BLD AUTO: 11.3 FL (ref 9.2–12.9)
POCT GLUCOSE: 115 MG/DL (ref 70–110)
POCT GLUCOSE: 120 MG/DL (ref 70–110)
POCT GLUCOSE: 134 MG/DL (ref 70–110)
POCT GLUCOSE: 139 MG/DL (ref 70–110)
POCT GLUCOSE: 142 MG/DL (ref 70–110)
POCT GLUCOSE: 148 MG/DL (ref 70–110)
POCT GLUCOSE: 158 MG/DL (ref 70–110)
POTASSIUM SERPL-SCNC: 4.2 MMOL/L (ref 3.5–5.1)
PROT SERPL-MCNC: 5.2 G/DL (ref 6–8.4)
PROTHROMBIN TIME: 11.5 SEC (ref 9–12.5)
RBC # BLD AUTO: 2.29 M/UL (ref 4–5.4)
RBC # BLD AUTO: 2.29 M/UL (ref 4–5.4)
SODIUM SERPL-SCNC: 129 MMOL/L (ref 136–145)
TACROLIMUS BLD-MCNC: 7 NG/ML (ref 5–15)
WBC # BLD AUTO: 7.45 K/UL (ref 3.9–12.7)
WBC # BLD AUTO: 7.45 K/UL (ref 3.9–12.7)

## 2023-02-11 PROCEDURE — 97530 THERAPEUTIC ACTIVITIES: CPT

## 2023-02-11 PROCEDURE — 99233 SBSQ HOSP IP/OBS HIGH 50: CPT | Mod: ,,, | Performed by: NURSE PRACTITIONER

## 2023-02-11 PROCEDURE — 80053 COMPREHEN METABOLIC PANEL: CPT | Performed by: STUDENT IN AN ORGANIZED HEALTH CARE EDUCATION/TRAINING PROGRAM

## 2023-02-11 PROCEDURE — 85730 THROMBOPLASTIN TIME PARTIAL: CPT | Performed by: STUDENT IN AN ORGANIZED HEALTH CARE EDUCATION/TRAINING PROGRAM

## 2023-02-11 PROCEDURE — S5010 5% DEXTROSE AND 0.45% SALINE: HCPCS | Performed by: STUDENT IN AN ORGANIZED HEALTH CARE EDUCATION/TRAINING PROGRAM

## 2023-02-11 PROCEDURE — 85018 HEMOGLOBIN: CPT | Performed by: NURSE PRACTITIONER

## 2023-02-11 PROCEDURE — 63600175 PHARM REV CODE 636 W HCPCS: Performed by: STUDENT IN AN ORGANIZED HEALTH CARE EDUCATION/TRAINING PROGRAM

## 2023-02-11 PROCEDURE — 85025 COMPLETE CBC W/AUTO DIFF WBC: CPT | Performed by: STUDENT IN AN ORGANIZED HEALTH CARE EDUCATION/TRAINING PROGRAM

## 2023-02-11 PROCEDURE — 99233 SBSQ HOSP IP/OBS HIGH 50: CPT | Mod: 24,,, | Performed by: NURSE PRACTITIONER

## 2023-02-11 PROCEDURE — 85014 HEMATOCRIT: CPT | Performed by: NURSE PRACTITIONER

## 2023-02-11 PROCEDURE — 99233 PR SUBSEQUENT HOSPITAL CARE,LEVL III: ICD-10-PCS | Mod: ,,, | Performed by: NURSE PRACTITIONER

## 2023-02-11 PROCEDURE — 97161 PT EVAL LOW COMPLEX 20 MIN: CPT

## 2023-02-11 PROCEDURE — 63600175 PHARM REV CODE 636 W HCPCS: Performed by: SURGERY

## 2023-02-11 PROCEDURE — 25000003 PHARM REV CODE 250: Performed by: STUDENT IN AN ORGANIZED HEALTH CARE EDUCATION/TRAINING PROGRAM

## 2023-02-11 PROCEDURE — 25000003 PHARM REV CODE 250: Performed by: NURSE PRACTITIONER

## 2023-02-11 PROCEDURE — 99233 PR SUBSEQUENT HOSPITAL CARE,LEVL III: ICD-10-PCS | Mod: 24,,, | Performed by: NURSE PRACTITIONER

## 2023-02-11 PROCEDURE — 80197 ASSAY OF TACROLIMUS: CPT | Performed by: STUDENT IN AN ORGANIZED HEALTH CARE EDUCATION/TRAINING PROGRAM

## 2023-02-11 PROCEDURE — 20600001 HC STEP DOWN PRIVATE ROOM

## 2023-02-11 PROCEDURE — 85610 PROTHROMBIN TIME: CPT | Performed by: STUDENT IN AN ORGANIZED HEALTH CARE EDUCATION/TRAINING PROGRAM

## 2023-02-11 RX ORDER — IBUPROFEN 200 MG
16 TABLET ORAL
Status: DISCONTINUED | OUTPATIENT
Start: 2023-02-11 | End: 2023-02-12

## 2023-02-11 RX ORDER — DOCUSATE SODIUM 100 MG/1
100 CAPSULE, LIQUID FILLED ORAL 3 TIMES DAILY
Status: DISCONTINUED | OUTPATIENT
Start: 2023-02-11 | End: 2023-02-11

## 2023-02-11 RX ORDER — BISACODYL 5 MG
10 TABLET, DELAYED RELEASE (ENTERIC COATED) ORAL NIGHTLY
Status: DISCONTINUED | OUTPATIENT
Start: 2023-02-11 | End: 2023-02-15 | Stop reason: HOSPADM

## 2023-02-11 RX ORDER — IBUPROFEN 200 MG
24 TABLET ORAL
Status: DISCONTINUED | OUTPATIENT
Start: 2023-02-11 | End: 2023-02-12

## 2023-02-11 RX ORDER — BISACODYL 10 MG
10 SUPPOSITORY, RECTAL RECTAL DAILY PRN
Status: DISCONTINUED | OUTPATIENT
Start: 2023-02-11 | End: 2023-02-15 | Stop reason: HOSPADM

## 2023-02-11 RX ORDER — PROCHLORPERAZINE EDISYLATE 5 MG/ML
5 INJECTION INTRAMUSCULAR; INTRAVENOUS EVERY 6 HOURS PRN
Status: DISCONTINUED | OUTPATIENT
Start: 2023-02-11 | End: 2023-02-15 | Stop reason: HOSPADM

## 2023-02-11 RX ORDER — HYDRALAZINE HYDROCHLORIDE 20 MG/ML
10 INJECTION INTRAMUSCULAR; INTRAVENOUS EVERY 6 HOURS PRN
Status: DISCONTINUED | OUTPATIENT
Start: 2023-02-11 | End: 2023-02-11

## 2023-02-11 RX ORDER — ACETAMINOPHEN 325 MG/1
650 TABLET ORAL EVERY 6 HOURS PRN
Status: DISCONTINUED | OUTPATIENT
Start: 2023-02-11 | End: 2023-02-15 | Stop reason: HOSPADM

## 2023-02-11 RX ORDER — DIPHENHYDRAMINE HCL 25 MG
25 CAPSULE ORAL EVERY 6 HOURS PRN
Status: DISCONTINUED | OUTPATIENT
Start: 2023-02-11 | End: 2023-02-15 | Stop reason: HOSPADM

## 2023-02-11 RX ORDER — ONDANSETRON 4 MG/1
4 TABLET, ORALLY DISINTEGRATING ORAL EVERY 6 HOURS PRN
Status: DISCONTINUED | OUTPATIENT
Start: 2023-02-11 | End: 2023-02-15 | Stop reason: HOSPADM

## 2023-02-11 RX ORDER — INSULIN ASPART 100 [IU]/ML
0-4 INJECTION, SOLUTION INTRAVENOUS; SUBCUTANEOUS
Status: DISCONTINUED | OUTPATIENT
Start: 2023-02-11 | End: 2023-02-12

## 2023-02-11 RX ORDER — GLUCAGON 1 MG
1 KIT INJECTION
Status: DISCONTINUED | OUTPATIENT
Start: 2023-02-11 | End: 2023-02-12

## 2023-02-11 RX ORDER — CALCIUM CARBONATE 200(500)MG
500 TABLET,CHEWABLE ORAL 3 TIMES DAILY PRN
Status: DISCONTINUED | OUTPATIENT
Start: 2023-02-11 | End: 2023-02-15 | Stop reason: HOSPADM

## 2023-02-11 RX ADMIN — HEPARIN SODIUM 5000 UNITS: 5000 INJECTION INTRAVENOUS; SUBCUTANEOUS at 01:02

## 2023-02-11 RX ADMIN — OXYCODONE HYDROCHLORIDE 10 MG: 10 TABLET ORAL at 01:02

## 2023-02-11 RX ADMIN — HEPARIN SODIUM 5000 UNITS: 5000 INJECTION INTRAVENOUS; SUBCUTANEOUS at 05:02

## 2023-02-11 RX ADMIN — DOCUSATE SODIUM 100 MG: 100 CAPSULE ORAL at 08:02

## 2023-02-11 RX ADMIN — METHOCARBAMOL 500 MG: 500 TABLET ORAL at 09:02

## 2023-02-11 RX ADMIN — HEPARIN SODIUM 5000 UNITS: 5000 INJECTION INTRAVENOUS; SUBCUTANEOUS at 09:02

## 2023-02-11 RX ADMIN — FAMOTIDINE 20 MG: 20 TABLET ORAL at 09:02

## 2023-02-11 RX ADMIN — ONDANSETRON 4 MG: 4 TABLET, ORALLY DISINTEGRATING ORAL at 11:02

## 2023-02-11 RX ADMIN — METHOCARBAMOL 500 MG: 500 TABLET ORAL at 08:02

## 2023-02-11 RX ADMIN — NYSTATIN 500000 UNITS: 500000 SUSPENSION ORAL at 01:02

## 2023-02-11 RX ADMIN — METHOCARBAMOL 500 MG: 500 TABLET ORAL at 05:02

## 2023-02-11 RX ADMIN — LAMIVUDINE 150 MG: 150 TABLET, FILM COATED ORAL at 08:02

## 2023-02-11 RX ADMIN — NYSTATIN 500000 UNITS: 500000 SUSPENSION ORAL at 05:02

## 2023-02-11 RX ADMIN — TACROLIMUS 2 MG: 1 CAPSULE ORAL at 08:02

## 2023-02-11 RX ADMIN — OXYCODONE 5 MG: 5 TABLET ORAL at 09:02

## 2023-02-11 RX ADMIN — MUPIROCIN 1 G: 20 OINTMENT TOPICAL at 09:02

## 2023-02-11 RX ADMIN — MYCOPHENOLATE MOFETIL 1000 MG: 250 CAPSULE ORAL at 08:02

## 2023-02-11 RX ADMIN — POLYETHYLENE GLYCOL 3350 17 G: 17 POWDER, FOR SOLUTION ORAL at 08:02

## 2023-02-11 RX ADMIN — BISACODYL 10 MG: 5 TABLET, COATED ORAL at 09:02

## 2023-02-11 RX ADMIN — OXYCODONE HYDROCHLORIDE 10 MG: 10 TABLET ORAL at 02:02

## 2023-02-11 RX ADMIN — SENNOSIDES 8.6 MG: 8.6 TABLET, FILM COATED ORAL at 08:02

## 2023-02-11 RX ADMIN — NYSTATIN 500000 UNITS: 500000 SUSPENSION ORAL at 08:02

## 2023-02-11 RX ADMIN — DEXTROSE AND SODIUM CHLORIDE: 5; 450 INJECTION, SOLUTION INTRAVENOUS at 12:02

## 2023-02-11 RX ADMIN — METHOCARBAMOL 500 MG: 500 TABLET ORAL at 12:02

## 2023-02-11 RX ADMIN — TACROLIMUS 1.5 MG: 1 CAPSULE ORAL at 05:02

## 2023-02-11 RX ADMIN — MYCOPHENOLATE MOFETIL 1000 MG: 250 CAPSULE ORAL at 09:02

## 2023-02-11 RX ADMIN — METHYLPREDNISOLONE SODIUM SUCCINATE 160 MG: 40 INJECTION, POWDER, FOR SOLUTION INTRAMUSCULAR; INTRAVENOUS at 08:02

## 2023-02-11 NOTE — PROGRESS NOTES
AAOx4. VSS. HR 50s-60s on tele. Patient up w/ 1x assist OOB to chair/BSC. Patient voiding in hat in BSC - see flowsheet for exact UOP amount. CXR done this AM - stable w/ improvement in R pleural fluid. PRN zofran given SL x1 for c/o nausea. PRN oxy 10mg given x1 for c/o pain. Patient worked w/ PT today in room. D5 1/2NS stopped per orders. Insulin gtt changed from q2hrs to ACHS - currently infusing @ 0.5Units/hr. R IJ trialysis removed per orders - occlusive dressing is CDI. RLQ ZACKERY drain w/ SS drainage noted - 320cc output noted so far this shift - leaking at site - covered w/ guaze+tape - changed PRN. Chevron incision - INA w/ staples intact - painted w/ betadine - dressing removed by NETTE Jordan NP. Patient's diet advanced from clear liquid to diabetic diet - patient tolerating well. Patient and spouse educated on how to pul self-meds - pulled 9PM meds w/ 100% accuracy. Non-skid socks on. Bed in low position. Call light within reach.  at bedside.

## 2023-02-11 NOTE — ASSESSMENT & PLAN NOTE
- Donor RPR+, ID consulted, rec bicillin weekly x 3 (2/10, 2/17, 2/24)  - Donor HBcAb+, recipient on lamuvidine   - Donor HCV Ab+/FRANNIE-, monitor HCV PCR per protocol

## 2023-02-11 NOTE — ASSESSMENT & PLAN NOTE
- expected s/p LTX  - H/H trending down but no overt signs of bleeding, continue to monitor closely  - keep T&S active

## 2023-02-11 NOTE — ASSESSMENT & PLAN NOTE
- s/p DBD LTX 2/9/23 (Donor RPR+, HCV Ab+/FRANNIE-, CMV D+/R+)  - Surgery w/o complication  - POD#1 Liver US stable  - LFTs trending down  - Tolerating diet, minimal N/V, (-) flatus, start bowel regimen. encourage OOBTC, pain well controlled  - ZACKERY x 1 with ss drg

## 2023-02-11 NOTE — PROGRESS NOTES
"[facility] - Transplant Stepdown  Endocrinology  Progress Note    Admit Date: 2023     Reason for Consult: Management of Hyperglycemia     Surgical Procedure and Date:   23  -  TRANSPLANT, LIVER (N/A)      HPI:   Patient is a 72 y.o. 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 She presents as a primary candidate for liver transplant with a MELD of 23. Endocrinology consulted for management of hyperglycemia.       Lab Results   Component Value Date    HGBA1C <4.0 (A) 2023           Interval HPI:   Overnight events: Remains in SICU. POD 2 s/p liver transplant. BG well controlled on IV intensive insulin protocol overnight with infusion rates ranging from 0.6-1.6 u/hr. Receiving Solu Medrol 160 mg, standard steroid taper. Diet progressed. Diet diabetic [facility];  Calorie    Eatin%  Nausea: No  Hypoglycemia and intervention: No  Fever: No  TPN and/or TF: No  If yes, type of TF/TPN and rate: n/a    BP (!) 146/68 (BP Location: Right arm, Patient Position: Lying)   Pulse (!) 59   Temp 97.7 °F (36.5 °C) (Oral)   Resp 18   Ht 5' 2" (1.575 m)   Wt 79.8 kg (175 lb 14.8 oz)   SpO2 (!) 93%   BMI 32.18 kg/m²     Labs Reviewed and Include    Recent Labs   Lab 23  0434   *   CALCIUM 7.5*   ALBUMIN 2.8*   PROT 5.2*   *   K 4.2   CO2 20*   CL 98   BUN 24*   CREATININE 1.0   ALKPHOS 113   *   *   BILITOT 3.1*     Lab Results   Component Value Date    WBC 7.45 2023    WBC 7.45 2023    HGB 7.8 (L) 2023    HGB 7.8 (L) 2023    HCT 23.2 (L) 2023    HCT 23.2 (L) 2023     (H) 2023     (H) 2023    PLT 63 (L) 2023    PLT 63 (L) 2023     No results for input(s): TSH, FREET4 in the last 168 hours.  Lab Results   Component Value Date    HGBA1C <4.0 (A) 2023       Nutritional status:   Body mass index is 32.18 " kg/m².  Lab Results   Component Value Date    ALBUMIN 2.8 (L) 02/11/2023    ALBUMIN 3.0 (L) 02/10/2023    ALBUMIN 2.8 (L) 02/09/2023     No results found for: PREALBUMIN    Estimated Creatinine Clearance: 51.2 mL/min (based on SCr of 1 mg/dL).    Accu-Checks  Recent Labs     02/10/23  1359 02/10/23  1553 02/10/23  1756 02/10/23  1957 02/10/23  2156 02/10/23  2354 02/11/23  0144 02/11/23  0359 02/11/23  0554 02/11/23  0800   POCTGLUCOSE 151* 155* 161* 164* 150* 136* 142* 148* 139* 120*       Current Medications and/or Treatments Impacting Glycemic Control  Immunotherapy:    Immunosuppressants           Stop Route Frequency     mycophenolate capsule 1,000 mg         -- Oral 2 times daily     tacrolimus capsule 2 mg         -- Oral 2 times daily     tacrolimus capsule 1 mg         -- Oral Once          Steroids:   Hormones (From admission, onward)      Start     Stop Route Frequency Ordered    02/15/23 0900  predniSONE tablet 20 mg  (methylprednisolone taper panel)        See Hyperspace for full Linked Orders Report.    -- Oral Daily 02/09/23 1445    02/14/23 0900  methylPREDNISolone sodium succinate injection 40 mg  (methylprednisolone taper panel)        See Hyperspace for full Linked Orders Report.    02/15 0859 IV Daily 02/09/23 1445    02/13/23 0900  methylPREDNISolone sodium succinate injection 80 mg  (methylprednisolone taper panel)        See Hyperspace for full Linked Orders Report.    02/14 0859 IV Daily 02/09/23 1445    02/12/23 0900  methylPREDNISolone sodium succinate injection 120 mg  (methylprednisolone taper panel)        See Hyperspace for full Linked Orders Report.    02/13 0859 IV Daily 02/09/23 1445          Pressors:    Autonomic Drugs (From admission, onward)      None          Hyperglycemia/Diabetes Medications:   Antihyperglycemics (From admission, onward)      None            ASSESSMENT and PLAN    * S/P liver transplant  Managed per primary team  Avoid hypoglycemia        Steroid-induced  hyperglycemia  BG goal 140 - 180     Discontinue IV insulin infusion protocol (diet progressed)   Start Transition IV insulin infusion at 0.8 u/hr with stepdown parameters (rate based off of current IV insulin requirements)   Low Dose Correction Scale  BG monitoring ac/hs/0200    ** Please call Endocrine for any BG related issues **    Discharge planning: TBD        Prophylactic immunotherapy  May increase insulin resistance.             Blanquita Soto NP  Endocrinology  [facility] - Trans[facility] Step[facility]

## 2023-02-11 NOTE — PLAN OF CARE
Problem: Physical Therapy  Goal: Physical Therapy Goal  Description: PT goals until 2/21/23    1. Pt supine to sit with SBA through sidelying-not met  2. Pt sit to supine with SBA through sidelying-not met  3. Pt sit to stand with LRAD as needed for safety with SBA-not met  4. Pt to perform gait 200ft with LRAD as needed for safety with SBA.-not met  5. Pt to up/down 4 steps with L UE rail with CGA.-not met  6. Pt to perform B LE exs in sitting or supine x 10 reps to strengthen L LE to improve L ankle DF.-not met   Outcome: Ongoing, Progressing   Pt's goals set and pt will benefit from skilled PT services to work towards improved functional mobility including: bed mobility, transfers, up/down steps, and gait.   2/11/2023

## 2023-02-11 NOTE — PT/OT/SLP EVAL
"Physical Therapy Evaluation    Patient Name:  Argelia Sevilla   MRN:  [id number]    Recommendations:     Discharge Recommendations: home with home health   Discharge Equipment Recommendations:  (TBD as pt progresses)   Barriers to discharge: Inaccessible home 4 TONYA with L UE rail    Assessment:     Argelia Sevilla is a 72 y.o. female admitted with a medical diagnosis of S/P liver transplant.  She presents with the following impairments/functional limitations: weakness, impaired balance, pain, impaired functional mobility, gait instability, decreased lower extremity function . Pt requires minimal assist for bed mobility, minimal assist for transfers, and minimal assist for gait due to weakness, pain, and instability. Pt is motivated to progress with functional mobility.     Rehab Prognosis: Good; patient would benefit from acute skilled PT services to address these deficits and reach maximum level of function.    Recent Surgery: Procedure(s) (LRB):  TRANSPLANT, LIVER (N/A) 2 Days Post-Op    Plan:     During this hospitalization, patient to be seen 4 x/week to address the identified rehab impairments via gait training, therapeutic activities, therapeutic exercises and progress toward the following goals:    Plan of Care Expires:  03/13/23    Subjective   "I just got back to bed" (pt agreeable to work with PT then return to bed)    Pain/Comfort:  Pain Rating 1: 6/10 (at rest)  Location - Orientation 1: generalized  Location 1: abdomen  Pain Addressed 1: Reposition, Cessation of Activity  Pain Rating Post-Intervention 1: 8/10 (with mobility)    Patients cultural, spiritual, Latter-day conflicts given the current situation: no    Living Environment:  Lives with her  in a 2 story home with 4 TONYA with L UE rail  Prior to admission, patients level of function was independent, drives, uses a SC when going outside the home and a rollator at times.  Equipment used at home: cane, straight, rollator, shower chair " (built in shower seat in walk in shower). Pt states they are looking to put a grab bar in the WIS.  Upon discharge, patient will have assistance from .    Objective:     Communicated with nurse prior to session.  Patient found supine with blood pressure cuff, peripheral IV, pulse ox (continuous), telemetry, ZACKERY drain  upon PT entry to room.    General Precautions: Standard, fall  Orthopedic Precautions:N/A   Braces: N/A  Respiratory Status: Room air    Exams:  Cognitive Exam:  Patient is oriented to Person, Place, and Time  Sensation:    -       Intact  light/touch B LE  RLE ROM: WFL  RLE Strength: WFL  LLE ROM: WFL  LLE Strength: WFL except ankle DF 2+/5    Functional Mobility:  Bed Mobility:     Rolling Right: minimum assistance; pt and her  were educated to log roll prior to coming to sit and to return to supine through sidelying  Supine to Sit: minimum assistance  Sit to Supine: minimum assistance  Transfers:     Sit to Stand:  minimum assistance with hand-held assist  Gait: Pt performed marching in place x 10 reps with HHA with minimal assist. Limited gait due to pt had just returned to bed after sitting up for hours.    AM-PAC 6 CLICK MOBILITY  Total Score:17     Treatment & Education:  Pt educated in and performed B LE ankle pumps at slow pace with a 2 sec hold at end ROM x 10 reps. Pt educated to perform exs throughout the day, she expressed understanding.    Patient left supine with all lines intact, call button in reach, nurse notified, and  present.    GOALS:   Multidisciplinary Problems       Physical Therapy Goals          Problem: Physical Therapy    Goal Priority Disciplines Outcome Goal Variances Interventions   Physical Therapy Goal     PT, PT/OT Ongoing, Progressing     Description: PT goals until 2/21/23    1. Pt supine to sit with SBA through sidelying-not met  2. Pt sit to supine with SBA through sidelying-not met  3. Pt sit to stand with LRAD as needed for safety with  SBA-not met  4. Pt to perform gait 200ft with LRAD as needed for safety with SBA.-not met  5. Pt to up/down 4 steps with L UE rail with CGA.-not met  6. Pt to perform B LE exs in sitting or supine x 10 reps to strengthen L LE to improve L ankle DF.-not met                        History:     Past Medical History:   Diagnosis Date    Anxiety disorder, unspecified     CAD (coronary artery disease)     DIC (disseminated intravascular coagulation)     Dyslipidemia     GERD (gastroesophageal reflux disease)     Hereditary hemolytic anemia, unspecified     History of coronary artery stent placement     HTN (hypertension)     Liver cirrhosis secondary to BROWN     Portal hypertensive gastropathy     Primary localized osteoarthrosis of right lower leg     Statin intolerance        Past Surgical History:   Procedure Laterality Date    ADENOIDECTOMY      ANTERIOR CRUCIATE LIGAMENT REPAIR      BACK SURGERY      CARDIAC CATHETERIZATION  05/31/2018    CARDIAC CATHETERIZATION  09/28/2021    coronary angioplasty    CHOLECYSTECTOMY      COLONOSCOPY  05/25/2022    GALLBLADDER SURGERY      HYSTERECTOMY      KNEE ARTHROSCOPY Right     LIVER TRANSPLANT N/A 2/9/2023    Procedure: TRANSPLANT, LIVER;  Surgeon: Iggy Adhikari MD;  Location: [facility] OR [address] [place] [address];  Service: Transplant;  Laterality: N/A;    OOPHORECTOMY      REDUCTION OF BOTH BREASTS  01/01/2011    TONSILLECTOMY      TUBAL LIGATION         Time Tracking:     PT Received On: 02/11/23  PT Start Time: 1148     PT Stop Time: 1215  PT Total Time (min): 27 min     Billable Minutes: Evaluation 17 and Therapeutic Activity 10      02/11/2023

## 2023-02-11 NOTE — PROGRESS NOTES
R IJ trilaysis removed per order from NETTE Jordan NP. Tip intact. Both sutures removed. Patient tolerated well. Pressure held for 5min post-removal. Occlusive dressing daniel[lied and patient told to lie flat for 30min. No bleeding noted.

## 2023-02-11 NOTE — SUBJECTIVE & OBJECTIVE
"Interval HPI:   Overnight events: Remains in SICU. POD 2 s/p liver transplant. BG well controlled on IV intensive insulin protocol overnight with infusion rates ranging from 0.6-1.6 u/hr. Receiving Solu Medrol 160 mg, standard steroid taper. Diet progressed. Diet diabetic [facility];  Calorie    Eatin%  Nausea: No  Hypoglycemia and intervention: No  Fever: No  TPN and/or TF: No  If yes, type of TF/TPN and rate: n/a    BP (!) 146/68 (BP Location: Right arm, Patient Position: Lying)   Pulse (!) 59   Temp 97.7 °F (36.5 °C) (Oral)   Resp 18   Ht 5' 2" (1.575 m)   Wt 79.8 kg (175 lb 14.8 oz)   SpO2 (!) 93%   BMI 32.18 kg/m²     Labs Reviewed and Include    Recent Labs   Lab 23  0434   *   CALCIUM 7.5*   ALBUMIN 2.8*   PROT 5.2*   *   K 4.2   CO2 20*   CL 98   BUN 24*   CREATININE 1.0   ALKPHOS 113   *   *   BILITOT 3.1*     Lab Results   Component Value Date    WBC 7.45 2023    WBC 7.45 2023    HGB 7.8 (L) 2023    HGB 7.8 (L) 2023    HCT 23.2 (L) 2023    HCT 23.2 (L) 2023     (H) 2023     (H) 2023    PLT 63 (L) 2023    PLT 63 (L) 2023     No results for input(s): TSH, FREET4 in the last 168 hours.  Lab Results   Component Value Date    HGBA1C <4.0 (A) 2023       Nutritional status:   Body mass index is 32.18 kg/m².  Lab Results   Component Value Date    ALBUMIN 2.8 (L) 2023    ALBUMIN 3.0 (L) 02/10/2023    ALBUMIN 2.8 (L) 2023     No results found for: PREALBUMIN    Estimated Creatinine Clearance: 51.2 mL/min (based on SCr of 1 mg/dL).    Accu-Checks  Recent Labs     02/10/23  1359 02/10/23  1553 02/10/23  1756 02/10/23  1957 02/10/23  2156 02/10/23  2354 23  0144 23  0359 23  0554 23  0800   POCTGLUCOSE 151* 155* 161* 164* 150* 136* 142* 148* 139* 120*       Current Medications and/or Treatments Impacting Glycemic Control  Immunotherapy:  "   Immunosuppressants           Stop Route Frequency     mycophenolate capsule 1,000 mg         -- Oral 2 times daily     tacrolimus capsule 2 mg         -- Oral 2 times daily     tacrolimus capsule 1 mg         -- Oral Once          Steroids:   Hormones (From admission, onward)      Start     Stop Route Frequency Ordered    02/15/23 0900  predniSONE tablet 20 mg  (methylprednisolone taper panel)        See Hyperspace for full Linked Orders Report.    -- Oral Daily 02/09/23 1445    02/14/23 0900  methylPREDNISolone sodium succinate injection 40 mg  (methylprednisolone taper panel)        See Hyperspace for full Linked Orders Report.    02/15 0859 IV Daily 02/09/23 1445    02/13/23 0900  methylPREDNISolone sodium succinate injection 80 mg  (methylprednisolone taper panel)        See Hyperspace for full Linked Orders Report.    02/14 0859 IV Daily 02/09/23 1445    02/12/23 0900  methylPREDNISolone sodium succinate injection 120 mg  (methylprednisolone taper panel)        See Hyperspace for full Linked Orders Report.    02/13 0859 IV Daily 02/09/23 1445          Pressors:    Autonomic Drugs (From admission, onward)      None          Hyperglycemia/Diabetes Medications:   Antihyperglycemics (From admission, onward)      None

## 2023-02-11 NOTE — ASSESSMENT & PLAN NOTE
BG goal 140 - 180     Discontinue IV insulin infusion protocol (diet progressed)   Start Transition IV insulin infusion at 0.8 u/hr with stepdown parameters (rate based off of current IV insulin requirements)   Low Dose Correction Scale  BG monitoring ac/hs/0200    ** Please call Endocrine for any BG related issues **    Discharge planning: TBD

## 2023-02-11 NOTE — ASSESSMENT & PLAN NOTE
Maintenance IS with prograf. cont to check tacrolimus level daily. Assess for toxicity and adjust level as needed

## 2023-02-11 NOTE — ASSESSMENT & PLAN NOTE
- continue valcyte for CMV prophylaxis for 3 months (CMV D+/R+)  - continue bactrim for PJP prophylaxis for 6 months  - continue nystatin for thrush prevention for 4 weeks  - Bactrim and valcyte to start on POD#10 or at discharge

## 2023-02-11 NOTE — SUBJECTIVE & OBJECTIVE
Scheduled Meds:   bisacodyL  10 mg Oral QHS    docusate sodium  100 mg Oral TID    famotidine  20 mg Oral Nightly    heparin (porcine)  5,000 Units Subcutaneous Q8H    lamiVUDine  150 mg Oral Daily    methocarbamoL  500 mg Oral QID    [START ON 2/12/2023] methylPREDNISolone sodium succinate injection  120 mg Intravenous Daily    Followed by    [START ON 2/13/2023] methylPREDNISolone sodium succinate injection  80 mg Intravenous Daily    Followed by    [START ON 2/14/2023] methylPREDNISolone sodium succinate injection  40 mg Intravenous Daily    Followed by    [START ON 2/15/2023] predniSONE  20 mg Oral Daily    mupirocin  1 g Nasal BID    mycophenolate  1,000 mg Oral BID    nystatin  500,000 Units Mouth/Throat TID PC    penicillin G benzathine  2.4 Million Units Intramuscular Weekly    polyethylene glycol  17 g Oral Daily    senna  8.6 mg Oral Daily    [START ON 2/16/2023] sulfamethoxazole-trimethoprim 400-80mg  1 tablet Oral Daily AM    tacrolimus  1 mg Oral Once    tacrolimus  2 mg Oral BID    [START ON 2/19/2023] valGANciclovir  450 mg Oral Daily     Continuous Infusions:   insulin regular 1 units/mL infusion orderable (DKA) 0.6 Units/hr (02/11/23 0802)     PRN Meds:sodium chloride 0.9%, bisacodyL, dextrose 10%, dextrose 10%, hydrALAZINE, HYDROmorphone, labetalol, oxyCODONE, oxyCODONE, sodium chloride 0.9%    Review of Systems   Constitutional:  Positive for fatigue. Negative for appetite change, chills and fever.   Respiratory:  Negative for cough, chest tightness and shortness of breath.    Cardiovascular:  Negative for chest pain, palpitations and leg swelling.   Gastrointestinal:  Positive for abdominal pain. Negative for abdominal distention, constipation, diarrhea, nausea and vomiting.   Genitourinary:  Negative for difficulty urinating, dysuria, frequency and urgency.   Musculoskeletal:  Negative for back pain and neck pain.   Skin:  Positive for wound. Negative for color change, pallor and rash.    Allergic/Immunologic: Positive for immunocompromised state.   Neurological:  Negative for dizziness, weakness and headaches.   Psychiatric/Behavioral:  Negative for confusion and sleep disturbance.    Objective:     Vital Signs (Most Recent):  Temp: 97.7 °F (36.5 °C) (02/11/23 0713)  Pulse: (!) 59 (02/11/23 0721)  Resp: 18 (02/11/23 0713)  BP: (!) 146/68 (02/11/23 0713)  SpO2: (!) 93 % (02/11/23 0713)   Vital Signs (24h Range):  Temp:  [97.7 °F (36.5 °C)-98.5 °F (36.9 °C)] 97.7 °F (36.5 °C)  Pulse:  [55-73] 59  Resp:  [15-18] 18  SpO2:  [92 %-98 %] 93 %  BP: (133-159)/(64-73) 146/68  Arterial Line BP: (165-167)/(63-68) 165/64     Weight: 79.8 kg (175 lb 14.8 oz)  Body mass index is 32.18 kg/m².    Intake/Output - Last 3 Shifts         02/09 0700  02/10 0659 02/10 0700  02/11 0659 02/11 0700 02/12 0659    P.O. 60 1020 120    I.V. (mL/kg) 7045.1 (93.1) 1742.4 (21.8)     Blood 2820      IV Piggyback 807.6 120.8     Total Intake(mL/kg) 33174.7 (141.8) 2883.2 (36.1) 120 (1.5)    Urine (mL/kg/hr) 4505 (2.5) 1505 (0.8)     Emesis/NG output  0     Drains 4150 625     Stool  0     Blood 2600      Total Output 03308 2130     Net -522.3 +753.2 +120           Stool Occurrence  0 x     Emesis Occurrence  0 x             Physical Exam  Vitals and nursing note reviewed.   Constitutional:       Appearance: Normal appearance.   Cardiovascular:      Rate and Rhythm: Normal rate and regular rhythm.      Pulses: Normal pulses.   Pulmonary:      Effort: Pulmonary effort is normal. No respiratory distress.      Breath sounds: Examination of the right-middle field reveals decreased breath sounds. Examination of the right-lower field reveals decreased breath sounds. Examination of the left-lower field reveals decreased breath sounds. Decreased breath sounds present. No wheezing or rales.   Abdominal:      General: Bowel sounds are normal. There is no distension.      Palpations: Abdomen is soft.      Tenderness: There is abdominal  tenderness (incisional). There is no guarding.       Musculoskeletal:         General: Normal range of motion.   Skin:     General: Skin is warm and dry.   Neurological:      Mental Status: She is alert and oriented to person, place, and time.   Psychiatric:         Behavior: Behavior is cooperative.       Laboratory:  Immunosuppressants           Stop Route Frequency     mycophenolate capsule 1,000 mg         -- Oral 2 times daily     tacrolimus capsule 2 mg         -- Oral 2 times daily     tacrolimus capsule 1 mg         -- Oral Once          CBC:   Recent Labs   Lab 02/11/23  0434   WBC 7.45  7.45   RBC 2.29*  2.29*   HGB 7.8*  7.8*   HCT 23.2*  23.2*   PLT 63*  63*   *  101*   MCH 34.1*  34.1*   MCHC 33.6  33.6     CMP:   Recent Labs   Lab 02/11/23 0434   *   CALCIUM 7.5*   ALBUMIN 2.8*   PROT 5.2*   *   K 4.2   CO2 20*   CL 98   BUN 24*   CREATININE 1.0   ALKPHOS 113   *   *   BILITOT 3.1*     Coagulation:   Recent Labs   Lab 02/11/23 0434   INR 1.1   APTT 36.0*     Labs within the past 24 hours have been reviewed.    Diagnostic Results:  US Liver Transplant Post:  Results for orders placed during the hospital encounter of 02/08/23    US Doppler Liver Transplant Post (xpd)    Narrative  EXAMINATION:  US DOPPLER LIVER TRANSPLANT POST (XPD)    CLINICAL HISTORY:  Assess perfusion following liver transplant;    TECHNIQUE:  Limited abdominal ultrasound of the transplant liver with Doppler evaluation.  Color and spectral Doppler were performed.    COMPARISON:  None.    FINDINGS:  Patient is status post orthotopic liver transplant 02/09/2023 (postoperative day 1).  The liver demonstrates homogeneous echotexture.  No focal hepatic lesions are seen.  No fluid collections.    The common duct is not dilated, measuring 3 mm.  No dilated intrahepatic radicles are seen.    Color flow and spectral waveform analysis was performed.  The main portal vein, right portal vein, left  portal vein, middle hepatic vein, right hepatic vein, left hepatic vein, and IVC are patent with proper directional flow.    Anastomosis site of the main hepatic artery demonstrates a peak systolic velocity 62 cm/sec.    Main hepatic artery demonstrates resistive index 0.80  with normal waveform.    Left hepatic artery demonstrates resistive index 0.80  with normal waveform.    Anterior branch of the right hepatic artery demonstrates resistive index 0.77  with normal waveform.    Posterior branch of the right hepatic artery demonstrates resistive index 0.81  with normal waveform.    Miscellaneous: Right pleural effusion.    Impression  Satisfactory Doppler assessment of postoperative day 1 liver allograft.  Mildly elevated normal resistive indices as above, which may relate to postoperative edema.  Continued follow-up as clinically warranted.    Findings communicated to Dr. Brasher via telephone on 02/10/2023 at 08:46.    Electronically signed by resident: Justin Omer  Date:    02/10/2023  Time:    08:05    Electronically signed by: Josue Lion  Date:    02/10/2023  Time:    08:48

## 2023-02-11 NOTE — HOSPITAL COURSE
Patient is now s/p DBD LTX on 2/9/23 (Donor RPR+, Hcv Ab+/FRANNIE-, HBcAb+, CMV D+/R+). Surgery was without complication. POD#1 Liver US stable. LFTs trending down.  Donor RPR+, ID rec PCN weekly x 3 (#1 given 2/10). Transferred to TSU POD#1. 2nd ZACKERY drain removed 2/12. Albumin 25% and Lasix 20 mg IV x 1 given 2/12 with good response.     Interval History: NAEON. Patient had multiple loose stools yesterday. Backed off on stool softeners. Improving. Progressing well overall. LFTs trending down. Renal function stable. Increased oral lasix to 40 mg daily on 2/14. CVC, parham, and drains are out. Tolerating diet; intake and supplements encouraged. Ambulating in halls with walker. PT/OT recommend HH @ discharge (ordered). Plan for POD#6 liver US in AM with dc to follow if satisfactory. Cont to monitor.

## 2023-02-11 NOTE — PLAN OF CARE
AAO x 4. VSS, afebrile, SpO2>92% on RA. Telemetry monitoring SB-SR 50s-70s. BG monitoring Q2H (range 136-164) with insulin gtt @ 1.6 units/hr. D5 0.45% NS @ 75 mL/hr continuous. Pain controlled with PRN oxycodone. Chevron incision with surgical dressing-scant old drainage marked. R ZACKERY with 200 mL serosanguinous output o/n. OGD=275 mL o/n. Pt up with 1 assist to bedside commode. Fall precautions maintained and pt repositions self. See flowsheet for assessment findings. POC reviewed with pt and spouse at bedside. Self meds set up and ready for introduction.

## 2023-02-11 NOTE — PROGRESS NOTES
[facility] - [facility]plant Stepdown  Liver Transplant  Progress Note    Patient Name: Argelia Sevilla  MRN: [id number]  Admission Date: 2/8/2023  Hospital Length of Stay: 3 days  Code Status: Full Code  Primary Care Provider: Primary Doctor No  Post-Operative Day: 2    ORGAN:   LIVER  Disease Etiology: Cirrhosis: Fatty Liver (BROWN)  Donor Type:   Donation after Brain Death  [facility] High Risk:   No  Donor CMV Status:   Donor CMV Status: Positive  Donor HBcAB:   Positive  Donor HCV Status:   Negative  Donor HBV FRANNIE: Negative  Donor HCV FRANNIE: Negative  Whole or Partial: Whole Liver  Biliary Anastomosis: End to End  Arterial Anatomy: Standard  Subjective:     History of Present Illness:  Ms. 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 She presents as a primary candidate for liver transplant with a MELD of 23. Denies recent fever/chills and recent hospitalizations. OR time is 7:30/9:00 AM with Dr. Adhikari. No intraop HD. Of note, donor is RPR+, recipient will need ID consult.       Hospital Course:  Patient is now s/p DBD LTX 2/9/23 (Donor RPR+, Hcv Ab+/FRANNIE-, HBcAb+, CMV D+/R+). Surgery was without complication. POD#1 Liver US stable. LFTs trending down. Transferred to TSU POD#1.     Interval History: no acute events overnight. Progressing well. LFTs trending down. POD#1 Liver US stable. ZACKERY x 1 with 395ml/day ss output. H/H trending down, US w/o fluid collection and ZACKERY ss, repeat later today. Donor RPR+, ID rec PCN weekly x 3 (#1 given 2/10). Tolerating diet, minimal N/V, (-) flatus, started bowel regimen, pain well controlled. Encourage OOBTC during the day. Dc CVC today.       Scheduled Meds:   bisacodyL  10 mg Oral QHS    docusate sodium  100 mg Oral TID    famotidine  20 mg Oral Nightly    heparin (porcine)  5,000 Units Subcutaneous Q8H    lamiVUDine  150 mg Oral Daily    methocarbamoL  500 mg Oral QID    [START ON 2/12/2023] methylPREDNISolone sodium  succinate injection  120 mg Intravenous Daily    Followed by    [START ON 2/13/2023] methylPREDNISolone sodium succinate injection  80 mg Intravenous Daily    Followed by    [START ON 2/14/2023] methylPREDNISolone sodium succinate injection  40 mg Intravenous Daily    Followed by    [START ON 2/15/2023] predniSONE  20 mg Oral Daily    mupirocin  1 g Nasal BID    mycophenolate  1,000 mg Oral BID    nystatin  500,000 Units Mouth/Throat TID PC    penicillin G benzathine  2.4 Million Units Intramuscular Weekly    polyethylene glycol  17 g Oral Daily    senna  8.6 mg Oral Daily    [START ON 2/16/2023] sulfamethoxazole-trimethoprim 400-80mg  1 tablet Oral Daily AM    tacrolimus  1 mg Oral Once    tacrolimus  2 mg Oral BID    [START ON 2/19/2023] valGANciclovir  450 mg Oral Daily     Continuous Infusions:   insulin regular 1 units/mL infusion orderable (DKA) 0.6 Units/hr (02/11/23 0802)     PRN Meds:sodium chloride 0.9%, bisacodyL, dextrose 10%, dextrose 10%, hydrALAZINE, HYDROmorphone, labetalol, oxyCODONE, oxyCODONE, sodium chloride 0.9%    Review of Systems   Constitutional:  Positive for fatigue. Negative for appetite change, chills and fever.   Respiratory:  Negative for cough, chest tightness and shortness of breath.    Cardiovascular:  Negative for chest pain, palpitations and leg swelling.   Gastrointestinal:  Positive for abdominal pain. Negative for abdominal distention, constipation, diarrhea, nausea and vomiting.   Genitourinary:  Negative for difficulty urinating, dysuria, frequency and urgency.   Musculoskeletal:  Negative for back pain and neck pain.   Skin:  Positive for wound. Negative for color change, pallor and rash.   Allergic/Immunologic: Positive for immunocompromised state.   Neurological:  Negative for dizziness, weakness and headaches.   Psychiatric/Behavioral:  Negative for confusion and sleep disturbance.    Objective:     Vital Signs (Most Recent):  Temp: 97.7 °F (36.5 °C)  (02/11/23 0713)  Pulse: (!) 59 (02/11/23 0721)  Resp: 18 (02/11/23 0713)  BP: (!) 146/68 (02/11/23 0713)  SpO2: (!) 93 % (02/11/23 0713)   Vital Signs (24h Range):  Temp:  [97.7 °F (36.5 °C)-98.5 °F (36.9 °C)] 97.7 °F (36.5 °C)  Pulse:  [55-73] 59  Resp:  [15-18] 18  SpO2:  [92 %-98 %] 93 %  BP: (133-159)/(64-73) 146/68  Arterial Line BP: (165-167)/(63-68) 165/64     Weight: 79.8 kg (175 lb 14.8 oz)  Body mass index is 32.18 kg/m².    Intake/Output - Last 3 Shifts         02/09 0700  02/10 0659 02/10 0700  02/11 0659 02/11 0700 02/12 0659    P.O. 60 1020 120    I.V. (mL/kg) 7045.1 (93.1) 1742.4 (21.8)     Blood 2820      IV Piggyback 807.6 120.8     Total Intake(mL/kg) 72414.7 (141.8) 2883.2 (36.1) 120 (1.5)    Urine (mL/kg/hr) 4505 (2.5) 1505 (0.8)     Emesis/NG output  0     Drains 4150 625     Stool  0     Blood 2600      Total Output 80156 2130     Net -522.3 +753.2 +120           Stool Occurrence  0 x     Emesis Occurrence  0 x             Physical Exam  Vitals and nursing note reviewed.   Constitutional:       Appearance: Normal appearance.   Cardiovascular:      Rate and Rhythm: Normal rate and regular rhythm.      Pulses: Normal pulses.   Pulmonary:      Effort: Pulmonary effort is normal. No respiratory distress.      Breath sounds: Examination of the right-middle field reveals decreased breath sounds. Examination of the right-lower field reveals decreased breath sounds. Examination of the left-lower field reveals decreased breath sounds. Decreased breath sounds present. No wheezing or rales.   Abdominal:      General: Bowel sounds are normal. There is no distension.      Palpations: Abdomen is soft.      Tenderness: There is abdominal tenderness (incisional). There is no guarding.       Musculoskeletal:         General: Normal range of motion.   Skin:     General: Skin is warm and dry.   Neurological:      Mental Status: She is alert and oriented to person, place, and time.   Psychiatric:          Behavior: Behavior is cooperative.       Laboratory:  Immunosuppressants           Stop Route Frequency     mycophenolate capsule 1,000 mg         -- Oral 2 times daily     tacrolimus capsule 2 mg         -- Oral 2 times daily     tacrolimus capsule 1 mg         -- Oral Once          CBC:   Recent Labs   Lab 02/11/23 0434   WBC 7.45  7.45   RBC 2.29*  2.29*   HGB 7.8*  7.8*   HCT 23.2*  23.2*   PLT 63*  63*   *  101*   MCH 34.1*  34.1*   MCHC 33.6  33.6     CMP:   Recent Labs   Lab 02/11/23 0434   *   CALCIUM 7.5*   ALBUMIN 2.8*   PROT 5.2*   *   K 4.2   CO2 20*   CL 98   BUN 24*   CREATININE 1.0   ALKPHOS 113   *   *   BILITOT 3.1*     Coagulation:   Recent Labs   Lab 02/11/23 0434   INR 1.1   APTT 36.0*     Labs within the past 24 hours have been reviewed.    Diagnostic Results:  US Liver Transplant Post:  Results for orders placed during the hospital encounter of 02/08/23    US Doppler Liver Transplant Post (xpd)    Narrative  EXAMINATION:  US DOPPLER LIVER TRANSPLANT POST (XPD)    CLINICAL HISTORY:  Assess perfusion following liver transplant;    TECHNIQUE:  Limited abdominal ultrasound of the transplant liver with Doppler evaluation.  Color and spectral Doppler were performed.    COMPARISON:  None.    FINDINGS:  Patient is status post orthotopic liver transplant 02/09/2023 (postoperative day 1).  The liver demonstrates homogeneous echotexture.  No focal hepatic lesions are seen.  No fluid collections.    The common duct is not dilated, measuring 3 mm.  No dilated intrahepatic radicles are seen.    Color flow and spectral waveform analysis was performed.  The main portal vein, right portal vein, left portal vein, middle hepatic vein, right hepatic vein, left hepatic vein, and IVC are patent with proper directional flow.    Anastomosis site of the main hepatic artery demonstrates a peak systolic velocity 62 cm/sec.    Main hepatic artery demonstrates resistive index  0.80  with normal waveform.    Left hepatic artery demonstrates resistive index 0.80  with normal waveform.    Anterior branch of the right hepatic artery demonstrates resistive index 0.77  with normal waveform.    Posterior branch of the right hepatic artery demonstrates resistive index 0.81  with normal waveform.    Miscellaneous: Right pleural effusion.    Impression  Satisfactory Doppler assessment of postoperative day 1 liver allograft.  Mildly elevated normal resistive indices as above, which may relate to postoperative edema.  Continued follow-up as clinically warranted.    Findings communicated to Dr. Brasher via telephone on 02/10/2023 at 08:46.    Electronically signed by resident: Justin Omer  Date:    02/10/2023  Time:    08:05    Electronically signed by: Josue Lion  Date:    02/10/2023  Time:    08:48      Assessment/Plan:     * S/P liver transplant  - s/p DBD LTX 2/9/23 (Donor RPR+, HCV Ab+/FRANNIE-, CMV D+/R+)  - Surgery w/o complication  - POD#1 Liver US stable  - LFTs trending down  - Tolerating diet, minimal N/V, (-) flatus, start bowel regimen. encourage OOBTC, pain well controlled  - ZACKERY x 1 with ss drg    Acute blood loss anemia  - expected s/p LTX  - H/H trending down but no overt signs of bleeding, continue to monitor closely  - keep T&S active    Long-term use of immunosuppressant medication  Maintenance IS with prograf. cont to check tacrolimus level daily. Assess for toxicity and adjust level as needed    At risk for infection transmitted from donor  - Donor RPR+, ID consulted, rec bicillin weekly x 3 (2/10, 2/17, 2/24)  - Donor HBcAb+, recipient on lamuvidine   - Donor HCV Ab+/FRANNIE-, monitor HCV PCR per protocol    Steroid-induced hyperglycemia  - endocrine consulted for post-op hyperglycemia 2/2 steroids, appreciate assistance    At risk for opportunistic infections  - continue valcyte for CMV prophylaxis for 3 months (CMV D+/R+)  - continue bactrim for PJP prophylaxis for 6 months  -  continue nystatin for thrush prevention for 4 weeks  - Bactrim and valcyte to start on POD#10 or at discharge    Prophylactic immunotherapy  - See long-term use of immunosuppressant medication    Thrombocytopenia  - 2/2 ELSD, expect improvement post-LTX  - monitor with CBC     Coronary artery disease involving native coronary artery of native heart without angina pectoris  - ASA held for surgery, restart once plts > 70 and BUN < 70    Anemia, chronic disease  - 2/2 liver disease., should improve post transplant   - monitor with CBC         VTE Risk Mitigation (From admission, onward)         Ordered     heparin (porcine) injection 5,000 Units  Every 8 hours         02/09/23 1445     Place sequential compression device  Until discontinued         02/09/23 1445     IP VTE HIGH RISK PATIENT  Once         02/09/23 1445                The patients clinical status was discussed at multidisplinary rounds, involving transplant surgery, transplant medicine, pharmacy, nursing, nutrition, and social work    Yumiko Jordan, BOAZ  Liver Transplant  [facility] - [facility]

## 2023-02-12 LAB
ABO + RH BLD: NORMAL
ALBUMIN SERPL BCP-MCNC: 2.7 G/DL (ref 3.5–5.2)
ALP SERPL-CCNC: 191 U/L (ref 55–135)
ALT SERPL W/O P-5'-P-CCNC: 315 U/L (ref 10–44)
ANION GAP SERPL CALC-SCNC: 11 MMOL/L (ref 8–16)
AST SERPL-CCNC: 223 U/L (ref 10–40)
BASOPHILS # BLD AUTO: 0.02 K/UL (ref 0–0.2)
BASOPHILS NFR BLD: 0.3 % (ref 0–1.9)
BILIRUB SERPL-MCNC: 2.8 MG/DL (ref 0.1–1)
BLD GP AB SCN CELLS X3 SERPL QL: NORMAL
BLD PROD TYP BPU: NORMAL
BLOOD UNIT EXPIRATION DATE: NORMAL
BLOOD UNIT TYPE CODE: 5100
BLOOD UNIT TYPE CODE: 9500
BLOOD UNIT TYPE CODE: 9500
BLOOD UNIT TYPE: NORMAL
BUN SERPL-MCNC: 37 MG/DL (ref 8–23)
CALCIUM SERPL-MCNC: 7.8 MG/DL (ref 8.7–10.5)
CHLORIDE SERPL-SCNC: 98 MMOL/L (ref 95–110)
CO2 SERPL-SCNC: 20 MMOL/L (ref 23–29)
CODING SYSTEM: NORMAL
CREAT SERPL-MCNC: 1.2 MG/DL (ref 0.5–1.4)
CROSSMATCH INTERPRETATION: NORMAL
DIFFERENTIAL METHOD: ABNORMAL
DISPENSE STATUS: NORMAL
EOSINOPHIL # BLD AUTO: 0.2 K/UL (ref 0–0.5)
EOSINOPHIL NFR BLD: 3.8 % (ref 0–8)
ERYTHROCYTE [DISTWIDTH] IN BLOOD BY AUTOMATED COUNT: 16.6 % (ref 11.5–14.5)
EST. GFR  (NO RACE VARIABLE): 48.7 ML/MIN/1.73 M^2
GLUCOSE SERPL-MCNC: 115 MG/DL (ref 70–110)
GLUCOSE SERPL-MCNC: 153 MG/DL (ref 70–110)
GLUCOSE SERPL-MCNC: 156 MG/DL (ref 70–110)
GLUCOSE SERPL-MCNC: 184 MG/DL (ref 70–110)
GLUCOSE SERPL-MCNC: 197 MG/DL (ref 70–110)
GLUCOSE SERPL-MCNC: 86 MG/DL (ref 70–110)
GLUCOSE SERPL-MCNC: 97 MG/DL (ref 70–110)
HCO3 UR-SCNC: 18.7 MMOL/L (ref 24–28)
HCO3 UR-SCNC: 19.5 MMOL/L (ref 24–28)
HCO3 UR-SCNC: 19.9 MMOL/L (ref 24–28)
HCO3 UR-SCNC: 20.9 MMOL/L (ref 24–28)
HCO3 UR-SCNC: 23.9 MMOL/L (ref 24–28)
HCO3 UR-SCNC: 24 MMOL/L (ref 24–28)
HCT VFR BLD AUTO: 23.6 % (ref 37–48.5)
HCT VFR BLD CALC: 21 %PCV (ref 36–54)
HCT VFR BLD CALC: 22 %PCV (ref 36–54)
HCT VFR BLD CALC: 23 %PCV (ref 36–54)
HCT VFR BLD CALC: 24 %PCV (ref 36–54)
HCT VFR BLD CALC: 25 %PCV (ref 36–54)
HCT VFR BLD CALC: 26 %PCV (ref 36–54)
HGB BLD-MCNC: 7.9 G/DL (ref 12–16)
IMM GRANULOCYTES # BLD AUTO: 0.04 K/UL (ref 0–0.04)
IMM GRANULOCYTES NFR BLD AUTO: 0.6 % (ref 0–0.5)
INR PPP: 1 (ref 0.8–1.2)
LDH SERPL L TO P-CCNC: 4.18 MMOL/L (ref 0.36–1.25)
LYMPHOCYTES # BLD AUTO: 0.4 K/UL (ref 1–4.8)
LYMPHOCYTES NFR BLD: 5.9 % (ref 18–48)
MAGNESIUM SERPL-MCNC: 2.3 MG/DL (ref 1.6–2.6)
MCH RBC QN AUTO: 34.1 PG (ref 27–31)
MCHC RBC AUTO-ENTMCNC: 33.5 G/DL (ref 32–36)
MCV RBC AUTO: 102 FL (ref 82–98)
MONOCYTES # BLD AUTO: 0.7 K/UL (ref 0.3–1)
MONOCYTES NFR BLD: 10.7 % (ref 4–15)
NEUTROPHILS # BLD AUTO: 4.9 K/UL (ref 1.8–7.7)
NEUTROPHILS NFR BLD: 78.7 % (ref 38–73)
NRBC BLD-RTO: 0 /100 WBC
NUM UNITS TRANS FFP: NORMAL
NUM UNITS TRANS PACKED RBC: NORMAL
PCO2 BLDA: 28.9 MMHG (ref 35–45)
PCO2 BLDA: 29.5 MMHG (ref 35–45)
PCO2 BLDA: 30.4 MMHG (ref 35–45)
PCO2 BLDA: 32.9 MMHG (ref 35–45)
PCO2 BLDA: 39 MMHG (ref 35–45)
PCO2 BLDA: 42 MMHG (ref 35–45)
PH SMN: 7.27 [PH] (ref 7.35–7.45)
PH SMN: 7.4 [PH] (ref 7.35–7.45)
PH SMN: 7.4 [PH] (ref 7.35–7.45)
PH SMN: 7.44 [PH] (ref 7.35–7.45)
PH SMN: 7.47 [PH] (ref 7.35–7.45)
PH SMN: 7.47 [PH] (ref 7.35–7.45)
PLATELET # BLD AUTO: 67 K/UL (ref 150–450)
PMV BLD AUTO: 11.6 FL (ref 9.2–12.9)
PO2 BLDA: 151 MMHG (ref 80–100)
PO2 BLDA: 157 MMHG (ref 80–100)
PO2 BLDA: 176 MMHG (ref 80–100)
PO2 BLDA: 213 MMHG (ref 80–100)
PO2 BLDA: 213 MMHG (ref 80–100)
PO2 BLDA: 214 MMHG (ref 80–100)
POC BE: -1 MMOL/L
POC BE: -3 MMOL/L
POC BE: -4 MMOL/L
POC BE: -6 MMOL/L
POC BE: -7 MMOL/L
POC BE: 0 MMOL/L
POC IONIZED CALCIUM: 0.89 MMOL/L (ref 1.06–1.42)
POC IONIZED CALCIUM: 1.05 MMOL/L (ref 1.06–1.42)
POC IONIZED CALCIUM: 1.11 MMOL/L (ref 1.06–1.42)
POC IONIZED CALCIUM: 1.17 MMOL/L (ref 1.06–1.42)
POC IONIZED CALCIUM: 1.23 MMOL/L (ref 1.06–1.42)
POC IONIZED CALCIUM: 1.27 MMOL/L (ref 1.06–1.42)
POC SATURATED O2: 100 % (ref 95–100)
POC SATURATED O2: 99 % (ref 95–100)
POC SATURATED O2: 99 % (ref 95–100)
POC TCO2: 20 MMOL/L (ref 23–27)
POC TCO2: 21 MMOL/L (ref 23–27)
POC TCO2: 21 MMOL/L (ref 23–27)
POC TCO2: 22 MMOL/L (ref 23–27)
POC TCO2: 25 MMOL/L (ref 23–27)
POC TCO2: 25 MMOL/L (ref 23–27)
POCT GLUCOSE: 118 MG/DL (ref 70–110)
POCT GLUCOSE: 123 MG/DL (ref 70–110)
POCT GLUCOSE: 123 MG/DL (ref 70–110)
POCT GLUCOSE: 155 MG/DL (ref 70–110)
POCT GLUCOSE: 68 MG/DL (ref 70–110)
POCT GLUCOSE: 89 MG/DL (ref 70–110)
POCT GLUCOSE: 92 MG/DL (ref 70–110)
POCT GLUCOSE: 95 MG/DL (ref 70–110)
POTASSIUM BLD-SCNC: 3.5 MMOL/L (ref 3.5–5.1)
POTASSIUM BLD-SCNC: 3.7 MMOL/L (ref 3.5–5.1)
POTASSIUM BLD-SCNC: 3.8 MMOL/L (ref 3.5–5.1)
POTASSIUM BLD-SCNC: 3.8 MMOL/L (ref 3.5–5.1)
POTASSIUM BLD-SCNC: 3.9 MMOL/L (ref 3.5–5.1)
POTASSIUM BLD-SCNC: 4.7 MMOL/L (ref 3.5–5.1)
POTASSIUM SERPL-SCNC: 4.5 MMOL/L (ref 3.5–5.1)
PROT SERPL-MCNC: 5.2 G/DL (ref 6–8.4)
PROTHROMBIN TIME: 10.5 SEC (ref 9–12.5)
RBC # BLD AUTO: 2.32 M/UL (ref 4–5.4)
SAMPLE: ABNORMAL
SODIUM BLD-SCNC: 132 MMOL/L (ref 136–145)
SODIUM BLD-SCNC: 133 MMOL/L (ref 136–145)
SODIUM BLD-SCNC: 135 MMOL/L (ref 136–145)
SODIUM BLD-SCNC: 135 MMOL/L (ref 136–145)
SODIUM BLD-SCNC: 136 MMOL/L (ref 136–145)
SODIUM BLD-SCNC: 137 MMOL/L (ref 136–145)
SODIUM SERPL-SCNC: 129 MMOL/L (ref 136–145)
TACROLIMUS BLD-MCNC: 4.3 NG/ML (ref 5–15)
WBC # BLD AUTO: 6.27 K/UL (ref 3.9–12.7)

## 2023-02-12 PROCEDURE — 99233 SBSQ HOSP IP/OBS HIGH 50: CPT | Mod: 24,,, | Performed by: NURSE PRACTITIONER

## 2023-02-12 PROCEDURE — 86900 BLOOD TYPING SEROLOGIC ABO: CPT | Performed by: SURGERY

## 2023-02-12 PROCEDURE — P9047 ALBUMIN (HUMAN), 25%, 50ML: HCPCS | Mod: JG | Performed by: NURSE PRACTITIONER

## 2023-02-12 PROCEDURE — 63600175 PHARM REV CODE 636 W HCPCS: Performed by: STUDENT IN AN ORGANIZED HEALTH CARE EDUCATION/TRAINING PROGRAM

## 2023-02-12 PROCEDURE — 85025 COMPLETE CBC W/AUTO DIFF WBC: CPT | Performed by: STUDENT IN AN ORGANIZED HEALTH CARE EDUCATION/TRAINING PROGRAM

## 2023-02-12 PROCEDURE — 20600001 HC STEP DOWN PRIVATE ROOM

## 2023-02-12 PROCEDURE — 63600175 PHARM REV CODE 636 W HCPCS: Performed by: NURSE PRACTITIONER

## 2023-02-12 PROCEDURE — 25000003 PHARM REV CODE 250: Performed by: NURSE PRACTITIONER

## 2023-02-12 PROCEDURE — 36415 COLL VENOUS BLD VENIPUNCTURE: CPT | Performed by: STUDENT IN AN ORGANIZED HEALTH CARE EDUCATION/TRAINING PROGRAM

## 2023-02-12 PROCEDURE — 99900035 HC TECH TIME PER 15 MIN (STAT)

## 2023-02-12 PROCEDURE — 85610 PROTHROMBIN TIME: CPT | Performed by: STUDENT IN AN ORGANIZED HEALTH CARE EDUCATION/TRAINING PROGRAM

## 2023-02-12 PROCEDURE — 25000003 PHARM REV CODE 250: Performed by: STUDENT IN AN ORGANIZED HEALTH CARE EDUCATION/TRAINING PROGRAM

## 2023-02-12 PROCEDURE — 99233 PR SUBSEQUENT HOSPITAL CARE,LEVL III: ICD-10-PCS | Mod: 24,,, | Performed by: NURSE PRACTITIONER

## 2023-02-12 PROCEDURE — 83735 ASSAY OF MAGNESIUM: CPT | Performed by: NURSE PRACTITIONER

## 2023-02-12 PROCEDURE — 63600175 PHARM REV CODE 636 W HCPCS: Performed by: SURGERY

## 2023-02-12 PROCEDURE — 80053 COMPREHEN METABOLIC PANEL: CPT | Performed by: STUDENT IN AN ORGANIZED HEALTH CARE EDUCATION/TRAINING PROGRAM

## 2023-02-12 PROCEDURE — 80197 ASSAY OF TACROLIMUS: CPT | Performed by: STUDENT IN AN ORGANIZED HEALTH CARE EDUCATION/TRAINING PROGRAM

## 2023-02-12 RX ORDER — PANTOPRAZOLE SODIUM 40 MG/1
40 TABLET, DELAYED RELEASE ORAL DAILY
Status: DISCONTINUED | OUTPATIENT
Start: 2023-02-12 | End: 2023-02-15 | Stop reason: HOSPADM

## 2023-02-12 RX ORDER — IBUPROFEN 200 MG
24 TABLET ORAL
Status: DISCONTINUED | OUTPATIENT
Start: 2023-02-12 | End: 2023-02-15 | Stop reason: HOSPADM

## 2023-02-12 RX ORDER — FUROSEMIDE 10 MG/ML
20 INJECTION INTRAMUSCULAR; INTRAVENOUS ONCE
Status: COMPLETED | OUTPATIENT
Start: 2023-02-12 | End: 2023-02-12

## 2023-02-12 RX ORDER — INSULIN ASPART 100 [IU]/ML
1-10 INJECTION, SOLUTION INTRAVENOUS; SUBCUTANEOUS
Status: DISCONTINUED | OUTPATIENT
Start: 2023-02-12 | End: 2023-02-15 | Stop reason: HOSPADM

## 2023-02-12 RX ORDER — TACROLIMUS 1 MG/1
1 CAPSULE ORAL ONCE
Status: COMPLETED | OUTPATIENT
Start: 2023-02-12 | End: 2023-02-12

## 2023-02-12 RX ORDER — GLUCAGON 1 MG
1 KIT INJECTION
Status: DISCONTINUED | OUTPATIENT
Start: 2023-02-12 | End: 2023-02-15 | Stop reason: HOSPADM

## 2023-02-12 RX ORDER — IBUPROFEN 200 MG
16 TABLET ORAL
Status: DISCONTINUED | OUTPATIENT
Start: 2023-02-12 | End: 2023-02-15 | Stop reason: HOSPADM

## 2023-02-12 RX ORDER — ALBUMIN HUMAN 250 G/1000ML
25 SOLUTION INTRAVENOUS EVERY 6 HOURS
Status: COMPLETED | OUTPATIENT
Start: 2023-02-12 | End: 2023-02-12

## 2023-02-12 RX ADMIN — SENNOSIDES 8.6 MG: 8.6 TABLET, FILM COATED ORAL at 08:02

## 2023-02-12 RX ADMIN — POLYETHYLENE GLYCOL 3350 17 G: 17 POWDER, FOR SOLUTION ORAL at 08:02

## 2023-02-12 RX ADMIN — TACROLIMUS 1 MG: 1 CAPSULE ORAL at 09:02

## 2023-02-12 RX ADMIN — FUROSEMIDE 20 MG: 10 INJECTION, SOLUTION INTRAMUSCULAR; INTRAVENOUS at 11:02

## 2023-02-12 RX ADMIN — TACROLIMUS 1.5 MG: 1 CAPSULE ORAL at 08:02

## 2023-02-12 RX ADMIN — NYSTATIN 500000 UNITS: 500000 SUSPENSION ORAL at 01:02

## 2023-02-12 RX ADMIN — MUPIROCIN 1 G: 20 OINTMENT TOPICAL at 08:02

## 2023-02-12 RX ADMIN — HEPARIN SODIUM 5000 UNITS: 5000 INJECTION INTRAVENOUS; SUBCUTANEOUS at 05:02

## 2023-02-12 RX ADMIN — PANTOPRAZOLE SODIUM 40 MG: 40 TABLET, DELAYED RELEASE ORAL at 08:02

## 2023-02-12 RX ADMIN — BISACODYL 10 MG: 5 TABLET, COATED ORAL at 08:02

## 2023-02-12 RX ADMIN — NYSTATIN 500000 UNITS: 500000 SUSPENSION ORAL at 08:02

## 2023-02-12 RX ADMIN — MYCOPHENOLATE MOFETIL 1000 MG: 250 CAPSULE ORAL at 08:02

## 2023-02-12 RX ADMIN — NYSTATIN 500000 UNITS: 500000 SUSPENSION ORAL at 05:02

## 2023-02-12 RX ADMIN — HEPARIN SODIUM 5000 UNITS: 5000 INJECTION INTRAVENOUS; SUBCUTANEOUS at 01:02

## 2023-02-12 RX ADMIN — ALBUMIN (HUMAN) 25 G: 12.5 SOLUTION INTRAVENOUS at 05:02

## 2023-02-12 RX ADMIN — MUPIROCIN 1 G: 20 OINTMENT TOPICAL at 09:02

## 2023-02-12 RX ADMIN — LAMIVUDINE 150 MG: 150 TABLET, FILM COATED ORAL at 08:02

## 2023-02-12 RX ADMIN — TACROLIMUS 2.5 MG: 1 CAPSULE ORAL at 05:02

## 2023-02-12 RX ADMIN — OXYCODONE 5 MG: 5 TABLET ORAL at 10:02

## 2023-02-12 RX ADMIN — HEPARIN SODIUM 5000 UNITS: 5000 INJECTION INTRAVENOUS; SUBCUTANEOUS at 08:02

## 2023-02-12 RX ADMIN — METHYLPREDNISOLONE SODIUM SUCCINATE 120 MG: 125 INJECTION, POWDER, FOR SOLUTION INTRAMUSCULAR; INTRAVENOUS at 08:02

## 2023-02-12 RX ADMIN — ALBUMIN (HUMAN) 25 G: 12.5 SOLUTION INTRAVENOUS at 11:02

## 2023-02-12 RX ADMIN — INSULIN ASPART 2 UNITS: 100 INJECTION, SOLUTION INTRAVENOUS; SUBCUTANEOUS at 05:02

## 2023-02-12 NOTE — CARE UPDATE
-Glucose Goal 140-180    -A1C:   Hemoglobin A1C   Date Value Ref Range Status   02/08/2023 <4.0 (A) 4.0 - 5.6 % Final     Comment:     ADA Screening Guidelines:  5.7-6.4%  Consistent with prediabetes  >or=6.5%  Consistent with diabetes    High levels of fetal hemoglobin interfere with the HbA1C  assay. Heterozygous hemoglobin variants (HbS, HgC, etc)do  not significantly interfere with this assay.   However, presence of multiple variants may affect accuracy.  Falsely low HA1c results may be observed in patients   with clinical conditions that shorten erythrocyte   life span or decrease mean erythrocyte age.  HA1c   may not accurately reflect glycemic control when   clinical conditions that affect erythrocyte survival   are present. Fructosamine may be used as an alternate  measurement of glycemic control.           -HOME REGIMEN: none    -INPATIENT REGIMEN: transition insulin infusion at 0.5 u/hr overnight; off this AM per orders.     -GLUCOSE TREND FOR THE PAST 24HRS:   Recent Labs   Lab 02/11/23  1723 02/11/23  2127 02/12/23  0110 02/12/23  0514 02/12/23  0555 02/12/23  0557   POCTGLUCOSE 158* 134* 118* 89 68* 92         -NO HYPOGYCEMIAS NOTED     - Diet  Diet diabetic [facility]; 2000 Calorie    -TOLERATING 50 % OF PO DIET       -Steroids - solumedrol 120 mg daily    Remains in 35183/74821 A. BG below goal this AM; insulin infusion stopped per orders. 3 Days Post-Op.         Plan:   - -Adjust Novolog Moderate dose correction with ISF 25 starting at 150 given high dose steroids  - POCT Glucose before meals and at bedtime  - Hypoglycemia protocol in place      ** Please notify Endocrine for any change and/or advance in diet**  ** Please call Endocrine for any BG related issues **     Discharge Planning:   TBD. Please notify endocrinology prior to discharge.

## 2023-02-12 NOTE — SUBJECTIVE & OBJECTIVE
Scheduled Meds:   bisacodyL  10 mg Oral QHS    famotidine  20 mg Oral Nightly    heparin (porcine)  5,000 Units Subcutaneous Q8H    lamiVUDine  150 mg Oral Daily    methocarbamoL  500 mg Oral QID    [START ON 2/12/2023] methylPREDNISolone sodium succinate injection  120 mg Intravenous Daily    Followed by    [START ON 2/13/2023] methylPREDNISolone sodium succinate injection  80 mg Intravenous Daily    Followed by    [START ON 2/14/2023] methylPREDNISolone sodium succinate injection  40 mg Intravenous Daily    Followed by    [START ON 2/15/2023] predniSONE  20 mg Oral Daily    mupirocin  1 g Nasal BID    mycophenolate  1,000 mg Oral BID    nystatin  500,000 Units Mouth/Throat TID PC    penicillin G benzathine  2.4 Million Units Intramuscular Weekly    polyethylene glycol  17 g Oral Daily    senna  8.6 mg Oral Daily    [START ON 2/16/2023] sulfamethoxazole-trimethoprim 400-80mg  1 tablet Oral Daily AM    tacrolimus  1.5 mg Oral BID    [START ON 2/19/2023] valGANciclovir  450 mg Oral Daily     Continuous Infusions:   insulin regular 1 units/mL infusion orderable (TRANSFER) 0.5 Units/hr (02/11/23 1122)     PRN Meds:sodium chloride 0.9%, acetaminophen, bisacodyL, calcium carbonate, dextrose 10%, dextrose 10%, dextrose 10%, dextrose 10%, diphenhydrAMINE, glucagon (human recombinant), glucose, glucose, hydrALAZINE, HYDROmorphone, insulin aspart U-100, labetalol, ondansetron, oxyCODONE, oxyCODONE, prochlorperazine, sodium chloride 0.9%    Review of Systems   Constitutional:  Positive for fatigue. Negative for appetite change, chills and fever.   Respiratory:  Negative for cough, chest tightness and shortness of breath.    Cardiovascular:  Negative for chest pain, palpitations and leg swelling.   Gastrointestinal:  Positive for abdominal pain. Negative for abdominal distention, constipation, diarrhea, nausea and vomiting.   Genitourinary:  Negative for difficulty urinating, dysuria, frequency and urgency.   Musculoskeletal:   Negative for back pain and neck pain.   Skin:  Positive for wound. Negative for color change, pallor and rash.   Allergic/Immunologic: Positive for immunocompromised state.   Neurological:  Negative for dizziness, weakness and headaches.   Psychiatric/Behavioral:  Negative for confusion and sleep disturbance.    Objective:     Vital Signs (Most Recent):  Temp: 98 °F (36.7 °C) (02/11/23 1546)  Pulse: (!) 55 (02/11/23 1923)  Resp: 18 (02/11/23 1546)  BP: (!) 151/72 (02/11/23 1546)  SpO2: 96 % (02/11/23 1546)   Vital Signs (24h Range):  Temp:  [97.7 °F (36.5 °C)-98.2 °F (36.8 °C)] 98 °F (36.7 °C)  Pulse:  [55-69] 55  Resp:  [15-18] 18  SpO2:  [93 %-96 %] 96 %  BP: (138-153)/(65-79) 151/72     Weight: 79.8 kg (175 lb 14.8 oz)  Body mass index is 32.18 kg/m².    Intake/Output - Last 3 Shifts         02/09 0700  02/10 0659 02/10 0700  02/11 0659 02/11 0700  02/12 0659    P.O. 60 1020 360    I.V. (mL/kg) 7045.1 (93.1) 1742.4 (21.8)     Blood 2820      IV Piggyback 807.6 120.8     Total Intake(mL/kg) 91599.7 (141.8) 2883.2 (36.1) 360 (4.5)    Urine (mL/kg/hr) 4505 (2.5) 1505 (0.8) 400 (0.4)    Emesis/NG output  0     Drains 4150 625 320    Stool  0     Blood 2600      Total Output 73575 2130 720    Net -522.3 +753.2 -360           Stool Occurrence  0 x     Emesis Occurrence  0 x             Physical Exam  Vitals and nursing note reviewed.   Constitutional:       Appearance: Normal appearance.   Cardiovascular:      Rate and Rhythm: Normal rate and regular rhythm.      Pulses: Normal pulses.   Pulmonary:      Effort: Pulmonary effort is normal. No respiratory distress.      Breath sounds: Examination of the right-middle field reveals decreased breath sounds. Examination of the right-lower field reveals decreased breath sounds. Examination of the left-lower field reveals decreased breath sounds. Decreased breath sounds present. No wheezing or rales.   Abdominal:      General: Bowel sounds are normal. There is no distension.       Palpations: Abdomen is soft.      Tenderness: There is abdominal tenderness (incisional). There is no guarding.       Musculoskeletal:         General: Normal range of motion.   Skin:     General: Skin is warm and dry.   Neurological:      Mental Status: She is alert and oriented to person, place, and time.   Psychiatric:         Behavior: Behavior is cooperative.       Laboratory:  Immunosuppressants           Stop Route Frequency     tacrolimus capsule 1.5 mg         -- Oral 2 times daily     mycophenolate capsule 1,000 mg         -- Oral 2 times daily          CBC:   Recent Labs   Lab 02/11/23  0434 02/11/23  1354   WBC 7.45  7.45  --    RBC 2.29*  2.29*  --    HGB 7.8*  7.8* 7.5*   HCT 23.2*  23.2* 22.3*   PLT 63*  63*  --    *  101*  --    MCH 34.1*  34.1*  --    MCHC 33.6  33.6  --      CMP:   Recent Labs   Lab 02/11/23  0434   *   CALCIUM 7.5*   ALBUMIN 2.8*   PROT 5.2*   *   K 4.2   CO2 20*   CL 98   BUN 24*   CREATININE 1.0   ALKPHOS 113   *   *   BILITOT 3.1*     Coagulation:   Recent Labs   Lab 02/11/23 0434   INR 1.1   APTT 36.0*     Labs within the past 24 hours have been reviewed.    Diagnostic Results:  US Liver Transplant Post:  Results for orders placed during the hospital encounter of 02/08/23    US Doppler Liver Transplant Post (xpd)    Narrative  EXAMINATION:  US DOPPLER LIVER TRANSPLANT POST (XPD)    CLINICAL HISTORY:  Assess perfusion following liver transplant;    TECHNIQUE:  Limited abdominal ultrasound of the transplant liver with Doppler evaluation.  Color and spectral Doppler were performed.    COMPARISON:  None.    FINDINGS:  Patient is status post orthotopic liver transplant 02/09/2023 (postoperative day 1).  The liver demonstrates homogeneous echotexture.  No focal hepatic lesions are seen.  No fluid collections.    The common duct is not dilated, measuring 3 mm.  No dilated intrahepatic radicles are seen.    Color flow and spectral  waveform analysis was performed.  The main portal vein, right portal vein, left portal vein, middle hepatic vein, right hepatic vein, left hepatic vein, and IVC are patent with proper directional flow.    Anastomosis site of the main hepatic artery demonstrates a peak systolic velocity 62 cm/sec.    Main hepatic artery demonstrates resistive index 0.80  with normal waveform.    Left hepatic artery demonstrates resistive index 0.80  with normal waveform.    Anterior branch of the right hepatic artery demonstrates resistive index 0.77  with normal waveform.    Posterior branch of the right hepatic artery demonstrates resistive index 0.81  with normal waveform.    Miscellaneous: Right pleural effusion.    Impression  Satisfactory Doppler assessment of postoperative day 1 liver allograft.  Mildly elevated normal resistive indices as above, which may relate to postoperative edema.  Continued follow-up as clinically warranted.    Findings communicated to Dr. Brasher via telephone on 02/10/2023 at 08:46.    Electronically signed by resident: Justin Omer  Date:    02/10/2023  Time:    08:05    Electronically signed by: Josue Lion  Date:    02/10/2023  Time:    08:48

## 2023-02-12 NOTE — PROGRESS NOTES
[facility] - [facility]plant Stepdown  Liver Transplant  Progress Note    Patient Name: Argelia Sevilla  MRN: [id number]  Admission Date: 2/8/2023  Hospital Length of Stay: 4 days  Code Status: Full Code  Primary Care Provider: Primary Doctor No  Post-Operative Day: 3    ORGAN:   LIVER  Disease Etiology: Cirrhosis: Fatty Liver (BROWN)  Donor Type:   Donation after Brain Death  [facility] High Risk:   No  Donor CMV Status:   Donor CMV Status: Positive  Donor HBcAB:   Positive  Donor HCV Status:   Negative  Donor HBV FRANNIE: Negative  Donor HCV FRANNIE: Negative  Whole or Partial: Whole Liver  Biliary Anastomosis: End to End  Arterial Anatomy: Standard  Subjective:     History of Present Illness:  Ms. 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 She presents as a primary candidate for liver transplant with a MELD of 23. Denies recent fever/chills and recent hospitalizations. OR time is 7:30/9:00 AM with Dr. Adhikari. No intraop HD. Of note, donor is RPR+, recipient will need ID consult.       Hospital Course:  Patient is now s/p DBD LTX 2/9/23 (Donor RPR+, Hcv Ab+/FRANNIE-, HBcAb+, CMV D+/R+). Surgery was without complication. POD#1 Liver US stable. LFTs trending down. Transferred to TSU POD#1.     Interval History: no acute events overnight. Progressing well. LFTs trending down. POD#1 Liver US stable. ZACKERY x 1 with 500 ml/day ss output, dc today. Albumin 25% and Lasix 20mg x 1 today. H/H decreased but stable, US w/o fluid collection and ZACKERY ss. Donor RPR+, ID rec PCN weekly x 3 (#1 given 2/10). Tolerating diet, minimal N/V. Reports feeling full, start boost. (+) flatus, continue bowel regimen, pain well controlled. Ambulated in halls with walker. Switch to protonix as patient reports difficulty swallowing when not on PPI. Encourage OOBTC during the day.     ZACKERY drain removal: Site cleaned with alcohol, 1% lidocaine used for local anesthestic.  3-0 prolene used for suture.  Drain tip  intact.  Patient tolerated procedure well.  Will continue to monitor for any complications.      Scheduled Meds:   bisacodyL  10 mg Oral QHS    famotidine  20 mg Oral Nightly    heparin (porcine)  5,000 Units Subcutaneous Q8H    lamiVUDine  150 mg Oral Daily    methocarbamoL  500 mg Oral QID    [START ON 2/12/2023] methylPREDNISolone sodium succinate injection  120 mg Intravenous Daily    Followed by    [START ON 2/13/2023] methylPREDNISolone sodium succinate injection  80 mg Intravenous Daily    Followed by    [START ON 2/14/2023] methylPREDNISolone sodium succinate injection  40 mg Intravenous Daily    Followed by    [START ON 2/15/2023] predniSONE  20 mg Oral Daily    mupirocin  1 g Nasal BID    mycophenolate  1,000 mg Oral BID    nystatin  500,000 Units Mouth/Throat TID PC    penicillin G benzathine  2.4 Million Units Intramuscular Weekly    polyethylene glycol  17 g Oral Daily    senna  8.6 mg Oral Daily    [START ON 2/16/2023] sulfamethoxazole-trimethoprim 400-80mg  1 tablet Oral Daily AM    tacrolimus  1.5 mg Oral BID    [START ON 2/19/2023] valGANciclovir  450 mg Oral Daily     Continuous Infusions:   insulin regular 1 units/mL infusion orderable (TRANSFER) 0.5 Units/hr (02/11/23 1122)     PRN Meds:sodium chloride 0.9%, acetaminophen, bisacodyL, calcium carbonate, dextrose 10%, dextrose 10%, dextrose 10%, dextrose 10%, diphenhydrAMINE, glucagon (human recombinant), glucose, glucose, hydrALAZINE, HYDROmorphone, insulin aspart U-100, labetalol, ondansetron, oxyCODONE, oxyCODONE, prochlorperazine, sodium chloride 0.9%    Review of Systems   Constitutional:  Positive for fatigue. Negative for appetite change, chills and fever.   Respiratory:  Negative for cough, chest tightness and shortness of breath.    Cardiovascular:  Negative for chest pain, palpitations and leg swelling.   Gastrointestinal:  Positive for abdominal pain. Negative for abdominal distention, constipation, diarrhea, nausea  and vomiting.   Genitourinary:  Negative for difficulty urinating, dysuria, frequency and urgency.   Musculoskeletal:  Negative for back pain and neck pain.   Skin:  Positive for wound. Negative for color change, pallor and rash.   Allergic/Immunologic: Positive for immunocompromised state.   Neurological:  Negative for dizziness, weakness and headaches.   Psychiatric/Behavioral:  Negative for confusion and sleep disturbance.    Objective:     Vital Signs (Most Recent):  Temp: 98 °F (36.7 °C) (02/11/23 1546)  Pulse: (!) 55 (02/11/23 1923)  Resp: 18 (02/11/23 1546)  BP: (!) 151/72 (02/11/23 1546)  SpO2: 96 % (02/11/23 1546)   Vital Signs (24h Range):  Temp:  [97.7 °F (36.5 °C)-98.2 °F (36.8 °C)] 98 °F (36.7 °C)  Pulse:  [55-69] 55  Resp:  [15-18] 18  SpO2:  [93 %-96 %] 96 %  BP: (138-153)/(65-79) 151/72     Weight: 79.8 kg (175 lb 14.8 oz)  Body mass index is 32.18 kg/m².    Intake/Output - Last 3 Shifts         02/09 0700  02/10 0659 02/10 0700 02/11 0659 02/11 0700  02/12 0659    P.O. 60 1020 360    I.V. (mL/kg) 7045.1 (93.1) 1742.4 (21.8)     Blood 2820      IV Piggyback 807.6 120.8     Total Intake(mL/kg) 34393.7 (141.8) 2883.2 (36.1) 360 (4.5)    Urine (mL/kg/hr) 4505 (2.5) 1505 (0.8) 400 (0.4)    Emesis/NG output  0     Drains 4150 625 320    Stool  0     Blood 2600      Total Output 93424 2130 720    Net -522.3 +753.2 -360           Stool Occurrence  0 x     Emesis Occurrence  0 x             Physical Exam  Vitals and nursing note reviewed.   Constitutional:       Appearance: Normal appearance.   Cardiovascular:      Rate and Rhythm: Normal rate and regular rhythm.      Pulses: Normal pulses.   Pulmonary:      Effort: Pulmonary effort is normal. No respiratory distress.      Breath sounds: Examination of the right-middle field reveals decreased breath sounds. Examination of the right-lower field reveals decreased breath sounds. Examination of the left-lower field reveals decreased breath sounds. Decreased  breath sounds present. No wheezing or rales.   Abdominal:      General: Bowel sounds are normal. There is no distension.      Palpations: Abdomen is soft.      Tenderness: There is abdominal tenderness (incisional). There is no guarding.       Musculoskeletal:         General: Normal range of motion.   Skin:     General: Skin is warm and dry.   Neurological:      Mental Status: She is alert and oriented to person, place, and time.   Psychiatric:         Behavior: Behavior is cooperative.       Laboratory:  Immunosuppressants           Stop Route Frequency     tacrolimus capsule 1.5 mg         -- Oral 2 times daily     mycophenolate capsule 1,000 mg         -- Oral 2 times daily          CBC:   Recent Labs   Lab 02/11/23  0434 02/11/23  1354   WBC 7.45  7.45  --    RBC 2.29*  2.29*  --    HGB 7.8*  7.8* 7.5*   HCT 23.2*  23.2* 22.3*   PLT 63*  63*  --    *  101*  --    MCH 34.1*  34.1*  --    MCHC 33.6  33.6  --      CMP:   Recent Labs   Lab 02/11/23 0434   *   CALCIUM 7.5*   ALBUMIN 2.8*   PROT 5.2*   *   K 4.2   CO2 20*   CL 98   BUN 24*   CREATININE 1.0   ALKPHOS 113   *   *   BILITOT 3.1*     Coagulation:   Recent Labs   Lab 02/11/23 0434   INR 1.1   APTT 36.0*     Labs within the past 24 hours have been reviewed.    Diagnostic Results:  US Liver Transplant Post:  Results for orders placed during the hospital encounter of 02/08/23    US Doppler Liver Transplant Post (xpd)    Narrative  EXAMINATION:  US DOPPLER LIVER TRANSPLANT POST (XPD)    CLINICAL HISTORY:  Assess perfusion following liver transplant;    TECHNIQUE:  Limited abdominal ultrasound of the transplant liver with Doppler evaluation.  Color and spectral Doppler were performed.    COMPARISON:  None.    FINDINGS:  Patient is status post orthotopic liver transplant 02/09/2023 (postoperative day 1).  The liver demonstrates homogeneous echotexture.  No focal hepatic lesions are seen.  No fluid collections.    The  common duct is not dilated, measuring 3 mm.  No dilated intrahepatic radicles are seen.    Color flow and spectral waveform analysis was performed.  The main portal vein, right portal vein, left portal vein, middle hepatic vein, right hepatic vein, left hepatic vein, and IVC are patent with proper directional flow.    Anastomosis site of the main hepatic artery demonstrates a peak systolic velocity 62 cm/sec.    Main hepatic artery demonstrates resistive index 0.80  with normal waveform.    Left hepatic artery demonstrates resistive index 0.80  with normal waveform.    Anterior branch of the right hepatic artery demonstrates resistive index 0.77  with normal waveform.    Posterior branch of the right hepatic artery demonstrates resistive index 0.81  with normal waveform.    Miscellaneous: Right pleural effusion.    Impression  Satisfactory Doppler assessment of postoperative day 1 liver allograft.  Mildly elevated normal resistive indices as above, which may relate to postoperative edema.  Continued follow-up as clinically warranted.    Findings communicated to Dr. Brasher via telephone on 02/10/2023 at 08:46.    Electronically signed by resident: Justin Omer  Date:    02/10/2023  Time:    08:05    Electronically signed by: Josue Lion  Date:    02/10/2023  Time:    08:48      Assessment/Plan:     * S/P liver transplant  - s/p DBD LTX 2/9/23 (Donor RPR+, HCV Ab+/FRANNIE-, CMV D+/R+)  - Surgery w/o complication  - POD#1 Liver US stable  - LFTs trending down  - Tolerating diet, minimal N/V, (+) flatus, start bowel regimen. encourage OOBTC, pain well controlled  - dc drain 2/12  - Early satiety, start boost and protonix  - Lasix 20mg Iv and albumin 25% x 1 today    Acute blood loss anemia  - expected s/p LTX  - H/H trending down but no overt signs of bleeding, continue to monitor closely  - keep T&S active    Long-term use of immunosuppressant medication  Maintenance IS with prograf. cont to check tacrolimus level  daily. Assess for toxicity and adjust level as needed    At risk for infection transmitted from donor  - Donor RPR+, ID consulted, rec bicillin weekly x 3 (2/10, 2/17, 2/24)  - Donor HBcAb+, recipient on lamuvidine   - Donor HCV Ab+/FRANNIE-, monitor HCV PCR per protocol    Steroid-induced hyperglycemia  - endocrine consulted for post-op hyperglycemia 2/2 steroids, appreciate assistance    At risk for opportunistic infections  - continue valcyte for CMV prophylaxis for 3 months (CMV D+/R+)  - continue bactrim for PJP prophylaxis for 6 months  - continue nystatin for thrush prevention for 4 weeks  - Bactrim and valcyte to start on POD#10 or at discharge    Prophylactic immunotherapy  - See long-term use of immunosuppressant medication    Thrombocytopenia  - 2/2 ELSD, expect improvement post-LTX  - monitor with CBC     Coronary artery disease involving native coronary artery of native heart without angina pectoris  - ASA held for surgery, restart once plts > 70 and BUN < 70    Anemia, chronic disease  - 2/2 liver disease., should improve post transplant   - monitor with CBC         VTE Risk Mitigation (From admission, onward)         Ordered     heparin (porcine) injection 5,000 Units  Every 8 hours         02/09/23 1445     Place sequential compression device  Until discontinued         02/09/23 1445     IP VTE HIGH RISK PATIENT  Once         02/09/23 1445                The patients clinical status was discussed at multidisplinary rounds, involving transplant surgery, transplant medicine, pharmacy, nursing, nutrition, and social work    Discharge Planning:  No Patient Care Coordination Note on file.      Yumiko Jordan, BOAZ  Liver Transplant  [facility] - Transplant Step[facility]

## 2023-02-12 NOTE — ASSESSMENT & PLAN NOTE
- s/p DBD LTX 2/9/23 (Donor RPR+, HCV Ab+/FRANNIE-, CMV D+/R+)  - Surgery w/o complication  - POD#1 Liver US stable  - LFTs trending down  - Tolerating diet, minimal N/V, (+) flatus, start bowel regimen. encourage OOBTC, pain well controlled  - dc drain 2/12  - Early satiety, start boost and protonix  - Lasix 20mg Iv and albumin 25% x 1 today

## 2023-02-12 NOTE — PLAN OF CARE
AAO x 4. VSS, afebrile, SpO2>92% on RA. Telemetry monitoring SB-SR 50s-70s. BG monitoring AC/HS/0200. Transitional insulin gtt d/c'd at 0515 following spot check POCT glucose=89. Pain controlled with PRN oxycodone. PRN Zofran administered once for nausea. No BM or passing gas. [employer] incision INA with heriberto. R ZACKERY with 180 mL serosanguinous output. Pt up with 1 assist to bedside commode. MTK=609 mL o/n. Fall precautions maintained and pt repositions self. See flowsheet for assessment findings. POC reviewed with pt and spouse at bedside. Pulled 100% self meds.

## 2023-02-12 NOTE — PROGRESS NOTES
AAOx4. VSS. Tele d/c'd per orders from H. Nikki NP. Patient up w/ 1x assist OOB to chair/BSC. CXR done this AM 2/12 - stable. [employer] incision - INA w/ staples intact - painted w/ betadine. RLQ ZACKERY drain removed per H. Nikki, NP - site covered w/ guaze+tape - dressing is CDI. 20mg lasix given IVP - patient voiding in hat in BSC - >2L UOP noted so far this shift w/ unmeasured urine occurences w/ BMs. Patient (+) passing gas - has had 2 BMs so far this shift. Albumin given IV x2 this shift. Non-skid socks on. Bed in low position. Call light within reach.  at bedside.

## 2023-02-13 PROBLEM — B19.10 HEPATITIS B VIRUS INFECTION IN CADAVERIC DONOR: Status: ACTIVE | Noted: 2023-02-13

## 2023-02-13 PROBLEM — Z00.5 HEPATITIS B VIRUS INFECTION IN CADAVERIC DONOR: Status: ACTIVE | Noted: 2023-02-13

## 2023-02-13 PROBLEM — K21.9 GASTROESOPHAGEAL REFLUX DISEASE WITHOUT ESOPHAGITIS: Status: ACTIVE | Noted: 2023-02-13

## 2023-02-13 LAB
ALBUMIN SERPL BCP-MCNC: 2.9 G/DL (ref 3.5–5.2)
ALP SERPL-CCNC: 182 U/L (ref 55–135)
ALT SERPL W/O P-5'-P-CCNC: 196 U/L (ref 10–44)
ANION GAP SERPL CALC-SCNC: 10 MMOL/L (ref 8–16)
AST SERPL-CCNC: 96 U/L (ref 10–40)
BACTERIA SPEC AEROBE CULT: NO GROWTH
BASOPHILS # BLD AUTO: 0.02 K/UL (ref 0–0.2)
BASOPHILS NFR BLD: 0.5 % (ref 0–1.9)
BILIRUB SERPL-MCNC: 3.1 MG/DL (ref 0.1–1)
BUN SERPL-MCNC: 25 MG/DL (ref 8–23)
CALCIUM SERPL-MCNC: 7.9 MG/DL (ref 8.7–10.5)
CHLORIDE SERPL-SCNC: 100 MMOL/L (ref 95–110)
CO2 SERPL-SCNC: 23 MMOL/L (ref 23–29)
CREAT SERPL-MCNC: 0.7 MG/DL (ref 0.5–1.4)
DIFFERENTIAL METHOD: ABNORMAL
EOSINOPHIL # BLD AUTO: 0.3 K/UL (ref 0–0.5)
EOSINOPHIL NFR BLD: 7.3 % (ref 0–8)
ERYTHROCYTE [DISTWIDTH] IN BLOOD BY AUTOMATED COUNT: 16.6 % (ref 11.5–14.5)
EST. GFR  (NO RACE VARIABLE): >60 ML/MIN/1.73 M^2
GLUCOSE SERPL-MCNC: 91 MG/DL (ref 70–110)
HCT VFR BLD AUTO: 22.1 % (ref 37–48.5)
HGB BLD-MCNC: 7.5 G/DL (ref 12–16)
IMM GRANULOCYTES # BLD AUTO: 0.04 K/UL (ref 0–0.04)
IMM GRANULOCYTES NFR BLD AUTO: 0.9 % (ref 0–0.5)
INR PPP: 1 (ref 0.8–1.2)
LYMPHOCYTES # BLD AUTO: 0.3 K/UL (ref 1–4.8)
LYMPHOCYTES NFR BLD: 5.7 % (ref 18–48)
MAGNESIUM SERPL-MCNC: 1.9 MG/DL (ref 1.6–2.6)
MCH RBC QN AUTO: 34.2 PG (ref 27–31)
MCHC RBC AUTO-ENTMCNC: 33.9 G/DL (ref 32–36)
MCV RBC AUTO: 101 FL (ref 82–98)
MONOCYTES # BLD AUTO: 0.5 K/UL (ref 0.3–1)
MONOCYTES NFR BLD: 11.7 % (ref 4–15)
NEUTROPHILS # BLD AUTO: 3.2 K/UL (ref 1.8–7.7)
NEUTROPHILS NFR BLD: 73.9 % (ref 38–73)
NRBC BLD-RTO: 0 /100 WBC
PLATELET # BLD AUTO: 54 K/UL (ref 150–450)
PMV BLD AUTO: 11.5 FL (ref 9.2–12.9)
POCT GLUCOSE: 109 MG/DL (ref 70–110)
POCT GLUCOSE: 136 MG/DL (ref 70–110)
POCT GLUCOSE: 145 MG/DL (ref 70–110)
POCT GLUCOSE: 184 MG/DL (ref 70–110)
POTASSIUM SERPL-SCNC: 3.9 MMOL/L (ref 3.5–5.1)
PROT SERPL-MCNC: 5.1 G/DL (ref 6–8.4)
PROTHROMBIN TIME: 10.9 SEC (ref 9–12.5)
RBC # BLD AUTO: 2.19 M/UL (ref 4–5.4)
SODIUM SERPL-SCNC: 133 MMOL/L (ref 136–145)
TACROLIMUS BLD-MCNC: 2.5 NG/ML (ref 5–15)
WBC # BLD AUTO: 4.36 K/UL (ref 3.9–12.7)

## 2023-02-13 PROCEDURE — 36415 COLL VENOUS BLD VENIPUNCTURE: CPT | Performed by: NURSE PRACTITIONER

## 2023-02-13 PROCEDURE — 80197 ASSAY OF TACROLIMUS: CPT | Performed by: STUDENT IN AN ORGANIZED HEALTH CARE EDUCATION/TRAINING PROGRAM

## 2023-02-13 PROCEDURE — 99900031 HC PATIENT EDUCATION (STAT)

## 2023-02-13 PROCEDURE — 20600001 HC STEP DOWN PRIVATE ROOM

## 2023-02-13 PROCEDURE — 25000003 PHARM REV CODE 250: Performed by: STUDENT IN AN ORGANIZED HEALTH CARE EDUCATION/TRAINING PROGRAM

## 2023-02-13 PROCEDURE — 85610 PROTHROMBIN TIME: CPT | Performed by: STUDENT IN AN ORGANIZED HEALTH CARE EDUCATION/TRAINING PROGRAM

## 2023-02-13 PROCEDURE — 63600175 PHARM REV CODE 636 W HCPCS: Performed by: NURSE PRACTITIONER

## 2023-02-13 PROCEDURE — 25000003 PHARM REV CODE 250: Performed by: PHYSICIAN ASSISTANT

## 2023-02-13 PROCEDURE — 63600175 PHARM REV CODE 636 W HCPCS: Performed by: PHYSICIAN ASSISTANT

## 2023-02-13 PROCEDURE — 25000003 PHARM REV CODE 250: Performed by: NURSE PRACTITIONER

## 2023-02-13 PROCEDURE — 94761 N-INVAS EAR/PLS OXIMETRY MLT: CPT

## 2023-02-13 PROCEDURE — 83735 ASSAY OF MAGNESIUM: CPT | Performed by: NURSE PRACTITIONER

## 2023-02-13 PROCEDURE — 63600175 PHARM REV CODE 636 W HCPCS: Performed by: STUDENT IN AN ORGANIZED HEALTH CARE EDUCATION/TRAINING PROGRAM

## 2023-02-13 PROCEDURE — 80053 COMPREHEN METABOLIC PANEL: CPT | Performed by: STUDENT IN AN ORGANIZED HEALTH CARE EDUCATION/TRAINING PROGRAM

## 2023-02-13 PROCEDURE — 85025 COMPLETE CBC W/AUTO DIFF WBC: CPT | Performed by: STUDENT IN AN ORGANIZED HEALTH CARE EDUCATION/TRAINING PROGRAM

## 2023-02-13 PROCEDURE — 97116 GAIT TRAINING THERAPY: CPT | Mod: CQ

## 2023-02-13 RX ORDER — TACROLIMUS 0.5 MG/1
0.5 CAPSULE ORAL ONCE
Status: CANCELLED | OUTPATIENT
Start: 2023-02-13

## 2023-02-13 RX ORDER — TACROLIMUS 1 MG/1
4 CAPSULE ORAL 2 TIMES DAILY
Status: DISCONTINUED | OUTPATIENT
Start: 2023-02-13 | End: 2023-02-14

## 2023-02-13 RX ORDER — TACROLIMUS 1 MG/1
3 CAPSULE ORAL 2 TIMES DAILY
Status: CANCELLED | OUTPATIENT
Start: 2023-02-13

## 2023-02-13 RX ORDER — FUROSEMIDE 20 MG/1
20 TABLET ORAL DAILY
Status: DISCONTINUED | OUTPATIENT
Start: 2023-02-13 | End: 2023-02-14

## 2023-02-13 RX ORDER — ONDANSETRON 8 MG/1
8 TABLET, ORALLY DISINTEGRATING ORAL ONCE
Status: DISCONTINUED | OUTPATIENT
Start: 2023-02-13 | End: 2023-02-13

## 2023-02-13 RX ADMIN — POLYETHYLENE GLYCOL 3350 17 G: 17 POWDER, FOR SOLUTION ORAL at 09:02

## 2023-02-13 RX ADMIN — NYSTATIN 500000 UNITS: 500000 SUSPENSION ORAL at 09:02

## 2023-02-13 RX ADMIN — TACROLIMUS 1.5 MG: 1 CAPSULE ORAL at 12:02

## 2023-02-13 RX ADMIN — PANTOPRAZOLE SODIUM 40 MG: 40 TABLET, DELAYED RELEASE ORAL at 09:02

## 2023-02-13 RX ADMIN — MYCOPHENOLATE MOFETIL 1000 MG: 250 CAPSULE ORAL at 08:02

## 2023-02-13 RX ADMIN — MUPIROCIN 1 G: 20 OINTMENT TOPICAL at 09:02

## 2023-02-13 RX ADMIN — MUPIROCIN 1 G: 20 OINTMENT TOPICAL at 08:02

## 2023-02-13 RX ADMIN — NYSTATIN 500000 UNITS: 500000 SUSPENSION ORAL at 08:02

## 2023-02-13 RX ADMIN — NYSTATIN 500000 UNITS: 500000 SUSPENSION ORAL at 02:02

## 2023-02-13 RX ADMIN — TACROLIMUS 2.5 MG: 1 CAPSULE ORAL at 09:02

## 2023-02-13 RX ADMIN — NYSTATIN 500000 UNITS: 500000 SUSPENSION ORAL at 06:02

## 2023-02-13 RX ADMIN — HEPARIN SODIUM 5000 UNITS: 5000 INJECTION INTRAVENOUS; SUBCUTANEOUS at 05:02

## 2023-02-13 RX ADMIN — SENNOSIDES 8.6 MG: 8.6 TABLET, FILM COATED ORAL at 09:02

## 2023-02-13 RX ADMIN — HEPARIN SODIUM 5000 UNITS: 5000 INJECTION INTRAVENOUS; SUBCUTANEOUS at 02:02

## 2023-02-13 RX ADMIN — FUROSEMIDE 20 MG: 20 TABLET ORAL at 12:02

## 2023-02-13 RX ADMIN — LAMIVUDINE 150 MG: 150 TABLET, FILM COATED ORAL at 09:02

## 2023-02-13 RX ADMIN — MYCOPHENOLATE MOFETIL 1000 MG: 250 CAPSULE ORAL at 09:02

## 2023-02-13 RX ADMIN — ONDANSETRON 4 MG: 4 TABLET, ORALLY DISINTEGRATING ORAL at 10:02

## 2023-02-13 RX ADMIN — TACROLIMUS 4 MG: 1 CAPSULE ORAL at 06:02

## 2023-02-13 RX ADMIN — METHYLPREDNISOLONE SODIUM SUCCINATE 80 MG: 40 INJECTION, POWDER, FOR SOLUTION INTRAMUSCULAR; INTRAVENOUS at 09:02

## 2023-02-13 NOTE — SUBJECTIVE & OBJECTIVE
Scheduled Meds:   bisacodyL  10 mg Oral QHS    furosemide  20 mg Oral Daily    heparin (porcine)  5,000 Units Subcutaneous Q8H    lamiVUDine  150 mg Oral Daily    [START ON 2/14/2023] methylPREDNISolone sodium succinate injection  40 mg Intravenous Daily    Followed by    [START ON 2/15/2023] predniSONE  20 mg Oral Daily    mupirocin  1 g Nasal BID    mycophenolate  1,000 mg Oral BID    nystatin  500,000 Units Mouth/Throat TID PC    pantoprazole  40 mg Oral Daily    penicillin G benzathine  2.4 Million Units Intramuscular Weekly    polyethylene glycol  17 g Oral Daily    senna  8.6 mg Oral Daily    [START ON 2/16/2023] sulfamethoxazole-trimethoprim 400-80mg  1 tablet Oral Daily AM    tacrolimus  4 mg Oral BID    [START ON 2/19/2023] valGANciclovir  450 mg Oral Daily     Continuous Infusions:  PRN Meds:sodium chloride 0.9%, acetaminophen, bisacodyL, calcium carbonate, dextrose 10%, dextrose 10%, diphenhydrAMINE, glucagon (human recombinant), glucose, glucose, hydrALAZINE, HYDROmorphone, insulin aspart U-100, labetalol, ondansetron, oxyCODONE, oxyCODONE, prochlorperazine, sodium chloride 0.9%    Review of Systems   Constitutional:  Positive for fatigue. Negative for appetite change, chills and fever.   Respiratory:  Negative for cough, chest tightness, shortness of breath, wheezing and stridor.    Cardiovascular:  Negative for chest pain, palpitations and leg swelling.   Gastrointestinal:  Positive for abdominal pain. Negative for abdominal distention, constipation, diarrhea, nausea and vomiting.   Genitourinary:  Negative for difficulty urinating, dysuria, frequency and urgency.   Musculoskeletal:  Negative for back pain and neck pain.   Skin:  Positive for wound. Negative for color change, pallor and rash.   Allergic/Immunologic: Positive for immunocompromised state.   Neurological:  Negative for dizziness, weakness and headaches.   Psychiatric/Behavioral:  Negative for confusion and sleep disturbance.    Objective:      Vital Signs (Most Recent):  Temp: 98.8 °F (37.1 °C) (02/13/23 1105)  Pulse: 69 (02/13/23 1105)  Resp: 18 (02/13/23 1105)  BP: (!) 159/70 (02/13/23 1105)  SpO2: (!) 93 % (02/13/23 1105)   Vital Signs (24h Range):  Temp:  [97.6 °F (36.4 °C)-99.1 °F (37.3 °C)] 98.8 °F (37.1 °C)  Pulse:  [61-69] 69  Resp:  [15-18] 18  SpO2:  [91 %-96 %] 93 %  BP: (140-159)/(63-70) 159/70     Weight: 80.5 kg (177 lb 7.5 oz)  Body mass index is 32.46 kg/m².    Intake/Output - Last 3 Shifts         02/11 0700  02/12 0659 02/12 0700  02/13 0659 02/13 0700  02/14 0659    P.O. 840 720     I.V. (mL/kg) 15.5 (0.2)      IV Piggyback       Total Intake(mL/kg) 855.5 (10.6) 720 (8.9)     Urine (mL/kg/hr) 900 (0.5) 2200 (1.1) 300 (0.7)    Emesis/NG output 0 0     Drains 500 130     Stool 0 0     Total Output 1400 2330 300    Net -544.5 -1610 -300           Urine Occurrence  3 x     Stool Occurrence 0 x 3 x     Emesis Occurrence 0 x 0 x             Physical Exam  Vitals and nursing note reviewed.   Constitutional:       Appearance: Normal appearance.   HENT:      Head: Normocephalic and atraumatic.   Eyes:      General: No scleral icterus.        Right eye: No discharge.         Left eye: No discharge.   Cardiovascular:      Rate and Rhythm: Normal rate and regular rhythm.      Pulses: Normal pulses.   Pulmonary:      Effort: Pulmonary effort is normal. No respiratory distress.      Breath sounds: Examination of the right-middle field reveals decreased breath sounds. Examination of the right-lower field reveals decreased breath sounds. Examination of the left-lower field reveals decreased breath sounds. Decreased breath sounds present. No wheezing or rales.   Abdominal:      General: Bowel sounds are normal. There is no distension.      Palpations: Abdomen is soft.      Tenderness: There is abdominal tenderness (incisional). There is no guarding.       Musculoskeletal:         General: Normal range of motion.      Right lower leg: Edema present.       Left lower leg: Edema present.   Skin:     General: Skin is warm and dry.      Capillary Refill: Capillary refill takes less than 2 seconds.   Neurological:      Mental Status: She is alert and oriented to person, place, and time.   Psychiatric:         Mood and Affect: Mood normal.         Behavior: Behavior normal. Behavior is cooperative.         Thought Content: Thought content normal.     Laboratory:  Immunosuppressants           Stop Route Frequency     tacrolimus capsule 4 mg         -- Oral 2 times daily     mycophenolate capsule 1,000 mg         -- Oral 2 times daily          CBC:   Recent Labs   Lab 02/13/23  0634   WBC 4.36   RBC 2.19*   HGB 7.5*   HCT 22.1*   PLT 54*   *   MCH 34.2*   MCHC 33.9     CMP:   Recent Labs   Lab 02/13/23  0634   GLU 91   CALCIUM 7.9*   ALBUMIN 2.9*   PROT 5.1*   *   K 3.9   CO2 23      BUN 25*   CREATININE 0.7   ALKPHOS 182*   *   AST 96*   BILITOT 3.1*     Coagulation:   Recent Labs   Lab 02/11/23  0434 02/12/23  0616 02/13/23  0634   INR 1.1   < > 1.0   APTT 36.0*  --   --     < > = values in this interval not displayed.     Labs within the past 24 hours have been reviewed.    Diagnostic Results:  I have personally reviewed all pertinent imaging studies.    Debility/Functional status: Patient debilitated by evidence of Weakness and Age-related physical debility. Physical and occupational therapy ordered daily to evaluate and treat. Debility was: present on admission.

## 2023-02-13 NOTE — PLAN OF CARE
72 year-old female admitted 2/8/23 for liver txp on 2/9/23. Pt has hx of BROWN cirrhosis, thrombocytopenia, HTN  -pt stepped down 2/10  -AAOx4, ambulates with walker and standby assist   -18 G Rt AC  -weekly Penicillin injection (donor syphillis +) 2/17  -Lasix 20 mg PO  -Accuchecks AC/HS  -coordinator/pharmacy teaching complete this shift  -self meds 100%  -spouse at bedside, pt sitting up in chair, wheels locked, non-skid socks in place, call light within reach

## 2023-02-13 NOTE — PROGRESS NOTES
EDUCATION NOTE:    Met with Argelia Sevilla and her caregivers to provide teaching re: immunosuppressant medications.  Reviewed medication section of the Liver Transplant Education book that was provided.  Emphasized the importance of compliance, role of the blue medication card, concerns for drug interactions, and process of obtaining refills.  Counseled regarding Prograf, Cellcept , prednisone, including directions for use, monitoring, how to handle missed doses, and side effects.  Patient and caregiver verbalized understanding and had the opportunity to ask questions.

## 2023-02-13 NOTE — PROGRESS NOTES
[facility] - Transplant Stepdown  Adult Nutrition  Progress Note    SUMMARY       Recommendations    1. Liberalize to Regular diet with texture per SLP     2. Continue Boost Plus TID      3. RD to monitor and follow    Goals: Meet % EEN, EPN by RD f/u  Nutrition Goal Status: new  Communication of RD Recs:  (POC)    Assessment and Plan    Nutrition Problem  Increased PRO/energy needs      Related to (etiology):   Physiological needs     Signs and Symptoms (as evidenced by):   S/p liver transplant     Interventions/Recommendations (treatment strategy):  Collaboration of nutritional care with other providers.  Low Na and Food safety education provided and ONS     Nutrition Diagnosis Status:   Continue     Reason for Assessment    Reason For Assessment: RD follow-up  Diagnosis: liver disease (ESLD in BROWN)  Relevant Medical History: Gout, HTN, CHF, CAD  Interdisciplinary Rounds: did not attend    General Information Comments:   2/13: RD follow up. Pt reports appetite remains low since sx. Tolerated a few bites of food each meals. Tolerated a whole can of Boost this AM. Pt reports was taking Premiere protein at home PTA, encourage to continue ONS after dc. Nausea improved with Zofran. Pt stated she usually takes Protonix at home to ease swallowing. Had BM this AM. Reiterated post tx nutrition education, all questions has been answered. Visual NFPE completed today, pt appears nourished. No indicator of malnutrition.     2/10: Pt seen by RD. Pt denies n/v/d, but endorses constipation. Pt states that appetite is poor. Pt states that there have been no changes in weight recently. Generalized trace edema noted. Pt is at risk for wt shifts d/t edema. Pt and caregiver received Food Safety and Low Na diet education. Pt's and caregiver's questions were answered and RD's contact information was left on edu materials.  Nutrition Discharge Planning: Post transplant nutrition education provided on 2/10. Food safety/drug  "interactions emphasized. General healthy/low salt diet recommended.     Nutrition Discharge Planning: Post transplant nutrition education provided on 2/10. Food safety/drug interactions emphasized. General healthy/low salt diet recommended. Education material left at bedside. No other needs identified.    Nutrition Risk Screen    Nutrition Risk Screen: no indicators present    Nutrition/Diet History    Patient Reported Diet/Restrictions/Preferences:  (Heart healthy)  Spiritual, Cultural Beliefs, Sikh Practices, Values that Affect Care: no  Supplemental Drinks or Food Habits: Premier Protein    Anthropometrics    Temp: 98.8 °F (37.1 °C)  Height Method: Stated  Height: 5' 2" (157.5 cm)  Height (inches): 62 in  Weight Method: Bed Scale  Weight: 80.5 kg (177 lb 7.5 oz)  Weight (lb): 177.47 lb  Ideal Body Weight (IBW), Female: 110 lb  % Ideal Body Weight, Female (lb): 151.74 %  BMI (Calculated): 32.5  BMI Grade: 30 - 34.9- obesity - grade I     Lab/Procedures/Meds    Pertinent Labs Reviewed: reviewed  Pertinent Labs Comments: Na 133, BUN 25, Tbili 3.1, , AST 96,   Pertinent Medications Reviewed: reviewed  Pertinent Medications Comments: prednisone, methylprednisolone, tacrolimus, furosemide, pantoprazole, mycophenolate, heparin    Estimated/Assessed Needs    Weight Used For Calorie Calculations: 75.7 kg (166 lb 14.2 oz)  Energy Calorie Requirements (kcal): 1538 kcal (MSJx1.25)  Energy Need Method: [place]  Protein Requirements: 91- 114g (1.2-1.5g/kg)  Weight Used For Protein Calculations: 75.7 kg (166 lb 14.2 oz)  Fluid Requirements (mL): 1ml/1kcal or per MD  Estimated Fluid Requirement Method: RDA Method  RDA Method (mL): 1538    Nutrition Prescription Ordered    Current Diet Order: Diabetic  Oral Nutrition Supplement: Boost Plus    Evaluation of Received Nutrient/Fluid Intake    I/O: - 1.9 L since admit  Energy Calories Required: not meeting needs  Protein Required: not meeting needs  Fluid " Required:  (as per MD)  Total Fluid Intake (mL/kg): 1ml/kcl or per MD  Comments: LBM 2/12  Tolerance: tolerating    Nutrition Risk    Level of Risk/Frequency of Follow-up:  (1x/week)     Monitor and Evaluation    Food and Nutrient Intake: energy intake, food and beverage intake  Food and Nutrient Adminstration: diet order  Knowledge/Beliefs/Attitudes: food and nutrition knowledge/skill  Physical Activity and Function: nutrition-related ADLs and IADLs  Anthropometric Measurements: height/length, weight, weight change, body mass index  Biochemical Data, Medical Tests and Procedures: electrolyte and renal panel, gastrointestinal profile, glucose/endocrine profile, inflammatory profile, lipid profile  Nutrition-Focused Physical Findings: overall appearance     Nutrition Follow-Up    RD Follow-up?: Yes

## 2023-02-13 NOTE — ASSESSMENT & PLAN NOTE
- s/p DBD LTX 2/9/23 (Donor RPR+, HCV Ab+/FRANNIE-, CMV D+/R+)  - Surgery w/o complication  - POD#1 Liver US stable  - LFTs trending down  - Tolerating diet, (+) flatus (+) BMs, cont bowel regimen. encourage OOBTC, pain well controlled  - Dc'd 2nd drain 2/12  - Early satiety, started boost and Protonix 2/12  - Lasix 20mg Iv and albumin 25% x 1 2/12, oral lasix 2/13

## 2023-02-13 NOTE — PLAN OF CARE
AAO x 4. VSS, afebrile, SpO2>92% on RA. BG monitoring AC/HS no coverage indicated. Pain controlled with PRN oxycodone. Chevron incision INA with staples. Pt up with stand by assist. Fall precautions maintained and pt repositions self. See flowsheet for assessment findings. POC reviewed with pt and spouse at bedside. Pulled 100% self meds

## 2023-02-13 NOTE — CARE UPDATE
-Glucose Goal 140-180    -A1C:   Lab Results   Component Value Date    HGBA1C <4.0 (A) 02/08/2023       -HOME REGIMEN: none; no previous hx of DM     -INPATIENT REGIMEN:Novolog Correction Scale     -GLUCOSE TREND FOR THE PAST 24HRS:   Recent Labs   Lab 02/12/23  0557 02/12/23  0836 02/12/23  1103 02/12/23  1658 02/12/23  2113 02/13/23  0814   POCTGLUCOSE 92 95 123* 155* 123* 109         -NO HYPOGYCEMIAS NOTED     - Diet  Diet diabetic [facility]; 2000 Calorie    -TOLERATING 50 % OF PO DIET       -Steroids - solumedrol 80 mg daily    Remains in 73920/40497 A. BG well controlled and within goal ranges with minimal prn SQ insulin requirements.  4 Days Post-Op.         Plan:   - -Adjust Novolog Moderate dose correction with ISF 25 starting at 150 given high dose steroids  - POCT Glucose before meals and at bedtime  - Hypoglycemia protocol in place      ** Please notify Endocrine for any change and/or advance in diet**  ** Please call Endocrine for any BG related issues **     Discharge Planning:   TBD. Please notify endocrinology prior to discharge.

## 2023-02-13 NOTE — PROGRESS NOTES
Met with patient and her  for  discharge teaching.  Patient's daughter in law, Bernadette, was on the telephone and able to particpate in this education.  Reviewed My New Journey: Living Smart After My Liver Transplant.  Sections reviewed were: First Steps, Appointments and Prevention.  Medications will be reviewed by Pharm D.  Allowed time for questions and answers.  Asked patient to complete the review questions in the discharge book.

## 2023-02-13 NOTE — PLAN OF CARE
Recommendations    1. Liberalize to Regular diet with texture per SLP     2. Continue Boost Plus TID      3. RD to monitor and follow    Goals: Meet % EEN, EPN by RD f/u  Nutrition Goal Status: new  Communication of RD Recs:  (POC)

## 2023-02-13 NOTE — PT/OT/SLP PROGRESS
Physical Therapy Treatment    Patient Name:  Argelia Sevilla   MRN:  [id number]    Recommendations:     Discharge Recommendations: home with home health  Discharge Equipment Recommendations:  (TBD as pt progresses)  Barriers to discharge: Inaccessible home       Assessment:     Argelia Sevilla is a 72 y.o. female admitted with a medical diagnosis of S/P liver transplant.  She presents with the following impairments/functional limitations: weakness, impaired balance, pain, impaired functional mobility, gait instability, decreased lower extremity function. Pt participated, and tolerated treatment well. Pt will continue to benefit from skilled PT services at this time to improve all deficits noted above. Resume PT POC as indicated.     Rehab Prognosis: Good; patient would benefit from acute skilled PT services to address these deficits and reach maximum level of function.    Recent Surgery: Procedure(s) (LRB):  TRANSPLANT, LIVER (N/A) 4 Days Post-Op    Plan:     During this hospitalization, patient to be seen 4 x/week to address the identified rehab impairments via gait training, therapeutic activities, therapeutic exercises and progress toward the following goals:    Plan of Care Expires:  03/13/23    Subjective     Chief Complaint: none stated  Patient/Family Comments/goals: none stated  Pain/Comfort:  Pain Rating 1: 0/10  Pain Rating Post-Intervention 1: 0/10      Objective:     Communicated with nursing prior to session.  Patient found up in chair with  (lines intact and visitor present) upon PT entry to room.     General Precautions: Standard, fall  Orthopedic Precautions: N/A  Braces: N/A  Respiratory Status: Room air     Functional Mobility:  Transfers:  Sit to Stand:  stand by assistance with 4 wheeled walker  Gait: ~120ft with rollator, and SBA for safety. No LOB noted.       AM-PAC 6 CLICK MOBILITY   Total Score: 17       Treatment & Education:  -All questions/concerns answered within PTA scope of  practice.     Patient left up in chair with all lines intact, call button in reach, nursing notified, and visitor present..    GOALS:   Multidisciplinary Problems       Physical Therapy Goals          Problem: Physical Therapy    Goal Priority Disciplines Outcome Goal Variances Interventions   Physical Therapy Goal     PT, PT/OT Ongoing, Progressing     Description: PT goals until 2/21/23    1. Pt supine to sit with SBA through sidelying-not met  2. Pt sit to supine with SBA through sidelying-not met  3. Pt sit to stand with LRAD as needed for safety with SBA-not met  4. Pt to perform gait 200ft with LRAD as needed for safety with SBA.-not met  5. Pt to up/down 4 steps with L UE rail with CGA.-not met  6. Pt to perform B LE exs in sitting or supine x 10 reps to strengthen L LE to improve L ankle DF.-not met                        Time Tracking:     PT Received On: 02/13/23  PT Start Time: 1511     PT Stop Time: 1521  PT Total Time (min): 10 min     Billable Minutes: Gait Training 10    Treatment Type: Treatment  PT/PTA: PTA     PTA Visit Number: 1     02/13/2023

## 2023-02-13 NOTE — PROGRESS NOTES
[facility] - [facility]plant Stepdown  Liver Transplant  Progress Note    Patient Name: Argelia Sevilla  MRN: [id number]  Admission Date: 2/8/2023  Hospital Length of Stay: 5 days  Code Status: Full Code  Primary Care Provider: Primary Doctor No  Post-Operative Day: 4    ORGAN:   LIVER  Disease Etiology: Cirrhosis: Fatty Liver (BR[place])  Donor Type:   [facility] High Risk:   No  Donor CMV Status:   Donor CMV Status: Positive  Donor HBcAB:   Positive  Donor HCV Status:   Negative  Donor HBV FRANNIE: Negative  Donor HCV FRANNIE: Negative  Whole or Partial: Whole Liver  Biliary Anastomosis: End to End  Arterial Anatomy: Standard  Subjective:     History of Present Illness:  Ms. 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 She presents as a primary candidate for liver transplant with a MELD of 23. Denies recent fever/chills and recent hospitalizations. OR time is 7:30/9:00 AM with Dr. Adhikari. No intraop HD. Of note, donor is RPR+, recipient will need ID consult.       Hospital Course:  Patient is now s/p DBD LTX on 2/9/23 (Donor RPR+, Hcv Ab+/FRANNIE-, HBcAb+, CMV D+/R+). Surgery was without complication. POD#1 Liver US stable. LFTs trending down.  Donor RPR+, ID rec PCN weekly x 3 (#1 given 2/10). Transferred to TSU POD#1. 2nd ZACKERY drain removed 2/12. Albumin 25% and Lasix 20mg x 1 given 2/12 with good response.     Interval History: NAEON. Progressing well. LFTs trending down. Renal function stable. CVC, parham, and drains are out. Diuresed well with lasix/albumin yesterday. Start 20 mg oral lasix daily. (+) flatus (+) Bms. Tolerating some food, but poor appetite. Supplements ordered and intake encouraged. She is now on Protonix for hx GERD. Ambulating in halls with walker. PT/OT recommend HH @ discharge. Tentative dc within next few days. WCTM.      Scheduled Meds:   bisacodyL  10 mg Oral QHS    furosemide  20 mg Oral Daily    heparin (porcine)  5,000  Units Subcutaneous Q8H    lamiVUDine  150 mg Oral Daily    [START ON 2/14/2023] methylPREDNISolone sodium succinate injection  40 mg Intravenous Daily    Followed by    [START ON 2/15/2023] predniSONE  20 mg Oral Daily    mupirocin  1 g Nasal BID    mycophenolate  1,000 mg Oral BID    nystatin  500,000 Units Mouth/Throat TID PC    pantoprazole  40 mg Oral Daily    penicillin G benzathine  2.4 Million Units Intramuscular Weekly    polyethylene glycol  17 g Oral Daily    senna  8.6 mg Oral Daily    [START ON 2/16/2023] sulfamethoxazole-trimethoprim 400-80mg  1 tablet Oral Daily AM    tacrolimus  4 mg Oral BID    [START ON 2/19/2023] valGANciclovir  450 mg Oral Daily     Continuous Infusions:  PRN Meds:sodium chloride 0.9%, acetaminophen, bisacodyL, calcium carbonate, dextrose 10%, dextrose 10%, diphenhydrAMINE, glucagon (human recombinant), glucose, glucose, hydrALAZINE, HYDROmorphone, insulin aspart U-100, labetalol, ondansetron, oxyCODONE, oxyCODONE, prochlorperazine, sodium chloride 0.9%    Review of Systems   Constitutional:  Positive for fatigue. Negative for appetite change, chills and fever.   Respiratory:  Negative for cough, chest tightness, shortness of breath, wheezing and stridor.    Cardiovascular:  Negative for chest pain, palpitations and leg swelling.   Gastrointestinal:  Positive for abdominal pain. Negative for abdominal distention, constipation, diarrhea, nausea and vomiting.   Genitourinary:  Negative for difficulty urinating, dysuria, frequency and urgency.   Musculoskeletal:  Negative for back pain and neck pain.   Skin:  Positive for wound. Negative for color change, pallor and rash.   Allergic/Immunologic: Positive for immunocompromised state.   Neurological:  Negative for dizziness, weakness and headaches.   Psychiatric/Behavioral:  Negative for confusion and sleep disturbance.    Objective:     Vital Signs (Most Recent):  Temp: 98.8 °F (37.1 °C) (02/13/23 1105)  Pulse: 69  (02/13/23 1105)  Resp: 18 (02/13/23 1105)  BP: (!) 159/70 (02/13/23 1105)  SpO2: (!) 93 % (02/13/23 1105)   Vital Signs (24h Range):  Temp:  [97.6 °F (36.4 °C)-99.1 °F (37.3 °C)] 98.8 °F (37.1 °C)  Pulse:  [61-69] 69  Resp:  [15-18] 18  SpO2:  [91 %-96 %] 93 %  BP: (140-159)/(63-70) 159/70     Weight: 80.5 kg (177 lb 7.5 oz)  Body mass index is 32.46 kg/m².    Intake/Output - Last 3 Shifts         02/11 0700  02/12 0659 02/12 0700  02/13 0659 02/13 0700  02/14 0659    P.O. 840 720     I.V. (mL/kg) 15.5 (0.2)      IV Piggyback       Total Intake(mL/kg) 855.5 (10.6) 720 (8.9)     Urine (mL/kg/hr) 900 (0.5) 2200 (1.1) 300 (0.7)    Emesis/NG output 0 0     Drains 500 130     Stool 0 0     Total Output 1400 2330 300    Net -544.5 -1610 -300           Urine Occurrence  3 x     Stool Occurrence 0 x 3 x     Emesis Occurrence 0 x 0 x             Physical Exam  Vitals and nursing note reviewed.   Constitutional:       Appearance: Normal appearance.   HENT:      Head: Normocephalic and atraumatic.   Eyes:      General: No scleral icterus.        Right eye: No discharge.         Left eye: No discharge.   Cardiovascular:      Rate and Rhythm: Normal rate and regular rhythm.      Pulses: Normal pulses.   Pulmonary:      Effort: Pulmonary effort is normal. No respiratory distress.      Breath sounds: Examination of the right-middle field reveals decreased breath sounds. Examination of the right-lower field reveals decreased breath sounds. Examination of the left-lower field reveals decreased breath sounds. Decreased breath sounds present. No wheezing or rales.   Abdominal:      General: Bowel sounds are normal. There is no distension.      Palpations: Abdomen is soft.      Tenderness: There is abdominal tenderness (incisional). There is no guarding.       Musculoskeletal:         General: Normal range of motion.      Right lower leg: Edema present.      Left lower leg: Edema present.   Skin:     General: Skin is warm and dry.       Capillary Refill: Capillary refill takes less than 2 seconds.   Neurological:      Mental Status: She is alert and oriented to person, place, and time.   Psychiatric:         Mood and Affect: Mood normal.         Behavior: Behavior normal. Behavior is cooperative.         Thought Content: Thought content normal.     Laboratory:  Immunosuppressants           Stop Route Frequency     tacrolimus capsule 4 mg         -- Oral 2 times daily     mycophenolate capsule 1,000 mg         -- Oral 2 times daily          CBC:   Recent Labs   Lab 02/13/23  0634   WBC 4.36   RBC 2.19*   HGB 7.5*   HCT 22.1*   PLT 54*   *   MCH 34.2*   MCHC 33.9     CMP:   Recent Labs   Lab 02/13/23  0634   GLU 91   CALCIUM 7.9*   ALBUMIN 2.9*   PROT 5.1*   *   K 3.9   CO2 23      BUN 25*   CREATININE 0.7   ALKPHOS 182*   *   AST 96*   BILITOT 3.1*     Coagulation:   Recent Labs   Lab 02/11/23  0434 02/12/23  0616 02/13/23  0634   INR 1.1   < > 1.0   APTT 36.0*  --   --     < > = values in this interval not displayed.     Labs within the past 24 hours have been reviewed.    Diagnostic Results:  I have personally reviewed all pertinent imaging studies.    Debility/Functional status: Patient debilitated by evidence of Weakness and Age-related physical debility. Physical and occupational therapy ordered daily to evaluate and treat. Debility was: present on admission.    Assessment/Plan:     * S/P liver transplant  - s/p DBD LTX 2/9/23 (Donor RPR+, HCV Ab+/FRANNIE-, CMV D+/R+)  - Surgery w/o complication  - POD#1 Liver US stable  - LFTs trending down  - Tolerating diet, (+) flatus (+) BMs, cont bowel regimen. encourage OOBTC, pain well controlled  - Dc'd 2nd drain 2/12  - Early satiety, started boost and Protonix 2/12  - Lasix 20mg Iv and albumin 25% x 1 2/12, oral lasix 2/13    Hepatitis B virus infection in cadaveric donor  - Continue Lamivudine.       Gastroesophageal reflux disease without esophagitis  - Continue  Protonix.      Acute blood loss anemia  - Expected s/p LTX  - H/H trending down but no overt signs of bleeding, continue to monitor closely  - Keep T&S active    Long-term use of immunosuppressant medication  Maintenance IS with prograf. cont to check tacrolimus level daily. Assess for toxicity and adjust level as needed    At risk for infection transmitted from donor  - Donor RPR+, ID consulted, rec bicillin weekly x 3 (2/10, 2/17, 2/24)  - Donor HBcAb+, recipient on lamuvidine   - Donor HCV Ab+/FRANNIE-, monitor HCV PCR per protocol    Steroid-induced hyperglycemia  - Endocrine consulted for post-op hyperglycemia 2/2 steroids, appreciate assistance    At risk for opportunistic infections  - Continue Valcyte for CMV prophylaxis for 3 months (CMV D+/R+)  - Continue Bactrim for PJP prophylaxis for 6 months  - Continue Nystatin for thrush prevention for 4 weeks  - Bactrim and Valcyte to start on POD#10 or at discharge    Prophylactic immunotherapy  - See long-term use of immunosuppressant medication    Thrombocytopenia  - 2/2 ELSD, expect improvement post-LTX  - Monitor with CBC     Coronary artery disease involving native coronary artery of native heart without angina pectoris  - ASA held for surgery, restart once plts > 70 and BUN < 70    Anemia, chronic disease  - 2/2 liver disease., should improve post transplant   - Monitor with CBC         VTE Risk Mitigation (From admission, onward)         Ordered     heparin (porcine) injection 5,000 Units  Every 8 hours         02/09/23 1445     Place sequential compression device  Until discontinued         02/09/23 1445     IP VTE HIGH RISK PATIENT  Once         02/09/23 1445                The patients clinical status was discussed at multidisplinary rounds, involving transplant surgery, transplant medicine, pharmacy, nursing, nutrition, and social work    Discharge Planning:  Tentative dc within next few days.    Cherrie Tamez PA-C  Liver Transplant  [facility] -  Transplant Stepdown

## 2023-02-14 LAB
ALBUMIN SERPL BCP-MCNC: 2.8 G/DL (ref 3.5–5.2)
ALP SERPL-CCNC: 189 U/L (ref 55–135)
ALT SERPL W/O P-5'-P-CCNC: 148 U/L (ref 10–44)
ANION GAP SERPL CALC-SCNC: 7 MMOL/L (ref 8–16)
AST SERPL-CCNC: 62 U/L (ref 10–40)
BASOPHILS # BLD AUTO: 0.02 K/UL (ref 0–0.2)
BASOPHILS NFR BLD: 0.4 % (ref 0–1.9)
BILIRUB SERPL-MCNC: 3.1 MG/DL (ref 0.1–1)
BUN SERPL-MCNC: 14 MG/DL (ref 8–23)
CALCIUM SERPL-MCNC: 8.3 MG/DL (ref 8.7–10.5)
CHLORIDE SERPL-SCNC: 99 MMOL/L (ref 95–110)
CO2 SERPL-SCNC: 27 MMOL/L (ref 23–29)
CREAT SERPL-MCNC: 0.7 MG/DL (ref 0.5–1.4)
DIFFERENTIAL METHOD: ABNORMAL
EOSINOPHIL # BLD AUTO: 0.5 K/UL (ref 0–0.5)
EOSINOPHIL NFR BLD: 11.2 % (ref 0–8)
ERYTHROCYTE [DISTWIDTH] IN BLOOD BY AUTOMATED COUNT: 16.3 % (ref 11.5–14.5)
EST. GFR  (NO RACE VARIABLE): >60 ML/MIN/1.73 M^2
GLUCOSE SERPL-MCNC: 94 MG/DL (ref 70–110)
HCT VFR BLD AUTO: 23.7 % (ref 37–48.5)
HGB BLD-MCNC: 8 G/DL (ref 12–16)
IMM GRANULOCYTES # BLD AUTO: 0.06 K/UL (ref 0–0.04)
IMM GRANULOCYTES NFR BLD AUTO: 1.3 % (ref 0–0.5)
LYMPHOCYTES # BLD AUTO: 0.4 K/UL (ref 1–4.8)
LYMPHOCYTES NFR BLD: 7.4 % (ref 18–48)
MAGNESIUM SERPL-MCNC: 1.8 MG/DL (ref 1.6–2.6)
MCH RBC QN AUTO: 34.2 PG (ref 27–31)
MCHC RBC AUTO-ENTMCNC: 33.8 G/DL (ref 32–36)
MCV RBC AUTO: 101 FL (ref 82–98)
MONOCYTES # BLD AUTO: 0.6 K/UL (ref 0.3–1)
MONOCYTES NFR BLD: 12 % (ref 4–15)
NEUTROPHILS # BLD AUTO: 3.2 K/UL (ref 1.8–7.7)
NEUTROPHILS NFR BLD: 67.7 % (ref 38–73)
NRBC BLD-RTO: 0 /100 WBC
PHOSPHATE SERPL-MCNC: 2.2 MG/DL (ref 2.7–4.5)
PLATELET # BLD AUTO: 69 K/UL (ref 150–450)
PMV BLD AUTO: 11.2 FL (ref 9.2–12.9)
POCT GLUCOSE: 151 MG/DL (ref 70–110)
POCT GLUCOSE: 167 MG/DL (ref 70–110)
POCT GLUCOSE: 184 MG/DL (ref 70–110)
POTASSIUM SERPL-SCNC: 3.7 MMOL/L (ref 3.5–5.1)
PROT SERPL-MCNC: 5.2 G/DL (ref 6–8.4)
RBC # BLD AUTO: 2.34 M/UL (ref 4–5.4)
SODIUM SERPL-SCNC: 133 MMOL/L (ref 136–145)
TACROLIMUS BLD-MCNC: 4.2 NG/ML (ref 5–15)
WBC # BLD AUTO: 4.74 K/UL (ref 3.9–12.7)

## 2023-02-14 PROCEDURE — 80197 ASSAY OF TACROLIMUS: CPT | Performed by: STUDENT IN AN ORGANIZED HEALTH CARE EDUCATION/TRAINING PROGRAM

## 2023-02-14 PROCEDURE — 25000003 PHARM REV CODE 250: Performed by: NURSE PRACTITIONER

## 2023-02-14 PROCEDURE — 63600175 PHARM REV CODE 636 W HCPCS: Performed by: STUDENT IN AN ORGANIZED HEALTH CARE EDUCATION/TRAINING PROGRAM

## 2023-02-14 PROCEDURE — 63600175 PHARM REV CODE 636 W HCPCS: Performed by: PHYSICIAN ASSISTANT

## 2023-02-14 PROCEDURE — 97116 GAIT TRAINING THERAPY: CPT | Mod: CQ

## 2023-02-14 PROCEDURE — 36415 COLL VENOUS BLD VENIPUNCTURE: CPT | Performed by: NURSE PRACTITIONER

## 2023-02-14 PROCEDURE — 84100 ASSAY OF PHOSPHORUS: CPT | Performed by: PHYSICIAN ASSISTANT

## 2023-02-14 PROCEDURE — 63600175 PHARM REV CODE 636 W HCPCS: Performed by: TRANSPLANT SURGERY

## 2023-02-14 PROCEDURE — 25000003 PHARM REV CODE 250: Performed by: STUDENT IN AN ORGANIZED HEALTH CARE EDUCATION/TRAINING PROGRAM

## 2023-02-14 PROCEDURE — 80053 COMPREHEN METABOLIC PANEL: CPT | Performed by: STUDENT IN AN ORGANIZED HEALTH CARE EDUCATION/TRAINING PROGRAM

## 2023-02-14 PROCEDURE — 20600001 HC STEP DOWN PRIVATE ROOM

## 2023-02-14 PROCEDURE — 63600175 PHARM REV CODE 636 W HCPCS: Performed by: NURSE PRACTITIONER

## 2023-02-14 PROCEDURE — 85025 COMPLETE CBC W/AUTO DIFF WBC: CPT | Performed by: STUDENT IN AN ORGANIZED HEALTH CARE EDUCATION/TRAINING PROGRAM

## 2023-02-14 PROCEDURE — 83735 ASSAY OF MAGNESIUM: CPT | Performed by: NURSE PRACTITIONER

## 2023-02-14 PROCEDURE — 25000003 PHARM REV CODE 250: Performed by: PHYSICIAN ASSISTANT

## 2023-02-14 RX ORDER — ASPIRIN 81 MG/1
81 TABLET ORAL DAILY
Status: DISCONTINUED | OUTPATIENT
Start: 2023-02-15 | End: 2023-02-14

## 2023-02-14 RX ORDER — FUROSEMIDE 40 MG/1
40 TABLET ORAL DAILY
Status: DISCONTINUED | OUTPATIENT
Start: 2023-02-15 | End: 2023-02-15

## 2023-02-14 RX ORDER — TACROLIMUS 1 MG/1
5 CAPSULE ORAL 2 TIMES DAILY
Status: DISCONTINUED | OUTPATIENT
Start: 2023-02-14 | End: 2023-02-15 | Stop reason: HOSPADM

## 2023-02-14 RX ORDER — TACROLIMUS 1 MG/1
1 CAPSULE ORAL ONCE
Status: COMPLETED | OUTPATIENT
Start: 2023-02-14 | End: 2023-02-14

## 2023-02-14 RX ORDER — NIFEDIPINE 30 MG/1
30 TABLET, EXTENDED RELEASE ORAL DAILY
Status: DISCONTINUED | OUTPATIENT
Start: 2023-02-14 | End: 2023-02-15 | Stop reason: HOSPADM

## 2023-02-14 RX ORDER — FUROSEMIDE 20 MG/1
20 TABLET ORAL ONCE
Status: COMPLETED | OUTPATIENT
Start: 2023-02-14 | End: 2023-02-14

## 2023-02-14 RX ADMIN — METHYLPREDNISOLONE SODIUM SUCCINATE 40 MG: 40 INJECTION, POWDER, FOR SOLUTION INTRAMUSCULAR; INTRAVENOUS at 09:02

## 2023-02-14 RX ADMIN — HEPARIN SODIUM 5000 UNITS: 5000 INJECTION INTRAVENOUS; SUBCUTANEOUS at 02:02

## 2023-02-14 RX ADMIN — NYSTATIN 500000 UNITS: 500000 SUSPENSION ORAL at 06:02

## 2023-02-14 RX ADMIN — ACETAMINOPHEN 650 MG: 325 TABLET ORAL at 12:02

## 2023-02-14 RX ADMIN — LAMIVUDINE 150 MG: 150 TABLET, FILM COATED ORAL at 09:02

## 2023-02-14 RX ADMIN — NIFEDIPINE 30 MG: 30 TABLET, FILM COATED, EXTENDED RELEASE ORAL at 04:02

## 2023-02-14 RX ADMIN — MYCOPHENOLATE MOFETIL 1000 MG: 250 CAPSULE ORAL at 09:02

## 2023-02-14 RX ADMIN — PANTOPRAZOLE SODIUM 40 MG: 40 TABLET, DELAYED RELEASE ORAL at 08:02

## 2023-02-14 RX ADMIN — NYSTATIN 500000 UNITS: 500000 SUSPENSION ORAL at 02:02

## 2023-02-14 RX ADMIN — FUROSEMIDE 20 MG: 20 TABLET ORAL at 10:02

## 2023-02-14 RX ADMIN — TACROLIMUS 1 MG: 1 CAPSULE ORAL at 10:02

## 2023-02-14 RX ADMIN — INSULIN ASPART 2 UNITS: 100 INJECTION, SOLUTION INTRAVENOUS; SUBCUTANEOUS at 12:02

## 2023-02-14 RX ADMIN — TACROLIMUS 4 MG: 1 CAPSULE ORAL at 08:02

## 2023-02-14 RX ADMIN — FUROSEMIDE 20 MG: 20 TABLET ORAL at 08:02

## 2023-02-14 RX ADMIN — INSULIN ASPART 2 UNITS: 100 INJECTION, SOLUTION INTRAVENOUS; SUBCUTANEOUS at 06:02

## 2023-02-14 RX ADMIN — NYSTATIN 500000 UNITS: 500000 SUSPENSION ORAL at 08:02

## 2023-02-14 RX ADMIN — HEPARIN SODIUM 5000 UNITS: 5000 INJECTION INTRAVENOUS; SUBCUTANEOUS at 05:02

## 2023-02-14 RX ADMIN — MUPIROCIN 1 G: 20 OINTMENT TOPICAL at 09:02

## 2023-02-14 RX ADMIN — HEPARIN SODIUM 5000 UNITS: 5000 INJECTION INTRAVENOUS; SUBCUTANEOUS at 09:02

## 2023-02-14 RX ADMIN — TACROLIMUS 5 MG: 1 CAPSULE ORAL at 06:02

## 2023-02-14 NOTE — ASSESSMENT & PLAN NOTE
- s/p DBD LTX 2/9/23 (Donor RPR+, HCV Ab+/FRANNIE-, CMV D+/R+)  - Surgery w/o complication  - POD#1 Liver US stable  - LFTs trending down  - Tolerating diet, (+) flatus (+) BMs, cont bowel regimen. encourage OOBTC, pain well controlled  - Dc'd 2nd drain 2/12  - Early satiety, started boost and Protonix 2/12   - Lasix 20mg Iv and albumin 25% x 1 2/12  - Oral lasix 2/13 (increased dose from 20 mg to 40 mg daily on 2/14)  - Plan for liver US 2/15 with dc to follow if US satisfactory.

## 2023-02-14 NOTE — ASSESSMENT & PLAN NOTE
- Expected s/p LTX  - H/H trending down but no overt signs of bleeding, continue to monitor closely  - Now H/H stable  - Keep T&S active

## 2023-02-14 NOTE — ASSESSMENT & PLAN NOTE
- Maintenance IS with Prograf. Cont to check Prograf level daily. Assess for toxicity and adjust level as needed.  - Continue Cellcept.   - Continue steroids.

## 2023-02-14 NOTE — PLAN OF CARE
Pt admitted 2/8, received liver transplant on 2/9/23. Stepped down 2/10. Pt is AAOx4, VSS on bedside monitor, afebrile, on RA. LFTs trending down. Chevron incision INA with staples, pt correctly paints with betadine. RLQ ZACKERY removal sites with sutures C/D/I. BLE edema 2+, PO lasix dose increased today. PO procardia started for HTN. Pt pulling self meds with her  100%. Accuchecks AC/HS. Pt is up with standby-assist up to toilet, remains free from falls/injuries so far this shift. Nonskid socks on, call bell within reach, bed locked/lowest position. Pt verbalized understanding to call for any needs/assistance. Possible discharge tomorrow.

## 2023-02-14 NOTE — PLAN OF CARE
Pt is AAOx4, afebrile, and VSS. Pt complained of loose stools overnight. Scheduled stool softeners held and PRN PO Zofran given w/ mild relief of symptoms. Pt has a chevron incision INA w/ staples painted w/ povidone. Pt has 2 RLQ  ZACKERY sites INA w/ stitches. Pt denies incisional pain overnight. Pt's BG checked ACHS, not requiring coverage overnight. Pt cont to have +2 BLE edema overnight. Pt is in bed in the lowest position, wheels locked, and call light in reach.

## 2023-02-14 NOTE — PT/OT/SLP PROGRESS
Physical Therapy Treatment    Patient Name:  Argelia Sevilla   MRN:  [id number]    Recommendations:     Discharge Recommendations: home with home health  Discharge Equipment Recommendations: Bedside Commode    Barriers to discharge: Inaccessible home    Assessment:     Argelia Sevilla is a 72 y.o. female admitted with a medical diagnosis of S/P liver transplant.  She presents with the following impairments/functional limitations: weakness, impaired balance, pain, impaired functional mobility, gait instability, decreased lower extremity function. Pt participated, and tolerated treatment well. Pt will continue to benefit from skilled PT services to improve all deficits noted above. Resume PT POC as indicated.     Rehab Prognosis: Good; patient would benefit from acute skilled PT services to address these deficits and reach maximum level of function.    Recent Surgery: Procedure(s) (LRB):  TRANSPLANT, LIVER (N/A) 5 Days Post-Op    Plan:     During this hospitalization, patient to be seen 4 x/week to address the identified rehab impairments via gait training, therapeutic activities, therapeutic exercises and progress toward the following goals:    Plan of Care Expires:  03/13/23    Subjective     Chief Complaint: none stated  Patient/Family Comments/goals: none stated  Pain/Comfort:  Pain Rating 1: 4/10  Location - Side 1: Right  Location - Orientation 1: lower  Location 1: abdomen  Pain Addressed 1: Reposition  Pain Rating Post-Intervention 1:  (not rated)      Objective:     Communicated with nursing prior to session.  Patient found up in chair with  (lines intact and spouse present) upon PT entry to room.     General Precautions: Standard, fall  Orthopedic Precautions: N/A  Braces: N/A  Respiratory Status: Room air     Functional Mobility:  Transfers:  Sit to Stand:  stand by assistance with rollator  Gait: ~200ft SBA with rollator. No LOB noted.       AM-PAC 6 CLICK MOBILITY   Total Score: 17     Treatment &  Education:  -All questions/concerns answered within PTA scope of practice.   -Donned mask and robe prior to ambulating in hallway.    Patient left up in chair with all lines intact, call button in reach, nursing notified, and spouse  present..    GOALS:   Multidisciplinary Problems       Physical Therapy Goals          Problem: Physical Therapy    Goal Priority Disciplines Outcome Goal Variances Interventions   Physical Therapy Goal     PT, PT/OT Ongoing, Progressing     Description: PT goals until 2/21/23    1. Pt supine to sit with SBA through sidelying-not met  2. Pt sit to supine with SBA through sidelying-not met  3. Pt sit to stand with LRAD as needed for safety with SBA-not met  4. Pt to perform gait 200ft with LRAD as needed for safety with SBA.-not met  5. Pt to up/down 4 steps with L UE rail with CGA.-not met  6. Pt to perform B LE exs in sitting or supine x 10 reps to strengthen L LE to improve L ankle DF.-not met                        Time Tracking:     PT Received On: 02/14/23  PT Start Time: 0830     PT Stop Time: 0847  PT Total Time (min): 17 min     Billable Minutes: Gait Training 17    Treatment Type: Treatment  PT/PTA: PTA     PTA Visit Number: 2     02/14/2023

## 2023-02-14 NOTE — SUBJECTIVE & OBJECTIVE
Scheduled Meds:   bisacodyL  10 mg Oral QHS    [START ON 2/15/2023] furosemide  40 mg Oral Daily    heparin (porcine)  5,000 Units Subcutaneous Q8H    lamiVUDine  150 mg Oral Daily    mycophenolate  1,000 mg Oral BID    nystatin  500,000 Units Mouth/Throat TID PC    pantoprazole  40 mg Oral Daily    penicillin G benzathine  2.4 Million Units Intramuscular Weekly    polyethylene glycol  17 g Oral Daily    [START ON 2/15/2023] predniSONE  20 mg Oral Daily    senna  8.6 mg Oral Daily    [START ON 2/16/2023] sulfamethoxazole-trimethoprim 400-80mg  1 tablet Oral Daily AM    tacrolimus  5 mg Oral BID    [START ON 2/19/2023] valGANciclovir  450 mg Oral Daily     Continuous Infusions:  PRN Meds:sodium chloride 0.9%, acetaminophen, bisacodyL, calcium carbonate, dextrose 10%, dextrose 10%, diphenhydrAMINE, glucagon (human recombinant), glucose, glucose, hydrALAZINE, HYDROmorphone, insulin aspart U-100, labetalol, ondansetron, oxyCODONE, oxyCODONE, prochlorperazine, sodium chloride 0.9%    Review of Systems   Constitutional:  Positive for fatigue. Negative for appetite change, chills and fever.   Respiratory:  Negative for cough, chest tightness, shortness of breath, wheezing and stridor.    Cardiovascular:  Positive for leg swelling. Negative for chest pain and palpitations.   Gastrointestinal:  Positive for abdominal pain. Negative for abdominal distention, constipation, diarrhea, nausea and vomiting.   Genitourinary:  Negative for difficulty urinating, dysuria, frequency and urgency.   Musculoskeletal:  Negative for back pain and neck pain.   Skin:  Positive for wound. Negative for color change, pallor and rash.   Allergic/Immunologic: Positive for immunocompromised state.   Neurological:  Negative for dizziness, weakness and headaches.   Psychiatric/Behavioral:  Negative for agitation, behavioral problems, confusion and sleep disturbance.    Objective:     Vital Signs (Most Recent):  Temp: 98.8 °F (37.1 °C) (02/14/23  1211)  Pulse: 66 (02/14/23 1211)  Resp: 18 (02/14/23 1211)  BP: (!) 160/76 (02/14/23 1213)  SpO2: (!) 92 % (02/14/23 1211)   Vital Signs (24h Range):  Temp:  [97.9 °F (36.6 °C)-98.8 °F (37.1 °C)] 98.8 °F (37.1 °C)  Pulse:  [60-70] 66  Resp:  [15-20] 18  SpO2:  [92 %-98 %] 92 %  BP: (136-162)/(64-76) 160/76     Weight: 80.5 kg (177 lb 7.5 oz)  Body mass index is 32.46 kg/m².    Intake/Output - Last 3 Shifts         02/12 0700  02/13 0659 02/13 0700  02/14 0659 02/14 0700  02/15 0659    P.O. 720 610     I.V. (mL/kg)       Total Intake(mL/kg) 720 (8.9) 610 (7.6)     Urine (mL/kg/hr) 2200 (1.1) 700 (0.4)     Emesis/NG output 0      Drains 130      Stool 0      Total Output 2330 700     Net -1610 -90            Urine Occurrence 3 x 3 x     Stool Occurrence 3 x 2 x     Emesis Occurrence 0 x              Physical Exam  Vitals and nursing note reviewed.   Constitutional:       General: She is not in acute distress.     Appearance: Normal appearance.   HENT:      Head: Normocephalic and atraumatic.   Eyes:      General: No scleral icterus.        Right eye: No discharge.         Left eye: No discharge.   Cardiovascular:      Rate and Rhythm: Normal rate and regular rhythm.      Heart sounds: No murmur heard.  Pulmonary:      Effort: Pulmonary effort is normal. No respiratory distress.      Breath sounds: Examination of the right-middle field reveals decreased breath sounds. Examination of the right-lower field reveals decreased breath sounds. Examination of the left-lower field reveals decreased breath sounds. Decreased breath sounds present. No wheezing or rales.   Abdominal:      General: Bowel sounds are normal. There is no distension.      Palpations: Abdomen is soft.      Tenderness: There is abdominal tenderness (incisional). There is no guarding.       Musculoskeletal:         General: Normal range of motion.      Right lower leg: Edema present.      Left lower leg: Edema present.   Skin:     General: Skin is warm and  dry.      Capillary Refill: Capillary refill takes less than 2 seconds.   Neurological:      Mental Status: She is alert and oriented to person, place, and time.   Psychiatric:         Mood and Affect: Mood normal.         Behavior: Behavior normal. Behavior is cooperative.         Thought Content: Thought content normal.     Laboratory:  Immunosuppressants           Stop Route Frequency     tacrolimus capsule 5 mg         -- Oral 2 times daily     mycophenolate capsule 1,000 mg         -- Oral 2 times daily          CBC:   Recent Labs   Lab 02/15/23  0601   WBC 7.14   RBC 2.46*   HGB 8.3*   HCT 24.8*   PLT 93*   *   MCH 33.7*   MCHC 33.5     CMP:   Recent Labs   Lab 02/15/23  0601      CALCIUM 8.6*   ALBUMIN 2.7*   PROT 5.3*   *   K 3.4*   CO2 27   CL 98   BUN 10   CREATININE 0.6   ALKPHOS 220*   *   AST 45*   BILITOT 2.9*     Labs within the past 24 hours have been reviewed.    Diagnostic Results:  I have personally reviewed all pertinent imaging studies.    Debility/Functional status: Patient debilitated by evidence of Other reduced mobility. Physical and occupational therapy ordered daily to evaluate and treat. Debility was: present on admission.

## 2023-02-14 NOTE — CARE UPDATE
-Glucose Goal 140-180    -A1C:   Lab Results   Component Value Date    HGBA1C <4.0 (A) 02/08/2023       -HOME REGIMEN: none; no previous hx of DM     -INPATIENT REGIMEN:Novolog Correction Scale     -GLUCOSE TREND FOR THE PAST 24HRS:   Recent Labs   Lab 02/12/23  1658 02/12/23  2113 02/13/23  0814 02/13/23  1331 02/13/23  1824 02/13/23 2020   POCTGLUCOSE 155* 123* 109 184* 136* 145*         -NO HYPOGYCEMIAS NOTED     - Diet  Diet diabetic [facility]; 2000 Calorie    -TOLERATING 25-50 % OF PO DIET       -Steroids - solumedrol 40 mg daily    Remains in 21451/84315 A. BG well controlled and within goal ranges with no prn SQ insulin requirements.  5 Days Post-Op.         Plan:   -Continue Moderate Dose Correction Scale   - POCT Glucose before meals and at bedtime  - Hypoglycemia protocol in place      ** Please notify Endocrine for any change and/or advance in diet**  ** Please call Endocrine for any BG related issues **     Discharge Planning:   TBD. Please notify endocrinology prior to discharge.

## 2023-02-14 NOTE — NURSING
FREDERICK GriderC notified of pt's manual BP of 160/76. Pt reported having been moving around a good bit this am, rated pain 5/10. PRN tylenol given. Per PA, will give tylenol only for now and reassess later this afternoon a need for starting scheduled antihypertensives.

## 2023-02-15 VITALS
OXYGEN SATURATION: 95 % | HEART RATE: 68 BPM | TEMPERATURE: 99 F | SYSTOLIC BLOOD PRESSURE: 141 MMHG | RESPIRATION RATE: 18 BRPM | BODY MASS INDEX: 32.96 KG/M2 | HEIGHT: 62 IN | DIASTOLIC BLOOD PRESSURE: 65 MMHG | WEIGHT: 179.13 LBS

## 2023-02-15 DIAGNOSIS — Z94.4 LIVER REPLACED BY TRANSPLANT: Primary | ICD-10-CM

## 2023-02-15 PROBLEM — R18.8 OTHER ASCITES: Status: ACTIVE | Noted: 2023-02-15

## 2023-02-15 LAB
ALBUMIN SERPL BCP-MCNC: 2.7 G/DL (ref 3.5–5.2)
ALP SERPL-CCNC: 220 U/L (ref 55–135)
ALT SERPL W/O P-5'-P-CCNC: 116 U/L (ref 10–44)
ANION GAP SERPL CALC-SCNC: 7 MMOL/L (ref 8–16)
AST SERPL-CCNC: 45 U/L (ref 10–40)
BASOPHILS # BLD AUTO: 0.04 K/UL (ref 0–0.2)
BASOPHILS NFR BLD: 0.6 % (ref 0–1.9)
BILIRUB SERPL-MCNC: 2.9 MG/DL (ref 0.1–1)
BUN SERPL-MCNC: 10 MG/DL (ref 8–23)
CALCIUM SERPL-MCNC: 8.6 MG/DL (ref 8.7–10.5)
CHLORIDE SERPL-SCNC: 98 MMOL/L (ref 95–110)
CO2 SERPL-SCNC: 27 MMOL/L (ref 23–29)
CREAT SERPL-MCNC: 0.6 MG/DL (ref 0.5–1.4)
DIFFERENTIAL METHOD: ABNORMAL
EOSINOPHIL # BLD AUTO: 0.8 K/UL (ref 0–0.5)
EOSINOPHIL NFR BLD: 10.9 % (ref 0–8)
ERYTHROCYTE [DISTWIDTH] IN BLOOD BY AUTOMATED COUNT: 16.2 % (ref 11.5–14.5)
EST. GFR  (NO RACE VARIABLE): >60 ML/MIN/1.73 M^2
GLUCOSE SERPL-MCNC: 101 MG/DL (ref 70–110)
HCT VFR BLD AUTO: 24.8 % (ref 37–48.5)
HGB BLD-MCNC: 8.3 G/DL (ref 12–16)
IMM GRANULOCYTES # BLD AUTO: 0.2 K/UL (ref 0–0.04)
IMM GRANULOCYTES NFR BLD AUTO: 2.8 % (ref 0–0.5)
LYMPHOCYTES # BLD AUTO: 0.5 K/UL (ref 1–4.8)
LYMPHOCYTES NFR BLD: 7.4 % (ref 18–48)
MAGNESIUM SERPL-MCNC: 1.5 MG/DL (ref 1.6–2.6)
MCH RBC QN AUTO: 33.7 PG (ref 27–31)
MCHC RBC AUTO-ENTMCNC: 33.5 G/DL (ref 32–36)
MCV RBC AUTO: 101 FL (ref 82–98)
MONOCYTES # BLD AUTO: 0.8 K/UL (ref 0.3–1)
MONOCYTES NFR BLD: 11.1 % (ref 4–15)
NEUTROPHILS # BLD AUTO: 4.8 K/UL (ref 1.8–7.7)
NEUTROPHILS NFR BLD: 67.2 % (ref 38–73)
NRBC BLD-RTO: 0 /100 WBC
PHOSPHATE SERPL-MCNC: 2.8 MG/DL (ref 2.7–4.5)
PLATELET # BLD AUTO: 93 K/UL (ref 150–450)
PMV BLD AUTO: 11.3 FL (ref 9.2–12.9)
POCT GLUCOSE: 102 MG/DL (ref 70–110)
POTASSIUM SERPL-SCNC: 3.4 MMOL/L (ref 3.5–5.1)
PROT SERPL-MCNC: 5.3 G/DL (ref 6–8.4)
RBC # BLD AUTO: 2.46 M/UL (ref 4–5.4)
SODIUM SERPL-SCNC: 132 MMOL/L (ref 136–145)
TACROLIMUS BLD-MCNC: 7.1 NG/ML (ref 5–15)
WBC # BLD AUTO: 7.14 K/UL (ref 3.9–12.7)

## 2023-02-15 PROCEDURE — 84100 ASSAY OF PHOSPHORUS: CPT | Performed by: PHYSICIAN ASSISTANT

## 2023-02-15 PROCEDURE — 25000003 PHARM REV CODE 250: Performed by: STUDENT IN AN ORGANIZED HEALTH CARE EDUCATION/TRAINING PROGRAM

## 2023-02-15 PROCEDURE — 25000003 PHARM REV CODE 250: Performed by: PHYSICIAN ASSISTANT

## 2023-02-15 PROCEDURE — 36415 COLL VENOUS BLD VENIPUNCTURE: CPT | Performed by: NURSE PRACTITIONER

## 2023-02-15 PROCEDURE — 63600175 PHARM REV CODE 636 W HCPCS: Performed by: PHYSICIAN ASSISTANT

## 2023-02-15 PROCEDURE — 63600175 PHARM REV CODE 636 W HCPCS: Performed by: STUDENT IN AN ORGANIZED HEALTH CARE EDUCATION/TRAINING PROGRAM

## 2023-02-15 PROCEDURE — 80053 COMPREHEN METABOLIC PANEL: CPT | Performed by: STUDENT IN AN ORGANIZED HEALTH CARE EDUCATION/TRAINING PROGRAM

## 2023-02-15 PROCEDURE — 99239 PR HOSPITAL DISCHARGE DAY,>30 MIN: ICD-10-PCS | Mod: 24,,, | Performed by: PHYSICIAN ASSISTANT

## 2023-02-15 PROCEDURE — 85025 COMPLETE CBC W/AUTO DIFF WBC: CPT | Performed by: STUDENT IN AN ORGANIZED HEALTH CARE EDUCATION/TRAINING PROGRAM

## 2023-02-15 PROCEDURE — 80197 ASSAY OF TACROLIMUS: CPT | Performed by: STUDENT IN AN ORGANIZED HEALTH CARE EDUCATION/TRAINING PROGRAM

## 2023-02-15 PROCEDURE — 63600175 PHARM REV CODE 636 W HCPCS: Performed by: TRANSPLANT SURGERY

## 2023-02-15 PROCEDURE — 99239 HOSP IP/OBS DSCHRG MGMT >30: CPT | Mod: 24,,, | Performed by: PHYSICIAN ASSISTANT

## 2023-02-15 PROCEDURE — 83735 ASSAY OF MAGNESIUM: CPT | Performed by: NURSE PRACTITIONER

## 2023-02-15 PROCEDURE — 25000003 PHARM REV CODE 250: Performed by: NURSE PRACTITIONER

## 2023-02-15 RX ORDER — POTASSIUM CHLORIDE 750 MG/1
30 CAPSULE, EXTENDED RELEASE ORAL ONCE
Status: COMPLETED | OUTPATIENT
Start: 2023-02-15 | End: 2023-02-15

## 2023-02-15 RX ORDER — PREDNISONE 5 MG/1
TABLET ORAL
Qty: 70 TABLET | Refills: 0 | Status: CANCELLED | OUTPATIENT
Start: 2023-02-15

## 2023-02-15 RX ORDER — ASPIRIN 81 MG/1
81 TABLET ORAL DAILY
Qty: 30 TABLET | Refills: 11 | Status: SHIPPED | OUTPATIENT
Start: 2023-02-15

## 2023-02-15 RX ORDER — ACETAMINOPHEN 500 MG
1 TABLET ORAL DAILY
Qty: 30 TABLET | Refills: 5 | Status: SHIPPED | OUTPATIENT
Start: 2023-02-15 | End: 2023-07-24 | Stop reason: SDUPTHER

## 2023-02-15 RX ORDER — FUROSEMIDE 20 MG/1
20 TABLET ORAL DAILY
Qty: 5 TABLET | Refills: 0 | Status: CANCELLED | OUTPATIENT
Start: 2023-02-16 | End: 2023-02-21

## 2023-02-15 RX ORDER — ONDANSETRON 4 MG/1
4 TABLET, ORALLY DISINTEGRATING ORAL EVERY 6 HOURS PRN
Qty: 15 TABLET | Refills: 0 | Status: SHIPPED | OUTPATIENT
Start: 2023-02-15

## 2023-02-15 RX ORDER — NIFEDIPINE 30 MG/1
30 TABLET, EXTENDED RELEASE ORAL DAILY
Qty: 30 TABLET | Refills: 11 | Status: SHIPPED | OUTPATIENT
Start: 2023-02-16 | End: 2023-03-08 | Stop reason: DRUGHIGH

## 2023-02-15 RX ORDER — FUROSEMIDE 20 MG/1
20 TABLET ORAL DAILY
Qty: 5 TABLET | Refills: 0 | Status: SHIPPED | OUTPATIENT
Start: 2023-02-16 | End: 2023-02-20 | Stop reason: SDUPTHER

## 2023-02-15 RX ORDER — MAGNESIUM SULFATE HEPTAHYDRATE 40 MG/ML
2 INJECTION, SOLUTION INTRAVENOUS ONCE
Status: COMPLETED | OUTPATIENT
Start: 2023-02-15 | End: 2023-02-15

## 2023-02-15 RX ORDER — TACROLIMUS 1 MG/1
5 CAPSULE ORAL EVERY 12 HOURS
Qty: 300 CAPSULE | Refills: 11 | Status: CANCELLED | OUTPATIENT
Start: 2023-02-15

## 2023-02-15 RX ORDER — TACROLIMUS 1 MG/1
5 CAPSULE ORAL EVERY 12 HOURS
Qty: 360 CAPSULE | Refills: 11 | Status: CANCELLED | OUTPATIENT
Start: 2023-02-15

## 2023-02-15 RX ORDER — TACROLIMUS 1 MG/1
5 CAPSULE ORAL EVERY 12 HOURS
Qty: 300 CAPSULE | Refills: 11 | Status: SHIPPED | OUTPATIENT
Start: 2023-02-15 | End: 2023-02-28 | Stop reason: DRUGHIGH

## 2023-02-15 RX ORDER — FUROSEMIDE 20 MG/1
20 TABLET ORAL DAILY
Status: DISCONTINUED | OUTPATIENT
Start: 2023-02-16 | End: 2023-02-15 | Stop reason: HOSPADM

## 2023-02-15 RX ORDER — OXYCODONE HYDROCHLORIDE 5 MG/1
5-10 TABLET ORAL EVERY 6 HOURS PRN
Qty: 24 TABLET | Refills: 0 | Status: SHIPPED | OUTPATIENT
Start: 2023-02-15 | End: 2023-04-17 | Stop reason: ALTCHOICE

## 2023-02-15 RX ADMIN — PANTOPRAZOLE SODIUM 40 MG: 40 TABLET, DELAYED RELEASE ORAL at 08:02

## 2023-02-15 RX ADMIN — POTASSIUM CHLORIDE 30 MEQ: 10 CAPSULE, COATED, EXTENDED RELEASE ORAL at 11:02

## 2023-02-15 RX ADMIN — MYCOPHENOLATE MOFETIL 1000 MG: 250 CAPSULE ORAL at 08:02

## 2023-02-15 RX ADMIN — NYSTATIN 500000 UNITS: 500000 SUSPENSION ORAL at 08:02

## 2023-02-15 RX ADMIN — TACROLIMUS 5 MG: 1 CAPSULE ORAL at 08:02

## 2023-02-15 RX ADMIN — PREDNISONE 20 MG: 20 TABLET ORAL at 08:02

## 2023-02-15 RX ADMIN — NYSTATIN 500000 UNITS: 500000 SUSPENSION ORAL at 04:02

## 2023-02-15 RX ADMIN — FUROSEMIDE 40 MG: 40 TABLET ORAL at 08:02

## 2023-02-15 RX ADMIN — CALCIUM CARBONATE (ANTACID) CHEW TAB 500 MG 500 MG: 500 CHEW TAB at 05:02

## 2023-02-15 RX ADMIN — HEPARIN SODIUM 5000 UNITS: 5000 INJECTION INTRAVENOUS; SUBCUTANEOUS at 05:02

## 2023-02-15 RX ADMIN — NIFEDIPINE 30 MG: 30 TABLET, FILM COATED, EXTENDED RELEASE ORAL at 08:02

## 2023-02-15 RX ADMIN — LAMIVUDINE 150 MG: 150 TABLET, FILM COATED ORAL at 08:02

## 2023-02-15 RX ADMIN — ONDANSETRON 4 MG: 4 TABLET, ORALLY DISINTEGRATING ORAL at 01:02

## 2023-02-15 RX ADMIN — MAGNESIUM SULFATE 2 G: 2 INJECTION INTRAVENOUS at 11:02

## 2023-02-15 NOTE — CARE UPDATE
Feb 28 Hospital Follow Up 11:00 AM   [facility]   [address]   [facility] [id number] [phone]

## 2023-02-15 NOTE — CARE UPDATE
SSC met with patient/family at bedside. Patient experience rounding completed and reviewed the following.        Do you know your discharge plan?  Yes,  (Home with Family)    If you are discharging home, do you have help at home? Yes  (Family)    Have you discussed your needs and preferences with your SW/CM?  No     Assigned SW/CM notified of any patient/family needs or concerns.

## 2023-02-15 NOTE — PROGRESS NOTES
Met with patient ,  and son , Arnoldo, in preparation for discharge today.  Reviewed their answers to the questions in My New Journey.  All questions were answered correctly.  These questions cover: post op care, medication management, infection prevention and emergency contacts.  Outpatient coordinator assigned. Outpatient appointments reviewed.  Allowed time for questions and answer.

## 2023-02-15 NOTE — PROGRESS NOTES
Pt,  and son given dc paperwork.   Verbalized understanding and had no questions. PIV removed.  Denies pain.  Awaiting transport at this time.

## 2023-02-15 NOTE — PLAN OF CARE
AAOx4, VSS, AC/HS no coverage needed  Chevron INA with staples, LFT's trending down  Sleeping well overnight, denies pain  Self meds 100%  Non skid socks worn,  to remain @ bedside, will cont to monitor

## 2023-02-15 NOTE — PROGRESS NOTES
[facility] - Transplant Stepdown  Liver Transplant  Progress Note    Patient Name: Argelia Sevilla  MRN: [id number]  Admission Date: 2/8/2023  Hospital Length of Stay: 7 days  Code Status: Full Code  Primary Care Provider: Primary Doctor No  Post-Operative Day: 6    ORGAN:   LIVER  Disease Etiology: Cirrhosis: Fatty Liver (BROWN)  Donor Type:   Donation after Brain Death  [facility] High Risk:   No  Donor CMV Status:   Donor CMV Status: Positive  Donor HBcAB:   Positive  Donor HCV Status:   Negative  Donor HBV FRANNIE: Negative  Donor HCV FRANNIE: Negative  Whole or Partial: Whole Liver  Biliary Anastomosis: End to End  Arterial Anatomy: Standard  Subjective:     History of Present Illness:  Ms. 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 She presents as a primary candidate for liver transplant with a MELD of 23. Denies recent fever/chills and recent hospitalizations. OR time is 7:30/9:00 AM with Dr. Adhikari. No intraop HD. Of note, donor is RPR+, recipient will need ID consult.       Hospital Course:  Patient is now s/p DBD LTX on 2/9/23 (Donor RPR+, Hcv Ab+/FRANNIE-, HBcAb+, CMV D+/R+). Surgery was without complication. POD#1 Liver US stable. LFTs trending down.  Donor RPR+, ID rec PCN weekly x 3 (#1 given 2/10). Transferred to TSU POD#1. 2nd ZACKERY drain removed 2/12. Albumin 25% and Lasix 20 mg IV x 1 given 2/12 with good response.     Interval History: NAEON. Patient had multiple loose stools yesterday. Backed off on stool softeners. Improving. Progressing well overall. LFTs trending down. Renal function stable. Increased oral lasix to 40 mg daily on 2/14. CVC, parham, and drains are out. Tolerating diet; intake and supplements encouraged. Ambulating in halls with walker. PT/OT recommend HH @ discharge (ordered). Plan for POD#6 liver US in AM with dc to follow if satisfactory. Cont to monitor.      Scheduled Meds:   bisacodyL  10 mg Oral QHS    [START ON 2/15/2023]  furosemide  40 mg Oral Daily    heparin (porcine)  5,000 Units Subcutaneous Q8H    lamiVUDine  150 mg Oral Daily    mycophenolate  1,000 mg Oral BID    nystatin  500,000 Units Mouth/Throat TID PC    pantoprazole  40 mg Oral Daily    penicillin G benzathine  2.4 Million Units Intramuscular Weekly    polyethylene glycol  17 g Oral Daily    [START ON 2/15/2023] predniSONE  20 mg Oral Daily    senna  8.6 mg Oral Daily    [START ON 2/16/2023] sulfamethoxazole-trimethoprim 400-80mg  1 tablet Oral Daily AM    tacrolimus  5 mg Oral BID    [START ON 2/19/2023] valGANciclovir  450 mg Oral Daily     Continuous Infusions:  PRN Meds:sodium chloride 0.9%, acetaminophen, bisacodyL, calcium carbonate, dextrose 10%, dextrose 10%, diphenhydrAMINE, glucagon (human recombinant), glucose, glucose, hydrALAZINE, HYDROmorphone, insulin aspart U-100, labetalol, ondansetron, oxyCODONE, oxyCODONE, prochlorperazine, sodium chloride 0.9%    Review of Systems   Constitutional:  Positive for fatigue. Negative for appetite change, chills and fever.   Respiratory:  Negative for cough, chest tightness, shortness of breath, wheezing and stridor.    Cardiovascular:  Positive for leg swelling. Negative for chest pain and palpitations.   Gastrointestinal:  Positive for abdominal pain. Negative for abdominal distention, constipation, diarrhea, nausea and vomiting.   Genitourinary:  Negative for difficulty urinating, dysuria, frequency and urgency.   Musculoskeletal:  Negative for back pain and neck pain.   Skin:  Positive for wound. Negative for color change, pallor and rash.   Allergic/Immunologic: Positive for immunocompromised state.   Neurological:  Negative for dizziness, weakness and headaches.   Psychiatric/Behavioral:  Negative for agitation, behavioral problems, confusion and sleep disturbance.    Objective:     Vital Signs (Most Recent):  Temp: 98.8 °F (37.1 °C) (02/14/23 1211)  Pulse: 66 (02/14/23 1211)  Resp: 18 (02/14/23  1211)  BP: (!) 160/76 (02/14/23 1213)  SpO2: (!) 92 % (02/14/23 1211)   Vital Signs (24h Range):  Temp:  [97.9 °F (36.6 °C)-98.8 °F (37.1 °C)] 98.8 °F (37.1 °C)  Pulse:  [60-70] 66  Resp:  [15-20] 18  SpO2:  [92 %-98 %] 92 %  BP: (136-162)/(64-76) 160/76     Weight: 80.5 kg (177 lb 7.5 oz)  Body mass index is 32.46 kg/m².    Intake/Output - Last 3 Shifts         02/12 0700  02/13 0659 02/13 0700  02/14 0659 02/14 0700  02/15 0659    P.O. 720 610     I.V. (mL/kg)       Total Intake(mL/kg) 720 (8.9) 610 (7.6)     Urine (mL/kg/hr) 2200 (1.1) 700 (0.4)     Emesis/NG output 0      Drains 130      Stool 0      Total Output 2330 700     Net -1610 -90            Urine Occurrence 3 x 3 x     Stool Occurrence 3 x 2 x     Emesis Occurrence 0 x              Physical Exam  Vitals and nursing note reviewed.   Constitutional:       General: She is not in acute distress.     Appearance: Normal appearance.   HENT:      Head: Normocephalic and atraumatic.   Eyes:      General: No scleral icterus.        Right eye: No discharge.         Left eye: No discharge.   Cardiovascular:      Rate and Rhythm: Normal rate and regular rhythm.      Heart sounds: No murmur heard.  Pulmonary:      Effort: Pulmonary effort is normal. No respiratory distress.      Breath sounds: Examination of the right-middle field reveals decreased breath sounds. Examination of the right-lower field reveals decreased breath sounds. Examination of the left-lower field reveals decreased breath sounds. Decreased breath sounds present. No wheezing or rales.   Abdominal:      General: Bowel sounds are normal. There is no distension.      Palpations: Abdomen is soft.      Tenderness: There is abdominal tenderness (incisional). There is no guarding.       Musculoskeletal:         General: Normal range of motion.      Right lower leg: Edema present.      Left lower leg: Edema present.   Skin:     General: Skin is warm and dry.      Capillary Refill: Capillary refill takes  less than 2 seconds.   Neurological:      Mental Status: She is alert and oriented to person, place, and time.   Psychiatric:         Mood and Affect: Mood normal.         Behavior: Behavior normal. Behavior is cooperative.         Thought Content: Thought content normal.     Laboratory:  Immunosuppressants           Stop Route Frequency     tacrolimus capsule 5 mg         -- Oral 2 times daily     mycophenolate capsule 1,000 mg         -- Oral 2 times daily          CBC:   Recent Labs   Lab 02/15/23  0601   WBC 7.14   RBC 2.46*   HGB 8.3*   HCT 24.8*   PLT 93*   *   MCH 33.7*   MCHC 33.5     CMP:   Recent Labs   Lab 02/15/23  0601      CALCIUM 8.6*   ALBUMIN 2.7*   PROT 5.3*   *   K 3.4*   CO2 27   CL 98   BUN 10   CREATININE 0.6   ALKPHOS 220*   *   AST 45*   BILITOT 2.9*     Labs within the past 24 hours have been reviewed.    Diagnostic Results:  I have personally reviewed all pertinent imaging studies.    Debility/Functional status: Patient debilitated by evidence of Other reduced mobility. Physical and occupational therapy ordered daily to evaluate and treat. Debility was: present on admission.    Assessment/Plan:     * S/P liver transplant  - s/p DBD LTX 2/9/23 (Donor RPR+, HCV Ab+/FRANNIE-, CMV D+/R+)  - Surgery w/o complication  - POD#1 Liver US stable  - LFTs trending down  - Tolerating diet, (+) flatus (+) BMs, cont bowel regimen. encourage OOBTC, pain well controlled  - Dc'd 2nd drain 2/12  - Early satiety, started boost and Protonix 2/12   - Lasix 20mg Iv and albumin 25% x 1 2/12  - Oral lasix 2/13 (increased dose from 20 mg to 40 mg daily on 2/14)  - Plan for liver US 2/15 with dc to follow if US satisfactory.    Hepatitis B virus infection in cadaveric donor  - Continue Lamivudine.       Gastroesophageal reflux disease without esophagitis  - Continue Protonix.      Acute blood loss anemia  - Expected s/p LTX  - H/H trending down but no overt signs of bleeding, continue to  monitor closely  - Now H/H stable  - Keep T&S active    Long-term use of immunosuppressant medication  - Maintenance IS with Prograf. Cont to check Prograf level daily. Assess for toxicity and adjust level as needed.  - Continue Cellcept.   - Continue steroids.    At risk for infection transmitted from donor  - Donor RPR+, ID consulted, rec bicillin weekly x 3 (2/10, 2/17, 2/24)  - Donor HBcAb+, recipient on lamuvidine   - Donor HCV Ab+/FRANNIE-, monitor HCV PCR per protocol    Steroid-induced hyperglycemia  - Endocrine consulted for post-op hyperglycemia 2/2 steroids, appreciate assistance    At risk for opportunistic infections  - Continue Valcyte for CMV prophylaxis for 3 months (CMV D+/R+)  - Continue Bactrim for PJP prophylaxis for 6 months  - Continue Nystatin for thrush prevention for 4 weeks  - Bactrim and Valcyte to start on POD#10 or at discharge    Prophylactic immunotherapy  - See long-term use of immunosuppressant medication    Thrombocytopenia  - 2/2 ELSD, expect improvement post-LTX  - Monitor with CBC     Coronary artery disease involving native coronary artery of native heart without angina pectoris  - ASA held for surgery, restart once plts > 70 and BUN < 70, likely on 2/15    Anemia, chronic disease  - 2/2 liver disease., should improve post transplant   - Monitor with CBC         VTE Risk Mitigation (From admission, onward)         Ordered     heparin (porcine) injection 5,000 Units  Every 8 hours         02/09/23 1445     Place sequential compression device  Until discontinued         02/09/23 1445     IP VTE HIGH RISK PATIENT  Once         02/09/23 1445                The patients clinical status was discussed at multidisplinary rounds, involving transplant surgery, transplant medicine, pharmacy, nursing, nutrition, and social work    Discharge Planning:  No Patient Care Coordination Note on file.      Cherrie Tamez PA-C  Liver Transplant  [facility] - Transplant Stepdown

## 2023-02-15 NOTE — PROGRESS NOTES
Discharge Note:     presented to patient bedside to discuss discharge plans, as well as assess any concerns or needs. Patient was alert and oriented x4, calm and pleasant. Patient's , Iggy, is at bedside. Patient is coping well with support from family. [facility]. Patient is in need of home health PT/OT/SN. VALERIE notified [facility]  of orders and patient will be scheduled. Patient also has needs for bedside commode. VALERIE notified [facility] of orders, who will check to see if this will be covered by insurance. If not, patient does not wish to pay out of pocket at this time. Patient's  will transport patient. Patient reports agreeing with the discharge plan and has no psychosocial concerns. Patient and caregiver verbalize understanding and are involved in treatment planning and discharge process. Patient nor caregiver had any further concerns or questions at this time.

## 2023-02-15 NOTE — CARE UPDATE
Glucose Goal 140-180    -A1C:   Lab Results   Component Value Date    HGBA1C <4.0 (A) 02/08/2023       -HOME REGIMEN: none; no previous hx of DM     -INPATIENT REGIMEN: Novolog Correction Scale     -GLUCOSE TREND FOR THE PAST 24HRS:   Recent Labs   Lab 02/13/23  1331 02/13/23  1824 02/13/23 2020 02/14/23  1211 02/14/23  1813 02/14/23  2201   POCTGLUCOSE 184* 136* 145* 184* 167* 151*         -NO HYPOGYCEMIAS NOTED     - Diet  Diet diabetic [facility]; 2000 Calorie    -TOLERATING 50-75% OF PO DIET       -Steroids - Prednisone 20 mg daily     Remains in 77495/55185 A. BG well controlled and within goal ranges with minimal prn SQ insulin requirements.  6 Days Post-Op.         Plan:   - Continue Moderate Dose Correction Scale   - POCT Glucose before meals and at bedtime  - Hypoglycemia protocol in place      ** Please notify Endocrine for any change and/or advance in diet**  ** Please call Endocrine for any BG related issues **     Discharge Planning:   TBD. Please notify endocrinology prior to discharge.

## 2023-02-15 NOTE — DISCHARGE SUMMARY
[facility] - Transplant Stepdown  Liver Transplant  Discharge Summary      Patient Name: Argelia Sevilla  MRN: [id number]  Admission Date: 2/8/2023  Hospital Length of Stay: 7 days  Discharge Date and Time:  02/15/2023 12:27 PM  Attending Physician: Arnoldo Valverde MD  Discharging Provider: Marah Braxton PA-C  Primary Care Provider: Primary Doctor No  Post-Operative Day: 6     ORGAN:   LIVER  Disease Etiology: Cirrhosis: Fatty Liver (BROWN)  Donor Type:   Donation after Brain Death  CDC High Risk:   No  Donor CMV Status:   Donor CMV Status: Positive  Donor HBcAB:   Positive  Donor HCV Status:   Negative  Whole or Partial: Whole Liver  Biliary Anastomosis: End to End  Arterial Anatomy: Standard    HPI:   Ms. 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 She presents as a primary candidate for liver transplant with a MELD of 23. Denies recent fever/chills and recent hospitalizations. OR time is 7:30/9:00 AM with Dr. Adhikari. No intraop HD. Of note, donor is RPR+, recipient will need ID consult.       Procedure(s) (LRB):  TRANSPLANT, LIVER (N/A)     Hospital Course:      Patient is now s/p DBD LTX on 2/9/23 (Donor RPR+, Hcv Ab+/FRANNIE-, HBcAb+, CMV D+/R+). Surgery was without complication. POD#1 Liver US stable. LFTs trending down.  Donor RPR+, ID rec PCN weekly x 3 (#1 given 2/10, next due 2/17, and 2/24 outpatient). Transferred to TSU POD#1. 2nd ZACKERY drain removed 2/12. Albumin 25% and Lasix 20 mg IV x 1 given 2/12 with good response. She will be discharged home on lasix 20 mg x 5 days.  POD#6 US showed mildly elevated arterial resistive indices with normal waveforms and new 5.7 cm collection anterior to the left hepatic lobe, likely a hematoma. Reviewed by surgeon. H/H stable. LFTs continue to trend down. Patient is stable and ready for discharge to [facility] at this time. She is ambulating with a walker ( PT/OT ordered), having BMs, tolerating diet, and  pain is appropriately controlled. Follow up to include labs and transplant clinic tomorrow, Thursday 2/16. Additional follow up as scheduled per coordinator. Patient is in agreement with dc from the hospital today and follow up plan.    Goals of Care Treatment Preferences:  Code Status: Full Code      Final Active Diagnoses:    Diagnosis Date Noted POA    PRINCIPAL PROBLEM:  S/P liver transplant [Z94.4] 02/09/2023 Not Applicable    Gastroesophageal reflux disease without esophagitis [K21.9] 02/13/2023 Yes    Hepatitis B virus infection in cadaveric donor [Z00.5, B19.10] 02/13/2023 Not Applicable    Long-term use of immunosuppressant medication [Z79.60] 02/11/2023 Not Applicable    Acute blood loss anemia [D62] 02/11/2023 No    At risk for infection transmitted from donor [Z91.89] 02/10/2023 Yes    Prophylactic immunotherapy [Z29.8] 02/09/2023 Not Applicable    At risk for opportunistic infections [Z91.89] 02/09/2023 No    Steroid-induced hyperglycemia [R73.9, T38.0X5A] 02/09/2023 No    Anemia, chronic disease [D63.8] 02/08/2023 Yes    Thrombocytopenia [D69.6] 01/06/2021 Yes    Coronary artery disease involving native coronary artery of native heart without angina pectoris [I25.10] 10/05/2012 Yes      Problems Resolved During this Admission:    Diagnosis Date Noted Date Resolved POA    End stage liver disease [K72.10] 02/08/2023 02/11/2023 Yes       Consults (From admission, onward)          Status Ordering Provider     Inpatient consult to Infectious Diseases  Once        Provider:  (Not yet assigned)    Completed ROSEMARY MEHTA     Inpatient consult to Endocrinology  Once        Provider:  (Not yet assigned)    Completed SELINA ALMONTE     Inpatient consult to Registered Dietitian/Nutritionist  Once        Provider:  (Not yet assigned)    Completed SELINA ALMONTE            Pending Diagnostic Studies:       Procedure Component Value Units Date/Time    Freeze and Hold -BB HEP [[id number]] Collected: 02/09/23 1447     "Order Status: Sent Lab Status: In process Updated: 02/09/23 1450    Specimen: Blood     Protime-INR [[id number]] Collected: 02/09/23 1449    Order Status: Sent Lab Status: In process Updated: 02/09/23 1450    Specimen: Blood     Specimen to Pathology, Surgery Liver [[id number]] Collected: 02/09/23 1400    Order Status: Sent Lab Status: In process Updated: 02/09/23 2152    Specimen: Tissue            Significant Diagnostic Studies: Labs:   CMP   Recent Labs   Lab 02/14/23  0629 02/15/23  0601   * 132*   K 3.7 3.4*   CL 99 98   CO2 27 27   GLU 94 101   BUN 14 10   CREATININE 0.7 0.6   CALCIUM 8.3* 8.6*   PROT 5.2* 5.3*   ALBUMIN 2.8* 2.7*   BILITOT 3.1* 2.9*   ALKPHOS 189* 220*   AST 62* 45*   * 116*   ANIONGAP 7* 7*   , CBC   Recent Labs   Lab 02/14/23  0629 02/15/23  0601   WBC 4.74 7.14   HGB 8.0* 8.3*   HCT 23.7* 24.8*   PLT 69* 93*    and INR   Lab Results   Component Value Date    INR 1.0 02/13/2023    INR 1.0 02/12/2023    INR 1.1 02/11/2023       The patients clinical status was discussed at multidisplinary rounds, involving transplant surgery, transplant medicine, pharmacy, nursing, nutrition, and social work.    Discharged Condition: stable    Disposition: Home or Self Care    Follow Up: See above.    Patient Instructions:      3 IN 1 COMMODE FOR HOME USE     Order Specific Question Answer Comments   Type: Standard    Height: 5' 2" (1.575 m)    Weight: 81.2 kg (179 lb 1.6 oz)    Does patient have medical equipment at home? cane, straight built in shower seat in walk in shower / built in shower seat in walk in shower / built in shower seat in walk in shower   Does patient have medical equipment at home? rollator    Does patient have medical equipment at home? shower chair    Length of need (1-99 months): 99    Vendor: [facility] refused   CRM Resolution Date: 2/15/2023      Ambulatory referral/consult to Home Health   Standing Status: Future   Referral Priority: Routine Referral Type: Home Health " Care   Referral Reason: Specialty Services Required   Requested Specialty: Home Health Services   Number of Visits Requested: 1     Diet Adult Regular     Lifting restrictions   Order Comments: Do not lift greater than 10 lbs for 6 weeks from time of transplant     Notify your health care provider if you experience any of the following:  temperature >100.4     Notify your health care provider if you experience any of the following:  persistent nausea and vomiting or diarrhea     Notify your health care provider if you experience any of the following:  severe uncontrolled pain     Notify your health care provider if you experience any of the following:  redness, tenderness, or signs of infection (pain, swelling, redness, odor or green/yellow discharge around incision site)     Notify your health care provider if you experience any of the following:  difficulty breathing or increased cough     Notify your health care provider if you experience any of the following:  severe persistent headache     Notify your health care provider if you experience any of the following:  worsening rash     Notify your health care provider if you experience any of the following:  persistent dizziness, light-headedness, or visual disturbances     Notify your health care provider if you experience any of the following:  increased confusion or weakness     Notify your health care provider if you experience any of the following:   Order Comments: For any other concerning signs or symptoms     Medications:  Reconciled Home Medications:      Medication List        START taking these medications      calcium carbonate-vitamin D3 600 mg-20 mcg (800 unit) Tab  Take 1 tablet by mouth once daily.     lamiVUDine 150 MG Tab  Commonly known as: EPIVIR  Take 1 tablet (150 mg total) by mouth once daily.     mycophenolate 250 mg Cap  Commonly known as: CELLCEPT  Take 4 capsules (1,000 mg total) by mouth 2 (two) times daily.     NIFEdipine 30 MG (OSM) 24  hr tablet  Commonly known as: PROCARDIA-XL  Take 1 tablet (30 mg total) by mouth once daily.  Start taking on: February 16, 2023     ondansetron 4 MG Tbdl  Commonly known as: ZOFRAN-ODT  DISSOLVE 1 tablet (4 mg total) by mouth every 6 (six) hours as needed (nausea/vomiting).     oxyCODONE 5 MG immediate release tablet  Commonly known as: ROXICODONE  Take 1-2 tablets (5-10 mg total) by mouth every 6 (six) hours as needed for Pain.     predniSONE 5 MG tablet  Commonly known as: DELTASONE  Take by mouth daily:  20mg 2/15-2/21, 15mg 2/22-2/28, 10mg 3/1-3/7, 5mg 3/8-3/14. STOP 3/15/23     sulfamethoxazole-trimethoprim 400-80mg 400-80 mg per tablet  Commonly known as: BACTRIM,SEPTRA  Take 1 tablet by mouth every morning. STOP 8/10/23  Start taking on: February 16, 2023     tacrolimus 1 MG Cap  Commonly known as: PROGRAF  Take 5 capsules (5 mg total) by mouth every 12 (twelve) hours.     valGANciclovir 450 mg Tab  Commonly known as: VALCYTE  Take 1 tablet (450 mg total) by mouth once daily. STOP 5/10/23  Start taking on: February 19, 2023            CHANGE how you take these medications      aspirin 81 MG EC tablet  Commonly known as: ECOTRIN  Take 1 tablet (81 mg total) by mouth once daily.  What changed: when to take this     furosemide 20 MG tablet  Commonly known as: LASIX  Take 1 tablet (20 mg total) by mouth once daily. STOP 2/21/23 for 5 days  Start taking on: February 16, 2023  What changed: additional instructions            CONTINUE taking these medications      pantoprazole 40 MG tablet  Commonly known as: PROTONIX  Take 40 mg by mouth.            STOP taking these medications      cholecalciferol (vitamin D3) 50 mcg (2,000 unit) Tab  Commonly known as: VITAMIN D3     EDARBI 40 mg Tab  Generic drug: azilsartan medoxomiL     EDARBI 80 mg Tab  Generic drug: azilsartan medoxomiL     spironolactone 50 MG tablet  Commonly known as: ALDACTONE     vitamin E 1000 UNIT capsule            Time spent caring for patient  (Greater than 1/2 spent in direct face-to-face contact): > 30 minutes    Marah Braxton PA-C  Liver Transplant  [facility] - Transplant Stepdown

## 2023-02-15 NOTE — PROGRESS NOTES
DISCHARGE NOTE:    Argelia Sevilla is a 72 y.o. female s/p   LIVER   Donation after Brain Death transplant on 2/9/2023 (Liver) for ESLD secondary to Cirrhosis: Fatty Liver (BROWN).      Past Medical History:   Diagnosis Date    Anxiety disorder, unspecified     CAD (coronary artery disease)     DIC (disseminated intravascular coagulation)     Dyslipidemia     GERD (gastroesophageal reflux disease)     Hereditary hemolytic anemia, unspecified     History of coronary artery stent placement     HTN (hypertension)     Liver cirrhosis secondary to BROWN     Portal hypertensive gastropathy     Primary localized osteoarthrosis of right lower leg     Statin intolerance        Hospital Course: Ms. Sevilla is a 72 y.o. female s/p DBD liver txp (2/9/23). Post-op course relatively uncomplicated. Diuresed with IV furosemide then transitioned to furosemide 20 mg PO daily for 5 days after discharge. Pain has been well controlled. Nifedipine 30 mg daily started for BP control.     Allergies:   Review of patient's allergies indicates:   Allergen Reactions    Codeine Anxiety    Adhesive tape-silicones Rash       Patient Pharmacy: [facility]    Discharge Medications:     Medication List        START taking these medications      calcium carbonate-vitamin D3 600 mg-20 mcg (800 unit) Tab  Take 1 tablet by mouth once daily.     lamiVUDine 150 MG Tab  Commonly known as: EPIVIR  Take 1 tablet (150 mg total) by mouth once daily.     mycophenolate 250 mg Cap  Commonly known as: CELLCEPT  Take 4 capsules (1,000 mg total) by mouth 2 (two) times daily.     NIFEdipine 30 MG (OSM) 24 hr tablet  Commonly known as: PROCARDIA-XL  Take 1 tablet (30 mg total) by mouth once daily.  Start taking on: February 16, 2023     ondansetron 4 MG Tbdl  Commonly known as: ZOFRAN-ODT  DISSOLVE 1 tablet (4 mg total) by mouth every 6 (six) hours as needed (nausea/vomiting).     oxyCODONE 5 MG immediate release tablet  Commonly known as:  ROXICODONE  Take 1-2 tablets (5-10 mg total) by mouth every 6 (six) hours as needed for Pain.     predniSONE 5 MG tablet  Commonly known as: DELTASONE  Take by mouth daily:  20mg 2/15-2/21, 15mg 2/22-2/28, 10mg 3/1-3/7, 5mg 3/8-3/14. STOP 3/15/23     sulfamethoxazole-trimethoprim 400-80mg 400-80 mg per tablet  Commonly known as: BACTRIM,SEPTRA  Take 1 tablet by mouth every morning. STOP 8/10/23  Start taking on: February 16, 2023     tacrolimus 1 MG Cap  Commonly known as: PROGRAF  Take 5 capsules (5 mg total) by mouth every 12 (twelve) hours.     valGANciclovir 450 mg Tab  Commonly known as: VALCYTE  Take 1 tablet (450 mg total) by mouth once daily. STOP 5/10/23  Start taking on: February 19, 2023            CHANGE how you take these medications      aspirin 81 MG EC tablet  Commonly known as: ECOTRIN  Take 1 tablet (81 mg total) by mouth once daily.  What changed: when to take this     furosemide 20 MG tablet  Commonly known as: LASIX  Take 1 tablet (20 mg total) by mouth once daily. STOP 2/21/23 for 5 days  Start taking on: February 16, 2023  What changed: additional instructions            CONTINUE taking these medications      pantoprazole 40 MG tablet  Commonly known as: PROTONIX            STOP taking these medications      cholecalciferol (vitamin D3) 50 mcg (2,000 unit) Tab  Commonly known as: VITAMIN D3     EDARBI 40 mg Tab  Generic drug: azilsartan medoxomiL     EDARBI 80 mg Tab  Generic drug: azilsartan medoxomiL     spironolactone 50 MG tablet  Commonly known as: ALDACTONE     vitamin E 1000 UNIT capsule               Where to Get Your Medications        [facility] [address] [facility] [address]      Hours: Mon-Fri 7a-7p, Sat-Sun 10a-4p Phone: [phone]   aspirin 81 MG EC tablet  calcium carbonate-vitamin D3 600 mg-20 mcg (800 unit) Tab  furosemide 20 MG tablet  lamiVUDine 150 MG Tab  mycophenolate 250 mg Cap  NIFEdipine 30 MG (OSM) 24 hr  tablet  ondansetron 4 MG Tbdl  oxyCODONE 5 MG immediate release tablet  predniSONE 5 MG tablet  sulfamethoxazole-trimethoprim 400-80mg 400-80 mg per tablet  tacrolimus 1 MG Cap  valGANciclovir 450 mg Tab          Pharmacy Interventions/Recommendations:  1) Transplant Immunosuppression: Induction methylpred, and maintenance tacrolimus, MMF and prednisone    2) Opportunistic Infection prophylaxis: Bactrim x 6 months (STOP 8/10/23), Valcyte x 3 months (STOP 5/10/23), lamivudine indefinitely, penicillin IM x 3 doses (1 received, next due 2/17/23 and 2/24/23)    3) Osteoporosis Prevention measures (liver txp): Calcium/Vit D daily    4) Patient Counseling/Education: Demonstrated the use of the BP cuff, thermometer.    5) Follow-Up/Discharge Needs: f/u need for further diuresis after completing furosemide 5 days after discharge; ensure patient receives remaining 2 penicillin shots (donor RPR+)    6) Patient Assistance Information: n/a    7) The following medications have been placed on HOLD and should be restarted in the outpatient setting (when appropriate): n/a    Argelia and her caregiver verbalized their understanding and had the opportunity to ask questions.

## 2023-02-16 ENCOUNTER — LAB VISIT (OUTPATIENT)
Dept: LAB | Facility: HOSPITAL | Age: 73
End: 2023-02-16
Attending: SURGERY
Payer: MEDICARE

## 2023-02-16 ENCOUNTER — PATIENT MESSAGE (OUTPATIENT)
Dept: TRANSPLANT | Facility: CLINIC | Age: 73
End: 2023-02-16

## 2023-02-16 ENCOUNTER — CLINICAL SUPPORT (OUTPATIENT)
Dept: TRANSPLANT | Facility: CLINIC | Age: 73
End: 2023-02-16
Payer: MEDICARE

## 2023-02-16 ENCOUNTER — TELEPHONE (OUTPATIENT)
Dept: TRANSPLANT | Facility: CLINIC | Age: 73
End: 2023-02-16

## 2023-02-16 VITALS
HEIGHT: 62 IN | WEIGHT: 163.56 LBS | TEMPERATURE: 97 F | OXYGEN SATURATION: 98 % | SYSTOLIC BLOOD PRESSURE: 146 MMHG | DIASTOLIC BLOOD PRESSURE: 65 MMHG | BODY MASS INDEX: 30.1 KG/M2 | HEART RATE: 81 BPM | RESPIRATION RATE: 18 BRPM

## 2023-02-16 DIAGNOSIS — Z94.4 LIVER REPLACED BY TRANSPLANT: ICD-10-CM

## 2023-02-16 DIAGNOSIS — B19.10 HEPATITIS B VIRUS INFECTION IN CADAVERIC DONOR: ICD-10-CM

## 2023-02-16 DIAGNOSIS — Z94.4 LIVER REPLACED BY TRANSPLANT: Primary | ICD-10-CM

## 2023-02-16 DIAGNOSIS — Z00.5 HEPATITIS B VIRUS INFECTION IN CADAVERIC DONOR: ICD-10-CM

## 2023-02-16 LAB
ALBUMIN SERPL BCP-MCNC: 2.8 G/DL (ref 3.5–5.2)
ALP SERPL-CCNC: 213 U/L (ref 55–135)
ALT SERPL W/O P-5'-P-CCNC: 99 U/L (ref 10–44)
ANION GAP SERPL CALC-SCNC: 9 MMOL/L (ref 8–16)
ANISOCYTOSIS BLD QL SMEAR: SLIGHT
AST SERPL-CCNC: 39 U/L (ref 10–40)
BACTERIA SPEC ANAEROBE CULT: NORMAL
BASOPHILS # BLD AUTO: ABNORMAL K/UL (ref 0–0.2)
BASOPHILS NFR BLD: 0 % (ref 0–1.9)
BILIRUB DIRECT SERPL-MCNC: 1.8 MG/DL (ref 0.1–0.3)
BILIRUB SERPL-MCNC: 2.7 MG/DL (ref 0.1–1)
BUN SERPL-MCNC: 8 MG/DL (ref 8–23)
CALCIUM SERPL-MCNC: 8.4 MG/DL (ref 8.7–10.5)
CHLORIDE SERPL-SCNC: 97 MMOL/L (ref 95–110)
CO2 SERPL-SCNC: 28 MMOL/L (ref 23–29)
CREAT SERPL-MCNC: 0.6 MG/DL (ref 0.5–1.4)
DIFFERENTIAL METHOD: ABNORMAL
EOSINOPHIL # BLD AUTO: ABNORMAL K/UL (ref 0–0.5)
EOSINOPHIL NFR BLD: 11 % (ref 0–8)
ERYTHROCYTE [DISTWIDTH] IN BLOOD BY AUTOMATED COUNT: 16.7 % (ref 11.5–14.5)
EST. GFR  (NO RACE VARIABLE): >60 ML/MIN/1.73 M^2
GLUCOSE SERPL-MCNC: 96 MG/DL (ref 70–110)
HBV SURFACE AG SERPL QL IA: NORMAL
HCT VFR BLD AUTO: 26.4 % (ref 37–48.5)
HGB BLD-MCNC: 8.9 G/DL (ref 12–16)
HYPOCHROMIA BLD QL SMEAR: ABNORMAL
IMM GRANULOCYTES # BLD AUTO: ABNORMAL K/UL (ref 0–0.04)
IMM GRANULOCYTES NFR BLD AUTO: ABNORMAL % (ref 0–0.5)
LYMPHOCYTES # BLD AUTO: ABNORMAL K/UL (ref 1–4.8)
LYMPHOCYTES NFR BLD: 3 % (ref 18–48)
MCH RBC QN AUTO: 34 PG (ref 27–31)
MCHC RBC AUTO-ENTMCNC: 33.7 G/DL (ref 32–36)
MCV RBC AUTO: 101 FL (ref 82–98)
MONOCYTES # BLD AUTO: ABNORMAL K/UL (ref 0.3–1)
MONOCYTES NFR BLD: 4 % (ref 4–15)
NEUTROPHILS NFR BLD: 82 % (ref 38–73)
NRBC BLD-RTO: 0 /100 WBC
OVALOCYTES BLD QL SMEAR: ABNORMAL
PLATELET # BLD AUTO: 144 K/UL (ref 150–450)
PMV BLD AUTO: 11.1 FL (ref 9.2–12.9)
POIKILOCYTOSIS BLD QL SMEAR: SLIGHT
POLYCHROMASIA BLD QL SMEAR: ABNORMAL
POTASSIUM SERPL-SCNC: 3.2 MMOL/L (ref 3.5–5.1)
PROT SERPL-MCNC: 5.5 G/DL (ref 6–8.4)
RBC # BLD AUTO: 2.62 M/UL (ref 4–5.4)
SODIUM SERPL-SCNC: 134 MMOL/L (ref 136–145)
TACROLIMUS BLD-MCNC: 5.7 NG/ML (ref 5–15)
WBC # BLD AUTO: 7.7 K/UL (ref 3.9–12.7)

## 2023-02-16 PROCEDURE — 85027 COMPLETE CBC AUTOMATED: CPT | Performed by: SURGERY

## 2023-02-16 PROCEDURE — 36415 COLL VENOUS BLD VENIPUNCTURE: CPT | Performed by: SURGERY

## 2023-02-16 PROCEDURE — 99213 OFFICE O/P EST LOW 20 MIN: CPT | Mod: PBBFAC

## 2023-02-16 PROCEDURE — 87340 HEPATITIS B SURFACE AG IA: CPT | Performed by: SURGERY

## 2023-02-16 PROCEDURE — 99999 PR PBB SHADOW E&M-EST. PATIENT-LVL III: CPT | Mod: PBBFAC,,,

## 2023-02-16 PROCEDURE — 82248 BILIRUBIN DIRECT: CPT | Performed by: SURGERY

## 2023-02-16 PROCEDURE — 87517 HEPATITIS B DNA QUANT: CPT | Performed by: SURGERY

## 2023-02-16 PROCEDURE — 85007 BL SMEAR W/DIFF WBC COUNT: CPT | Performed by: SURGERY

## 2023-02-16 PROCEDURE — 80053 COMPREHEN METABOLIC PANEL: CPT | Performed by: SURGERY

## 2023-02-16 PROCEDURE — 80197 ASSAY OF TACROLIMUS: CPT | Performed by: SURGERY

## 2023-02-16 PROCEDURE — G0180 PR HOME HEALTH MD CERTIFICATION: ICD-10-PCS | Mod: ,,, | Performed by: SURGERY

## 2023-02-16 PROCEDURE — G0180 MD CERTIFICATION HHA PATIENT: HCPCS | Mod: ,,, | Performed by: SURGERY

## 2023-02-16 PROCEDURE — 99999 PR PBB SHADOW E&M-EST. PATIENT-LVL III: ICD-10-PCS | Mod: PBBFAC,,,

## 2023-02-16 NOTE — TELEPHONE ENCOUNTER
Patient made aware of lab review and that no med changes ordered at this time. Reminded of monday lab am apt and to see surgeon that afternoon.        ----- Message from Hima Miguel MD sent at 2/16/2023  2:19 PM CST -----  Results ok. No action needed

## 2023-02-16 NOTE — PROGRESS NOTES
Clinic Note: First Return to Clinic Post-  Liver Transplant    Argelia Sevilla  is a 72 y.o. female  S/p   LIVER transplant on 2/9/2023 (Liver) for Cirrhosis: Fatty Liver (BROWN).      Discharge Course (Issues/Concerns): Patient complaining of pain -- encouraged patient to take tylenol around the clock and then take oxycodone as needed; also advised to take pain meds overnight - just not the other meds before labs.     Objective:   Vitals:    02/16/23 0927   BP: (!) 146/65   Pulse: 81   Resp: 18   Temp: 97.3 °F (36.3 °C)       Met with patient and her caregiver in the clinic to review current medication list.     Current Outpatient Medications   Medication Sig Dispense Refill    aspirin (ECOTRIN) 81 MG EC tablet Take 1 tablet (81 mg total) by mouth once daily. 30 tablet 11    calcium carbonate-vitamin D3 600 mg-20 mcg (800 unit) Tab Take 1 tablet by mouth once daily. 30 tablet 5    furosemide (LASIX) 20 MG tablet Take 1 tablet (20 mg total) by mouth once daily. STOP 2/21/23 for 5 days 5 tablet 0    lamiVUDine (EPIVIR) 150 MG Tab Take 1 tablet (150 mg total) by mouth once daily. 30 tablet 11    mycophenolate (CELLCEPT) 250 mg Cap Take 4 capsules (1,000 mg total) by mouth 2 (two) times daily. 240 capsule 2    NIFEdipine (PROCARDIA-XL) 30 MG (OSM) 24 hr tablet Take 1 tablet (30 mg total) by mouth once daily. 30 tablet 11    ondansetron (ZOFRAN-ODT) 4 MG TbDL DISSOLVE 1 tablet (4 mg total) by mouth every 6 (six) hours as needed (nausea/vomiting). 15 tablet 0    oxyCODONE (ROXICODONE) 5 MG immediate release tablet Take 1-2 tablets (5-10 mg total) by mouth every 6 (six) hours as needed for Pain. 24 tablet 0    pantoprazole (PROTONIX) 40 MG tablet Take 40 mg by mouth once daily.      predniSONE (DELTASONE) 5 MG tablet Take by mouth daily:  20mg 2/15-2/21, 15mg 2/22-2/28, 10mg 3/1-3/7, 5mg 3/8-3/14. STOP 3/15/23 70 tablet 0    sulfamethoxazole-trimethoprim 400-80mg (BACTRIM,SEPTRA) 400-80 mg per tablet Take 1 tablet by mouth  every morning. STOP 8/10/23 30 tablet 5    tacrolimus (PROGRAF) 1 MG Cap Take 5 capsules (5 mg total) by mouth every 12 (twelve) hours. 300 capsule 11    [START ON 2/19/2023] valGANciclovir (VALCYTE) 450 mg Tab Take 1 tablet (450 mg total) by mouth once daily. STOP 5/10/23 30 tablet 2     No current facility-administered medications for this visit.       Pharmacy Interventions/Recommendations:     1) Graft Function & Immunosuppression Issues: Patient received steroid induction and is on Tacrolimus 5 mg  BID, Cellcept 1000 mg BID and Prednisone taper (EOT: 3/15/23).  Patient's LFTs continue to downtrend.    2) Opportunistic Infection prophylaxis:   PCP ppx: Bactrim (STOP 8/10/23)  CMV ppx: Valcyte (STOP 5/10/23)  Fungal ppx: N/A  Lamivudine indefinitely  Penicillin IM x 3 doses (1 received, next due 2/17/23 and 2/24/23)    3) Donor Serologies & Monitoring:     Donor CMV Status: Positive  Donor HCV Status: Negative  Donor HBcAb: Positive  Donor HBV FARNNIE: Negative  Donor HCV FRANNIE: Negative    4) Pain Management & Bowel Regimen: Patient struggling with pain management -- encouraged patient to take tylenol around the clock and take oxycodone PRN.  Advised patient that pain will be hard to control if she does not stay on top of it.  Patient also needs to  a stool softener - wrote docusate on the back of her blue card.     5) Blood Pressure Management: Patient's BP slightly elevated due to pain - on Nifedipine 30 mg daily.  Patient started on lasix 20 mg daily x 5 days.     6) Blood Sugar Management & Follow-up: WNL without meds    7) Electrolyte Management: Patient's potassium is 3.2 - encouraged patient to take in some potassium in diet.     8) Osteoporosis Prevention Strategy (Liver Transplant): Calcium/Vit D daily    8) OTHER medication follow-up (patient assistance, held medications, etc): N/A    9) Reinforced medication education conducted in the hospital, including medication indications, dosing,  administration, side effects, monitoring-- including timing of immunosuppressant levels.     Patient received their FIRST fill of medications from [facility].  Discussed the process for obtaining refills of medications, including verifying the dose and mailing address to have medications delivered.     Argelia and her caregivers verbalized understanding and had the opportunity to ask questions.

## 2023-02-17 ENCOUNTER — CLINICAL SUPPORT (OUTPATIENT)
Dept: INFECTIOUS DISEASES | Facility: CLINIC | Age: 73
End: 2023-02-17
Payer: MEDICARE

## 2023-02-17 DIAGNOSIS — A53.9 SYPHILIS (ACQUIRED): Primary | ICD-10-CM

## 2023-02-17 LAB
FINAL PATHOLOGIC DIAGNOSIS: NORMAL
GROSS: NORMAL
Lab: NORMAL
MICROSCOPIC EXAM: NORMAL

## 2023-02-17 PROCEDURE — 96372 THER/PROPH/DIAG INJ SC/IM: CPT | Mod: PBBFAC

## 2023-02-17 RX ADMIN — PENICILLIN G BENZATHINE 2.4 MILLION UNITS: 1200000 INJECTION, SUSPENSION INTRAMUSCULAR at 09:02

## 2023-02-17 NOTE — PROGRESS NOTES
Patient received the Bicillin 2.4 Mill units  injections in the bilateral buttocks (ventrogluteal site). Pt tolerated well. Pt asked to wait in the clinic 15 minutes after injection in the event of an allergic reaction. Pt verbalized understanding. Pt left in NAD.

## 2023-02-20 ENCOUNTER — LAB VISIT (OUTPATIENT)
Dept: LAB | Facility: HOSPITAL | Age: 73
End: 2023-02-20
Attending: SURGERY
Payer: MEDICARE

## 2023-02-20 ENCOUNTER — OFFICE VISIT (OUTPATIENT)
Dept: TRANSPLANT | Facility: CLINIC | Age: 73
End: 2023-02-20
Payer: MEDICARE

## 2023-02-20 VITALS
SYSTOLIC BLOOD PRESSURE: 174 MMHG | HEART RATE: 73 BPM | OXYGEN SATURATION: 97 % | HEIGHT: 62 IN | WEIGHT: 160.25 LBS | TEMPERATURE: 97 F | DIASTOLIC BLOOD PRESSURE: 84 MMHG | RESPIRATION RATE: 18 BRPM | BODY MASS INDEX: 29.49 KG/M2

## 2023-02-20 DIAGNOSIS — Z94.4 LIVER REPLACED BY TRANSPLANT: ICD-10-CM

## 2023-02-20 DIAGNOSIS — Z94.4 S/P LIVER TRANSPLANT: ICD-10-CM

## 2023-02-20 DIAGNOSIS — Z51.81 ENCOUNTER FOR THERAPEUTIC DRUG MONITORING: Primary | ICD-10-CM

## 2023-02-20 DIAGNOSIS — Z79.899 ENCOUNTER FOR LONG-TERM (CURRENT) USE OF OTHER MEDICATIONS: ICD-10-CM

## 2023-02-20 LAB
ALBUMIN SERPL BCP-MCNC: 2.9 G/DL (ref 3.5–5.2)
ALP SERPL-CCNC: 170 U/L (ref 55–135)
ALT SERPL W/O P-5'-P-CCNC: 41 U/L (ref 10–44)
ANION GAP SERPL CALC-SCNC: 10 MMOL/L (ref 8–16)
ANISOCYTOSIS BLD QL SMEAR: SLIGHT
AST SERPL-CCNC: 16 U/L (ref 10–40)
BASO STIPL BLD QL SMEAR: ABNORMAL
BASOPHILS NFR BLD: 1 % (ref 0–1.9)
BILIRUB DIRECT SERPL-MCNC: 1 MG/DL (ref 0.1–0.3)
BILIRUB SERPL-MCNC: 1.5 MG/DL (ref 0.1–1)
BUN SERPL-MCNC: 8 MG/DL (ref 8–23)
CALCIUM SERPL-MCNC: 8.8 MG/DL (ref 8.7–10.5)
CHLORIDE SERPL-SCNC: 97 MMOL/L (ref 95–110)
CO2 SERPL-SCNC: 27 MMOL/L (ref 23–29)
CREAT SERPL-MCNC: 0.7 MG/DL (ref 0.5–1.4)
DIFFERENTIAL METHOD: ABNORMAL
EOSINOPHIL NFR BLD: 5 % (ref 0–8)
ERYTHROCYTE [DISTWIDTH] IN BLOOD BY AUTOMATED COUNT: 17.5 % (ref 11.5–14.5)
EST. GFR  (NO RACE VARIABLE): >60 ML/MIN/1.73 M^2
GLUCOSE SERPL-MCNC: 91 MG/DL (ref 70–110)
HBV DNA SERPL NAA+PROBE-ACNC: <10 IU/ML
HBV DNA SERPL NAA+PROBE-LOG IU: <1 LOG (10) IU/ML
HBV DNA SERPL QL NAA+PROBE: NOT DETECTED
HCT VFR BLD AUTO: 26 % (ref 37–48.5)
HGB BLD-MCNC: 8.5 G/DL (ref 12–16)
IMM GRANULOCYTES # BLD AUTO: ABNORMAL K/UL (ref 0–0.04)
IMM GRANULOCYTES NFR BLD AUTO: ABNORMAL % (ref 0–0.5)
LYMPHOCYTES NFR BLD: 6 % (ref 18–48)
MCH RBC QN AUTO: 34.3 PG (ref 27–31)
MCHC RBC AUTO-ENTMCNC: 32.7 G/DL (ref 32–36)
MCV RBC AUTO: 105 FL (ref 82–98)
METAMYELOCYTES NFR BLD MANUAL: 1 %
MONOCYTES NFR BLD: 4 % (ref 4–15)
MYELOCYTES NFR BLD MANUAL: 1 %
NEUTROPHILS NFR BLD: 81 % (ref 38–73)
NEUTS BAND NFR BLD MANUAL: 1 %
NRBC BLD-RTO: 0 /100 WBC
OVALOCYTES BLD QL SMEAR: ABNORMAL
PLATELET # BLD AUTO: 338 K/UL (ref 150–450)
PLATELET BLD QL SMEAR: ABNORMAL
PMV BLD AUTO: 9.5 FL (ref 9.2–12.9)
POIKILOCYTOSIS BLD QL SMEAR: SLIGHT
POLYCHROMASIA BLD QL SMEAR: ABNORMAL
POTASSIUM SERPL-SCNC: 3.9 MMOL/L (ref 3.5–5.1)
PROT SERPL-MCNC: 5.4 G/DL (ref 6–8.4)
RBC # BLD AUTO: 2.48 M/UL (ref 4–5.4)
SODIUM SERPL-SCNC: 134 MMOL/L (ref 136–145)
TACROLIMUS BLD-MCNC: 6.7 NG/ML (ref 5–15)
WBC # BLD AUTO: 9.63 K/UL (ref 3.9–12.7)

## 2023-02-20 PROCEDURE — 99213 PR OFFICE/OUTPT VISIT, EST, LEVL III, 20-29 MIN: ICD-10-PCS | Mod: 24,S$PBB,, | Performed by: TRANSPLANT SURGERY

## 2023-02-20 PROCEDURE — 80053 COMPREHEN METABOLIC PANEL: CPT | Performed by: SURGERY

## 2023-02-20 PROCEDURE — 85027 COMPLETE CBC AUTOMATED: CPT | Performed by: SURGERY

## 2023-02-20 PROCEDURE — 99999 PR PBB SHADOW E&M-EST. PATIENT-LVL III: ICD-10-PCS | Mod: PBBFAC,,,

## 2023-02-20 PROCEDURE — 99213 OFFICE O/P EST LOW 20 MIN: CPT | Mod: PBBFAC

## 2023-02-20 PROCEDURE — 82248 BILIRUBIN DIRECT: CPT | Performed by: SURGERY

## 2023-02-20 PROCEDURE — 36415 COLL VENOUS BLD VENIPUNCTURE: CPT | Performed by: SURGERY

## 2023-02-20 PROCEDURE — 99999 PR PBB SHADOW E&M-EST. PATIENT-LVL III: CPT | Mod: PBBFAC,,,

## 2023-02-20 PROCEDURE — 80197 ASSAY OF TACROLIMUS: CPT | Performed by: SURGERY

## 2023-02-20 PROCEDURE — 99213 OFFICE O/P EST LOW 20 MIN: CPT | Mod: 24,S$PBB,, | Performed by: TRANSPLANT SURGERY

## 2023-02-20 PROCEDURE — 85007 BL SMEAR W/DIFF WBC COUNT: CPT | Performed by: SURGERY

## 2023-02-20 RX ORDER — FUROSEMIDE 20 MG/1
20 TABLET ORAL DAILY
Qty: 7 TABLET | Refills: 0 | Status: ON HOLD | OUTPATIENT
Start: 2023-02-20 | End: 2023-03-04 | Stop reason: HOSPADM

## 2023-02-20 NOTE — TELEPHONE ENCOUNTER
Patient made aware that per clinic visit, to continue lasix 20mg orally daily for the next 7 days.

## 2023-02-20 NOTE — PROGRESS NOTES
Transplant Surgery  Liver Transplant Recipient Follow-up    Original Referring Physician: Iggy Contreras  Current Corresponding Physician: Iggy Contreras    Chief Complaint: Argelia is here for follow up of her liver transplant performed 2023 for the primary diagnosis (UNOS) of Cirrhosis: Fatty Liver (BROWN)    ORGAN: LIVER  Whole or Partial: whole liver  Donor Type: donation after brain death  PHS Increased Risk: no  Donor CMV Status: Positive  Donor HCV Status: Negative  Donor HBcAb: Positive  Donor HBV FRANNIE: Negative  Donor HCV FRANNIE: Negative    Biliary Anastomosis: end to end  Arterial Anatomy: standard  IVC reconstruction: end to end ivc  Portal vein status: patent    Subjective:     History of Present Illness: She has had the following complications since transplant: none.      Interval History: Currently, she is doing well.  Current complaints include edema.  Argelia is here for management of her immunosuppression medication.    External provider notes reviewed: Yes    Review of Systems  Objective:     Physical Exam  Vitals reviewed.   Constitutional:       Appearance: She is overweight.   Abdominal:      Palpations: Abdomen is soft. There is no fluid wave or hepatomegaly.       Musculoskeletal:      Right lower le+ Edema present.      Left lower le+ Edema present.   Neurological:      Mental Status: She is alert.   Lab Results   Component Value Date    BILITOT 1.5 (H) 2023    AST 16 2023    ALT 41 2023    ALKPHOS 170 (H) 2023    CREATININE 0.7 2023    ALBUMIN 2.9 (L) 2023     Lab Results   Component Value Date    WBC 9.63 2023    HGB 8.5 (L) 2023    HCT 26.0 (L) 2023    HCT 25 (L) 2023     2023     Lab Results   Component Value Date    TACROLIMUS 5.7 2023       Diagnostics:  The following labs have been reviewed: CBC  CMP  TACROLIMUS LEVEL  The following radiology images have been independently reviewed and interpreted: Liver  US    Assessment/Plan:          S/P liver transplant. Good allograt function   Chronic immunosuppressive medications for rejection prophylaxis at target.  Plan: no adjustment needed.  Continue monitoring symptoms, labs and drug levels for drug-related toxicity and side effects.  Incision: staples in place; wound clean, dry, and intact  Femoral arterial line site: no complications evident  Edema: Continue lasix 20 mg/day    Additional testing to be completed according to Written Order Guidelines for Post-Liver and Combined Liver/Kidney Transplant Follow-up (LI-09)    Interpretation of tests and discussion of patient management involves all members of the multidisciplinary transplant team  Patient advised that it is recommended that all transplanted patients, and their close contacts and household members receive Covid vaccination.  Laureano Lipscomb MD       [facility] Patient Status  Functional Status: 40% - Disabled: requires special care and assistance  Physical Capacity: Limited Mobility

## 2023-02-20 NOTE — TELEPHONE ENCOUNTER
Patient made aware that lab review is complete, no other med changes needed, repeat labs Thursday.

## 2023-02-23 ENCOUNTER — TELEPHONE (OUTPATIENT)
Dept: TRANSPLANT | Facility: CLINIC | Age: 73
End: 2023-02-23
Payer: MEDICARE

## 2023-02-23 ENCOUNTER — LAB VISIT (OUTPATIENT)
Dept: LAB | Facility: HOSPITAL | Age: 73
End: 2023-02-23
Attending: SURGERY
Payer: MEDICARE

## 2023-02-23 ENCOUNTER — PATIENT MESSAGE (OUTPATIENT)
Dept: TRANSPLANT | Facility: CLINIC | Age: 73
End: 2023-02-23
Payer: MEDICARE

## 2023-02-23 ENCOUNTER — CONFERENCE (OUTPATIENT)
Dept: TRANSPLANT | Facility: CLINIC | Age: 73
End: 2023-02-23
Payer: MEDICARE

## 2023-02-23 DIAGNOSIS — Z94.4 LIVER REPLACED BY TRANSPLANT: ICD-10-CM

## 2023-02-23 LAB
ALBUMIN SERPL BCP-MCNC: 3.2 G/DL (ref 3.5–5.2)
ALP SERPL-CCNC: 149 U/L (ref 55–135)
ALT SERPL W/O P-5'-P-CCNC: 27 U/L (ref 10–44)
ANION GAP SERPL CALC-SCNC: 11 MMOL/L (ref 8–16)
AST SERPL-CCNC: 15 U/L (ref 10–40)
BASOPHILS # BLD AUTO: 0.21 K/UL (ref 0–0.2)
BASOPHILS NFR BLD: 2.3 % (ref 0–1.9)
BILIRUB DIRECT SERPL-MCNC: 1 MG/DL (ref 0.1–0.3)
BILIRUB SERPL-MCNC: 1.6 MG/DL (ref 0.1–1)
BUN SERPL-MCNC: 7 MG/DL (ref 8–23)
CALCIUM SERPL-MCNC: 9.4 MG/DL (ref 8.7–10.5)
CHLORIDE SERPL-SCNC: 99 MMOL/L (ref 95–110)
CO2 SERPL-SCNC: 29 MMOL/L (ref 23–29)
CREAT SERPL-MCNC: 0.7 MG/DL (ref 0.5–1.4)
DIFFERENTIAL METHOD: ABNORMAL
EOSINOPHIL # BLD AUTO: 0.3 K/UL (ref 0–0.5)
EOSINOPHIL NFR BLD: 2.7 % (ref 0–8)
ERYTHROCYTE [DISTWIDTH] IN BLOOD BY AUTOMATED COUNT: 17.8 % (ref 11.5–14.5)
EST. GFR  (NO RACE VARIABLE): >60 ML/MIN/1.73 M^2
GLUCOSE SERPL-MCNC: 97 MG/DL (ref 70–110)
HCT VFR BLD AUTO: 28.9 % (ref 37–48.5)
HGB BLD-MCNC: 9.2 G/DL (ref 12–16)
IMM GRANULOCYTES # BLD AUTO: 0.3 K/UL (ref 0–0.04)
IMM GRANULOCYTES NFR BLD AUTO: 3.2 % (ref 0–0.5)
LYMPHOCYTES # BLD AUTO: 1.3 K/UL (ref 1–4.8)
LYMPHOCYTES NFR BLD: 13.7 % (ref 18–48)
MCH RBC QN AUTO: 33.2 PG (ref 27–31)
MCHC RBC AUTO-ENTMCNC: 31.8 G/DL (ref 32–36)
MCV RBC AUTO: 104 FL (ref 82–98)
MONOCYTES # BLD AUTO: 0.6 K/UL (ref 0.3–1)
MONOCYTES NFR BLD: 6.4 % (ref 4–15)
NEUTROPHILS # BLD AUTO: 6.6 K/UL (ref 1.8–7.7)
NEUTROPHILS NFR BLD: 71.7 % (ref 38–73)
NRBC BLD-RTO: 0 /100 WBC
PLATELET # BLD AUTO: 427 K/UL (ref 150–450)
PMV BLD AUTO: 8.9 FL (ref 9.2–12.9)
POTASSIUM SERPL-SCNC: 3.8 MMOL/L (ref 3.5–5.1)
PROT SERPL-MCNC: 5.9 G/DL (ref 6–8.4)
RBC # BLD AUTO: 2.77 M/UL (ref 4–5.4)
SODIUM SERPL-SCNC: 139 MMOL/L (ref 136–145)
TACROLIMUS BLD-MCNC: 7 NG/ML (ref 5–15)
WBC # BLD AUTO: 9.25 K/UL (ref 3.9–12.7)

## 2023-02-23 PROCEDURE — 36415 COLL VENOUS BLD VENIPUNCTURE: CPT | Performed by: SURGERY

## 2023-02-23 PROCEDURE — 80197 ASSAY OF TACROLIMUS: CPT | Performed by: SURGERY

## 2023-02-23 PROCEDURE — 85025 COMPLETE CBC W/AUTO DIFF WBC: CPT | Performed by: SURGERY

## 2023-02-23 PROCEDURE — 82248 BILIRUBIN DIRECT: CPT | Performed by: SURGERY

## 2023-02-23 PROCEDURE — 80053 COMPREHEN METABOLIC PANEL: CPT | Performed by: SURGERY

## 2023-02-23 NOTE — TELEPHONE ENCOUNTER
My chart message sent to patient, no action required with next labs 2/27/2023        ----- Message from Kya Peace MD sent at 2/23/2023  2:03 PM CST -----  Results ok. No action needed.

## 2023-02-24 ENCOUNTER — CLINICAL SUPPORT (OUTPATIENT)
Dept: INFECTIOUS DISEASES | Facility: CLINIC | Age: 73
End: 2023-02-24
Payer: MEDICARE

## 2023-02-24 PROCEDURE — 96372 THER/PROPH/DIAG INJ SC/IM: CPT | Mod: PBBFAC

## 2023-02-24 RX ADMIN — PENICILLIN G BENZATHINE 2.4 MILLION UNITS: 1200000 INJECTION, SUSPENSION INTRAMUSCULAR at 08:02

## 2023-02-24 NOTE — PROGRESS NOTES
Patient received her third dose of Bicillin1.2 million units (2.4 mill total) in the bilateral buttocks, VG site. Pt tolerated well. Pt asked to wait in the clinic 15 minutes after injection in the event of an allergic reaction. Pt verbalized understanding. Pt left in NAD.

## 2023-02-27 ENCOUNTER — PATIENT MESSAGE (OUTPATIENT)
Dept: TRANSPLANT | Facility: CLINIC | Age: 73
End: 2023-02-27
Payer: MEDICARE

## 2023-02-27 ENCOUNTER — TELEPHONE (OUTPATIENT)
Dept: TRANSPLANT | Facility: CLINIC | Age: 73
End: 2023-02-27
Payer: MEDICARE

## 2023-02-27 NOTE — TELEPHONE ENCOUNTER
My chart message sent to patient, no action required with next labs  3/2/23        ----- Message from Hima Miguel MD sent at 2/27/2023  1:55 PM CST -----  Results ok. No action needed

## 2023-02-28 ENCOUNTER — OFFICE VISIT (OUTPATIENT)
Dept: INTERNAL MEDICINE | Facility: CLINIC | Age: 73
DRG: 074 | End: 2023-02-28
Payer: MEDICARE

## 2023-02-28 ENCOUNTER — OFFICE VISIT (OUTPATIENT)
Dept: TRANSPLANT | Facility: CLINIC | Age: 73
DRG: 074 | End: 2023-02-28
Payer: MEDICARE

## 2023-02-28 VITALS
WEIGHT: 145.5 LBS | OXYGEN SATURATION: 94 % | RESPIRATION RATE: 20 BRPM | BODY MASS INDEX: 26.78 KG/M2 | TEMPERATURE: 97 F | HEART RATE: 80 BPM | DIASTOLIC BLOOD PRESSURE: 75 MMHG | SYSTOLIC BLOOD PRESSURE: 161 MMHG | HEIGHT: 62 IN

## 2023-02-28 VITALS
DIASTOLIC BLOOD PRESSURE: 78 MMHG | WEIGHT: 160.25 LBS | HEIGHT: 62 IN | BODY MASS INDEX: 29.49 KG/M2 | SYSTOLIC BLOOD PRESSURE: 150 MMHG | OXYGEN SATURATION: 98 % | HEART RATE: 87 BPM

## 2023-02-28 DIAGNOSIS — Z94.4 S/P LIVER TRANSPLANT: ICD-10-CM

## 2023-02-28 DIAGNOSIS — Z29.89 PROPHYLACTIC IMMUNOTHERAPY: Primary | ICD-10-CM

## 2023-02-28 DIAGNOSIS — Z79.60 LONG-TERM USE OF IMMUNOSUPPRESSANT MEDICATION: ICD-10-CM

## 2023-02-28 DIAGNOSIS — Z91.89 AT RISK FOR OPPORTUNISTIC INFECTIONS: ICD-10-CM

## 2023-02-28 DIAGNOSIS — Z79.899 ENCOUNTER FOR LONG-TERM (CURRENT) USE OF OTHER MEDICATIONS: ICD-10-CM

## 2023-02-28 DIAGNOSIS — Z51.81 ENCOUNTER FOR THERAPEUTIC DRUG MONITORING: ICD-10-CM

## 2023-02-28 DIAGNOSIS — Z94.4 LIVER REPLACED BY TRANSPLANT: ICD-10-CM

## 2023-02-28 DIAGNOSIS — Z09 HOSPITAL DISCHARGE FOLLOW-UP: Primary | ICD-10-CM

## 2023-02-28 PROCEDURE — 99215 OFFICE O/P EST HI 40 MIN: CPT | Mod: 24,S$PBB,, | Performed by: TRANSPLANT SURGERY

## 2023-02-28 PROCEDURE — 99215 PR OFFICE/OUTPT VISIT, EST, LEVL V, 40-54 MIN: ICD-10-PCS | Mod: 24,S$PBB,, | Performed by: TRANSPLANT SURGERY

## 2023-02-28 PROCEDURE — 99213 OFFICE O/P EST LOW 20 MIN: CPT | Mod: S$PBB,,, | Performed by: PHYSICIAN ASSISTANT

## 2023-02-28 PROCEDURE — 99999 PR PBB SHADOW E&M-EST. PATIENT-LVL V: CPT | Mod: PBBFAC,,, | Performed by: PHYSICIAN ASSISTANT

## 2023-02-28 PROCEDURE — 99999 PR PBB SHADOW E&M-EST. PATIENT-LVL III: ICD-10-PCS | Mod: PBBFAC,,,

## 2023-02-28 PROCEDURE — 99213 OFFICE O/P EST LOW 20 MIN: CPT | Mod: PBBFAC

## 2023-02-28 PROCEDURE — 99999 PR PBB SHADOW E&M-EST. PATIENT-LVL V: ICD-10-PCS | Mod: PBBFAC,,, | Performed by: PHYSICIAN ASSISTANT

## 2023-02-28 PROCEDURE — 99213 PR OFFICE/OUTPT VISIT, EST, LEVL III, 20-29 MIN: ICD-10-PCS | Mod: S$PBB,,, | Performed by: PHYSICIAN ASSISTANT

## 2023-02-28 PROCEDURE — 99999 PR PBB SHADOW E&M-EST. PATIENT-LVL III: CPT | Mod: PBBFAC,,,

## 2023-02-28 PROCEDURE — 99215 OFFICE O/P EST HI 40 MIN: CPT | Mod: PBBFAC,27 | Performed by: PHYSICIAN ASSISTANT

## 2023-02-28 RX ORDER — PREDNISONE 5 MG/1
TABLET ORAL
Qty: 70 TABLET | Refills: 0 | Status: CANCELLED | OUTPATIENT
Start: 2023-02-28

## 2023-02-28 RX ORDER — TACROLIMUS 1 MG/1
4 CAPSULE ORAL EVERY 12 HOURS
Qty: 240 CAPSULE | Refills: 11 | Status: ON HOLD | OUTPATIENT
Start: 2023-02-28 | End: 2023-03-04 | Stop reason: HOSPADM

## 2023-02-28 RX ORDER — FUROSEMIDE 20 MG/1
20 TABLET ORAL DAILY
Qty: 7 TABLET | Refills: 0 | Status: CANCELLED | OUTPATIENT
Start: 2023-02-28 | End: 2023-03-07

## 2023-02-28 NOTE — PROGRESS NOTES
Subjective:       Patient ID: Argelia Sevilla is a 72 y.o. female.    Chief Complaint: Follow-up (Hospital follow up), Tremors (Pt reports from prograf)/), and Peripheral Neuropathy      Established pt of Primary Doctor Azul (new to me)    HPI        New pt here  PCP in [place], [place]  Here with     She is s/p liver transplant on 2/9 for cirrhosis/Fatty liver(BROWN)  Having BMS, taking miralax  Last time pain med needed 2/22, tylenol as needed  Notes adherence with all med per transplant team  LE swelling is better  Notes a/e tremor from prograf  She has HH PT/OT, took 2 laps around the courtyard w/o walker.     Admission Date: 2/8/2023  Hospital Length of Stay: 7 days  Discharge Date and Time:  02/15/2023 12:27 PM  Hospital Course:    Patient is now s/p DBD LTX on 2/9/23 (Donor RPR+, Hcv Ab+/FRANNIE-, HBcAb+, CMV D+/R+). Surgery was without complication. POD#1 Liver US stable. LFTs trending down.  Donor RPR+, ID rec PCN weekly x 3 (#1 given 2/10, next due 2/17, and 2/24 outpatient). Transferred to [facility] POD#1. 2nd ZACKERY drain removed 2/12. Albumin 25% and Lasix 20 mg IV x 1 given 2/12 with good response. She will be discharged home on lasix 20 mg x 5 days.  POD#6 US showed mildly elevated arterial resistive indices with normal waveforms and new 5.7 cm collection anterior to the left hepatic lobe, likely a hematoma. Reviewed by surgeon. H/H stable. LFTs continue to trend down. Patient is stable and ready for discharge to [facility] at this time. She is ambulating with a walker (HH PT/OT ordered), having BMs, tolerating diet, and pain is appropriately controlled. Follow up to include labs and transplant clinic tomorrow, Thursday 2/16. Additional follow up as scheduled per coordinator. Patient is in agreement with dc from the hospital today and follow up plan.        BP Readings from Last 3 Encounters:   02/28/23 (!) 158/80   02/20/23 (!) 174/84   02/16/23 (!) 146/65         Past Medical History:   Diagnosis Date     Anxiety disorder, unspecified     CAD (coronary artery disease)     DIC (disseminated intravascular coagulation)     Dyslipidemia     GERD (gastroesophageal reflux disease)     Hereditary hemolytic anemia, unspecified     History of coronary artery stent placement     HTN (hypertension)     Liver cirrhosis secondary to BROWN     Portal hypertensive gastropathy     Primary localized osteoarthrosis of right lower leg     Statin intolerance        Social History     Tobacco Use    Smoking status: Never    Smokeless tobacco: Never   Substance Use Topics    Alcohol use: Never       Review of patient's allergies indicates:   Allergen Reactions    Codeine Anxiety    Adhesive tape-silicones Rash         Current Outpatient Medications:     aspirin (ECOTRIN) 81 MG EC tablet, Take 1 tablet (81 mg total) by mouth once daily., Disp: 30 tablet, Rfl: 11    calcium carbonate-vitamin D3 600 mg-20 mcg (800 unit) Tab, Take 1 tablet by mouth once daily., Disp: 30 tablet, Rfl: 5    lamiVUDine (EPIVIR) 150 MG Tab, Take 1 tablet (150 mg total) by mouth once daily., Disp: 30 tablet, Rfl: 11    mycophenolate (CELLCEPT) 250 mg Cap, Take 4 capsules (1,000 mg total) by mouth 2 (two) times daily., Disp: 240 capsule, Rfl: 2    NIFEdipine (PROCARDIA-XL) 30 MG (OSM) 24 hr tablet, Take 1 tablet (30 mg total) by mouth once daily., Disp: 30 tablet, Rfl: 11    ondansetron (ZOFRAN-ODT) 4 MG TbDL, DISSOLVE 1 tablet (4 mg total) by mouth every 6 (six) hours as needed (nausea/vomiting)., Disp: 15 tablet, Rfl: 0    predniSONE (DELTASONE) 5 MG tablet, Take by mouth daily:  20mg 2/15-2/21, 15mg 2/22-2/28, 10mg 3/1-3/7, 5mg 3/8-3/14. STOP 3/15/23, Disp: 70 tablet, Rfl: 0    sulfamethoxazole-trimethoprim 400-80mg (BACTRIM,SEPTRA) 400-80 mg per tablet, Take 1 tablet by mouth every morning. STOP 8/10/23, Disp: 30 tablet, Rfl: 5    tacrolimus (PROGRAF) 1 MG Cap, Take 5 capsules (5 mg total) by mouth every 12 (twelve) hours., Disp: 300 capsule, Rfl: 11     "valGANciclovir (VALCYTE) 450 mg Tab, Take 1 tablet (450 mg total) by mouth once daily. STOP 5/10/23, Disp: 30 tablet, Rfl: 2    furosemide (LASIX) 20 MG tablet, Take 1 tablet (20 mg total) by mouth once daily. STOP 2/21/23 for 7 days, Disp: 7 tablet, Rfl: 0    oxyCODONE (ROXICODONE) 5 MG immediate release tablet, Take 1-2 tablets (5-10 mg total) by mouth every 6 (six) hours as needed for Pain. (Patient not taking: Reported on 2/28/2023), Disp: 24 tablet, Rfl: 0    pantoprazole (PROTONIX) 40 MG tablet, Take 40 mg by mouth once daily., Disp: , Rfl:     Family History   Problem Relation Age of Onset    Stroke Mother     Hyperlipidemia Mother     Asthma Mother     Hyperlipidemia Father     Stroke Father     Hyperlipidemia Brother     Cancer Maternal Grandmother        Past Surgical History:   Procedure Laterality Date    ADENOIDECTOMY      ANTERIOR CRUCIATE LIGAMENT REPAIR      BACK SURGERY      CARDIAC CATHETERIZATION  05/31/2018    CARDIAC CATHETERIZATION  09/28/2021    coronary angioplasty    CHOLECYSTECTOMY      COLONOSCOPY  05/25/2022    GALLBLADDER SURGERY      HYSTERECTOMY      KNEE ARTHROSCOPY Right     LIVER TRANSPLANT N/A 2/9/2023    Procedure: TRANSPLANT, LIVER;  Surgeon: Iggy Adhikari MD;  Location: [facility] OR [address] [place] [address];  Service: Transplant;  Laterality: N/A;    OOPHORECTOMY      REDUCTION OF BOTH BREASTS  01/01/2011    TONSILLECTOMY      TUBAL LIGATION         Review of Systems   Constitutional:  Negative for chills, fever and unexpected weight change.   Respiratory:  Negative for cough and shortness of breath.    Cardiovascular:  Negative for chest pain and leg swelling.   Gastrointestinal:  Negative for abdominal pain, nausea and vomiting.   Integumentary:  Negative for rash.   Neurological:  Positive for tremors. Negative for weakness, light-headedness and headaches.     Objective: BP (!) 158/80 (BP Location: Right arm, Patient Position: Sitting, BP Method: Medium (Manual))   Pulse 87   Ht 5' 2" (1.575 m)  "  Wt 72.7 kg (160 lb 4.4 oz)   SpO2 98%   BMI 29.31 kg/m²         Physical Exam  Vitals reviewed.   Constitutional:       General: She is not in acute distress.     Appearance: She is well-developed.   HENT:      Head: Normocephalic and atraumatic.   Cardiovascular:      Rate and Rhythm: Normal rate and regular rhythm.      Heart sounds: No murmur heard.  Pulmonary:      Effort: Pulmonary effort is normal.      Breath sounds: Normal breath sounds. No wheezing or rales.   Abdominal:      General: Bowel sounds are normal.      Palpations: Abdomen is soft.      Tenderness: There is no abdominal tenderness.   Musculoskeletal:      Right lower leg: No edema.      Left lower leg: No edema.      Comments: In wheelchair   Skin:     General: Skin is warm and dry.      Findings: No rash.   Neurological:      Mental Status: She is alert.   Psychiatric:         Mood and Affect: Mood normal.       Assessment:       1. Hospital discharge follow-up    2. S/P liver transplant          Plan:             Argelia was seen today for follow-up, tremors and peripheral neuropathy.    Diagnoses and all orders for this visit:    Hospital discharge follow-up  S/P liver transplant  Discharge summary notes/labs reviewed  Stable since discharge  Has appt with transplant team today  Pt plans to resume care with her PCP in [facility] prn      Patient Instructions   Keep appt today with liver team to discusses your meds, tremor and burning pain.       Future Appointments   Date Time Provider Department Center   2/28/2023  1:20 PM LIVER SURGERY, TRANSPLANT [facility]   3/2/2023  8:05 AM LAB, TRANSPLANT [facility]   3/6/2023  1:15 PM [facility]   4/21/2023  3:30 PM Houston Crawford MD [facility]   5/8/2023  1:15 PM [facility]   8/7/2023  1:15 PM [facility]

## 2023-02-28 NOTE — PROGRESS NOTES
Transplant Surgery  Liver Transplant Recipient Follow-up    Original Referring Physician: Iggy Contreras  Current Corresponding Physician: Iggy Contreras    Chief Complaint: Argelia is here for follow up of her liver transplant performed 2/9/2023 for the primary diagnosis (UNOS) of Cirrhosis: Fatty Liver (BROWN)    ORGAN: LIVER  Whole or Partial: whole liver  Donor Type: donation after brain death  PHS Increased Risk: no  Donor CMV Status: Positive  Donor HCV Status: Negative  Donor HBcAb: Positive  Donor HBV FRANNIE: Negative  Donor HCV FRANNIE: Negative    Biliary Anastomosis: end to end  Arterial Anatomy: standard  IVC reconstruction: end to end ivc  Portal vein status: patent    Subjective:     History of Present Illness: She has had the following complications since transplant: none.  The noted complications are well controlled.    Interval History: Currently, she is doing well.  Current complaints include tremor.  Argelia is here for management of her immunosuppression medication.    External provider notes reviewed: Yes    Review of Systems   Constitutional:  Negative for activity change, appetite change, chills, fatigue and fever.   HENT:  Negative for sore throat and trouble swallowing.    Eyes:  Negative for visual disturbance.   Respiratory:  Negative for cough, chest tightness and shortness of breath.    Cardiovascular:  Negative for chest pain, palpitations and leg swelling.   Gastrointestinal:  Negative for abdominal distention, abdominal pain, blood in stool, constipation, diarrhea, nausea and vomiting.   Endocrine: Negative for polyuria.   Genitourinary:  Negative for decreased urine volume, difficulty urinating, dysuria, flank pain, frequency and hematuria.   Musculoskeletal:  Negative for gait problem, myalgias and neck stiffness.   Skin:  Negative for rash and wound.   Neurological:  Negative for dizziness, tremors, seizures, weakness, light-headedness and headaches.   Hematological:  Negative for adenopathy.    Psychiatric/Behavioral:  Negative for agitation, confusion and sleep disturbance.    Objective:     Physical Exam  Vitals reviewed.   Constitutional:       General: She is not in acute distress.     Appearance: She is well-developed.   HENT:      Head: Normocephalic.      Nose: Nose normal.      Mouth/Throat:      Pharynx: Oropharynx is clear.   Eyes:      General: No scleral icterus.     Conjunctiva/sclera: Conjunctivae normal.      Pupils: Pupils are equal, round, and reactive to light.   Neck:      Thyroid: No thyromegaly.      Trachea: No tracheal deviation.   Cardiovascular:      Rate and Rhythm: Normal rate and regular rhythm.      Heart sounds: Normal heart sounds. No murmur heard.  Pulmonary:      Effort: Pulmonary effort is normal. No respiratory distress.      Breath sounds: Normal breath sounds.   Abdominal:      General: Bowel sounds are normal. There is no distension.      Palpations: Abdomen is soft. There is no mass.      Tenderness: There is no abdominal tenderness. There is no guarding or rebound.      Comments: No inguinal adenopathy   Musculoskeletal:         General: Normal range of motion.      Cervical back: Normal range of motion and neck supple.      Comments: No clubbing or cyanosis   Lymphadenopathy:      Cervical: No cervical adenopathy.   Skin:     General: Skin is warm and dry.      Findings: No erythema or rash.   Neurological:      Mental Status: She is alert and oriented to person, place, and time.   Psychiatric:         Behavior: Behavior normal.     Lab Results   Component Value Date    BILITOT 1.6 (H) 02/27/2023    AST 14 02/27/2023    ALT 18 02/27/2023    ALKPHOS 124 02/27/2023    CREATININE 0.7 02/27/2023    ALBUMIN 3.5 02/27/2023     Lab Results   Component Value Date    WBC 6.29 02/27/2023    HGB 10.2 (L) 02/27/2023    HCT 32.2 (L) 02/27/2023    HCT 25 (L) 02/09/2023     02/27/2023     Lab Results   Component Value Date    TACROLIMUS 9.0 02/27/2023        Diagnostics:  The following labs have been reviewed: CBC  CMP  TACROLIMUS LEVEL  The following radiology images have been independently reviewed and interpreted: Liver US    Assessment/Plan:          S/P liver transplant.  Chronic immunosuppressive medications for rejection prophylaxis supratherapeutic.  Plan: decrease tacro to 4mg bid .  Continue monitoring symptoms, labs and drug levels for drug-related toxicity and side effects.  Incision: staples in place; wound clean, dry, and intact  Femoral arterial line site: no complications evident    Additional testing to be completed according to Written Order Guidelines for Post-Liver and Combined Liver/Kidney Transplant Follow-up (LI-09)    Interpretation of tests and discussion of patient management involves all members of the multidisciplinary transplant team  Patient advised that it is recommended that all transplanted patients, and their close contacts and household members receive Covid vaccination.  Kya Peace MD       [facility] Patient Status  Functional Status: 70% - Cares for self: unable to carry on normal activity or active work  Physical Capacity: No Limitations

## 2023-02-28 NOTE — PROGRESS NOTES
STAPLE REMOVAL NOTE    Staples removed from liver transplant incision, steri-strips applied with Benzoin per Dr. Peace's order.  Patient tolerated procedure well.  Skin dry and intact.  Patient instructed to shower with back to water spray and to pat dry incision and to let the steri-strips wear off on their own.  Patient instructed to report any redness, warmth, or drainage from incision to transplant coordinators.  All questions answered.

## 2023-03-02 ENCOUNTER — HOSPITAL ENCOUNTER (INPATIENT)
Facility: HOSPITAL | Age: 73
LOS: 2 days | Discharge: HOME | DRG: 074 | End: 2023-03-04
Attending: SURGERY | Admitting: SURGERY
Payer: MEDICARE

## 2023-03-02 ENCOUNTER — TELEPHONE (OUTPATIENT)
Dept: TRANSPLANT | Facility: CLINIC | Age: 73
End: 2023-03-02
Payer: MEDICARE

## 2023-03-02 DIAGNOSIS — G57.93 NEUROPATHY INVOLVING BOTH LOWER EXTREMITIES: ICD-10-CM

## 2023-03-02 DIAGNOSIS — Z29.89 PROPHYLACTIC IMMUNOTHERAPY: ICD-10-CM

## 2023-03-02 DIAGNOSIS — Z79.60 LONG-TERM USE OF IMMUNOSUPPRESSANT MEDICATION: ICD-10-CM

## 2023-03-02 DIAGNOSIS — B19.10 HEPATITIS B VIRUS INFECTION IN CADAVERIC DONOR: ICD-10-CM

## 2023-03-02 DIAGNOSIS — Z91.89 AT RISK FOR INFECTION TRANSMITTED FROM DONOR: ICD-10-CM

## 2023-03-02 DIAGNOSIS — Z94.4 S/P LIVER TRANSPLANT: Primary | ICD-10-CM

## 2023-03-02 DIAGNOSIS — K21.9 GASTROESOPHAGEAL REFLUX DISEASE WITHOUT ESOPHAGITIS: ICD-10-CM

## 2023-03-02 DIAGNOSIS — D63.8 ANEMIA, CHRONIC DISEASE: ICD-10-CM

## 2023-03-02 DIAGNOSIS — R25.1 TREMORS OF NERVOUS SYSTEM: ICD-10-CM

## 2023-03-02 DIAGNOSIS — Z91.89 AT RISK FOR OPPORTUNISTIC INFECTIONS: ICD-10-CM

## 2023-03-02 DIAGNOSIS — Z00.5 HEPATITIS B VIRUS INFECTION IN CADAVERIC DONOR: ICD-10-CM

## 2023-03-02 LAB
BACTERIA #/AREA URNS AUTO: ABNORMAL /HPF
BILIRUB UR QL STRIP: NEGATIVE
CLARITY UR REFRACT.AUTO: CLEAR
COLOR UR AUTO: YELLOW
FOLATE SERPL-MCNC: 8.3 NG/ML (ref 4–24)
GLUCOSE UR QL STRIP: NEGATIVE
HGB UR QL STRIP: NEGATIVE
HYALINE CASTS UR QL AUTO: 5 /LPF
KETONES UR QL STRIP: NEGATIVE
LEUKOCYTE ESTERASE UR QL STRIP: NEGATIVE
MAGNESIUM SERPL-MCNC: 1 MG/DL (ref 1.6–2.6)
MICROSCOPIC COMMENT: ABNORMAL
NITRITE UR QL STRIP: NEGATIVE
NON-SQ EPI CELLS #/AREA URNS AUTO: 1 /HPF
PH UR STRIP: 6 [PH] (ref 5–8)
PHOSPHATE SERPL-MCNC: 3.6 MG/DL (ref 2.7–4.5)
POCT GLUCOSE: 186 MG/DL (ref 70–110)
PROT UR QL STRIP: ABNORMAL
RBC #/AREA URNS AUTO: 1 /HPF (ref 0–4)
SARS-COV-2 RDRP RESP QL NAA+PROBE: NEGATIVE
SP GR UR STRIP: 1.03 (ref 1–1.03)
SQUAMOUS #/AREA URNS AUTO: 11 /HPF
URN SPEC COLLECT METH UR: ABNORMAL
VIT B12 SERPL-MCNC: 346 PG/ML (ref 210–950)
WBC #/AREA URNS AUTO: 13 /HPF (ref 0–5)

## 2023-03-02 PROCEDURE — 81001 URINALYSIS AUTO W/SCOPE: CPT | Performed by: PHYSICIAN ASSISTANT

## 2023-03-02 PROCEDURE — 99223 1ST HOSP IP/OBS HIGH 75: CPT | Mod: 24,AI,, | Performed by: NURSE PRACTITIONER

## 2023-03-02 PROCEDURE — 84100 ASSAY OF PHOSPHORUS: CPT | Performed by: PHYSICIAN ASSISTANT

## 2023-03-02 PROCEDURE — 82607 VITAMIN B-12: CPT | Performed by: NURSE PRACTITIONER

## 2023-03-02 PROCEDURE — 94761 N-INVAS EAR/PLS OXIMETRY MLT: CPT

## 2023-03-02 PROCEDURE — 83735 ASSAY OF MAGNESIUM: CPT | Performed by: PHYSICIAN ASSISTANT

## 2023-03-02 PROCEDURE — 63600175 PHARM REV CODE 636 W HCPCS: Performed by: PHYSICIAN ASSISTANT

## 2023-03-02 PROCEDURE — 20600001 HC STEP DOWN PRIVATE ROOM

## 2023-03-02 PROCEDURE — 87040 BLOOD CULTURE FOR BACTERIA: CPT | Mod: 59 | Performed by: PHYSICIAN ASSISTANT

## 2023-03-02 PROCEDURE — U0002 COVID-19 LAB TEST NON-CDC: HCPCS | Performed by: PHYSICIAN ASSISTANT

## 2023-03-02 PROCEDURE — 82746 ASSAY OF FOLIC ACID SERUM: CPT | Performed by: NURSE PRACTITIONER

## 2023-03-02 PROCEDURE — 25000003 PHARM REV CODE 250: Performed by: CLINICAL NURSE SPECIALIST

## 2023-03-02 PROCEDURE — 87086 URINE CULTURE/COLONY COUNT: CPT | Performed by: PHYSICIAN ASSISTANT

## 2023-03-02 PROCEDURE — 99223 PR INITIAL HOSPITAL CARE,LEVL III: ICD-10-PCS | Mod: 24,AI,, | Performed by: NURSE PRACTITIONER

## 2023-03-02 PROCEDURE — 63600175 PHARM REV CODE 636 W HCPCS: Performed by: CLINICAL NURSE SPECIALIST

## 2023-03-02 PROCEDURE — 99900031 HC PATIENT EDUCATION (STAT)

## 2023-03-02 PROCEDURE — 25000003 PHARM REV CODE 250: Performed by: NURSE PRACTITIONER

## 2023-03-02 RX ORDER — DEXTROSE 40 %
15 GEL (GRAM) ORAL
Status: CANCELLED | OUTPATIENT
Start: 2023-03-02

## 2023-03-02 RX ORDER — ASPIRIN 81 MG/1
81 TABLET ORAL DAILY
Status: DISCONTINUED | OUTPATIENT
Start: 2023-03-03 | End: 2023-03-05 | Stop reason: HOSPADM

## 2023-03-02 RX ORDER — HEPARIN SODIUM 5000 [USP'U]/ML
5000 INJECTION, SOLUTION INTRAVENOUS; SUBCUTANEOUS EVERY 8 HOURS
Status: DISCONTINUED | OUTPATIENT
Start: 2023-03-02 | End: 2023-03-05 | Stop reason: HOSPADM

## 2023-03-02 RX ORDER — TALC
6 POWDER (GRAM) TOPICAL NIGHTLY PRN
Status: DISCONTINUED | OUTPATIENT
Start: 2023-03-02 | End: 2023-03-05 | Stop reason: HOSPADM

## 2023-03-02 RX ORDER — ONDANSETRON 8 MG/1
8 TABLET, ORALLY DISINTEGRATING ORAL EVERY 8 HOURS PRN
Status: DISCONTINUED | OUTPATIENT
Start: 2023-03-02 | End: 2023-03-05 | Stop reason: HOSPADM

## 2023-03-02 RX ORDER — IBUPROFEN 200 MG
24 TABLET ORAL
Status: DISCONTINUED | OUTPATIENT
Start: 2023-03-02 | End: 2023-03-02

## 2023-03-02 RX ORDER — SULFAMETHOXAZOLE AND TRIMETHOPRIM 400; 80 MG/1; MG/1
1 TABLET ORAL EVERY MORNING
Status: DISCONTINUED | OUTPATIENT
Start: 2023-03-03 | End: 2023-03-05 | Stop reason: HOSPADM

## 2023-03-02 RX ORDER — LAMIVUDINE 150 MG/1
150 TABLET, FILM COATED ORAL DAILY
Status: DISCONTINUED | OUTPATIENT
Start: 2023-03-03 | End: 2023-03-05 | Stop reason: HOSPADM

## 2023-03-02 RX ORDER — PREDNISONE 10 MG/1
10 TABLET ORAL DAILY
Status: DISCONTINUED | OUTPATIENT
Start: 2023-03-03 | End: 2023-03-05 | Stop reason: HOSPADM

## 2023-03-02 RX ORDER — PANTOPRAZOLE SODIUM 40 MG/1
40 TABLET, DELAYED RELEASE ORAL DAILY
Status: DISCONTINUED | OUTPATIENT
Start: 2023-03-03 | End: 2023-03-05 | Stop reason: HOSPADM

## 2023-03-02 RX ORDER — MAGNESIUM SULFATE HEPTAHYDRATE 40 MG/ML
2 INJECTION, SOLUTION INTRAVENOUS
Status: COMPLETED | OUTPATIENT
Start: 2023-03-02 | End: 2023-03-03

## 2023-03-02 RX ORDER — NIFEDIPINE 30 MG/1
30 TABLET, EXTENDED RELEASE ORAL DAILY
Status: DISCONTINUED | OUTPATIENT
Start: 2023-03-03 | End: 2023-03-05 | Stop reason: HOSPADM

## 2023-03-02 RX ORDER — INSULIN ASPART 100 [IU]/ML
0-5 INJECTION, SOLUTION INTRAVENOUS; SUBCUTANEOUS
Status: DISCONTINUED | OUTPATIENT
Start: 2023-03-02 | End: 2023-03-02

## 2023-03-02 RX ORDER — ACETAMINOPHEN 325 MG/1
650 TABLET ORAL EVERY 6 HOURS PRN
Status: DISCONTINUED | OUTPATIENT
Start: 2023-03-02 | End: 2023-03-05 | Stop reason: HOSPADM

## 2023-03-02 RX ORDER — VALGANCICLOVIR 450 MG/1
450 TABLET, FILM COATED ORAL DAILY
Status: DISCONTINUED | OUTPATIENT
Start: 2023-03-03 | End: 2023-03-05 | Stop reason: HOSPADM

## 2023-03-02 RX ORDER — MYCOPHENOLATE MOFETIL 250 MG/1
1000 CAPSULE ORAL 2 TIMES DAILY
Status: DISCONTINUED | OUTPATIENT
Start: 2023-03-02 | End: 2023-03-05 | Stop reason: HOSPADM

## 2023-03-02 RX ORDER — ACETAMINOPHEN 325 MG/1
650 TABLET ORAL EVERY 8 HOURS PRN
Status: DISCONTINUED | OUTPATIENT
Start: 2023-03-02 | End: 2023-03-02

## 2023-03-02 RX ORDER — IBUPROFEN 200 MG
16 TABLET ORAL
Status: DISCONTINUED | OUTPATIENT
Start: 2023-03-02 | End: 2023-03-02

## 2023-03-02 RX ORDER — LANOLIN ALCOHOL/MO/W.PET/CERES
400 CREAM (GRAM) TOPICAL 2 TIMES DAILY
Status: DISCONTINUED | OUTPATIENT
Start: 2023-03-02 | End: 2023-03-05 | Stop reason: HOSPADM

## 2023-03-02 RX ORDER — GLUCAGON 1 MG
1 KIT INJECTION
Status: DISCONTINUED | OUTPATIENT
Start: 2023-03-02 | End: 2023-03-02

## 2023-03-02 RX ORDER — SODIUM CHLORIDE 0.9 % (FLUSH) 0.9 %
3 SYRINGE (ML) INJECTION
Status: DISCONTINUED | OUTPATIENT
Start: 2023-03-02 | End: 2023-03-05 | Stop reason: HOSPADM

## 2023-03-02 RX ADMIN — ACETAMINOPHEN 650 MG: 325 TABLET ORAL at 08:03

## 2023-03-02 RX ADMIN — MYCOPHENOLATE MOFETIL 1000 MG: 250 CAPSULE ORAL at 08:03

## 2023-03-02 RX ADMIN — CEFTRIAXONE 2 G: 2 INJECTION, POWDER, FOR SOLUTION INTRAMUSCULAR; INTRAVENOUS at 09:03

## 2023-03-02 RX ADMIN — MAGNESIUM SULFATE 2 G: 2 INJECTION INTRAVENOUS at 08:03

## 2023-03-02 RX ADMIN — MAGNESIUM SULFATE 2 G: 2 INJECTION INTRAVENOUS at 10:03

## 2023-03-02 RX ADMIN — HEPARIN SODIUM 5000 UNITS: 5000 INJECTION INTRAVENOUS; SUBCUTANEOUS at 08:03

## 2023-03-02 RX ADMIN — MAGNESIUM OXIDE TAB 400 MG (241.3 MG ELEMENTAL MG) 400 MG: 400 (241.3 MG) TAB at 08:03

## 2023-03-02 NOTE — PLAN OF CARE
Pt AAOx4.  at bedside. x1 assist due to tremors in upper extremities. Urine sample collected. CT scan of head tomorrow scheduled as well as routine labs. [employer] incision OA w steri strips w no sign of infection.

## 2023-03-02 NOTE — H&P
[facility] - Transplant Stepdown  Liver Transplant  History & Physical    Patient Name: Argelia Sevilla  MRN: [id number]  Admission Date: 3/2/2023  Code Status: Full Code  Primary Care Provider: Primary Doctor No  Post-Operative Day: 21     ORGAN:   LIVER  Disease Etiology: Cirrhosis: Fatty Liver (BROWN)  Donor Type:   Donation after Brain Death  [facility] High Risk:   No  Donor CMV Status:   Donor CMV Status: Positive  Donor HBcAB:   Positive  Donor HCV Status:   Negative  Donor HBV FRANNIE: Negative  Donor HCV FRANNIE: Negative  Whole or Partial: Whole Liver  Biliary Anastomosis: End to End  Arterial Anatomy: Standard    Subjective:     History of Present Illness:  Arianna Sevilla is a 73 y/o F with PMH of BROWN cirrhosis now s/p DBD LTX on 2/9/23 (Donor RPR+, Hcv Ab+/FRANNIE-, HBcAb+, CMV D+/R+).  Post op course unremarkable. She had been progressing well outpatient; however, she has developed worsening tremors over the past several days. She was seen in surgery clinic on Tuesday for staple removal and noted to have difficulty walking due to tremors. At that time, her tac dose was decreased; however, she continues to have severe tremors even affecting speech. Decision made to direct admit for further management. Will hold prograf on admission and initiate infectious work up. CT head. Consult PT. Routine labwork this AM unremarkable. Will check Lipid panel, B12, and folate. Denies AMS, N/V, diarrhea, abdominal pain, fever/chills, chest pain, SOB. She reports feeling well except tremors. D/w Dr Brasher.       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     Portal hypertensive gastropathy     Primary  localized osteoarthrosis of right lower leg     Statin intolerance        Past Surgical History:   Procedure Laterality Date    ADENOIDECTOMY      ANTERIOR CRUCIATE LIGAMENT REPAIR      BACK SURGERY      CARDIAC CATHETERIZATION  05/31/2018    CARDIAC CATHETERIZATION  09/28/2021    coronary angioplasty    CHOLECYSTECTOMY      COLONOSCOPY  05/25/2022    GALLBLADDER SURGERY      HYSTERECTOMY      KNEE ARTHROSCOPY Right     LIVER TRANSPLANT N/A 2/9/2023    Procedure: TRANSPLANT, LIVER;  Surgeon: Iggy Adhikari MD;  Location: [facility] OR [address] [place] [address];  Service: Transplant;  Laterality: N/A;    OOPHORECTOMY      REDUCTION OF BOTH BREASTS  01/01/2011    TONSILLECTOMY      TUBAL LIGATION         Review of patient's allergies indicates:   Allergen Reactions    Codeine Anxiety    Adhesive tape-silicones Rash       Family History       Problem Relation (Age of Onset)    Asthma Mother    Cancer Maternal Grandmother    Hyperlipidemia Mother, Father, Brother    Stroke Mother, Father          Tobacco Use    Smoking status: Never    Smokeless tobacco: Never   Substance and Sexual Activity    Alcohol use: Never    Drug use: Not on file    Sexual activity: Not on file       PTA Medications   Medication Sig    aspirin (ECOTRIN) 81 MG EC tablet Take 1 tablet (81 mg total) by mouth once daily.    calcium carbonate-vitamin D3 600 mg-20 mcg (800 unit) Tab Take 1 tablet by mouth once daily.    furosemide (LASIX) 20 MG tablet Take 1 tablet (20 mg total) by mouth once daily. STOP 2/21/23 for 7 days    lamiVUDine (EPIVIR) 150 MG Tab Take 1 tablet (150 mg total) by mouth once daily.    mycophenolate (CELLCEPT) 250 mg Cap Take 4 capsules (1,000 mg total) by mouth 2 (two) times daily.    NIFEdipine (PROCARDIA-XL) 30 MG (OSM) 24 hr tablet Take 1 tablet (30 mg total) by mouth once daily.    ondansetron (ZOFRAN-ODT) 4 MG TbDL DISSOLVE 1 tablet (4 mg total) by mouth every 6 (six) hours as needed (nausea/vomiting).    oxyCODONE  (ROXICODONE) 5 MG immediate release tablet Take 1-2 tablets (5-10 mg total) by mouth every 6 (six) hours as needed for Pain. (Patient not taking: Reported on 2/28/2023)    pantoprazole (PROTONIX) 40 MG tablet Take 40 mg by mouth once daily.    predniSONE (DELTASONE) 5 MG tablet Take by mouth daily:  20mg 2/15-2/21, 15mg 2/22-2/28, 10mg 3/1-3/7, 5mg 3/8-3/14. STOP 3/15/23    sulfamethoxazole-trimethoprim 400-80mg (BACTRIM,SEPTRA) 400-80 mg per tablet Take 1 tablet by mouth every morning. STOP 8/10/23    tacrolimus (PROGRAF) 1 MG Cap Take 4 capsules (4 mg total) by mouth every 12 (twelve) hours.    valGANciclovir (VALCYTE) 450 mg Tab Take 1 tablet (450 mg total) by mouth once daily. STOP 5/10/23       Review of Systems   Constitutional:  Negative for appetite change, chills, fatigue and fever.   Respiratory:  Negative for cough, chest tightness and shortness of breath.    Cardiovascular:  Negative for chest pain, palpitations and leg swelling.   Gastrointestinal:  Negative for abdominal distention, abdominal pain, constipation, diarrhea, nausea and vomiting.   Genitourinary:  Negative for difficulty urinating, dysuria, frequency and urgency.   Musculoskeletal:  Negative for back pain and neck pain.   Skin:  Positive for wound. Negative for color change, pallor and rash.   Allergic/Immunologic: Positive for immunocompromised state.   Neurological:  Positive for tremors and weakness. Negative for dizziness and headaches.   Psychiatric/Behavioral:  Negative for confusion and sleep disturbance.    Objective:     Vital Signs (Most Recent):  Temp: 98.4 °F (36.9 °C) (03/02/23 1645)  Pulse: 74 (03/02/23 1645)  Resp: 18 (03/02/23 1645)  BP: 138/64 (03/02/23 1645)  SpO2: (!) 94 % (03/02/23 1645)   Vital Signs (24h Range):  Temp:  [98.4 °F (36.9 °C)] 98.4 °F (36.9 °C)  Pulse:  [74] 74  Resp:  [18] 18  SpO2:  [94 %] 94 %  BP: (138)/(64) 138/64     Weight: 63.6 kg (140 lb 1.6 oz)  Body mass index is 25.63 kg/m².    No intake  or output data in the 24 hours ending 03/02/23 6411    Physical Exam  Vitals and nursing note reviewed.   Constitutional:       General: She is awake.      Appearance: Normal appearance.   Cardiovascular:      Rate and Rhythm: Normal rate and regular rhythm.      Pulses: Normal pulses.   Pulmonary:      Effort: Pulmonary effort is normal. No respiratory distress.      Breath sounds: Normal breath sounds. No decreased breath sounds, wheezing or rales.   Abdominal:      General: Bowel sounds are normal. There is no distension.      Palpations: Abdomen is soft.      Tenderness: There is no abdominal tenderness. There is no guarding.       Musculoskeletal:         General: Normal range of motion.   Skin:     General: Skin is warm and dry.   Neurological:      Mental Status: She is alert and oriented to person, place, and time.      Motor: Weakness and tremor (generalized, but worse in hands/arms) present.   Psychiatric:         Behavior: Behavior is cooperative.       Laboratory:  CBC:   Recent Labs   Lab 03/02/23  0908   WBC 7.39   RBC 3.24*   HGB 10.7*   HCT 32.7*      *   MCH 33.0*   MCHC 32.7     CMP:   Recent Labs   Lab 03/02/23  0908   *   CALCIUM 9.9   ALBUMIN 3.8   PROT 6.8      K 3.8   CO2 29      BUN 12   CREATININE 0.7   ALKPHOS 114   ALT 16   AST 14   BILITOT 1.7*     Coagulation: No results for input(s): PT, INR, APTT in the last 168 hours.  Labs within the past 24 hours have been reviewed.    Diagnostic Results:  US Liver Transplant Post:  Results for orders placed during the hospital encounter of 02/08/23    US Doppler Liver Transplant Post (xpd)    Narrative  EXAMINATION:  US DOPPLER LIVER TRANSPLANT POST (XPD)    CLINICAL HISTORY:  POD#6 s/p liver US pending DC;    TECHNIQUE:  Limited abdominal ultrasound of the transplant liver with Doppler evaluation.  Color and spectral Doppler were performed.    COMPARISON:  Ultrasound liver transplant  02/10/2023    FINDINGS:  Patient is status post orthotopic liver transplant on 02/09/2023.    The liver demonstrates homogeneous echotexture.  No focal hepatic lesions are seen.    There is a 5.5 x 5.7 x 0.9 cm anechoic collection with lacy internal echoes along the anterior left hepatic lobe, new from prior.    The common duct is not dilated, measuring 4 mm.  No dilated intrahepatic radicles are seen.    Color flow and spectral waveform analysis was performed.  The main portal vein, right portal vein, left portal vein, middle hepatic vein, right hepatic vein, left hepatic vein, and IVC are patent with proper directional flow.    Anastomosis site of the main hepatic artery demonstrates a peak systolic velocity 126 cm/sec (previously 62 cm/sec).    Main hepatic artery demonstrates resistive index 0.73 (previously 0.80) with normal waveform.    Left hepatic artery shows resistive index 0.79 (previously 0.80) with normal waveform.    Anterior branch of the right hepatic artery demonstrates resistive index 0.81 (previously 0.77) with normal waveform.    Posterior branch of the right hepatic artery demonstrates resistive index 0.77 (previously 0.81) with normal waveform.    Right-sided pleural effusion.    Impression  Mildly elevated arterial resistive indices, which could represent edema or rejection in the early postoperative setting.  Waveforms are normal.    New 5.7 cm collection anterior to the left hepatic lobe, likely a hematoma.    Persistent right pleural effusion.    Electronically signed by resident: Maddie Ragsdale  Date:    02/15/2023  Time:    08:21    Electronically signed by: Nilton Ferguson  Date:    02/15/2023  Time:    10:27    Assessment/Plan:     * S/P liver transplant  - LFTs improving, Tbili remains mildly elevated (h/o ductoplasty intra-op due to size mismatch)  - staples removed 2/28      Tremors of nervous system  - presents with worsening tremors   - hold prograf  - infectious work up  - CT head  -  PT consulted for weakness    Hepatitis B virus infection in cadaveric donor  - cont lamuvidine      Long-term use of immunosuppressant medication  - see prophylactic immunotherapy      At risk for opportunistic infections  - cont OI prophylaxis per protocol      Prophylactic immunotherapy  - continue full dose cellcept and prednisone taper  - Hold prograf due to severe tremors  - will check daily prograf levels, monitor for toxic side effects, and adjust for therapeutic dose        Anemia, chronic disease  - 2/2 h/o ESLD  - check folate and B12          Yumiko Jordan, BOAZ  Liver Transplant  [facility] - Transplant Stepdown

## 2023-03-02 NOTE — ASSESSMENT & PLAN NOTE
- presents with worsening tremors   - hold prograf  - infectious work up  - CT head  - PT consulted for weakness

## 2023-03-02 NOTE — HPI
Arianna Sevilla is a 73 y/o F with PMH of BROWN cirrhosis now s/p DBD LTX on 2/9/23 (Donor RPR+, Hcv Ab+/FRANNIE-, HBcAb+, CMV D+/R+).  Post op course unremarkable. She had been progressing well outpatient; however, she has developed worsening tremors over the past several days. She was seen in surgery clinic on Tuesday for staple removal and noted to have difficulty walking due to tremors. At that time, her tac dose was decreased; however, she continues to have severe tremors even affecting speech. Decision made to direct admit for further management. Will hold prograf on admission and initiate infectious work up. CT head. Consult PT. Routine labwork this AM unremarkable. Will check Lipid panel, B12, and folate. Denies AMS, N/V, diarrhea, abdominal pain, fever/chills, chest pain, SOB. She reports feeling well except tremors. D/w Dr Brasher.

## 2023-03-02 NOTE — SUBJECTIVE & OBJECTIVE
Past Medical History:   Diagnosis Date    Anxiety disorder, unspecified     CAD (coronary artery disease)     DIC (disseminated intravascular coagulation)     Dyslipidemia     GERD (gastroesophageal reflux disease)     Hereditary hemolytic anemia, unspecified     History of coronary artery stent placement     HTN (hypertension)     Liver cirrhosis secondary to BROWN     Portal hypertensive gastropathy     Primary localized osteoarthrosis of right lower leg     Statin intolerance        Past Surgical History:   Procedure Laterality Date    ADENOIDECTOMY      ANTERIOR CRUCIATE LIGAMENT REPAIR      BACK SURGERY      CARDIAC CATHETERIZATION  05/31/2018    CARDIAC CATHETERIZATION  09/28/2021    coronary angioplasty    CHOLECYSTECTOMY      COLONOSCOPY  05/25/2022    GALLBLADDER SURGERY      HYSTERECTOMY      KNEE ARTHROSCOPY Right     LIVER TRANSPLANT N/A 2/9/2023    Procedure: TRANSPLANT, LIVER;  Surgeon: Iggy Adhikari MD;  Location: [facility] OR [address] [place] [address];  Service: Transplant;  Laterality: N/A;    OOPHORECTOMY      REDUCTION OF BOTH BREASTS  01/01/2011    TONSILLECTOMY      TUBAL LIGATION         Review of patient's allergies indicates:   Allergen Reactions    Codeine Anxiety    Adhesive tape-silicones Rash       Family History       Problem Relation (Age of Onset)    Asthma Mother    Cancer Maternal Grandmother    Hyperlipidemia Mother, Father, Brother    Stroke Mother, Father          Tobacco Use    Smoking status: Never    Smokeless tobacco: Never   Substance and Sexual Activity    Alcohol use: Never    Drug use: Not on file    Sexual activity: Not on file       PTA Medications   Medication Sig    aspirin (ECOTRIN) 81 MG EC tablet Take 1 tablet (81 mg total) by mouth once daily.    calcium carbonate-vitamin D3 600 mg-20 mcg (800 unit) Tab Take 1 tablet by mouth once daily.    furosemide (LASIX) 20 MG tablet Take 1 tablet (20 mg total) by mouth once daily. STOP 2/21/23 for 7 days    lamiVUDine (EPIVIR) 150 MG Tab Take 1  tablet (150 mg total) by mouth once daily.    mycophenolate (CELLCEPT) 250 mg Cap Take 4 capsules (1,000 mg total) by mouth 2 (two) times daily.    NIFEdipine (PROCARDIA-XL) 30 MG (OSM) 24 hr tablet Take 1 tablet (30 mg total) by mouth once daily.    ondansetron (ZOFRAN-ODT) 4 MG TbDL DISSOLVE 1 tablet (4 mg total) by mouth every 6 (six) hours as needed (nausea/vomiting).    oxyCODONE (ROXICODONE) 5 MG immediate release tablet Take 1-2 tablets (5-10 mg total) by mouth every 6 (six) hours as needed for Pain. (Patient not taking: Reported on 2/28/2023)    pantoprazole (PROTONIX) 40 MG tablet Take 40 mg by mouth once daily.    predniSONE (DELTASONE) 5 MG tablet Take by mouth daily:  20mg 2/15-2/21, 15mg 2/22-2/28, 10mg 3/1-3/7, 5mg 3/8-3/14. STOP 3/15/23    sulfamethoxazole-trimethoprim 400-80mg (BACTRIM,SEPTRA) 400-80 mg per tablet Take 1 tablet by mouth every morning. STOP 8/10/23    tacrolimus (PROGRAF) 1 MG Cap Take 4 capsules (4 mg total) by mouth every 12 (twelve) hours.    valGANciclovir (VALCYTE) 450 mg Tab Take 1 tablet (450 mg total) by mouth once daily. STOP 5/10/23       Review of Systems   Constitutional:  Negative for appetite change, chills, fatigue and fever.   Respiratory:  Negative for cough, chest tightness and shortness of breath.    Cardiovascular:  Negative for chest pain, palpitations and leg swelling.   Gastrointestinal:  Negative for abdominal distention, abdominal pain, constipation, diarrhea, nausea and vomiting.   Genitourinary:  Negative for difficulty urinating, dysuria, frequency and urgency.   Musculoskeletal:  Negative for back pain and neck pain.   Skin:  Positive for wound. Negative for color change, pallor and rash.   Allergic/Immunologic: Positive for immunocompromised state.   Neurological:  Positive for tremors and weakness. Negative for dizziness and headaches.   Psychiatric/Behavioral:  Negative for confusion and sleep disturbance.    Objective:     Vital Signs (Most  Recent):  Temp: 98.4 °F (36.9 °C) (03/02/23 1645)  Pulse: 74 (03/02/23 1645)  Resp: 18 (03/02/23 1645)  BP: 138/64 (03/02/23 1645)  SpO2: (!) 94 % (03/02/23 1645)   Vital Signs (24h Range):  Temp:  [98.4 °F (36.9 °C)] 98.4 °F (36.9 °C)  Pulse:  [74] 74  Resp:  [18] 18  SpO2:  [94 %] 94 %  BP: (138)/(64) 138/64     Weight: 63.6 kg (140 lb 1.6 oz)  Body mass index is 25.63 kg/m².    No intake or output data in the 24 hours ending 03/02/23 1651    Physical Exam  Vitals and nursing note reviewed.   Constitutional:       General: She is awake.      Appearance: Normal appearance.   Cardiovascular:      Rate and Rhythm: Normal rate and regular rhythm.      Pulses: Normal pulses.   Pulmonary:      Effort: Pulmonary effort is normal. No respiratory distress.      Breath sounds: Normal breath sounds. No decreased breath sounds, wheezing or rales.   Abdominal:      General: Bowel sounds are normal. There is no distension.      Palpations: Abdomen is soft.      Tenderness: There is no abdominal tenderness. There is no guarding.       Musculoskeletal:         General: Normal range of motion.   Skin:     General: Skin is warm and dry.   Neurological:      Mental Status: She is alert and oriented to person, place, and time.      Motor: Weakness and tremor (generalized, but worse in hands/arms) present.   Psychiatric:         Behavior: Behavior is cooperative.       Laboratory:  CBC:   Recent Labs   Lab 03/02/23  0908   WBC 7.39   RBC 3.24*   HGB 10.7*   HCT 32.7*      *   MCH 33.0*   MCHC 32.7     CMP:   Recent Labs   Lab 03/02/23  0908   *   CALCIUM 9.9   ALBUMIN 3.8   PROT 6.8      K 3.8   CO2 29      BUN 12   CREATININE 0.7   ALKPHOS 114   ALT 16   AST 14   BILITOT 1.7*     Coagulation: No results for input(s): PT, INR, APTT in the last 168 hours.  Labs within the past 24 hours have been reviewed.    Diagnostic Results:  US Liver Transplant Post:  Results for orders placed during the hospital  encounter of 02/08/23    US Doppler Liver Transplant Post (xpd)    Narrative  EXAMINATION:  US DOPPLER LIVER TRANSPLANT POST (XPD)    CLINICAL HISTORY:  POD#6 s/p liver US pending DC;    TECHNIQUE:  Limited abdominal ultrasound of the transplant liver with Doppler evaluation.  Color and spectral Doppler were performed.    COMPARISON:  Ultrasound liver transplant 02/10/2023    FINDINGS:  Patient is status post orthotopic liver transplant on 02/09/2023.    The liver demonstrates homogeneous echotexture.  No focal hepatic lesions are seen.    There is a 5.5 x 5.7 x 0.9 cm anechoic collection with lacy internal echoes along the anterior left hepatic lobe, new from prior.    The common duct is not dilated, measuring 4 mm.  No dilated intrahepatic radicles are seen.    Color flow and spectral waveform analysis was performed.  The main portal vein, right portal vein, left portal vein, middle hepatic vein, right hepatic vein, left hepatic vein, and IVC are patent with proper directional flow.    Anastomosis site of the main hepatic artery demonstrates a peak systolic velocity 126 cm/sec (previously 62 cm/sec).    Main hepatic artery demonstrates resistive index 0.73 (previously 0.80) with normal waveform.    Left hepatic artery shows resistive index 0.79 (previously 0.80) with normal waveform.    Anterior branch of the right hepatic artery demonstrates resistive index 0.81 (previously 0.77) with normal waveform.    Posterior branch of the right hepatic artery demonstrates resistive index 0.77 (previously 0.81) with normal waveform.    Right-sided pleural effusion.    Impression  Mildly elevated arterial resistive indices, which could represent edema or rejection in the early postoperative setting.  Waveforms are normal.    New 5.7 cm collection anterior to the left hepatic lobe, likely a hematoma.    Persistent right pleural effusion.    Electronically signed by resident: Maddie  Jn  Date:    02/15/2023  Time:    08:21    Electronically signed by: Nilton Ferguson  Date:    02/15/2023  Time:    10:27

## 2023-03-02 NOTE — TELEPHONE ENCOUNTER
"Writer contacted patient to inform her of her liver txp lab review with surgeon. In conversing with patient, noted her voice breaking up and patient reports her tremors noted this past Tuesday (head to toe) have not improved with the tacro dose adj down made, and that her BLE pain feels worse, "legs feel like they are on fire".     Patient therefore made aware that she will be direct admitted to [facility] as per DR Peace for evaluation.    Admit office contacted, IKA made aware.  Patient will gather her self some belongings and blue card then report to the Admit office.  "

## 2023-03-02 NOTE — ASSESSMENT & PLAN NOTE
- continue full dose cellcept and prednisone taper  - Hold prograf due to severe tremors  - will check daily prograf levels, monitor for toxic side effects, and adjust for therapeutic dose

## 2023-03-02 NOTE — ASSESSMENT & PLAN NOTE
- LFTs improving, Tbili remains mildly elevated (h/o ductoplasty intra-op due to size mismatch)  - staples removed 2/28

## 2023-03-03 DIAGNOSIS — Z94.4 S/P LIVER TRANSPLANT: Primary | ICD-10-CM

## 2023-03-03 PROBLEM — R18.8 OTHER ASCITES: Status: RESOLVED | Noted: 2023-02-15 | Resolved: 2023-03-03

## 2023-03-03 PROBLEM — R73.9 STEROID-INDUCED HYPERGLYCEMIA: Status: RESOLVED | Noted: 2023-02-09 | Resolved: 2023-03-03

## 2023-03-03 PROBLEM — G57.93 NEUROPATHY INVOLVING BOTH LOWER EXTREMITIES: Status: ACTIVE | Noted: 2023-03-03

## 2023-03-03 PROBLEM — D62 ACUTE BLOOD LOSS ANEMIA: Status: RESOLVED | Noted: 2023-02-11 | Resolved: 2023-03-03

## 2023-03-03 PROBLEM — T38.0X5A STEROID-INDUCED HYPERGLYCEMIA: Status: RESOLVED | Noted: 2023-02-09 | Resolved: 2023-03-03

## 2023-03-03 PROBLEM — D69.6 THROMBOCYTOPENIA: Status: RESOLVED | Noted: 2021-01-06 | Resolved: 2023-03-03

## 2023-03-03 LAB
ALBUMIN SERPL BCP-MCNC: 3.2 G/DL (ref 3.5–5.2)
ALP SERPL-CCNC: 100 U/L (ref 55–135)
ALT SERPL W/O P-5'-P-CCNC: 12 U/L (ref 10–44)
ANION GAP SERPL CALC-SCNC: 8 MMOL/L (ref 8–16)
AST SERPL-CCNC: 13 U/L (ref 10–40)
BASOPHILS # BLD AUTO: 0.06 K/UL (ref 0–0.2)
BASOPHILS NFR BLD: 1.2 % (ref 0–1.9)
BILIRUB SERPL-MCNC: 1.1 MG/DL (ref 0.1–1)
BUN SERPL-MCNC: 12 MG/DL (ref 8–23)
CALCIUM SERPL-MCNC: 8.8 MG/DL (ref 8.7–10.5)
CHLORIDE SERPL-SCNC: 102 MMOL/L (ref 95–110)
CHOLEST SERPL-MCNC: 127 MG/DL (ref 120–199)
CHOLEST/HDLC SERPL: 3.2 {RATIO} (ref 2–5)
CMV DNA SPEC QL NAA+PROBE: NOT DETECTED
CO2 SERPL-SCNC: 29 MMOL/L (ref 23–29)
CREAT SERPL-MCNC: 0.6 MG/DL (ref 0.5–1.4)
CYTOMEGALOVIRUS LOG (IU/ML): NOT DETECTED LOGIU/ML
CYTOMEGALOVIRUS PCR, QUANT: NOT DETECTED IU/ML
DIFFERENTIAL METHOD: ABNORMAL
EOSINOPHIL # BLD AUTO: 0.1 K/UL (ref 0–0.5)
EOSINOPHIL NFR BLD: 2.2 % (ref 0–8)
ERYTHROCYTE [DISTWIDTH] IN BLOOD BY AUTOMATED COUNT: 16.7 % (ref 11.5–14.5)
EST. GFR  (NO RACE VARIABLE): >60 ML/MIN/1.73 M^2
GLUCOSE SERPL-MCNC: 92 MG/DL (ref 70–110)
HCT VFR BLD AUTO: 28.1 % (ref 37–48.5)
HDLC SERPL-MCNC: 40 MG/DL (ref 40–75)
HDLC SERPL: 31.5 % (ref 20–50)
HGB BLD-MCNC: 9.2 G/DL (ref 12–16)
IMM GRANULOCYTES # BLD AUTO: 0.06 K/UL (ref 0–0.04)
IMM GRANULOCYTES NFR BLD AUTO: 1.2 % (ref 0–0.5)
LDLC SERPL CALC-MCNC: 62 MG/DL (ref 63–159)
LYMPHOCYTES # BLD AUTO: 1 K/UL (ref 1–4.8)
LYMPHOCYTES NFR BLD: 20.1 % (ref 18–48)
MAGNESIUM SERPL-MCNC: 1.7 MG/DL (ref 1.6–2.6)
MCH RBC QN AUTO: 33.6 PG (ref 27–31)
MCHC RBC AUTO-ENTMCNC: 32.7 G/DL (ref 32–36)
MCV RBC AUTO: 103 FL (ref 82–98)
MONOCYTES # BLD AUTO: 0.3 K/UL (ref 0.3–1)
MONOCYTES NFR BLD: 6.7 % (ref 4–15)
NEUTROPHILS # BLD AUTO: 3.5 K/UL (ref 1.8–7.7)
NEUTROPHILS NFR BLD: 68.6 % (ref 38–73)
NONHDLC SERPL-MCNC: 87 MG/DL
NRBC BLD-RTO: 0 /100 WBC
PLATELET # BLD AUTO: 244 K/UL (ref 150–450)
PMV BLD AUTO: 9.4 FL (ref 9.2–12.9)
POTASSIUM SERPL-SCNC: 3.2 MMOL/L (ref 3.5–5.1)
PROT SERPL-MCNC: 5.9 G/DL (ref 6–8.4)
RBC # BLD AUTO: 2.74 M/UL (ref 4–5.4)
SODIUM SERPL-SCNC: 139 MMOL/L (ref 136–145)
TACROLIMUS BLD-MCNC: 5 NG/ML (ref 5–15)
TRIGL SERPL-MCNC: 125 MG/DL (ref 30–150)
WBC # BLD AUTO: 5.08 K/UL (ref 3.9–12.7)

## 2023-03-03 PROCEDURE — 25000003 PHARM REV CODE 250: Performed by: CLINICAL NURSE SPECIALIST

## 2023-03-03 PROCEDURE — 20600001 HC STEP DOWN PRIVATE ROOM

## 2023-03-03 PROCEDURE — 80061 LIPID PANEL: CPT | Performed by: NURSE PRACTITIONER

## 2023-03-03 PROCEDURE — 85025 COMPLETE CBC W/AUTO DIFF WBC: CPT | Performed by: PHYSICIAN ASSISTANT

## 2023-03-03 PROCEDURE — 97161 PT EVAL LOW COMPLEX 20 MIN: CPT

## 2023-03-03 PROCEDURE — 25000003 PHARM REV CODE 250: Performed by: NURSE PRACTITIONER

## 2023-03-03 PROCEDURE — 63600175 PHARM REV CODE 636 W HCPCS: Performed by: NURSE PRACTITIONER

## 2023-03-03 PROCEDURE — 83735 ASSAY OF MAGNESIUM: CPT | Performed by: PHYSICIAN ASSISTANT

## 2023-03-03 PROCEDURE — 94761 N-INVAS EAR/PLS OXIMETRY MLT: CPT

## 2023-03-03 PROCEDURE — 63600175 PHARM REV CODE 636 W HCPCS: Performed by: PHYSICIAN ASSISTANT

## 2023-03-03 PROCEDURE — 25000003 PHARM REV CODE 250: Performed by: PHYSICIAN ASSISTANT

## 2023-03-03 PROCEDURE — 36415 COLL VENOUS BLD VENIPUNCTURE: CPT | Performed by: PHYSICIAN ASSISTANT

## 2023-03-03 PROCEDURE — 80053 COMPREHEN METABOLIC PANEL: CPT | Performed by: PHYSICIAN ASSISTANT

## 2023-03-03 PROCEDURE — 80197 ASSAY OF TACROLIMUS: CPT | Performed by: PHYSICIAN ASSISTANT

## 2023-03-03 PROCEDURE — 99233 PR SUBSEQUENT HOSPITAL CARE,LEVL III: ICD-10-PCS | Mod: 24,,, | Performed by: NURSE PRACTITIONER

## 2023-03-03 PROCEDURE — 99233 SBSQ HOSP IP/OBS HIGH 50: CPT | Mod: 24,,, | Performed by: NURSE PRACTITIONER

## 2023-03-03 RX ORDER — POTASSIUM CHLORIDE 20 MEQ/1
40 TABLET, EXTENDED RELEASE ORAL ONCE
Status: COMPLETED | OUTPATIENT
Start: 2023-03-03 | End: 2023-03-03

## 2023-03-03 RX ORDER — CALCIUM CARBONATE 200(500)MG
1000 TABLET,CHEWABLE ORAL 3 TIMES DAILY PRN
Status: DISCONTINUED | OUTPATIENT
Start: 2023-03-03 | End: 2023-03-05 | Stop reason: HOSPADM

## 2023-03-03 RX ORDER — MAGNESIUM SULFATE HEPTAHYDRATE 40 MG/ML
2 INJECTION, SOLUTION INTRAVENOUS ONCE
Status: COMPLETED | OUTPATIENT
Start: 2023-03-03 | End: 2023-03-03

## 2023-03-03 RX ADMIN — LAMIVUDINE 150 MG: 150 TABLET, FILM COATED ORAL at 08:03

## 2023-03-03 RX ADMIN — CYCLOSPORINE 100 MG: 100 CAPSULE, LIQUID FILLED ORAL at 11:03

## 2023-03-03 RX ADMIN — MAGNESIUM OXIDE TAB 400 MG (241.3 MG ELEMENTAL MG) 400 MG: 400 (241.3 MG) TAB at 09:03

## 2023-03-03 RX ADMIN — ACETAMINOPHEN 650 MG: 325 TABLET ORAL at 11:03

## 2023-03-03 RX ADMIN — PREDNISONE 10 MG: 10 TABLET ORAL at 08:03

## 2023-03-03 RX ADMIN — MYCOPHENOLATE MOFETIL 1000 MG: 250 CAPSULE ORAL at 08:03

## 2023-03-03 RX ADMIN — HEPARIN SODIUM 5000 UNITS: 5000 INJECTION INTRAVENOUS; SUBCUTANEOUS at 08:03

## 2023-03-03 RX ADMIN — CYCLOSPORINE 100 MG: 100 CAPSULE, LIQUID FILLED ORAL at 05:03

## 2023-03-03 RX ADMIN — VALGANCICLOVIR 450 MG: 450 TABLET, FILM COATED ORAL at 08:03

## 2023-03-03 RX ADMIN — MAGNESIUM SULFATE 2 G: 2 INJECTION INTRAVENOUS at 12:03

## 2023-03-03 RX ADMIN — ASPIRIN 81 MG: 81 TABLET, COATED ORAL at 08:03

## 2023-03-03 RX ADMIN — MAGNESIUM OXIDE TAB 400 MG (241.3 MG ELEMENTAL MG) 400 MG: 400 (241.3 MG) TAB at 08:03

## 2023-03-03 RX ADMIN — HEPARIN SODIUM 5000 UNITS: 5000 INJECTION INTRAVENOUS; SUBCUTANEOUS at 01:03

## 2023-03-03 RX ADMIN — SULFAMETHOXAZOLE AND TRIMETHOPRIM 1 TABLET: 400; 80 TABLET ORAL at 08:03

## 2023-03-03 RX ADMIN — POTASSIUM CHLORIDE 40 MEQ: 1500 TABLET, EXTENDED RELEASE ORAL at 12:03

## 2023-03-03 RX ADMIN — HEPARIN SODIUM 5000 UNITS: 5000 INJECTION INTRAVENOUS; SUBCUTANEOUS at 05:03

## 2023-03-03 RX ADMIN — NIFEDIPINE 30 MG: 30 TABLET, FILM COATED, EXTENDED RELEASE ORAL at 08:03

## 2023-03-03 RX ADMIN — PANTOPRAZOLE SODIUM 40 MG: 40 TABLET, DELAYED RELEASE ORAL at 08:03

## 2023-03-03 NOTE — PT/OT/SLP EVAL
Physical Therapy Evaluation/Discharge    Patient Name:  Argelia Sevilla   MRN:  [id number]  Admit Date: 3/2/2023  Admitting Diagnosis:  S/P liver transplant  Length of Stay: 1 days  Recent Surgery: * No surgery found *      Recommendations:     Discharge Recommendations:  other (see comments) (defer to CM/SW)   Discharge Equipment Recommendations: grab bar   Barriers to discharge: None    Assessment:     Argelia Sevilla is a 72 y.o. female admitted with a medical diagnosis of S/P liver transplant. PT evaluation complete and no goals established. Pt is functioning at high level and does not required acute care physical therapy services. Education provided to ambulate in hallway 3x/day with RN in order to maintain strength and endurance. Pt verbalized understanding. Please re-consult if functional mobility changes.     Problem List: impaired endurance, impaired sensation, impaired cardiopulmonary response to activity  Rehab Prognosis: Good;  Plan:     DC acute PT    Subjective   Communicated with RN prior to session.  Patient found HOB elevated upon PT entry to room, agreeable to evaluation. Argelia Sevilla's  present during session.    Chief Complaint: No chief complaint on file.    Patient/Family Comments/goals: to get better and return home   Pain/Comfort:  Pain Rating 1: 0/10  Pain Rating Post-Intervention 1: 0/10    Living Environment:  Pt lives with  in a H with 4 TONYA to enter the front and no HR and 3 TONYA the back with a L HR. Pt mostly uses the back steps. Pt was mod ind with ambulation (rollator) and mod ind - supervision with ADLs.  is present to assist as needed if function varies. Patient uses DME as follows: rollator. DME owned (not currently used): none.      Patient reports they will have assistance from  upon discharge.    Objective:   Patient found with: telemetry, pulse ox (continuous), blood pressure cuff, peripheral IV     General Precautions: Standard, Cardiac  "fall   Orthopedic Precautions:N/A   Braces: N/A   Oxygen Device: Room Air  Vitals: BP (!) 157/74 (BP Location: Left arm, Patient Position: Sitting)   Pulse 97   Temp 98.9 °F (37.2 °C) (Oral)   Resp 16   Ht 5' 2" (1.575 m)   Wt 64.8 kg (142 lb 13.7 oz)   SpO2 98%   BMI 26.13 kg/m²     Exams:  Cognition:   Alert and Cooperative  Ox4  Command following: Follows multistep  commands  Fluency: clear/fluent    RLE ROM: WFL  RLE Strength: grossly 4/5  LLE ROM: WFL  LLE Strength: grossly 4/5      Outcome Measures:  AM-PAC 6 CLICK MOBILITY  Turning over in bed (including adjusting bedclothes, sheets and blankets)?: 4  Sitting down on and standing up from a chair with arms (e.g., wheelchair, bedside commode, etc.): 4  Moving from lying on back to sitting on the side of the bed?: 4  Moving to and from a bed to a chair (including a wheelchair)?: 4  Need to walk in hospital room?: 3  Climbing 3-5 steps with a railing?: 3  Basic Mobility Total Score: 22     Functional Mobility:  Additional staff present: N/A  Bed Mobility:  Supine to Sit: independence; HOB elevated  Scooting anteriorly to EOB to have both feet planted on floor: independence  Sit to Supine: independence; HOB elevated    Sitting Balance at Edge of Bed:  Assistance Level Required: [place]      Transfers:   Sit <> Stand Transfer: modified independence with  rollator  from EOB      Gait:   Patient ambulated: 140ft    Patient required: supervision  Patient used:  rollator  Gait Pattern observed: reciprocal gait  Gait Deviation(s): wide base of support and decreased dottie  Impairments due to: decreased strength and decreased endurance  Comments:   Mask donned  No LOB with horizontal head turns, change of speed, or change in direction  No SOB  Verbal cuing for pacing         Therapeutic Activities, Exercises, & Education:   Educated pt on PT role/POC  Educated pt on importance of OOB activity and daily ambulation   Pt verbalized understanding         Patient " left HOB elevated with all lines intact, call button in reach, RN notified, and  present.    GOALS:   Multidisciplinary Problems       Physical Therapy Goals       Not on file              Multidisciplinary Problems (Resolved)          Problem: Physical Therapy    Goal Priority Disciplines Outcome Goal Variances Interventions   Physical Therapy Goal   (Resolved)     PT, PT/OT Met                         History:     Past Medical History:   Diagnosis Date    Anxiety disorder, unspecified     CAD (coronary artery disease)     DIC (disseminated intravascular coagulation)     Dyslipidemia     GERD (gastroesophageal reflux disease)     Hereditary hemolytic anemia, unspecified     History of coronary artery stent placement     HTN (hypertension)     Liver cirrhosis secondary to BROWN     Other ascites 2/15/2023    Portal hypertensive gastropathy     Primary localized osteoarthrosis of right lower leg     Statin intolerance        Past Surgical History:   Procedure Laterality Date    ADENOIDECTOMY      ANTERIOR CRUCIATE LIGAMENT REPAIR      BACK SURGERY      CARDIAC CATHETERIZATION  05/31/2018    CARDIAC CATHETERIZATION  09/28/2021    coronary angioplasty    CHOLECYSTECTOMY      COLONOSCOPY  05/25/2022    GALLBLADDER SURGERY      HYSTERECTOMY      KNEE ARTHROSCOPY Right     LIVER TRANSPLANT N/A 2/9/2023    Procedure: TRANSPLANT, LIVER;  Surgeon: Iggy Adhikari MD;  Location: [facility] OR [address] [place] [address];  Service: Transplant;  Laterality: N/A;    OOPHORECTOMY      REDUCTION OF BOTH BREASTS  01/01/2011    TONSILLECTOMY      TUBAL LIGATION         Time Tracking:     PT Received On: 03/03/23  PT Start Time: 1014     PT Stop Time: 1025  PT Total Time (min): 11 min     Billable Minutes: Evaluation 11

## 2023-03-03 NOTE — PROGRESS NOTES
[facility] - Transplant Stepdown  Liver Transplant  Progress Note    Patient Name: Argelia Sevilla  MRN: [id number]  Admission Date: 3/2/2023  Hospital Length of Stay: 1 days  Code Status: Full Code  Primary Care Provider: Primary Doctor No  Post-Operative Day: 22    ORGAN:   LIVER  Disease Etiology: Cirrhosis: Fatty Liver (BROWN)  Donor Type:   Donation after Brain Death  CDC High Risk:   No  Donor CMV Status:   Donor CMV Status: Positive  Donor HBcAB:   Positive  Donor HCV Status:   Negative  Donor HBV FRANNIE: Negative  Donor HCV FRANNIE: Negative  Whole or Partial: Whole Liver  Biliary Anastomosis: End to End  Arterial Anatomy: Standard  Subjective:     History of Present Illness:  Arianna Sevilla is a 73 y/o F with PMH of BROWN cirrhosis now s/p DBD LTX on 2/9/23 (Donor RPR+, Hcv Ab+/FRANNIE-, HBcAb+, CMV D+/R+).  Post op course unremarkable. She had been progressing well outpatient; however, she has developed worsening tremors over the past several days. She was seen in surgery clinic on Tuesday for staple removal and noted to have difficulty walking due to tremors. At that time, her tac dose was decreased; however, she continues to have severe tremors even affecting speech. Decision made to direct admit for further management. Will hold prograf on admission and initiate infectious work up. CT head. Consult PT. Routine labwork this AM unremarkable. Will check Lipid panel, B12, and folate. Denies AMS, N/V, diarrhea, abdominal pain, fever/chills, chest pain, SOB. She reports feeling well except tremors. D/w Dr Brasher.       Hospital Course:  Patient admitted for w/u of tremors and brittanie LE neuropathy, also mentioned dysuria.    Interval History: no acute event overnight. UA w occ bacterua, 13 WBC. Pt given dose of Rocephin. Urine cx pending. Prelim blood cx w NGTD. CT head pending. Since holding Prograf, pt reports still has tremors greater w movement but overall improved. She notes bruning in her legs same. Replaced Mag.  Can consider low dose Neurontin if cont. Patient is otherwise independent in room. She denies abdominal pain or diarrhea. LFTs remain wnl. Good renal function. Plan to convert pt to Cyclosporine today and monitor level.           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     Portal hypertensive gastropathy     Primary localized osteoarthrosis of right lower leg     Statin intolerance        Past Surgical History:   Procedure Laterality Date    ADENOIDECTOMY      ANTERIOR CRUCIATE LIGAMENT REPAIR      BACK SURGERY      CARDIAC CATHETERIZATION  05/31/2018    CARDIAC CATHETERIZATION  09/28/2021    coronary angioplasty    CHOLECYSTECTOMY      COLONOSCOPY  05/25/2022    GALLBLADDER SURGERY      HYSTERECTOMY      KNEE ARTHROSCOPY Right     LIVER TRANSPLANT N/A 2/9/2023    Procedure: TRANSPLANT, LIVER;  Surgeon: Iggy Adhikari MD;  Location: [facility] OR [address] [place] [address];  Service: Transplant;  Laterality: N/A;    OOPHORECTOMY      REDUCTION OF BOTH BREASTS  01/01/2011    TONSILLECTOMY      TUBAL LIGATION         Review of patient's allergies indicates:   Allergen Reactions    Codeine Anxiety    Adhesive tape-silicones Rash       Family History       Problem Relation (Age of Onset)    Asthma Mother    Cancer Maternal Grandmother    Hyperlipidemia Mother, Father, Brother    Stroke Mother, Father          Tobacco Use    Smoking status: Never    Smokeless tobacco: Never   Substance and Sexual Activity    Alcohol use: Never    Drug use: Not on file    Sexual activity: Not on file       PTA Medications   Medication Sig    aspirin (ECOTRIN) 81 MG EC tablet Take 1 tablet (81 mg total) by  mouth once daily.    calcium carbonate-vitamin D3 600 mg-20 mcg (800 unit) Tab Take 1 tablet by mouth once daily.    lamiVUDine (EPIVIR) 150 MG Tab Take 1 tablet (150 mg total) by mouth once daily.    mycophenolate (CELLCEPT) 250 mg Cap Take 4 capsules (1,000 mg total) by mouth 2 (two) times daily.    NIFEdipine (PROCARDIA-XL) 30 MG (OSM) 24 hr tablet Take 1 tablet (30 mg total) by mouth once daily.    oxyCODONE (ROXICODONE) 5 MG immediate release tablet Take 1-2 tablets (5-10 mg total) by mouth every 6 (six) hours as needed for Pain.    predniSONE (DELTASONE) 5 MG tablet Take by mouth daily:  20mg 2/15-2/21, 15mg 2/22-2/28, 10mg 3/1-3/7, 5mg 3/8-3/14. STOP 3/15/23    sulfamethoxazole-trimethoprim 400-80mg (BACTRIM,SEPTRA) 400-80 mg per tablet Take 1 tablet by mouth every morning. STOP 8/10/23    tacrolimus (PROGRAF) 1 MG Cap Take 4 capsules (4 mg total) by mouth every 12 (twelve) hours.    valGANciclovir (VALCYTE) 450 mg Tab Take 1 tablet (450 mg total) by mouth once daily. STOP 5/10/23    furosemide (LASIX) 20 MG tablet Take 1 tablet (20 mg total) by mouth once daily. STOP 2/21/23 for 7 days    ondansetron (ZOFRAN-ODT) 4 MG TbDL DISSOLVE 1 tablet (4 mg total) by mouth every 6 (six) hours as needed (nausea/vomiting).    pantoprazole (PROTONIX) 40 MG tablet Take 40 mg by mouth once daily.       Review of Systems   Constitutional:  Negative for appetite change, chills, fatigue and fever.   Respiratory:  Negative for cough, chest tightness and shortness of breath.    Cardiovascular:  Negative for chest pain, palpitations and leg swelling.   Gastrointestinal:  Negative for abdominal distention, abdominal pain, constipation, diarrhea, nausea and vomiting.   Genitourinary:  Negative for difficulty urinating, dysuria, frequency and urgency.   Musculoskeletal:  Negative for back pain and neck pain.   Skin:  Positive for wound. Negative for color change, pallor and rash.   Allergic/Immunologic: Positive for  immunocompromised state.   Neurological:  Positive for tremors and weakness (mild). Negative for dizziness and headaches.   Psychiatric/Behavioral:  Negative for confusion and sleep disturbance.    Objective:     Vital Signs (Most Recent):  Temp: 98.9 °F (37.2 °C) (03/03/23 0843)  Pulse: 97 (03/03/23 0843)  Resp: 16 (03/03/23 0843)  BP: (!) 157/74 (03/03/23 0843)  SpO2: 98 % (03/03/23 0843)   Vital Signs (24h Range):  Temp:  [98 °F (36.7 °C)-98.9 °F (37.2 °C)] 98.9 °F (37.2 °C)  Pulse:  [74-97] 97  Resp:  [16-18] 16  SpO2:  [93 %-98 %] 98 %  BP: (138-157)/(64-74) 157/74     Weight: 64.8 kg (142 lb 13.7 oz)  Body mass index is 26.13 kg/m².      Intake/Output Summary (Last 24 hours) at 3/3/2023 1113  Last data filed at 3/3/2023 0504  Gross per 24 hour   Intake 690 ml   Output 1300 ml   Net -610 ml       Physical Exam  Vitals and nursing note reviewed. Exam conducted with a chaperone present.   Constitutional:       General: She is awake.      Appearance: Normal appearance.   Eyes:      General: No scleral icterus.  Cardiovascular:      Rate and Rhythm: Normal rate and regular rhythm.      Pulses: Normal pulses.   Pulmonary:      Effort: Pulmonary effort is normal. No respiratory distress.      Breath sounds: Normal breath sounds. No decreased breath sounds, wheezing or rales.   Abdominal:      General: Bowel sounds are normal. There is no distension.      Palpations: Abdomen is soft.      Tenderness: There is no abdominal tenderness. There is no guarding.          Comments: Well healed chevron w steri strips  [place] sites CDI   Musculoskeletal:         General: Normal range of motion.   Skin:     General: Skin is warm and dry.      Capillary Refill: Capillary refill takes 2 to 3 seconds.   Neurological:      Mental Status: She is alert and oriented to person, place, and time.      Motor: Weakness and tremor (generalized, but worse in hands/arms) present.   Psychiatric:         Mood and Affect: Mood normal.          Behavior: Behavior normal. Behavior is cooperative.         Thought Content: Thought content normal.         Judgment: Judgment normal.       Laboratory:  CBC:   Recent Labs   Lab 03/03/23  0517   WBC 5.08   RBC 2.74*   HGB 9.2*   HCT 28.1*      *   MCH 33.6*   MCHC 32.7       CMP:   Recent Labs   Lab 03/03/23  0517   GLU 92   CALCIUM 8.8   ALBUMIN 3.2*   PROT 5.9*      K 3.2*   CO2 29      BUN 12   CREATININE 0.6   ALKPHOS 100   ALT 12   AST 13   BILITOT 1.1*       Coagulation: No results for input(s): PT, INR, APTT in the last 168 hours.  Labs within the past 24 hours have been reviewed.    Diagnostic Results:  US Liver Transplant Post:  Results for orders placed during the hospital encounter of 02/08/23    US Doppler Liver Transplant Post (xpd)    Narrative  EXAMINATION:  US DOPPLER LIVER TRANSPLANT POST (XPD)    CLINICAL HISTORY:  POD#6 s/p liver US pending DC;    TECHNIQUE:  Limited abdominal ultrasound of the transplant liver with Doppler evaluation.  Color and spectral Doppler were performed.    COMPARISON:  Ultrasound liver transplant 02/10/2023    FINDINGS:  Patient is status post orthotopic liver transplant on 02/09/2023.    The liver demonstrates homogeneous echotexture.  No focal hepatic lesions are seen.    There is a 5.5 x 5.7 x 0.9 cm anechoic collection with lacy internal echoes along the anterior left hepatic lobe, new from prior.    The common duct is not dilated, measuring 4 mm.  No dilated intrahepatic radicles are seen.    Color flow and spectral waveform analysis was performed.  The main portal vein, right portal vein, left portal vein, middle hepatic vein, right hepatic vein, left hepatic vein, and IVC are patent with proper directional flow.    Anastomosis site of the main hepatic artery demonstrates a peak systolic velocity 126 cm/sec (previously 62 cm/sec).    Main hepatic artery demonstrates resistive index 0.73 (previously 0.80) with normal waveform.    Left  hepatic artery shows resistive index 0.79 (previously 0.80) with normal waveform.    Anterior branch of the right hepatic artery demonstrates resistive index 0.81 (previously 0.77) with normal waveform.    Posterior branch of the right hepatic artery demonstrates resistive index 0.77 (previously 0.81) with normal waveform.    Right-sided pleural effusion.    Impression  Mildly elevated arterial resistive indices, which could represent edema or rejection in the early postoperative setting.  Waveforms are normal.    New 5.7 cm collection anterior to the left hepatic lobe, likely a hematoma.    Persistent right pleural effusion.    Electronically signed by resident: Maddie Ragsdale  Date:    02/15/2023  Time:    08:21    Electronically signed by: Nilton Ferguson  Date:    02/15/2023  Time:    10:27    Assessment/Plan:     * S/P liver transplant  - LFTs improving, Tbili remains mildly elevated (h/o ductoplasty intra-op due to size mismatch)  - staples removed 2/28, steri strips CDI      Neuropathy involving both lower extremities  - replacing electrolytes  - If cont, can consider starting low dose Neurontin      Tremors of nervous system  - presents with worsening tremors   - hold prograf  - infectious work up- UA w mod bacteria, cx pending, treating w Rocephin  - CT head pending  - PT consulted for weakness  - likley medication related, improved w holding Prograf, will try Cyclo    Hepatitis B virus infection in cadaveric donor  - cont lamuvidine      Gastroesophageal reflux disease without esophagitis  - cont PPI      Long-term use of immunosuppressant medication  - see prophylactic immunotherapy      At risk for opportunistic infections  - cont OI prophylaxis per protocol      Prophylactic immunotherapy  - continue full dose cellcept and prednisone taper  - Hold prograf due to severe tremors  - will check daily prograf levels, monitor for toxic side effects, and adjust for therapeutic dose  - PLAN to start cyclo today.   -  CT head pending        Anemia, chronic disease  - 2/2 h/o ESLD  - check folate(8.3)  and B12(346)  - can check iron studies in am        VTE Risk Mitigation (From admission, onward)         Ordered     heparin (porcine) injection 5,000 Units  Every 8 hours         03/02/23 1641     IP VTE LOW RISK PATIENT  Once         03/02/23 1641                The patients clinical status was discussed at multidisplinary rounds, involving transplant surgery, transplant medicine, pharmacy, nursing, nutrition, and social work    Discharge Planning:   Discussed plan of care.  No plan for discharge today.  Greater than 30 minutes spent with patient discussing diagnosis including reviewing labs and imaging and plan of care.    PharmD Review: if more BP control needed consider increase nifedipine 30 mg > 60 mg daily [ 02/27/23]      Marilee Chaney NP  Liver Transplant  [facility] - Transplant Stepdown

## 2023-03-03 NOTE — PLAN OF CARE
AAO x 4. VSS, afebrile, SpO2>92% on RA. IS at bedside. Prograf held for tremors. CT head ordered. UA dirty-rocephin once. Mg=1 replaced IV. Chevron INA with steristrips. Fall precautions maintained and pt repositions self. Up with 1 assist and walker. See flowsheet for assessment findings.

## 2023-03-03 NOTE — HOSPITAL COURSE
Patient admitted for w/u of tremors and brittanie LE neuropathy, also mentioned dysuria.    Interval History: no acute event overnight. UA w occ bacterua, 13 WBC. Pt given dose of Rocephin. Urine cx pending. Prelim blood cx w NGTD. CT head pending. Since holding Prograf, pt reports still has tremors greater w movement but overall improved. She notes bruning in her legs same. Replaced Mag. Can consider low dose Neurontin if cont. Patient is otherwise independent in room. She denies abdominal pain or diarrhea. LFTs remain wnl. Good renal function. Plan to convert pt to Cyclosporine today and monitor level.

## 2023-03-03 NOTE — SUBJECTIVE & OBJECTIVE
Past Medical History:   Diagnosis Date    Anxiety disorder, unspecified     CAD (coronary artery disease)     DIC (disseminated intravascular coagulation)     Dyslipidemia     GERD (gastroesophageal reflux disease)     Hereditary hemolytic anemia, unspecified     History of coronary artery stent placement     HTN (hypertension)     Liver cirrhosis secondary to BROWN     Portal hypertensive gastropathy     Primary localized osteoarthrosis of right lower leg     Statin intolerance        Past Surgical History:   Procedure Laterality Date    ADENOIDECTOMY      ANTERIOR CRUCIATE LIGAMENT REPAIR      BACK SURGERY      CARDIAC CATHETERIZATION  05/31/2018    CARDIAC CATHETERIZATION  09/28/2021    coronary angioplasty    CHOLECYSTECTOMY      COLONOSCOPY  05/25/2022    GALLBLADDER SURGERY      HYSTERECTOMY      KNEE ARTHROSCOPY Right     LIVER TRANSPLANT N/A 2/9/2023    Procedure: TRANSPLANT, LIVER;  Surgeon: Iggy Adhikari MD;  Location: [facility] OR [address] [place] [address];  Service: Transplant;  Laterality: N/A;    OOPHORECTOMY      REDUCTION OF BOTH BREASTS  01/01/2011    TONSILLECTOMY      TUBAL LIGATION         Review of patient's allergies indicates:   Allergen Reactions    Codeine Anxiety    Adhesive tape-silicones Rash       Family History       Problem Relation (Age of Onset)    Asthma Mother    Cancer Maternal Grandmother    Hyperlipidemia Mother, Father, Brother    Stroke Mother, Father          Tobacco Use    Smoking status: Never    Smokeless tobacco: Never   Substance and Sexual Activity    Alcohol use: Never    Drug use: Not on file    Sexual activity: Not on file       PTA Medications   Medication Sig    aspirin (ECOTRIN) 81 MG EC tablet Take 1 tablet (81 mg total) by mouth once daily.    calcium carbonate-vitamin D3 600 mg-20 mcg (800 unit) Tab Take 1 tablet by mouth once daily.    lamiVUDine (EPIVIR) 150 MG Tab Take 1 tablet (150 mg total) by mouth once daily.    mycophenolate (CELLCEPT) 250 mg Cap Take 4 capsules (1,000 mg  total) by mouth 2 (two) times daily.    NIFEdipine (PROCARDIA-XL) 30 MG (OSM) 24 hr tablet Take 1 tablet (30 mg total) by mouth once daily.    oxyCODONE (ROXICODONE) 5 MG immediate release tablet Take 1-2 tablets (5-10 mg total) by mouth every 6 (six) hours as needed for Pain.    predniSONE (DELTASONE) 5 MG tablet Take by mouth daily:  20mg 2/15-2/21, 15mg 2/22-2/28, 10mg 3/1-3/7, 5mg 3/8-3/14. STOP 3/15/23    sulfamethoxazole-trimethoprim 400-80mg (BACTRIM,SEPTRA) 400-80 mg per tablet Take 1 tablet by mouth every morning. STOP 8/10/23    tacrolimus (PROGRAF) 1 MG Cap Take 4 capsules (4 mg total) by mouth every 12 (twelve) hours.    valGANciclovir (VALCYTE) 450 mg Tab Take 1 tablet (450 mg total) by mouth once daily. STOP 5/10/23    furosemide (LASIX) 20 MG tablet Take 1 tablet (20 mg total) by mouth once daily. STOP 2/21/23 for 7 days    ondansetron (ZOFRAN-ODT) 4 MG TbDL DISSOLVE 1 tablet (4 mg total) by mouth every 6 (six) hours as needed (nausea/vomiting).    pantoprazole (PROTONIX) 40 MG tablet Take 40 mg by mouth once daily.       Review of Systems   Constitutional:  Negative for appetite change, chills, fatigue and fever.   Respiratory:  Negative for cough, chest tightness and shortness of breath.    Cardiovascular:  Negative for chest pain, palpitations and leg swelling.   Gastrointestinal:  Negative for abdominal distention, abdominal pain, constipation, diarrhea, nausea and vomiting.   Genitourinary:  Negative for difficulty urinating, dysuria, frequency and urgency.   Musculoskeletal:  Negative for back pain and neck pain.   Skin:  Positive for wound. Negative for color change, pallor and rash.   Allergic/Immunologic: Positive for immunocompromised state.   Neurological:  Positive for tremors and weakness (mild). Negative for dizziness and headaches.   Psychiatric/Behavioral:  Negative for confusion and sleep disturbance.    Objective:     Vital Signs (Most Recent):  Temp: 98.9 °F (37.2 °C) (03/03/23  0843)  Pulse: 97 (03/03/23 0843)  Resp: 16 (03/03/23 0843)  BP: (!) 157/74 (03/03/23 0843)  SpO2: 98 % (03/03/23 0843)   Vital Signs (24h Range):  Temp:  [98 °F (36.7 °C)-98.9 °F (37.2 °C)] 98.9 °F (37.2 °C)  Pulse:  [74-97] 97  Resp:  [16-18] 16  SpO2:  [93 %-98 %] 98 %  BP: (138-157)/(64-74) 157/74     Weight: 64.8 kg (142 lb 13.7 oz)  Body mass index is 26.13 kg/m².      Intake/Output Summary (Last 24 hours) at 3/3/2023 1113  Last data filed at 3/3/2023 0504  Gross per 24 hour   Intake 690 ml   Output 1300 ml   Net -610 ml       Physical Exam  Vitals and nursing note reviewed. Exam conducted with a chaperone present.   Constitutional:       General: She is awake.      Appearance: Normal appearance.   Eyes:      General: No scleral icterus.  Cardiovascular:      Rate and Rhythm: Normal rate and regular rhythm.      Pulses: Normal pulses.   Pulmonary:      Effort: Pulmonary effort is normal. No respiratory distress.      Breath sounds: Normal breath sounds. No decreased breath sounds, wheezing or rales.   Abdominal:      General: Bowel sounds are normal. There is no distension.      Palpations: Abdomen is soft.      Tenderness: There is no abdominal tenderness. There is no guarding.          Comments: Well healed chevron w steri strips  [place] sites CDI   Musculoskeletal:         General: Normal range of motion.   Skin:     General: Skin is warm and dry.      Capillary Refill: Capillary refill takes 2 to 3 seconds.   Neurological:      Mental Status: She is alert and oriented to person, place, and time.      Motor: Weakness and tremor (generalized, but worse in hands/arms) present.   Psychiatric:         Mood and Affect: Mood normal.         Behavior: Behavior normal. Behavior is cooperative.         Thought Content: Thought content normal.         Judgment: Judgment normal.       Laboratory:  CBC:   Recent Labs   Lab 03/03/23  0517   WBC 5.08   RBC 2.74*   HGB 9.2*   HCT 28.1*      *   MCH 33.6*    MCHC 32.7       CMP:   Recent Labs   Lab 03/03/23  0517   GLU 92   CALCIUM 8.8   ALBUMIN 3.2*   PROT 5.9*      K 3.2*   CO2 29      BUN 12   CREATININE 0.6   ALKPHOS 100   ALT 12   AST 13   BILITOT 1.1*       Coagulation: No results for input(s): PT, INR, APTT in the last 168 hours.  Labs within the past 24 hours have been reviewed.    Diagnostic Results:  US Liver Transplant Post:  Results for orders placed during the hospital encounter of 02/08/23    US Doppler Liver Transplant Post (xpd)    Narrative  EXAMINATION:  US DOPPLER LIVER TRANSPLANT POST (XPD)    CLINICAL HISTORY:  POD#6 s/p liver US pending DC;    TECHNIQUE:  Limited abdominal ultrasound of the transplant liver with Doppler evaluation.  Color and spectral Doppler were performed.    COMPARISON:  Ultrasound liver transplant 02/10/2023    FINDINGS:  Patient is status post orthotopic liver transplant on 02/09/2023.    The liver demonstrates homogeneous echotexture.  No focal hepatic lesions are seen.    There is a 5.5 x 5.7 x 0.9 cm anechoic collection with lacy internal echoes along the anterior left hepatic lobe, new from prior.    The common duct is not dilated, measuring 4 mm.  No dilated intrahepatic radicles are seen.    Color flow and spectral waveform analysis was performed.  The main portal vein, right portal vein, left portal vein, middle hepatic vein, right hepatic vein, left hepatic vein, and IVC are patent with proper directional flow.    Anastomosis site of the main hepatic artery demonstrates a peak systolic velocity 126 cm/sec (previously 62 cm/sec).    Main hepatic artery demonstrates resistive index 0.73 (previously 0.80) with normal waveform.    Left hepatic artery shows resistive index 0.79 (previously 0.80) with normal waveform.    Anterior branch of the right hepatic artery demonstrates resistive index 0.81 (previously 0.77) with normal waveform.    Posterior branch of the right hepatic artery demonstrates resistive  index 0.77 (previously 0.81) with normal waveform.    Right-sided pleural effusion.    Impression  Mildly elevated arterial resistive indices, which could represent edema or rejection in the early postoperative setting.  Waveforms are normal.    New 5.7 cm collection anterior to the left hepatic lobe, likely a hematoma.    Persistent right pleural effusion.    Electronically signed by resident: Maddie Ragsdale  Date:    02/15/2023  Time:    08:21    Electronically signed by: Nilton Ferguson  Date:    02/15/2023  Time:    10:27

## 2023-03-03 NOTE — ASSESSMENT & PLAN NOTE
- continue full dose cellcept and prednisone taper  - Hold prograf due to severe tremors  - will check daily prograf levels, monitor for toxic side effects, and adjust for therapeutic dose  - PLAN to start cyclo today.   - CT head pending

## 2023-03-03 NOTE — PROGRESS NOTES
"Admit/Discharge Note     Met with patient and spouse to assess needs. Patient is a 72 y.o.  female, admitted for  tremors .     Patient admitted as a direct admit on 3/2/2023 .  At this time, patient presents as alert and oriented x 4, pleasant, and communicative.  At this time, patients caregiver presents as alert and oriented x 4, pleasant, and communicative.    Household/Family Systems     Patient resides with patient's spouse, at:     [address] [place] [address].      Support system includes , 2 adult children, daughter in law, grandchildren and friends.  Patient does not have dependents that are need of being cared for.     Patients primary caregiver is Iggy Sevilla, patients , phone number [phone].  Confirmed patients contact information is There is no home phone number on file.;   Telephone Information:   Mobile [phone]       During admission, patient's caregiver plans to stay in patient's room.  Confirmed patient and patients caregivers do have access to reliable transportation.    Cognitive Status/Learning     Patient reports reading ability as college and states patient does have difficulty with seeing, and wears glasses.  Patient reports patient learns best by visual information.   Needed: No.   Highest education level: Associate/Bachelor Degree    Vocation/Disability     Working for Income: No  If no, reason not working: Patient Choice - Retired  Patient is  a retired teacher.    Adherence     Patient reports a high level of adherence to patients health care regimen. Patient notes tremors due to immunosuppressant medication. Patient states medical team will "change" medication.  Adherence counseling and education provided. Patient verbalizes understanding.    Substance Use    Patient reports the following substance usage.    Tobacco: none, patient denies any use.  Alcohol: none, patient denies any use.  Illicit Drugs/Non-prescribed Medications: " "none, patient denies any use.  Patient states clear understanding of the potential impact of substance use.  Substance abstinence/cessation counseling, education and resources provided and reviewed.     Services Utilizing/ADLS    Infusion Service: Prior to admission, patient utilizing? no  Home Health: Prior to admission, patient utilizing? yes [facility]  (PT/SN). Patient will need to resume at services at discharge. SW spoke to medical team to request new orders. VALERIE contacted Yaritza with [facility] to notify her of pt expected discharge date, Saturday 3/4.  DME: Prior to admission,  cane and rolling walker. Patient had recommendation for BSC during last admission, however, pt states " I didn't need it".   Pulmonary/Cardiac Rehab: Prior to admission, no  Dialysis:  Prior to admission, no  Transplant Specialty Pharmacy:  Prior to admission, no.    Prior to admission, patient reports patient was independent with ADLS and was not driving.  Patient reports patient is not able to care for self at this time due to compromised medical condition (as documented in medical record) and physical weakness..  Patient indicates a willingness to care for self once medically cleared to do so.    Insurance/Medications    Insured by   Payer/Plan Subscr  Sex Relation Sub. Ins. ID Effective Group Num   1. MEDICARE - ME* DELANO MOSES S* 1950 Female Self [id number] 3/1/17                                    [address]   2. GENERIC COMME* DELANO MOSES S* 1950 Female Self [id number] 3/1/17                                    [address] [facility] [address]      Primary Insurance (for UNOS reporting): Public Insurance - Medicare FFS (Fee For Service)  Secondary Insurance (for UNOS reporting): Private Insurance    Patient reports patient is able to obtain and afford medications at this time and at time of discharge.    Living Will/Healthcare Power of     Patient states patient has a LW and/or HCPA.  Per report, pt's adult " sons are her HC POAs.    Coping/Mental Health    Patient is coping adequately with the aid of  family members.  Patient notes her stapes were removed on 2/28. Patient expressed medical team may release her to return home on 3/8. Patient notes she is happy and recovering well.  Patient denies mental health difficulties, at this time.     Discharge Planning    At time of discharge, patient plans to return to [address] under the care of .  Patients  will transport patient.  Per rounds today, expected discharge date is Saturday, 3/4 . Patient will resume services with [facility] for PT/SN.SW notified Susu with [facility] Patient and patients caregiver  verbalize understanding and are involved in treatment planning and discharge process.    Additional Concerns    Patient is being followed for needs, education, resources, information, emotional support, supportive counseling, and for supportive and skilled discharge plan of care.  providing ongoing psychosocial support, education, resources and d/c planning as needed.  SW remains available.

## 2023-03-03 NOTE — ASSESSMENT & PLAN NOTE
- presents with worsening tremors   - hold prograf  - infectious work up- UA w mod bacteria, cx pending, treating w Rocephin  - CT head pending  - PT consulted for weakness  - jesus medication related, improved w holding Prograf, will try Cyclo

## 2023-03-03 NOTE — ASSESSMENT & PLAN NOTE
- LFTs improving, Tbili remains mildly elevated (h/o ductoplasty intra-op due to size mismatch)  - staples removed 2/28, steri strips CDI

## 2023-03-04 VITALS
HEIGHT: 62 IN | WEIGHT: 147.25 LBS | RESPIRATION RATE: 16 BRPM | BODY MASS INDEX: 27.1 KG/M2 | OXYGEN SATURATION: 97 % | SYSTOLIC BLOOD PRESSURE: 158 MMHG | DIASTOLIC BLOOD PRESSURE: 72 MMHG | TEMPERATURE: 98 F | HEART RATE: 77 BPM

## 2023-03-04 LAB
ALBUMIN SERPL BCP-MCNC: 3.1 G/DL (ref 3.5–5.2)
ALP SERPL-CCNC: 104 U/L (ref 55–135)
ALT SERPL W/O P-5'-P-CCNC: 12 U/L (ref 10–44)
ANION GAP SERPL CALC-SCNC: 6 MMOL/L (ref 8–16)
AST SERPL-CCNC: 11 U/L (ref 10–40)
BACTERIA UR CULT: NORMAL
BASOPHILS # BLD AUTO: 0.06 K/UL (ref 0–0.2)
BASOPHILS NFR BLD: 1.2 % (ref 0–1.9)
BILIRUB SERPL-MCNC: 1 MG/DL (ref 0.1–1)
BUN SERPL-MCNC: 13 MG/DL (ref 8–23)
CALCIUM SERPL-MCNC: 8.5 MG/DL (ref 8.7–10.5)
CHLORIDE SERPL-SCNC: 102 MMOL/L (ref 95–110)
CO2 SERPL-SCNC: 26 MMOL/L (ref 23–29)
CREAT SERPL-MCNC: 0.5 MG/DL (ref 0.5–1.4)
CYCLOSPORINE BLD LC/MS/MS-MCNC: 32 NG/ML (ref 100–400)
DIFFERENTIAL METHOD: ABNORMAL
EOSINOPHIL # BLD AUTO: 0.1 K/UL (ref 0–0.5)
EOSINOPHIL NFR BLD: 2.4 % (ref 0–8)
ERYTHROCYTE [DISTWIDTH] IN BLOOD BY AUTOMATED COUNT: 16.5 % (ref 11.5–14.5)
EST. GFR  (NO RACE VARIABLE): >60 ML/MIN/1.73 M^2
FERRITIN SERPL-MCNC: 268 NG/ML (ref 20–300)
GLUCOSE SERPL-MCNC: 102 MG/DL (ref 70–110)
HCT VFR BLD AUTO: 27.5 % (ref 37–48.5)
HGB BLD-MCNC: 8.8 G/DL (ref 12–16)
IMM GRANULOCYTES # BLD AUTO: 0.06 K/UL (ref 0–0.04)
IMM GRANULOCYTES NFR BLD AUTO: 1.2 % (ref 0–0.5)
IRON SERPL-MCNC: 20 UG/DL (ref 30–160)
LYMPHOCYTES # BLD AUTO: 1.1 K/UL (ref 1–4.8)
LYMPHOCYTES NFR BLD: 21.4 % (ref 18–48)
MAGNESIUM SERPL-MCNC: 1.9 MG/DL (ref 1.6–2.6)
MCH RBC QN AUTO: 32.7 PG (ref 27–31)
MCHC RBC AUTO-ENTMCNC: 32 G/DL (ref 32–36)
MCV RBC AUTO: 102 FL (ref 82–98)
MONOCYTES # BLD AUTO: 0.4 K/UL (ref 0.3–1)
MONOCYTES NFR BLD: 7.3 % (ref 4–15)
NEUTROPHILS # BLD AUTO: 3.3 K/UL (ref 1.8–7.7)
NEUTROPHILS NFR BLD: 66.5 % (ref 38–73)
NRBC BLD-RTO: 0 /100 WBC
PLATELET # BLD AUTO: 238 K/UL (ref 150–450)
PMV BLD AUTO: 9.7 FL (ref 9.2–12.9)
POTASSIUM SERPL-SCNC: 3.6 MMOL/L (ref 3.5–5.1)
PROT SERPL-MCNC: 5.7 G/DL (ref 6–8.4)
RBC # BLD AUTO: 2.69 M/UL (ref 4–5.4)
SATURATED IRON: 7 % (ref 20–50)
SODIUM SERPL-SCNC: 134 MMOL/L (ref 136–145)
TACROLIMUS BLD-MCNC: 2.2 NG/ML (ref 5–15)
TOTAL IRON BINDING CAPACITY: 299 UG/DL (ref 250–450)
TRANSFERRIN SERPL-MCNC: 202 MG/DL (ref 200–375)
WBC # BLD AUTO: 4.91 K/UL (ref 3.9–12.7)

## 2023-03-04 PROCEDURE — 80158 DRUG ASSAY CYCLOSPORINE: CPT | Performed by: NURSE PRACTITIONER

## 2023-03-04 PROCEDURE — 36415 COLL VENOUS BLD VENIPUNCTURE: CPT | Performed by: PHYSICIAN ASSISTANT

## 2023-03-04 PROCEDURE — 80197 ASSAY OF TACROLIMUS: CPT | Performed by: PHYSICIAN ASSISTANT

## 2023-03-04 PROCEDURE — 82728 ASSAY OF FERRITIN: CPT | Performed by: NURSE PRACTITIONER

## 2023-03-04 PROCEDURE — 25000003 PHARM REV CODE 250: Performed by: NURSE PRACTITIONER

## 2023-03-04 PROCEDURE — 99024 POSTOP FOLLOW-UP VISIT: CPT | Mod: ,,, | Performed by: NURSE PRACTITIONER

## 2023-03-04 PROCEDURE — 80053 COMPREHEN METABOLIC PANEL: CPT | Performed by: PHYSICIAN ASSISTANT

## 2023-03-04 PROCEDURE — 84466 ASSAY OF TRANSFERRIN: CPT | Performed by: NURSE PRACTITIONER

## 2023-03-04 PROCEDURE — 97530 THERAPEUTIC ACTIVITIES: CPT

## 2023-03-04 PROCEDURE — 63600175 PHARM REV CODE 636 W HCPCS: Performed by: SURGERY

## 2023-03-04 PROCEDURE — 25000003 PHARM REV CODE 250: Performed by: CLINICAL NURSE SPECIALIST

## 2023-03-04 PROCEDURE — 63600175 PHARM REV CODE 636 W HCPCS: Performed by: NURSE PRACTITIONER

## 2023-03-04 PROCEDURE — 85025 COMPLETE CBC W/AUTO DIFF WBC: CPT | Performed by: PHYSICIAN ASSISTANT

## 2023-03-04 PROCEDURE — 97165 OT EVAL LOW COMPLEX 30 MIN: CPT

## 2023-03-04 PROCEDURE — 25000003 PHARM REV CODE 250: Performed by: PHYSICIAN ASSISTANT

## 2023-03-04 PROCEDURE — 99024 PR POST-OP FOLLOW-UP VISIT: ICD-10-PCS | Mod: ,,, | Performed by: NURSE PRACTITIONER

## 2023-03-04 PROCEDURE — 83735 ASSAY OF MAGNESIUM: CPT | Performed by: PHYSICIAN ASSISTANT

## 2023-03-04 PROCEDURE — 63600175 PHARM REV CODE 636 W HCPCS: Performed by: PHYSICIAN ASSISTANT

## 2023-03-04 RX ORDER — CYCLOSPORINE 25 MG/1
150 CAPSULE, LIQUID FILLED ORAL 2 TIMES DAILY
Qty: 360 CAPSULE | Refills: 11 | Status: SHIPPED | OUTPATIENT
Start: 2023-03-04 | End: 2023-03-05

## 2023-03-04 RX ORDER — LANOLIN ALCOHOL/MO/W.PET/CERES
400 CREAM (GRAM) TOPICAL DAILY
Qty: 30 TABLET | Refills: 5 | Status: SHIPPED | OUTPATIENT
Start: 2023-03-04 | End: 2023-11-06 | Stop reason: ALTCHOICE

## 2023-03-04 RX ORDER — CYCLOSPORINE 50 MG/1
150 CAPSULE, LIQUID FILLED ORAL DAILY
Qty: 180 CAPSULE | Refills: 11 | Status: SHIPPED | OUTPATIENT
Start: 2023-03-04 | End: 2023-03-04 | Stop reason: SDUPTHER

## 2023-03-04 RX ADMIN — MYCOPHENOLATE MOFETIL 1000 MG: 250 CAPSULE ORAL at 08:03

## 2023-03-04 RX ADMIN — CYCLOSPORINE 100 MG: 100 CAPSULE, LIQUID FILLED ORAL at 08:03

## 2023-03-04 RX ADMIN — HEPARIN SODIUM 5000 UNITS: 5000 INJECTION INTRAVENOUS; SUBCUTANEOUS at 05:03

## 2023-03-04 RX ADMIN — PANTOPRAZOLE SODIUM 40 MG: 40 TABLET, DELAYED RELEASE ORAL at 08:03

## 2023-03-04 RX ADMIN — CYCLOSPORINE 50 MG: 25 CAPSULE, LIQUID FILLED ORAL at 12:03

## 2023-03-04 RX ADMIN — SULFAMETHOXAZOLE AND TRIMETHOPRIM 1 TABLET: 400; 80 TABLET ORAL at 08:03

## 2023-03-04 RX ADMIN — MAGNESIUM OXIDE TAB 400 MG (241.3 MG ELEMENTAL MG) 400 MG: 400 (241.3 MG) TAB at 09:03

## 2023-03-04 RX ADMIN — ASPIRIN 81 MG: 81 TABLET, COATED ORAL at 08:03

## 2023-03-04 RX ADMIN — LAMIVUDINE 150 MG: 150 TABLET, FILM COATED ORAL at 08:03

## 2023-03-04 RX ADMIN — ACETAMINOPHEN 650 MG: 325 TABLET ORAL at 03:03

## 2023-03-04 RX ADMIN — PREDNISONE 10 MG: 10 TABLET ORAL at 08:03

## 2023-03-04 RX ADMIN — VALGANCICLOVIR 450 MG: 450 TABLET, FILM COATED ORAL at 08:03

## 2023-03-04 RX ADMIN — NIFEDIPINE 30 MG: 30 TABLET, FILM COATED, EXTENDED RELEASE ORAL at 08:03

## 2023-03-04 NOTE — PT/OT/SLP EVAL
"Occupational Therapy   Evaluation and Discharge Note    Name: Argelia Sevilla  MRN: [id number]  Admitting Diagnosis: S/P liver transplant  Recent Surgery: * No surgery found *      Recommendations:     Discharge Recommendations: other (see comments) (defer to CM/SW)  Discharge Equipment Recommendations: grab bar  Barriers to discharge:  None    Assessment:     Argelia Sevilla is a 73 y.o. female with a medical diagnosis of S/P liver transplant. At this time, patient is functioning at their prior level of function and does not require further acute OT services.     Plan:     During this hospitalization, patient does not require further acute OT services.  Please re-consult if situation changes.    Plan of Care Reviewed with: patient, spouse    Subjective     Chief Complaint: "My tremors have gone away  Patient/Family Comments/goals: None    Occupational Profile:  Living Environment: Pt lives with  on 1st floor of apartment   Previous level of function: IND with ADLs uses rollator for ambulation  Roles and Routines: Wife  Equipment Used at home: rollator  Assistance upon Discharge: Family    Pain/Comfort:  Pain Rating 1: 0/10    Patients cultural, spiritual, Confucianist conflicts given the current situation: no    Objective:     Communicated with: nurse prior to session.  Patient found up in chair with  (No active lines) upon OT entry to room.    General Precautions: Standard, fall  Orthopedic Precautions: N/A  Braces: N/A  Respiratory Status: Room air     Occupational Performance:    Bed Mobility:    NT found in chair    Functional Mobility/Transfers:  Patient completed Sit <> Stand Transfer with contact guard assistance  with  rolling walker   Functional Mobility: Pt ambulated 100 ft using rollator with SBA    Activities of Daily Living:  Pt indicated she managed all self-care before therapy arrived.     Cognitive/Visual Perceptual:  Cognitive/Psychosocial Skills:     -       Oriented to: Person, Place, " Time, and Situation   -       Follows Commands/attention:Follows multistep  commands  -       Safety awareness/insight to disability: intact   -       Mood/Affect/Coping skills/emotional control: Cooperative    Physical Exam:  Balance:    -       Good overall balance and uses RW appropriately   Postural examination/scapula alignment:    -       No postural abnormalities identified  Sensation:    -       Intact  Upper Extremity Range of Motion:     -       Right Upper Extremity: WFL  -       Left Upper Extremity: WFL  Upper Extremity Strength:    -       Right Upper Extremity: WFL  -       Left Upper Extremity: WFL    AMPAC 6 Click ADL:  AMPAC Total Score: 24    Treatment & Education:  Pt educated on role of OT/POC  Pt. Educated on safety precautions   Pt provided self-care/ADL re-education  Pt reviewed bed mobility/functional mobility    Patient left up in chair with all lines intact and call button in reach    GOALS:   Multidisciplinary Problems       Occupational Therapy Goals       Not on file              Multidisciplinary Problems (Resolved)          Problem: Occupational Therapy    Goal Priority Disciplines Outcome Interventions   Occupational Therapy Goal   (Resolved)     OT, PT/OT Met    Description: Pt is at functional baseline with no acute needs. OT to sign off. Please reconsult if status changes.                         History:     Past Medical History:   Diagnosis Date    Anxiety disorder, unspecified     CAD (coronary artery disease)     DIC (disseminated intravascular coagulation)     Dyslipidemia     GERD (gastroesophageal reflux disease)     Hereditary hemolytic anemia, unspecified     History of coronary artery stent placement     HTN (hypertension)     Liver cirrhosis secondary to BROWN     Other ascites 2/15/2023    Portal hypertensive gastropathy     Primary localized osteoarthrosis of right lower leg     Statin intolerance          Past Surgical History:   Procedure Laterality Date     ADENOIDECTOMY      ANTERIOR CRUCIATE LIGAMENT REPAIR      BACK SURGERY      CARDIAC CATHETERIZATION  05/31/2018    CARDIAC CATHETERIZATION  09/28/2021    coronary angioplasty    CHOLECYSTECTOMY      COLONOSCOPY  05/25/2022    GALLBLADDER SURGERY      HYSTERECTOMY      KNEE ARTHROSCOPY Right     LIVER TRANSPLANT N/A 2/9/2023    Procedure: TRANSPLANT, LIVER;  Surgeon: Iggy Adhikari MD;  Location: [facility] OR [address] [place] [address];  Service: Transplant;  Laterality: N/A;    OOPHORECTOMY      REDUCTION OF BOTH BREASTS  01/01/2011    TONSILLECTOMY      TUBAL LIGATION         Time Tracking:     OT Date of Treatment: 03/04/23  OT Start Time: 0832  OT Stop Time: 0844  OT Total Time (min): 12 min    Billable Minutes:Evaluation 4  Self Care/Home Management 8    3/4/2023

## 2023-03-04 NOTE — PROGRESS NOTES
Pt departed TSU on wheelchair w . Received all meds from pharmacy. Gathered all belongings. VS stable and pt in no distress. AVS given and full verbal consent given by pt and .

## 2023-03-04 NOTE — PLAN OF CARE
Problem: Occupational Therapy  Goal: Occupational Therapy Goal  Description: Pt is at functional baseline with no acute needs. OT to sign off. Please reconsult if status changes.    Outcome: Met      Bactrim Pregnancy And Lactation Text: This medication is Pregnancy Category D and is known to cause fetal risk.  It is also excreted in breast milk.

## 2023-03-04 NOTE — DISCHARGE SUMMARY
[facility] - Transplant Stepdown  Liver Transplant  Discharge Summary      Patient Name: Argelia Sevilla  MRN: [id number]  Admission Date: 3/2/2023  Hospital Length of Stay: 2 days  Discharge Date and Time:  03/04/2023 12:48 PM  Attending Physician: Mark Brasher MD   Discharging Provider: Marilee Chaney NP  Primary Care Provider: Primary Doctor No  Post-Operative Day: 23     ORGAN:   LIVER  Disease Etiology: Cirrhosis: Fatty Liver (BROWN)  Donor Type:   Donation after Brain Death  CDC High Risk:   No  Donor CMV Status:   Donor CMV Status: Positive  Donor HBcAB:   Positive  Donor HCV Status:   Negative  Whole or Partial: Whole Liver  Biliary Anastomosis: End to End  Arterial Anatomy: Standard    HPI:   Arianna Sevilla is a 73 y/o F with PMH of BROWN cirrhosis now s/p DBD LTX on 2/9/23 (Donor RPR+, Hcv Ab+/FRANNIE-, HBcAb+, CMV D+/R+).  Post op course unremarkable. She had been progressing well outpatient; however, she has developed worsening tremors over the past several days. She was seen in surgery clinic on Tuesday for staple removal and noted to have difficulty walking due to tremors. At that time, her tac dose was decreased; however, she continues to have severe tremors even affecting speech. Decision made to direct admit for further management. Will hold prograf on admission and initiate infectious work up. CT head. Consult PT. Routine labwork this AM unremarkable. Will check Lipid panel, B12, and folate. Denies AMS, N/V, diarrhea, abdominal pain, fever/chills, chest pain, SOB. She reports feeling well except tremors. D/w Dr Brasher.       * No surgery found *     Hospital Course:    Patient admitted for w/u of tremors and brittanie LE neuropathy, also mentioned dysuria. Prograf discontinued. Tremors improved significantly. CT head unremarkable for acute process. Cyclosporine started 3/3. Dose increased to 150 mg bid on 3/4. Pt continues to note tremors improved. With replacing electrolytes, neuropathy or burning to  lower extremities improved. She does not wish to try Neurontin for pain at this time. She has tried Neurontin in the past and did not like the way it made her feel. She will notify us if pain gets worse. UA w occ bacteria, 13 WBC on admit. She also c/o dysuria. She received  Rocephin  3/2 and 3/3. Urine cx w NGTD and abx discontinued. No plan to discharge pt on antibiotic. Blood cx w NGTD. Patient is otherwise independent in room. She denies abdominal pain or diarrhea. LFTs remain wnl. Good renal function.    On day of discharge, pt denies tremors. She seems to be tolerating Cyclo well. Goal for cyclo is ~200. Plan to discharge pt to [facility] [employer] [facility] with labs to include Cyclo level daily. Pt will f/u w previous clinic appt as scheduled.  Transplant coordinator notified. Discharge instructions reviewed by Pharmacist, Nursing and Transplant coordinator.  Patient and CG verbalize understanding and are in agreement with discharge from hospital today.  Patient is medically stable for discharge.         Goals of Care Treatment Preferences:  Code Status: Full Code      Final Active Diagnoses:    Diagnosis Date Noted POA    PRINCIPAL PROBLEM:  S/P liver transplant [Z94.4] 02/09/2023 Not Applicable    Neuropathy involving both lower extremities [G57.93] 03/03/2023 Unknown    Tremors of nervous system [R25.1] 03/02/2023 Yes    Hepatitis B virus infection in cadaveric donor [Z00.5, B19.10] 02/13/2023 Not Applicable    Gastroesophageal reflux disease without esophagitis [K21.9] 02/13/2023 Yes    Long-term use of immunosuppressant medication [Z79.60] 02/11/2023 Not Applicable    Prophylactic immunotherapy [Z29.8] 02/09/2023 Not Applicable    At risk for opportunistic infections [Z91.89] 02/09/2023 Yes    Anemia, chronic disease [D63.8] 02/08/2023 Yes      Problems Resolved During this Admission:    Diagnosis Date Noted Date Resolved POA    At risk for infection transmitted from donor [Z91.89] 02/10/2023 03/02/2023  Yes           Pending Diagnostic Studies:     None        Significant Diagnostic Studies: Labs:   CMP   Recent Labs   Lab 03/03/23  0517 03/04/23  0550    134*   K 3.2* 3.6    102   CO2 29 26   GLU 92 102   BUN 12 13   CREATININE 0.6 0.5   CALCIUM 8.8 8.5*   PROT 5.9* 5.7*   ALBUMIN 3.2* 3.1*   BILITOT 1.1* 1.0   ALKPHOS 100 104   AST 13 11   ALT 12 12   ANIONGAP 8 6*     CBC   Recent Labs   Lab 03/03/23  0517 03/04/23  0550   WBC 5.08 4.91   HGB 9.2* 8.8*   HCT 28.1* 27.5*    238     INR   Lab Results   Component Value Date    INR 1.0 02/13/2023    INR 1.0 02/12/2023    INR 1.1 02/11/2023     All labs within the past 24 hours have been reviewed    Microbiology:   Blood Culture   Lab Results   Component Value Date    LABBLOO No Growth to date 03/02/2023    LABBLOO No Growth to date 03/02/2023      Urine Culture    Lab Results   Component Value Date    LABURIN No significant growth 03/02/2023     Radiology: X-Ray: CXR: X-Ray Chest 1 View (CXR):   Results for orders placed or performed during the hospital encounter of 03/02/23   X-Ray Chest 1 View    Narrative    EXAMINATION:  XR CHEST 1 VIEW    CLINICAL HISTORY:  infectious work up;    TECHNIQUE:  Single frontal view of the chest was performed.    COMPARISON:  Chest radiograph February 12, 2023    FINDINGS:  Single chest view is submitted.  There is diminished depth of inspiration, there is mild elevation of the right hemidiaphragm.  The appearance of the cardiomediastinal silhouette appears stable.  There is improved aeration associated with the right hemithorax.  There is mild increased opacity at the left lung base this may relate to components of pleural effusion, and or pulmonary infiltrate/airspace disease and atelectatic change.  There is no evidence for pneumothorax bilaterally.  The osseous structures demonstrate chronic change.      Impression    Findings at the left lung base, as discussed above.      Electronically signed by: Claudio  Christiana  Date:    03/02/2023  Time:    21:12     CT scan: Head 3/3/23 unremarkable for acute process    The patients clinical status was discussed at multidisplinary rounds, involving transplant surgery, transplant medicine, pharmacy, nursing, nutrition, and social work    Discharged Condition: good    Disposition: to home    Follow Up: Labs daily until instructed otherwise by transplant coordinator- recently changed to Cyclo from Prograf. Goal level ~200 per Dr Brasher.    Patient Instructions:      Ambulatory referral/consult to Home Health   Standing Status: Future   Referral Priority: Routine Referral Type: Home Health Care   Referral Reason: Specialty Services Required   Requested Specialty: Home Health Services   Number of Visits Requested: 1     Medications:  Reconciled Home Medications:      Medication List      START taking these medications    cycloSPORINE modified (NEORAL) 25 MG capsule  Commonly known as: NEORAL  Take 6 capsules (150 mg total) by mouth 2 (two) times daily.     magnesium oxide 400 mg (241.3 mg magnesium) tablet  Commonly known as: MAG-OX  Take 1 tablet (400 mg total) by mouth once daily.        CONTINUE taking these medications    aspirin 81 MG EC tablet  Commonly known as: ECOTRIN  Take 1 tablet (81 mg total) by mouth once daily.     calcium carbonate-vitamin D3 600 mg-20 mcg (800 unit) Tab  Take 1 tablet by mouth once daily.     lamiVUDine 150 MG Tab  Commonly known as: EPIVIR  Take 1 tablet (150 mg total) by mouth once daily.     mycophenolate 250 mg Cap  Commonly known as: CELLCEPT  Take 4 capsules (1,000 mg total) by mouth 2 (two) times daily.     NIFEdipine 30 MG (OSM) 24 hr tablet  Commonly known as: PROCARDIA-XL  Take 1 tablet (30 mg total) by mouth once daily.     ondansetron 4 MG Tbdl  Commonly known as: ZOFRAN-ODT  DISSOLVE 1 tablet (4 mg total) by mouth every 6 (six) hours as needed (nausea/vomiting).     oxyCODONE 5 MG immediate release tablet  Commonly known as:  ROXICODONE  Take 1-2 tablets (5-10 mg total) by mouth every 6 (six) hours as needed for Pain.     pantoprazole 40 MG tablet  Commonly known as: PROTONIX  Take 40 mg by mouth once daily.     predniSONE 5 MG tablet  Commonly known as: DELTASONE  Take by mouth daily:  20mg 2/15-2/21, 15mg 2/22-2/28, 10mg 3/1-3/7, 5mg 3/8-3/14. STOP 3/15/23     sulfamethoxazole-trimethoprim 400-80mg 400-80 mg per tablet  Commonly known as: BACTRIM,SEPTRA  Take 1 tablet by mouth every morning. STOP 8/10/23     valGANciclovir 450 mg Tab  Commonly known as: VALCYTE  Take 1 tablet (450 mg total) by mouth once daily. STOP 5/10/23        STOP taking these medications    furosemide 20 MG tablet  Commonly known as: LASIX     tacrolimus 1 MG Cap  Commonly known as: PROGRAF          Time spent caring for patient (Greater than 1/2 spent in direct face-to-face contact): > 30 minutes    Marilee Chaney NP  Liver Transplant  [facility] - Transplant Stepdown

## 2023-03-04 NOTE — PROGRESS NOTES
Discharge Medication Note:    Hospital Course:    72 yo CF s/p OLT 2/9/23 BROWN; CMV +/+; Donor RPR+ (s/p PCN x3), Hep B Core+ (on lamivudine)    Admit for tremors thought to be from tacrolimus. Tac held and switched to cyclosporine. Initiated on 100 mg BID. Trough after 2 doses only ~30. Increased to 150 mg BID and discharged. Will get labs tomorrow.     Met with Argelia Sevilla at discharge to review discharge medications and to update the blue medication card.           Medication List        START taking these medications      cycloSPORINE modified (NEORAL) 25 MG capsule  Commonly known as: NEORAL  Take 6 capsules (150 mg total) by mouth 2 (two) times daily.     magnesium oxide 400 mg (241.3 mg magnesium) tablet  Commonly known as: MAG-OX  Take 1 tablet (400 mg total) by mouth once daily.            CONTINUE taking these medications      aspirin 81 MG EC tablet  Commonly known as: ECOTRIN  Take 1 tablet (81 mg total) by mouth once daily.     calcium carbonate-vitamin D3 600 mg-20 mcg (800 unit) Tab  Take 1 tablet by mouth once daily.     lamiVUDine 150 MG Tab  Commonly known as: EPIVIR  Take 1 tablet (150 mg total) by mouth once daily.     mycophenolate 250 mg Cap  Commonly known as: CELLCEPT  Take 4 capsules (1,000 mg total) by mouth 2 (two) times daily.     NIFEdipine 30 MG (OSM) 24 hr tablet  Commonly known as: PROCARDIA-XL  Take 1 tablet (30 mg total) by mouth once daily.     ondansetron 4 MG Tbdl  Commonly known as: ZOFRAN-ODT  DISSOLVE 1 tablet (4 mg total) by mouth every 6 (six) hours as needed (nausea/vomiting).     oxyCODONE 5 MG immediate release tablet  Commonly known as: ROXICODONE  Take 1-2 tablets (5-10 mg total) by mouth every 6 (six) hours as needed for Pain.     pantoprazole 40 MG tablet  Commonly known as: PROTONIX     predniSONE 5 MG tablet  Commonly known as: DELTASONE  Take by mouth daily:  20mg 2/15-2/21, 15mg 2/22-2/28, 10mg 3/1-3/7, 5mg 3/8-3/14. STOP 3/15/23      sulfamethoxazole-trimethoprim 400-80mg 400-80 mg per tablet  Commonly known as: BACTRIM,SEPTRA  Take 1 tablet by mouth every morning. STOP 8/10/23     valGANciclovir 450 mg Tab  Commonly known as: VALCYTE  Take 1 tablet (450 mg total) by mouth once daily. STOP 5/10/23            STOP taking these medications      furosemide 20 MG tablet  Commonly known as: LASIX     tacrolimus 1 MG Cap  Commonly known as: PROGRAF               Where to Get Your Medications        These medications were sent to [facility]  [address] [facility] [address]      Hours: Mon-Fri 7a-7p, Sat-Sun 10a-4p Phone: [phone]   cycloSPORINE modified (NEORAL) 25 MG capsule  magnesium oxide 400 mg (241.3 mg magnesium) tablet            The following medications have been placed on HOLD and should be restarted in the outpatient setting (when appropriate): none     Argelia Sevilal verbalized understanding and had the opportunity to ask questions.

## 2023-03-04 NOTE — PLAN OF CARE
Pt AAOx4. Up w walker and standby assist. Tremors have decreased today. Cyclosporine started. CT scan of head was clean. Chevron incision looks good.  at bedside. VS stable.

## 2023-03-05 ENCOUNTER — LAB VISIT (OUTPATIENT)
Dept: LAB | Facility: HOSPITAL | Age: 73
End: 2023-03-05
Attending: SURGERY
Payer: MEDICARE

## 2023-03-05 ENCOUNTER — NURSE TRIAGE (OUTPATIENT)
Dept: ADMINISTRATIVE | Facility: CLINIC | Age: 73
End: 2023-03-05
Payer: MEDICARE

## 2023-03-05 DIAGNOSIS — Z94.4 S/P LIVER TRANSPLANT: ICD-10-CM

## 2023-03-05 DIAGNOSIS — Z94.4 LIVER REPLACED BY TRANSPLANT: ICD-10-CM

## 2023-03-05 LAB
ALBUMIN SERPL BCP-MCNC: 3.7 G/DL (ref 3.5–5.2)
ALP SERPL-CCNC: 116 U/L (ref 55–135)
ALT SERPL W/O P-5'-P-CCNC: 17 U/L (ref 10–44)
ANION GAP SERPL CALC-SCNC: 5 MMOL/L (ref 8–16)
AST SERPL-CCNC: 14 U/L (ref 10–40)
BASOPHILS # BLD AUTO: 0.07 K/UL (ref 0–0.2)
BASOPHILS NFR BLD: 1.4 % (ref 0–1.9)
BILIRUB SERPL-MCNC: 1.4 MG/DL (ref 0.1–1)
BUN SERPL-MCNC: 13 MG/DL (ref 8–23)
CALCIUM SERPL-MCNC: 9.8 MG/DL (ref 8.7–10.5)
CHLORIDE SERPL-SCNC: 101 MMOL/L (ref 95–110)
CO2 SERPL-SCNC: 29 MMOL/L (ref 23–29)
CREAT SERPL-MCNC: 0.6 MG/DL (ref 0.5–1.4)
CYCLOSPORINE BLD LC/MS/MS-MCNC: 75 NG/ML (ref 100–400)
DIFFERENTIAL METHOD: ABNORMAL
EOSINOPHIL # BLD AUTO: 0.1 K/UL (ref 0–0.5)
EOSINOPHIL NFR BLD: 2.7 % (ref 0–8)
ERYTHROCYTE [DISTWIDTH] IN BLOOD BY AUTOMATED COUNT: 16.5 % (ref 11.5–14.5)
EST. GFR  (NO RACE VARIABLE): >60 ML/MIN/1.73 M^2
GLUCOSE SERPL-MCNC: 102 MG/DL (ref 70–110)
HCT VFR BLD AUTO: 31.8 % (ref 37–48.5)
HGB BLD-MCNC: 9.9 G/DL (ref 12–16)
IMM GRANULOCYTES # BLD AUTO: 0.07 K/UL (ref 0–0.04)
IMM GRANULOCYTES NFR BLD AUTO: 1.4 % (ref 0–0.5)
LYMPHOCYTES # BLD AUTO: 1.2 K/UL (ref 1–4.8)
LYMPHOCYTES NFR BLD: 24.3 % (ref 18–48)
MCH RBC QN AUTO: 32.4 PG (ref 27–31)
MCHC RBC AUTO-ENTMCNC: 31.1 G/DL (ref 32–36)
MCV RBC AUTO: 104 FL (ref 82–98)
MONOCYTES # BLD AUTO: 0.3 K/UL (ref 0.3–1)
MONOCYTES NFR BLD: 5.7 % (ref 4–15)
NEUTROPHILS # BLD AUTO: 3.3 K/UL (ref 1.8–7.7)
NEUTROPHILS NFR BLD: 64.5 % (ref 38–73)
NRBC BLD-RTO: 0 /100 WBC
PLATELET # BLD AUTO: 317 K/UL (ref 150–450)
PMV BLD AUTO: 9.7 FL (ref 9.2–12.9)
POTASSIUM SERPL-SCNC: 4.8 MMOL/L (ref 3.5–5.1)
PROT SERPL-MCNC: 6.8 G/DL (ref 6–8.4)
RBC # BLD AUTO: 3.06 M/UL (ref 4–5.4)
SODIUM SERPL-SCNC: 135 MMOL/L (ref 136–145)
WBC # BLD AUTO: 5.11 K/UL (ref 3.9–12.7)

## 2023-03-05 PROCEDURE — 36415 COLL VENOUS BLD VENIPUNCTURE: CPT | Performed by: SURGERY

## 2023-03-05 PROCEDURE — 80158 DRUG ASSAY CYCLOSPORINE: CPT | Performed by: SURGERY

## 2023-03-05 PROCEDURE — 85025 COMPLETE CBC W/AUTO DIFF WBC: CPT | Performed by: SURGERY

## 2023-03-05 PROCEDURE — 80053 COMPREHEN METABOLIC PANEL: CPT | Performed by: SURGERY

## 2023-03-05 NOTE — TELEPHONE ENCOUNTER
(Liver transplant 2/9/2023)Pt called in stating that she needed to have labs drawn today, but did not see anyone available upon arrival. Pt encouraged to return to lab for blood draw.      Nurse called to follow up with pt, who states she was able to have blood drawn. No further needs.  Reason for Disposition   Health Information question, no triage required and triager able to answer question    Protocols used: Information Only Call - No Triage-A-

## 2023-03-05 NOTE — TELEPHONE ENCOUNTER
Spoke to Dr. Brasher re: lab results.  Per MD, increase  mg BID and repeat labs tomorrow.  Called pt to advise.  Discussed labs, instructed pt to start taking cyclosporine 200 mg by mouth twice daily and repeat labs tomorrow.  Pt repeated instructions and verbalized understanding.

## 2023-03-06 ENCOUNTER — PATIENT MESSAGE (OUTPATIENT)
Dept: TRANSPLANT | Facility: CLINIC | Age: 73
End: 2023-03-06
Payer: MEDICARE

## 2023-03-06 ENCOUNTER — NURSE TRIAGE (OUTPATIENT)
Dept: ADMINISTRATIVE | Facility: CLINIC | Age: 73
End: 2023-03-06
Payer: MEDICARE

## 2023-03-06 ENCOUNTER — HOSPITAL ENCOUNTER (OUTPATIENT)
Dept: RADIOLOGY | Facility: HOSPITAL | Age: 73
Discharge: HOME | End: 2023-03-06
Attending: SURGERY
Payer: MEDICARE

## 2023-03-06 DIAGNOSIS — Z94.4 LIVER REPLACED BY TRANSPLANT: ICD-10-CM

## 2023-03-06 DIAGNOSIS — B19.10 HEPATITIS B VIRUS INFECTION IN CADAVERIC DONOR: ICD-10-CM

## 2023-03-06 DIAGNOSIS — Z00.5 HEPATITIS B VIRUS INFECTION IN CADAVERIC DONOR: ICD-10-CM

## 2023-03-06 PROCEDURE — 76705 ECHO EXAM OF ABDOMEN: CPT | Mod: 26,XS,, | Performed by: RADIOLOGY

## 2023-03-06 PROCEDURE — 93976 US DOPPLER LIVER TRANSPLANT POST (XPD): ICD-10-PCS | Mod: 26,,, | Performed by: RADIOLOGY

## 2023-03-06 PROCEDURE — 93976 VASCULAR STUDY: CPT | Mod: 26,,, | Performed by: RADIOLOGY

## 2023-03-06 PROCEDURE — 93976 VASCULAR STUDY: CPT | Mod: TC

## 2023-03-06 PROCEDURE — 76705 US DOPPLER LIVER TRANSPLANT POST (XPD): ICD-10-PCS | Mod: 26,XS,, | Performed by: RADIOLOGY

## 2023-03-06 RX ORDER — CYCLOSPORINE 50 MG/1
200 CAPSULE, LIQUID FILLED ORAL 2 TIMES DAILY
Qty: 240 CAPSULE | Refills: 11 | Status: SHIPPED | OUTPATIENT
Start: 2023-03-06 | End: 2023-03-06 | Stop reason: DRUGHIGH

## 2023-03-06 NOTE — TELEPHONE ENCOUNTER
Patient made aware of cya dose adj from 200mg BID TO 250MG BID, patient aware to have follow up labs this upcoming Thursday 3/9/23        ----- Message from Hima Miguel MD sent at 3/6/2023  2:12 PM CST -----  Cya 250 biw

## 2023-03-07 ENCOUNTER — PATIENT MESSAGE (OUTPATIENT)
Dept: TRANSPLANT | Facility: CLINIC | Age: 73
End: 2023-03-07

## 2023-03-07 ENCOUNTER — OFFICE VISIT (OUTPATIENT)
Dept: TRANSPLANT | Facility: CLINIC | Age: 73
End: 2023-03-07
Payer: MEDICARE

## 2023-03-07 ENCOUNTER — TELEPHONE (OUTPATIENT)
Dept: TRANSPLANT | Facility: HOSPITAL | Age: 73
End: 2023-03-07
Payer: MEDICARE

## 2023-03-07 VITALS
OXYGEN SATURATION: 97 % | RESPIRATION RATE: 18 BRPM | WEIGHT: 144.81 LBS | SYSTOLIC BLOOD PRESSURE: 171 MMHG | DIASTOLIC BLOOD PRESSURE: 77 MMHG | HEART RATE: 110 BPM | BODY MASS INDEX: 26.65 KG/M2 | TEMPERATURE: 98 F | HEIGHT: 62 IN

## 2023-03-07 DIAGNOSIS — Z51.81 ENCOUNTER FOR THERAPEUTIC DRUG MONITORING: Primary | ICD-10-CM

## 2023-03-07 DIAGNOSIS — Z94.4 LIVER REPLACED BY TRANSPLANT: ICD-10-CM

## 2023-03-07 LAB
BACTERIA BLD CULT: NORMAL
BACTERIA BLD CULT: NORMAL

## 2023-03-07 PROCEDURE — 99999 PR PBB SHADOW E&M-EST. PATIENT-LVL III: CPT | Mod: PBBFAC,,,

## 2023-03-07 PROCEDURE — 99213 OFFICE O/P EST LOW 20 MIN: CPT | Mod: 24,S$PBB,, | Performed by: SURGERY

## 2023-03-07 PROCEDURE — 99213 PR OFFICE/OUTPT VISIT, EST, LEVL III, 20-29 MIN: ICD-10-PCS | Mod: 24,S$PBB,, | Performed by: SURGERY

## 2023-03-07 PROCEDURE — 99213 OFFICE O/P EST LOW 20 MIN: CPT | Mod: PBBFAC

## 2023-03-07 PROCEDURE — 99999 PR PBB SHADOW E&M-EST. PATIENT-LVL III: ICD-10-PCS | Mod: PBBFAC,,,

## 2023-03-07 RX ORDER — CYCLOSPORINE 25 MG/1
CAPSULE, LIQUID FILLED ORAL
Qty: 120 CAPSULE | Refills: 11 | Status: SHIPPED | OUTPATIENT
Start: 2023-03-07 | End: 2023-03-13 | Stop reason: DRUGHIGH

## 2023-03-07 RX ORDER — CYCLOSPORINE 100 MG/1
200 CAPSULE, LIQUID FILLED ORAL EVERY 12 HOURS
Qty: 120 CAPSULE | Refills: 11 | Status: SHIPPED | OUTPATIENT
Start: 2023-03-07 | End: 2023-03-13

## 2023-03-07 NOTE — TELEPHONE ENCOUNTER
Liver transplant 2/9/2023    Patient just received a portal message stating to continue taking prograf although patient was advised by her doctor to stop taking prograf because her levels were elevated. Patient states that she was advised to replace prograf with cyclosporine instead. Attempted to contact nurse Merced Clifford via secure chat but no reply. Contacted Dr. Peace, states to follow directions for new prescription, which reads   cycloSPORINE modified, NEORAL, (NEORAL) 50 MG capsule 240 capsule 11 3/6/2023  No   Sig - Route: Take 4 capsules (200 mg total) by mouth 2 (two) times daily. - Oral   Patient VU.  Advised the patient to call back with any further questions or if symptoms worsen.        Reason for Disposition   [1] Caller has URGENT medicine question about med that PCP or specialist prescribed AND [2] triager unable to answer question    Protocols used: Medication Question Call-A-

## 2023-03-07 NOTE — PROGRESS NOTES
Transplant Surgery  Liver Transplant Recipient Follow-up    Original Referring Physician: Iggy Contreras  Current Corresponding Physician: Iggy Contreras    Chief Complaint: Argelia is here for follow up of her liver transplant performed 2/9/2023 for the primary diagnosis (UNOS) of Cirrhosis: Fatty Liver (BROWN)    ORGAN: LIVER  Whole or Partial: whole liver  Donor Type: donation after brain death  PHS Increased Risk: no  Donor CMV Status: Positive  Donor HCV Status: Negative  Donor HBcAb: Positive  Donor HBV FRANNIE: Negative  Donor HCV FRANNIE: Negative    Biliary Anastomosis: end to end  Arterial Anatomy: standard  IVC reconstruction: end to end ivc  Portal vein status: patent    Subjective:     History of Present Illness: She has had the following complications since transplant: none.  The noted complications are well controlled.    Interval History: Currently, she is doing adequately.  Current complaints include  tremor - recently changed to cyclosporine .  Argelia is here for management of her immunosuppression medication.    External provider notes reviewed: No    Review of Systems   Constitutional:  Negative for fatigue.   HENT:  Negative for drooling, postnasal drip and sore throat.    Eyes:  Negative for discharge and itching.   Respiratory:  Negative for choking and stridor.    Gastrointestinal:  Negative for rectal pain.   Endocrine: Negative for polydipsia.   Genitourinary:  Negative for enuresis and genital sores.   Musculoskeletal:  Negative for back pain, neck pain and neck stiffness.   Allergic/Immunologic: Positive for immunocompromised state.   Neurological:  Negative for facial asymmetry and numbness.   Hematological:  Negative for adenopathy.   Psychiatric/Behavioral:  Negative for behavioral problems, self-injury and suicidal ideas.    Objective:     Physical Exam  Abdominal:          Comments: Wound well healed   Lab Results   Component Value Date    BILITOT 1.3 (H) 03/06/2023    AST 14 03/06/2023    ALT 17  03/06/2023    ALKPHOS 121 03/06/2023    CREATININE 0.6 03/06/2023    ALBUMIN 3.7 03/06/2023     Lab Results   Component Value Date    WBC 5.93 03/06/2023    HGB 10.6 (L) 03/06/2023    HCT 32.6 (L) 03/06/2023    HCT 25 (L) 02/09/2023     03/06/2023     Lab Results   Component Value Date    TACROLIMUS 2.2 (L) 03/04/2023    CYCLOSPORINE 127 03/06/2023       Diagnostics:  The following labs have been reviewed: CBC  CMP  CYCLOSPIRIN LEVEL    Assessment/Plan:          S/P liver transplant.  Chronic immunosuppressive medications for rejection prophylaxis at target.  Plan: no adjustment needed.  Continue monitoring symptoms, labs and drug levels for drug-related toxicity and side effects.  Incision: staples out, wound well-healed, no evidence of hernia  Femoral arterial line site: no complications evident    Additional testing to be completed according to Written Order Guidelines for Post-Liver and Combined Liver/Kidney Transplant Follow-up (LI-09)    Interpretation of tests and discussion of patient management involves all members of the multidisciplinary transplant team  Patient advised that it is recommended that all transplanted patients, and their close contacts and household members receive Covid vaccination.  Mark Brasher MD       [facility] Patient Status  Functional Status: 90% - Able to carry on normal activity: minor symptoms of disease  Physical Capacity: No Limitations

## 2023-03-07 NOTE — TELEPHONE ENCOUNTER
SW contacted patient after message from coordinator that patient is cleared to return home. Patient reports she has plans to check out of [place] apartments tomorrow and will return her key to management office. Patient also reports she will contact [facility]  to notify them she will be leaving tomorrow. Patient was discharged from home health PT services and notes she is doing well with mobility. Patient denies any concerns or needs at this time, and thankful for transplant team's assistance. SW remains available and will continue to follow, providing psychosocial support, education and assistance as needed.       ----- Message from Merced Clifford RN sent at 3/7/2023  3:10 PM CST -----  Regarding: [facility] discharge  Chris nielsen, just an fyi that this patient is cleared to go home, she plans on leaving in the morning as they have a great deal to pack up, thanks!

## 2023-03-08 ENCOUNTER — PATIENT MESSAGE (OUTPATIENT)
Dept: TRANSPLANT | Facility: CLINIC | Age: 73
End: 2023-03-08
Payer: MEDICARE

## 2023-03-08 DIAGNOSIS — I10 HYPERTENSION, UNSPECIFIED TYPE: Primary | ICD-10-CM

## 2023-03-08 DIAGNOSIS — Z94.4 S/P LIVER TRANSPLANT: ICD-10-CM

## 2023-03-08 LAB — FUNGUS SPEC CULT: NORMAL

## 2023-03-08 RX ORDER — NIFEDIPINE 30 MG/1
60 TABLET, EXTENDED RELEASE ORAL DAILY
Qty: 60 TABLET | Refills: 2 | Status: SHIPPED | OUTPATIENT
Start: 2023-03-08 | End: 2023-03-20

## 2023-03-08 NOTE — TELEPHONE ENCOUNTER
Patient made aware that per communication with T[facility] surgeon and [facility] Pharm D, due to elevated BP readings she has submitted via my  chart (media), she is to increase her Nifedipine up to 60mg orally once daily and to send in her readings to coordinator at least once weekly for monitoring.          ----- Message from Hima Miguel MD sent at 3/8/2023  1:08 PM CST -----  Results ok. No action needed

## 2023-03-09 ENCOUNTER — PATIENT MESSAGE (OUTPATIENT)
Dept: ADMINISTRATIVE | Facility: OTHER | Age: 73
End: 2023-03-09
Payer: MEDICARE

## 2023-03-09 ENCOUNTER — PATIENT MESSAGE (OUTPATIENT)
Dept: TRANSPLANT | Facility: CLINIC | Age: 73
End: 2023-03-09
Payer: MEDICARE

## 2023-03-09 DIAGNOSIS — Z94.4 S/P LIVER TRANSPLANT: Primary | ICD-10-CM

## 2023-03-09 DIAGNOSIS — M79.672 FOOT PAIN, LEFT: ICD-10-CM

## 2023-03-09 LAB
EXT ALBUMIN: 4.1
EXT ALKALINE PHOSPHATASE: 106
EXT ALT: 17
EXT AST: 15
EXT BASOPHIL%: 0.1
EXT BILIRUBIN DIRECT: 0.8 MG/DL
EXT BILIRUBIN TOTAL: 1.5
EXT BUN: 13
EXT CALCIUM: 9.6
EXT CHLORIDE: 102
EXT CO2: 27
EXT CREATININE: 0.74 MG/DL
EXT CYCLOSPORONE LEVEL: 336
EXT EOSINOPHIL%: 2.1
EXT GFR MDRD NON AF AMER: >60
EXT GLUCOSE: 112
EXT HEMATOCRIT: 35.9
EXT HEMOGLOBIN: 11.5
EXT LYMPH%: 24.5
EXT MONOCYTES%: 7.7
EXT PLATELETS: 309
EXT POTASSIUM: 4.3
EXT PROTEIN TOTAL: 6.7
EXT SEGS%: 64.2
EXT SODIUM: 139 MMOL/L
EXT WBC: 4.7

## 2023-03-09 NOTE — TELEPHONE ENCOUNTER
"Writer contacted patient and made her aware that partial lab review completed and no action required, still awaiting the csa level.  Patient stated she is still having pain "burning" to her feet but most dominique to left foot and asking for assistance in managing it as tylenol not working.  Writer contacted txp surgeon who ordered gabapentin to be dosed by Pharm D, see below recommended dosing:      Franca Nath, PharmD  Merced Clifford RN  She can take up to gabapentin 300mg TID.     I would probably have her start at gabapentin 300mg nightly first, and then increase to BID after a few days if needed         Previous Messages       ----- Message -----   From: Merced Clifford RN   Sent: 3/9/2023   2:08 PM CST   To: Abdominal Transplant Pharmacists   Subject: gabapentin                                       Hi gia, dr miguel asking for dosing on gabapentin for this patient.   She had head to toe tremors and got admitted for it, switched to csa. However still c/o pain and tingling dominique to her right foot.                ----- Message from Hima Miguel MD sent at 3/9/2023  1:49 PM CST -----  Results ok. No action needed  "

## 2023-03-10 ENCOUNTER — PATIENT MESSAGE (OUTPATIENT)
Dept: TRANSPLANT | Facility: CLINIC | Age: 73
End: 2023-03-10
Payer: MEDICARE

## 2023-03-10 RX ORDER — GABAPENTIN 100 MG/1
300 CAPSULE ORAL NIGHTLY
Qty: 90 CAPSULE | Refills: 11 | Status: SHIPPED | OUTPATIENT
Start: 2023-03-10 | End: 2023-03-17 | Stop reason: DRUGHIGH

## 2023-03-13 DIAGNOSIS — R17 HIGH TOTAL BILIRUBIN: ICD-10-CM

## 2023-03-13 DIAGNOSIS — Z94.4 S/P LIVER TRANSPLANT: Primary | ICD-10-CM

## 2023-03-13 LAB
EXT ALBUMIN: 4.6
EXT ALKALINE PHOSPHATASE: 97
EXT ALT: 16
EXT AST: 18
EXT BASOPHIL%: 1.3
EXT BILIRUBIN TOTAL: 1.9
EXT BUN: 13
EXT CALCIUM: 10.2
EXT CHLORIDE: 100
EXT CO2: 24
EXT CREATININE: 0.73 MG/DL
EXT CYCLOSPORONE LEVEL: 341
EXT EGFR NO RACE VARIABLE: >60
EXT EOSINOPHIL%: 1.8
EXT GLUCOSE: 115
EXT HEMATOCRIT: 40.1
EXT HEMOGLOBIN: 12.8
EXT HEP B S AG: ABNORMAL
EXT HIV: NOT DETECTED
EXT LYMPH%: 25.5
EXT MONOCYTES%: 6.3
EXT PLATELETS: 300
EXT POTASSIUM: 3.9
EXT PROTEIN TOTAL: 7.7
EXT SEGS%: 65.1
EXT SODIUM: 137 MMOL/L
EXT WBC: 4.5
HEPATITIS C VIRUS (HCV) RNA DETECTION/QUANTIFICATION RT-PCR: ABNORMAL

## 2023-03-13 NOTE — TELEPHONE ENCOUNTER
Patient made aware that per surgeon review of partial lab review (csa pending) of today's labs, patient to have a liver ultrasound due to rising T Bili.    Patient also made aware that today T[facility] team received csa result from last Thursday labs and therefore is to decrease her csa down to 200 mg q12hrs from 250mg q12hrs, with repeat labs this Thursday. Patient will report to [facility] moving forward as her local laboratory has a significant delay with CSA levels.

## 2023-03-14 ENCOUNTER — HOSPITAL ENCOUNTER (OUTPATIENT)
Dept: RADIOLOGY | Facility: HOSPITAL | Age: 73
Discharge: HOME | End: 2023-03-14
Attending: SURGERY
Payer: MEDICARE

## 2023-03-14 ENCOUNTER — TELEPHONE (OUTPATIENT)
Dept: INFECTIOUS DISEASES | Facility: CLINIC | Age: 73
End: 2023-03-14
Payer: MEDICARE

## 2023-03-14 DIAGNOSIS — R17 HIGH TOTAL BILIRUBIN: ICD-10-CM

## 2023-03-14 DIAGNOSIS — Z94.4 S/P LIVER TRANSPLANT: ICD-10-CM

## 2023-03-14 PROCEDURE — 93976 VASCULAR STUDY: CPT | Mod: 26,,, | Performed by: STUDENT IN AN ORGANIZED HEALTH CARE EDUCATION/TRAINING PROGRAM

## 2023-03-14 PROCEDURE — 76705 US DOPPLER LIVER TRANSPLANT POST (XPD): ICD-10-PCS | Mod: 26,XS,, | Performed by: STUDENT IN AN ORGANIZED HEALTH CARE EDUCATION/TRAINING PROGRAM

## 2023-03-14 PROCEDURE — 93976 US DOPPLER LIVER TRANSPLANT POST (XPD): ICD-10-PCS | Mod: 26,,, | Performed by: STUDENT IN AN ORGANIZED HEALTH CARE EDUCATION/TRAINING PROGRAM

## 2023-03-14 PROCEDURE — 76705 ECHO EXAM OF ABDOMEN: CPT | Mod: 26,XS,, | Performed by: STUDENT IN AN ORGANIZED HEALTH CARE EDUCATION/TRAINING PROGRAM

## 2023-03-14 PROCEDURE — 93976 VASCULAR STUDY: CPT | Mod: TC

## 2023-03-14 RX ORDER — CYCLOSPORINE 100 MG/1
200 CAPSULE, LIQUID FILLED ORAL EVERY 12 HOURS
Qty: 120 CAPSULE | Refills: 11 | Status: SHIPPED | OUTPATIENT
Start: 2023-03-14 | End: 2023-03-17 | Stop reason: DRUGHIGH

## 2023-03-15 ENCOUNTER — TELEPHONE (OUTPATIENT)
Dept: TRANSPLANT | Facility: CLINIC | Age: 73
End: 2023-03-15
Payer: MEDICARE

## 2023-03-15 ENCOUNTER — PATIENT MESSAGE (OUTPATIENT)
Dept: TRANSPLANT | Facility: CLINIC | Age: 73
End: 2023-03-15
Payer: MEDICARE

## 2023-03-15 NOTE — TELEPHONE ENCOUNTER
Patient made aware that her liver txp ultrasound has been reviewed and requires no further action at this time will monitor.      ----- Message from Hima Miguel MD sent at 3/15/2023  2:56 PM CDT -----  Results ok. No action needed

## 2023-03-16 ENCOUNTER — LAB VISIT (OUTPATIENT)
Dept: LAB | Facility: HOSPITAL | Age: 73
End: 2023-03-16
Attending: SURGERY
Payer: MEDICARE

## 2023-03-16 DIAGNOSIS — Z94.4 LIVER REPLACED BY TRANSPLANT: ICD-10-CM

## 2023-03-16 DIAGNOSIS — Z94.4 S/P LIVER TRANSPLANT: ICD-10-CM

## 2023-03-16 DIAGNOSIS — R17 HIGH TOTAL BILIRUBIN: ICD-10-CM

## 2023-03-16 LAB
ALBUMIN SERPL BCP-MCNC: 4.1 G/DL (ref 3.5–5.2)
ALP SERPL-CCNC: 97 U/L (ref 55–135)
ALT SERPL W/O P-5'-P-CCNC: 13 U/L (ref 10–44)
ANION GAP SERPL CALC-SCNC: 10 MMOL/L (ref 8–16)
AST SERPL-CCNC: 15 U/L (ref 10–40)
BASOPHILS # BLD AUTO: 0.05 K/UL (ref 0–0.2)
BASOPHILS NFR BLD: 0.8 % (ref 0–1.9)
BILIRUB DIRECT SERPL-MCNC: 0.9 MG/DL (ref 0.1–0.3)
BILIRUB SERPL-MCNC: 1.9 MG/DL (ref 0.1–1)
BUN SERPL-MCNC: 19 MG/DL (ref 8–23)
CALCIUM SERPL-MCNC: 10.1 MG/DL (ref 8.7–10.5)
CHLORIDE SERPL-SCNC: 99 MMOL/L (ref 95–110)
CO2 SERPL-SCNC: 29 MMOL/L (ref 23–29)
CREAT SERPL-MCNC: 0.9 MG/DL (ref 0.5–1.4)
DIFFERENTIAL METHOD: ABNORMAL
EOSINOPHIL # BLD AUTO: 0.1 K/UL (ref 0–0.5)
EOSINOPHIL NFR BLD: 1 % (ref 0–8)
ERYTHROCYTE [DISTWIDTH] IN BLOOD BY AUTOMATED COUNT: 15.8 % (ref 11.5–14.5)
EST. GFR  (NO RACE VARIABLE): >60 ML/MIN/1.73 M^2
GLUCOSE SERPL-MCNC: 121 MG/DL (ref 70–110)
HCT VFR BLD AUTO: 37.6 % (ref 37–48.5)
HGB BLD-MCNC: 12.1 G/DL (ref 12–16)
IMM GRANULOCYTES # BLD AUTO: 0.04 K/UL (ref 0–0.04)
IMM GRANULOCYTES NFR BLD AUTO: 0.6 % (ref 0–0.5)
LYMPHOCYTES # BLD AUTO: 1.5 K/UL (ref 1–4.8)
LYMPHOCYTES NFR BLD: 23.7 % (ref 18–48)
MCH RBC QN AUTO: 32.2 PG (ref 27–31)
MCHC RBC AUTO-ENTMCNC: 32.2 G/DL (ref 32–36)
MCV RBC AUTO: 100 FL (ref 82–98)
MONOCYTES # BLD AUTO: 0.4 K/UL (ref 0.3–1)
MONOCYTES NFR BLD: 6.3 % (ref 4–15)
NEUTROPHILS # BLD AUTO: 4.2 K/UL (ref 1.8–7.7)
NEUTROPHILS NFR BLD: 67.6 % (ref 38–73)
NRBC BLD-RTO: 0 /100 WBC
PLATELET # BLD AUTO: 301 K/UL (ref 150–450)
PMV BLD AUTO: 10.3 FL (ref 9.2–12.9)
POTASSIUM SERPL-SCNC: 4.6 MMOL/L (ref 3.5–5.1)
PROT SERPL-MCNC: 7.3 G/DL (ref 6–8.4)
RBC # BLD AUTO: 3.76 M/UL (ref 4–5.4)
SODIUM SERPL-SCNC: 138 MMOL/L (ref 136–145)
WBC # BLD AUTO: 6.2 K/UL (ref 3.9–12.7)

## 2023-03-16 PROCEDURE — 36415 COLL VENOUS BLD VENIPUNCTURE: CPT | Mod: PO | Performed by: SURGERY

## 2023-03-16 PROCEDURE — 80158 DRUG ASSAY CYCLOSPORINE: CPT | Performed by: SURGERY

## 2023-03-16 PROCEDURE — 85025 COMPLETE CBC W/AUTO DIFF WBC: CPT | Performed by: SURGERY

## 2023-03-16 PROCEDURE — 82248 BILIRUBIN DIRECT: CPT | Performed by: SURGERY

## 2023-03-16 PROCEDURE — 80053 COMPREHEN METABOLIC PANEL: CPT | Performed by: SURGERY

## 2023-03-17 ENCOUNTER — PATIENT MESSAGE (OUTPATIENT)
Dept: TRANSPLANT | Facility: CLINIC | Age: 73
End: 2023-03-17
Payer: MEDICARE

## 2023-03-17 DIAGNOSIS — Z94.4 S/P LIVER TRANSPLANT: ICD-10-CM

## 2023-03-17 DIAGNOSIS — M79.672 FOOT PAIN, LEFT: ICD-10-CM

## 2023-03-17 LAB — CYCLOSPORINE BLD LC/MS/MS-MCNC: 292 NG/ML (ref 100–400)

## 2023-03-17 NOTE — TELEPHONE ENCOUNTER
Patient made aware that writer received communication from Dr Valverde to adjust her csa down to 175mg every 12hrs with repeat labs Monday  Writer also updating MAR to reflect gabapentin at 100 mg nightly only, patients states that 100 mg holds her fine and she does not to be at 300 mg but will notify team if that changes.

## 2023-03-20 DIAGNOSIS — I10 HYPERTENSION, UNSPECIFIED TYPE: ICD-10-CM

## 2023-03-20 DIAGNOSIS — Z94.4 S/P LIVER TRANSPLANT: Primary | ICD-10-CM

## 2023-03-20 RX ORDER — GABAPENTIN 100 MG/1
100 CAPSULE ORAL NIGHTLY
Qty: 30 CAPSULE | Refills: 11 | Status: SHIPPED | OUTPATIENT
Start: 2023-03-20 | End: 2023-03-28

## 2023-03-20 RX ORDER — CARVEDILOL 6.25 MG/1
6.25 TABLET ORAL 2 TIMES DAILY
Qty: 60 TABLET | Refills: 11 | Status: SHIPPED | OUTPATIENT
Start: 2023-03-20 | End: 2023-04-21 | Stop reason: SDUPTHER

## 2023-03-20 RX ORDER — CYCLOSPORINE 25 MG/1
175 CAPSULE, LIQUID FILLED ORAL EVERY 12 HOURS
Qty: 420 CAPSULE | Refills: 11 | Status: SHIPPED | OUTPATIENT
Start: 2023-03-20 | End: 2023-03-22 | Stop reason: DRUGHIGH

## 2023-03-20 NOTE — TELEPHONE ENCOUNTER
Patient made aware that due to continued high heart rate recordings and flushed face since Nifedipine increase, to stop Nifedipine and start coreg 6.25 mg orally bid per pharm d rec and DR Brasher approval.

## 2023-03-21 ENCOUNTER — EXTERNAL HOME HEALTH (OUTPATIENT)
Dept: HOME HEALTH SERVICES | Facility: HOSPITAL | Age: 73
End: 2023-03-21
Payer: MEDICARE

## 2023-03-21 LAB
EXT ALBUMIN: 4
EXT ALKALINE PHOSPHATASE: 97
EXT ALT: 15
EXT AST: 18
EXT BASOPHIL%: 0
EXT BILIRUBIN DIRECT: 0.7 MG/DL
EXT BILIRUBIN TOTAL: 1.4
EXT BUN: 20
EXT CALCIUM: 9.8
EXT CHLORIDE: 102
EXT CO2: 25
EXT CREATININE: 0.82 MG/DL
EXT CYCLOSPORONE LEVEL: 297
EXT EOSINOPHIL%: 1.1
EXT GFR MDRD NON AF AMER: >60
EXT GLUCOSE: 112
EXT HEMATOCRIT: 35.5
EXT HEMOGLOBIN: 11.7
EXT INR: 0.99
EXT LYMPH%: 24.6
EXT MONOCYTES%: 5.1
EXT PLATELETS: 234
EXT POTASSIUM: 4.2
EXT PROTEIN TOTAL: 6.8
EXT PT: 13.2
EXT SEGS%: 68.6
EXT SODIUM: 138 MMOL/L
EXT WBC: 4.7

## 2023-03-22 ENCOUNTER — PATIENT MESSAGE (OUTPATIENT)
Dept: TRANSPLANT | Facility: CLINIC | Age: 73
End: 2023-03-22
Payer: MEDICARE

## 2023-03-22 DIAGNOSIS — Z94.4 S/P LIVER TRANSPLANT: ICD-10-CM

## 2023-03-22 RX ORDER — CYCLOSPORINE 25 MG/1
150 CAPSULE, LIQUID FILLED ORAL EVERY 12 HOURS
Qty: 360 CAPSULE | Refills: 11 | Status: SHIPPED | OUTPATIENT
Start: 2023-03-22 | End: 2023-05-04 | Stop reason: DRUGHIGH

## 2023-03-22 NOTE — TELEPHONE ENCOUNTER
Patient made aware to decrease her CYA further down to 150 mg orally every twelve hours per Dr Brasher with repeat labs this upcoming Monday.

## 2023-03-23 ENCOUNTER — PATIENT MESSAGE (OUTPATIENT)
Dept: ADMINISTRATIVE | Facility: OTHER | Age: 73
End: 2023-03-23
Payer: MEDICARE

## 2023-03-24 ENCOUNTER — DOCUMENT SCAN (OUTPATIENT)
Dept: HOME HEALTH SERVICES | Facility: HOSPITAL | Age: 73
End: 2023-03-24
Payer: MEDICARE

## 2023-03-27 LAB
EXT ALBUMIN: 3.8
EXT ALKALINE PHOSPHATASE: 80
EXT ALT: 13
EXT AST: 15
EXT BASOPHIL%: 1.1
EXT BILIRUBIN DIRECT: 0.5 MG/DL
EXT BILIRUBIN TOTAL: 1.1
EXT BUN: 18
EXT CALCIUM: 9.8
EXT CHLORIDE: 105
EXT CO2: 25
EXT CREATININE: 0.78 MG/DL
EXT CYCLOSPORONE LEVEL: 249
EXT EGFR NO RACE VARIABLE: >60
EXT EOSINOPHIL%: 1.1
EXT GLUCOSE: 100
EXT HEMATOCRIT: 36
EXT HEMOGLOBIN: 11.3
EXT INR: 1.02
EXT LYMPH%: 23.4
EXT MONOCYTES%: 4.1
EXT PLATELETS: 272
EXT POTASSIUM: 4.3
EXT PROTEIN TOTAL: 6.5
EXT PT: 13.5
EXT SEGS%: 70.3
EXT SODIUM: 140 MMOL/L
EXT WBC: 3.6

## 2023-03-28 ENCOUNTER — OFFICE VISIT (OUTPATIENT)
Dept: TRANSPLANT | Facility: CLINIC | Age: 73
End: 2023-03-28
Payer: MEDICARE

## 2023-03-28 VITALS
HEART RATE: 71 BPM | BODY MASS INDEX: 26.86 KG/M2 | RESPIRATION RATE: 16 BRPM | HEIGHT: 62 IN | TEMPERATURE: 97 F | DIASTOLIC BLOOD PRESSURE: 86 MMHG | WEIGHT: 145.94 LBS | SYSTOLIC BLOOD PRESSURE: 190 MMHG | OXYGEN SATURATION: 97 %

## 2023-03-28 DIAGNOSIS — Z79.899 ENCOUNTER FOR LONG-TERM (CURRENT) USE OF OTHER MEDICATIONS: ICD-10-CM

## 2023-03-28 DIAGNOSIS — Z94.4 S/P LIVER TRANSPLANT: ICD-10-CM

## 2023-03-28 DIAGNOSIS — Z51.81 ENCOUNTER FOR THERAPEUTIC DRUG MONITORING: Primary | ICD-10-CM

## 2023-03-28 DIAGNOSIS — M79.672 FOOT PAIN, LEFT: ICD-10-CM

## 2023-03-28 PROCEDURE — 99999 PR PBB SHADOW E&M-EST. PATIENT-LVL III: ICD-10-PCS | Mod: PBBFAC,,,

## 2023-03-28 PROCEDURE — 99215 PR OFFICE/OUTPT VISIT, EST, LEVL V, 40-54 MIN: ICD-10-PCS | Mod: 24,S$PBB,, | Performed by: TRANSPLANT SURGERY

## 2023-03-28 PROCEDURE — 99215 OFFICE O/P EST HI 40 MIN: CPT | Mod: 24,S$PBB,, | Performed by: TRANSPLANT SURGERY

## 2023-03-28 PROCEDURE — 99999 PR PBB SHADOW E&M-EST. PATIENT-LVL III: CPT | Mod: PBBFAC,,,

## 2023-03-28 PROCEDURE — 99213 OFFICE O/P EST LOW 20 MIN: CPT | Mod: PBBFAC

## 2023-03-28 RX ORDER — HYDRALAZINE HYDROCHLORIDE 25 MG/1
25 TABLET, FILM COATED ORAL EVERY 12 HOURS
Qty: 60 TABLET | Refills: 11 | Status: SHIPPED | OUTPATIENT
Start: 2023-03-28 | End: 2023-04-17 | Stop reason: ALTCHOICE

## 2023-03-28 RX ORDER — GABAPENTIN 100 MG/1
100 CAPSULE ORAL 2 TIMES DAILY
Qty: 60 CAPSULE | Refills: 11 | Status: SHIPPED | OUTPATIENT
Start: 2023-03-28 | End: 2024-03-27

## 2023-03-28 NOTE — PROGRESS NOTES
Transplant Surgery  Liver Transplant Recipient Follow-up    Original Referring Physician: Iggy Contreras  Current Corresponding Physician: Iggy Contreras    Chief Complaint: Argelia is here for follow up of her liver transplant performed 2/9/2023 for the primary diagnosis (UNOS) of Cirrhosis: Fatty Liver (BROWN)    ORGAN: LIVER  Whole or Partial: whole liver  Donor Type: donation after brain death  PHS Increased Risk: no  Donor CMV Status: Positive  Donor HCV Status: Negative  Donor HBcAb: Positive  Donor HBV FRANNIE: Negative  Donor HCV FRANNIE: Negative    Biliary Anastomosis: end to end  Arterial Anatomy: standard  IVC reconstruction: end to end ivc  Portal vein status: patent    Subjective:     History of Present Illness: She has had the following complications since transplant: none.  The noted complications are well controlled.    Interval History: Currently, she is doing well.  Current complaints include none.  Argelia is here for management of her immunosuppression medication.    External provider notes reviewed: Yes    Review of Systems   Constitutional:  Negative for activity change and appetite change.   HENT:  Negative for sinus pressure and sore throat.    Eyes:  Negative for redness and visual disturbance.   Respiratory:  Negative for cough and shortness of breath.    Cardiovascular:  Negative for chest pain and palpitations.   Gastrointestinal:  Negative for abdominal distention and abdominal pain.   Endocrine: Negative for polydipsia and polyuria.   Genitourinary:  Negative for dysuria and frequency.   Musculoskeletal:  Negative for arthralgias and back pain.   Skin:  Negative for rash and wound.   Allergic/Immunologic: Positive for immunocompromised state. Negative for environmental allergies.   Neurological:  Negative for tremors and seizures.   Hematological:  Negative for adenopathy. Does not bruise/bleed easily.   Psychiatric/Behavioral:  Negative for behavioral problems and confusion.    Objective:     Physical  Exam  Constitutional:       General: She is not in acute distress.     Appearance: She is not diaphoretic.   Neck:      Vascular: No JVD.   Cardiovascular:      Rate and Rhythm: Normal rate and regular rhythm.   Pulmonary:      Effort: Pulmonary effort is normal. No respiratory distress.      Breath sounds: No wheezing.   Abdominal:      Palpations: Abdomen is soft.          Comments: Mild incisional tenderness   Skin:     General: Skin is warm and dry.   Neurological:      Mental Status: She is alert and oriented to person, place, and time.     Lab Results   Component Value Date    BILITOT 1.9 (H) 03/16/2023    AST 15 03/16/2023    ALT 13 03/16/2023    ALKPHOS 97 03/16/2023    CREATININE 0.9 03/16/2023    ALBUMIN 4.1 03/16/2023     Lab Results   Component Value Date    WBC 6.20 03/16/2023    HGB 12.1 03/16/2023    HCT 37.6 03/16/2023    HCT 25 (L) 02/09/2023     03/16/2023     Lab Results   Component Value Date    TACROLIMUS 2.2 (L) 03/04/2023    CYCLOSPORINE 292 03/16/2023       Diagnostics:  The following labs have been reviewed: CBC  CMP  CYCLOSPIRIN LEVEL  The following radiology images have been independently reviewed and interpreted:     Assessment/Plan:          S/P liver transplant.  Chronic immunosuppressive medications for rejection prophylaxis at target.  Plan: no adjustment needed.  Continue monitoring symptoms, labs and drug levels for drug-related toxicity and side effects.  Incision: staples out, wound well-healed, no evidence of hernia  Femoral arterial line site: no complications evident    Additional testing to be completed according to Written Order Guidelines for Post-Liver and Combined Liver/Kidney Transplant Follow-up (LI-09)    Interpretation of tests and discussion of patient management involves all members of the multidisciplinary transplant team  Patient advised that it is recommended that all transplanted patients, and their close contacts and household members receive Covid  vaccination.  Iggy Adhikari MD       [facility] Patient Status  Functional Status: 80% - Normal activity with effort: some symptoms of disease  Physical Capacity: No Limitations

## 2023-03-29 ENCOUNTER — PATIENT MESSAGE (OUTPATIENT)
Dept: TRANSPLANT | Facility: CLINIC | Age: 73
End: 2023-03-29
Payer: MEDICARE

## 2023-03-30 LAB
ACID FAST MOD KINY STN SPEC: NORMAL
MYCOBACTERIUM SPEC QL CULT: NORMAL

## 2023-04-01 ENCOUNTER — PATIENT MESSAGE (OUTPATIENT)
Dept: TRANSPLANT | Facility: CLINIC | Age: 73
End: 2023-04-01
Payer: MEDICARE

## 2023-04-03 ENCOUNTER — PATIENT MESSAGE (OUTPATIENT)
Dept: TRANSPLANT | Facility: CLINIC | Age: 73
End: 2023-04-03
Payer: MEDICARE

## 2023-04-05 ENCOUNTER — PATIENT MESSAGE (OUTPATIENT)
Dept: TRANSPLANT | Facility: CLINIC | Age: 73
End: 2023-04-05
Payer: MEDICARE

## 2023-04-05 ENCOUNTER — TELEPHONE (OUTPATIENT)
Dept: TRANSPLANT | Facility: CLINIC | Age: 73
End: 2023-04-05
Payer: MEDICARE

## 2023-04-05 LAB
EXT ALBUMIN: 3.9
EXT ALKALINE PHOSPHATASE: 74
EXT ALT: 13
EXT AST: 15
EXT BASOPHIL%: 1.7
EXT BILIRUBIN DIRECT: 0.6 MG/DL
EXT BILIRUBIN TOTAL: 1.1
EXT BUN: 18
EXT CALCIUM: 9.7
EXT CHLORIDE: 107
EXT CO2: 27
EXT CREATININE: 0.77 MG/DL
EXT CYCLOSPORONE LEVEL: 247
EXT EGFR NO RACE VARIABLE: >60
EXT EOSINOPHIL%: 0.7
EXT GLUCOSE: 98
EXT HEMATOCRIT: 35.5
EXT HEMOGLOBIN: 11.2
EXT INR: 1.04
EXT LYMPH%: 27.4
EXT MONOCYTES%: 5.3
EXT PLATELETS: 279
EXT POTASSIUM: 4.8
EXT PROTEIN TOTAL: 6.5
EXT SEGS%: 64.9
EXT SODIUM: 141 MMOL/L
EXT WBC: 3
NEUTROPHILS # BLD AUTO: 1947 10*3/UL

## 2023-04-05 NOTE — TELEPHONE ENCOUNTER
Patient made aware that surgeon has reviewed her labs from 4/3, no med changes at this time. Patient on 4/3 already given Neutropenic precautions to follow.  Patient aware that surgeon is fine with labs on 4/10.  Writer has given off report to team as writer will be out on 4/10.

## 2023-04-06 ENCOUNTER — PATIENT MESSAGE (OUTPATIENT)
Dept: ADMINISTRATIVE | Facility: OTHER | Age: 73
End: 2023-04-06
Payer: MEDICARE

## 2023-04-06 ENCOUNTER — TELEPHONE (OUTPATIENT)
Dept: TRANSPLANT | Facility: CLINIC | Age: 73
End: 2023-04-06
Payer: MEDICARE

## 2023-04-06 ENCOUNTER — PATIENT MESSAGE (OUTPATIENT)
Dept: TRANSPLANT | Facility: CLINIC | Age: 73
End: 2023-04-06
Payer: MEDICARE

## 2023-04-06 NOTE — TELEPHONE ENCOUNTER
Patient made aware she may have the below vaccines she was told she needed.        Franca Nath, PharmD  Merced Clifford, RN  Yep - I think that is fine!     I had that she was due Jan - Feb 2023, but this likely was delayed due to her txp.  She is ok to get them now!         Previous Messages       ----- Message -----   From: Merced Clifford, RN   Sent: 4/6/2023  11:41 AM CDT   To: Abdominal Transplant Pharmacists   Subject: vaccine question                                 Patient was at her local [facility] pharmacy for something else when the staff there mentioned to her, she was due for twinix vaccine (hep a and b).     Should she go ahead and get this?? She is asking.                 ----- Message from Yosvany Isbell sent at 4/6/2023 10:31 AM CDT -----  Regarding: call back  Pt call to speak with Merced in regards to some vaccine requesting call back    Call

## 2023-04-11 ENCOUNTER — PATIENT MESSAGE (OUTPATIENT)
Dept: TRANSPLANT | Facility: CLINIC | Age: 73
End: 2023-04-11
Payer: MEDICARE

## 2023-04-11 LAB
EXT ALBUMIN: 3.9
EXT ALKALINE PHOSPHATASE: 72
EXT ALT: 11
EXT AST: 14
EXT BASOPHIL%: 1.8
EXT BILIRUBIN DIRECT: 0.5 MG/DL
EXT BILIRUBIN TOTAL: 1
EXT BUN: 18
EXT CALCIUM: 9.4
EXT CHLORIDE: 106
EXT CO2: 25
EXT CREATININE: 0.74 MG/DL
EXT CYCLOSPORONE LEVEL: 301
EXT EGFR NO RACE VARIABLE: >60
EXT EOSINOPHIL%: 1.4
EXT GLUCOSE: 104
EXT HEMATOCRIT: 35.7
EXT HEMOGLOBIN: 11.6
EXT INR: 1.04
EXT LYMPH%: 31.5
EXT MONOCYTES%: 4.3
EXT PLATELETS: 243
EXT POTASSIUM: 4.3
EXT PROTEIN TOTAL: 6.5
EXT PT: 13.7
EXT SEGS%: 61
EXT SODIUM: 138 MMOL/L
EXT WBC: 2.8
NEUTROPHILS # BLD AUTO: 1708 10*3/UL

## 2023-04-12 ENCOUNTER — PATIENT MESSAGE (OUTPATIENT)
Dept: TRANSPLANT | Facility: CLINIC | Age: 73
End: 2023-04-12
Payer: MEDICARE

## 2023-04-12 ENCOUNTER — TELEPHONE (OUTPATIENT)
Dept: TRANSPLANT | Facility: CLINIC | Age: 73
End: 2023-04-12
Payer: MEDICARE

## 2023-04-12 NOTE — TELEPHONE ENCOUNTER
Patient made aware of lab review by surgeon, no med changes at this time. ANC still low, patient advised to continue Neutropenic precautions.    Patient for labs 4/17 to include Hep B labs as it does not appear lab nelson them with 4/10 labs as we requested.        ----- Message from Yosvany Isbell sent at 4/12/2023  1:16 PM CDT -----  Regarding: call back  Pt returning Merced sims call    Call

## 2023-04-17 ENCOUNTER — PATIENT MESSAGE (OUTPATIENT)
Dept: TRANSPLANT | Facility: CLINIC | Age: 73
End: 2023-04-17
Payer: MEDICARE

## 2023-04-17 ENCOUNTER — TELEPHONE (OUTPATIENT)
Dept: TRANSPLANT | Facility: CLINIC | Age: 73
End: 2023-04-17
Payer: MEDICARE

## 2023-04-17 DIAGNOSIS — Z94.4 S/P LIVER TRANSPLANT: Primary | ICD-10-CM

## 2023-04-17 LAB
EXT ALBUMIN: 3.9
EXT ALKALINE PHOSPHATASE: 70
EXT ALT: 10
EXT AST: 14
EXT BASOPHIL%: 1.5
EXT BILIRUBIN DIRECT: 0.4 MG/DL
EXT BILIRUBIN TOTAL: 1
EXT BUN: 19
EXT CALCIUM: 9.5
EXT CHLORIDE: 104
EXT CO2: 28
EXT CREATININE: 0.85 MG/DL
EXT CYCLOSPORONE LEVEL: 305
EXT EGFR NO RACE VARIABLE: >60
EXT EOSINOPHIL%: 1.1
EXT GLUCOSE: 107
EXT HEMATOCRIT: 35.5
EXT HEMOGLOBIN: 11.5
EXT INR: 1.6
EXT LYMPH%: 34.2
EXT MONOCYTES%: 6.3
EXT PLATELETS: 215
EXT POTASSIUM: 5.2
EXT PROTEIN TOTAL: 6.5
EXT PT: 18
EXT SEGS%: 56.9
EXT SODIUM: 140 MMOL/L
EXT WBC: 2.7
NEUTROPHILS # BLD AUTO: 1536 10*3/UL

## 2023-04-17 NOTE — TELEPHONE ENCOUNTER
Patient made aware to stop taking MMF, will continue with weekly labs. At this time Csa level still pending, will update with any changes once that level has been reviewed.    Patient verbalized understanding.    Pantoprazole refills requested        ----- Message from Hima Miguel MD sent at 4/17/2023  2:45 PM CDT -----  Stop cellcept

## 2023-04-18 ENCOUNTER — DOCUMENT SCAN (OUTPATIENT)
Dept: HOME HEALTH SERVICES | Facility: HOSPITAL | Age: 73
End: 2023-04-18
Payer: MEDICARE

## 2023-04-18 RX ORDER — PANTOPRAZOLE SODIUM 40 MG/1
40 TABLET, DELAYED RELEASE ORAL DAILY
Qty: 30 TABLET | Refills: 5 | Status: SHIPPED | OUTPATIENT
Start: 2023-04-18 | End: 2024-02-02

## 2023-04-20 ENCOUNTER — PATIENT MESSAGE (OUTPATIENT)
Dept: TRANSPLANT | Facility: CLINIC | Age: 73
End: 2023-04-20
Payer: MEDICARE

## 2023-04-21 ENCOUNTER — OFFICE VISIT (OUTPATIENT)
Dept: TRANSPLANT | Facility: CLINIC | Age: 73
End: 2023-04-21
Payer: MEDICARE

## 2023-04-21 VITALS
RESPIRATION RATE: 16 BRPM | WEIGHT: 148.38 LBS | HEIGHT: 62 IN | DIASTOLIC BLOOD PRESSURE: 92 MMHG | TEMPERATURE: 98 F | HEART RATE: 60 BPM | SYSTOLIC BLOOD PRESSURE: 201 MMHG | BODY MASS INDEX: 27.3 KG/M2 | OXYGEN SATURATION: 98 %

## 2023-04-21 DIAGNOSIS — Z29.89 PROPHYLACTIC IMMUNOTHERAPY: Primary | ICD-10-CM

## 2023-04-21 DIAGNOSIS — B19.10 HEPATITIS B VIRUS INFECTION IN CADAVERIC DONOR: ICD-10-CM

## 2023-04-21 DIAGNOSIS — Z00.5 HEPATITIS B VIRUS INFECTION IN CADAVERIC DONOR: ICD-10-CM

## 2023-04-21 DIAGNOSIS — I10 HYPERTENSION, UNSPECIFIED TYPE: ICD-10-CM

## 2023-04-21 DIAGNOSIS — G57.93 NEUROPATHY INVOLVING BOTH LOWER EXTREMITIES: ICD-10-CM

## 2023-04-21 DIAGNOSIS — Z94.4 S/P LIVER TRANSPLANT: ICD-10-CM

## 2023-04-21 PROCEDURE — 99999 PR PBB SHADOW E&M-EST. PATIENT-LVL III: ICD-10-PCS | Mod: PBBFAC,,, | Performed by: INTERNAL MEDICINE

## 2023-04-21 PROCEDURE — 99215 OFFICE O/P EST HI 40 MIN: CPT | Mod: S$PBB,,, | Performed by: INTERNAL MEDICINE

## 2023-04-21 PROCEDURE — 99213 OFFICE O/P EST LOW 20 MIN: CPT | Mod: PBBFAC | Performed by: INTERNAL MEDICINE

## 2023-04-21 PROCEDURE — 99215 PR OFFICE/OUTPT VISIT, EST, LEVL V, 40-54 MIN: ICD-10-PCS | Mod: S$PBB,,, | Performed by: INTERNAL MEDICINE

## 2023-04-21 PROCEDURE — 99999 PR PBB SHADOW E&M-EST. PATIENT-LVL III: CPT | Mod: PBBFAC,,, | Performed by: INTERNAL MEDICINE

## 2023-04-21 RX ORDER — CARVEDILOL 25 MG/1
25 TABLET ORAL 2 TIMES DAILY
Qty: 60 TABLET | Refills: 11 | Status: SHIPPED | OUTPATIENT
Start: 2023-04-21 | End: 2024-04-20

## 2023-04-21 NOTE — LETTER
April 21, 2023        Iggy Contreras  [address] [facility] [place] [address]  Phone: [phone]  Fax: [phone]             [facility]  [address]  [facility] [address]  Phone: [phone]   Patient: Argelia Sevilla   MR Number: [id number]   YOB: 1950   Date of Visit: 4/21/2023       Dear Dr. Iggy Contreras    Thank you for referring Argelia Sevilla to me for evaluation. Attached you will find relevant portions of my assessment and plan of care.    If you have questions, please do not hesitate to call me. I look forward to following Argelia Sevilla along with you.    Sincerely,    Houston Crawford MD    Enclosure    If you would like to receive this communication electronically, please contact [email or web address] or [phone] to request [facility] Complete Link access.    [facility] Complete Link is a tool which provides read-only access to select patient information with whom you have a relationship. Its easy to use and provides real time access to review your patients record including encounter summaries, notes, results, and demographic information.    If you feel you have received this communication in error or would no longer like to receive these types of communications, please e-mail [email or web address]

## 2023-04-21 NOTE — PROGRESS NOTES
Subjective:       Patient ID: Argelia Sevilla is a 73 y.o. female.    Chief Complaint: No chief complaint on file.    HPI  I saw this 73 y.o.lady who had a liver transplant 2 months ago.    Original Referring Physician: Iggy Contreras  Current Corresponding Physician: Iggy Contreras     Chief Complaint: Argelia is here for follow up of her liver transplant performed 2/9/2023 for the primary diagnosis (UNOS) of Cirrhosis: Fatty Liver (BROWN)     ORGAN: LIVER  Whole or Partial: whole liver  Donor Type: donation after brain death  PHS Increased Risk: no  Donor CMV Status: Positive  Donor HCV Status: Negative  Donor HBcAb: Positive  Donor HBV FRANNIE: Negative  Donor HCV FRANNIE: Negative     Biliary Anastomosis: end to end  Arterial Anatomy: standard  IVC reconstruction: end to end ivc  Portal vein status: patent    - transplant admission was 7 days long.  - readmitted with tremors and speech disturbance  - switched to cyclo    LFTs and creatinie normal on 4/17/23  Cyclo level 305    Abdo US: 3/14/23  Elevated main portal vein velocity.  Otherwise, satisfactory Doppler evaluation of the liver allograft.  Small peritransplant fluid collection adjacent to the left hepatic lobe.      Review of Systems   Constitutional:  Negative for activity change, appetite change, chills, fatigue, fever and unexpected weight change.   HENT:  Negative for ear pain, hearing loss, nosebleeds, sore throat and trouble swallowing.    Eyes:  Negative for redness and visual disturbance.   Respiratory:  Negative for cough, chest tightness, shortness of breath and wheezing.    Cardiovascular:  Negative for chest pain and palpitations.   Gastrointestinal:  Negative for abdominal distention, abdominal pain, blood in stool, constipation, diarrhea, nausea and vomiting.   Genitourinary:  Negative for difficulty urinating, dysuria, frequency, hematuria and urgency.   Musculoskeletal:  Negative for arthralgias, back pain, gait problem, joint swelling and myalgias.    Skin:  Negative for rash.   Neurological:  Negative for tremors, seizures, speech difficulty, weakness and headaches.   Hematological:  Negative for adenopathy.   Psychiatric/Behavioral:  Negative for confusion, decreased concentration and sleep disturbance. The patient is not nervous/anxious.          Lab Results   Component Value Date    ALT 13 03/16/2023    AST 15 03/16/2023    GGT 35 10/20/2022    ALKPHOS 97 03/16/2023    BILITOT 1.9 (H) 03/16/2023     Past Medical History:   Diagnosis Date    Anxiety disorder, unspecified     CAD (coronary artery disease)     DIC (disseminated intravascular coagulation)     Dyslipidemia     GERD (gastroesophageal reflux disease)     Hereditary hemolytic anemia, unspecified     History of coronary artery stent placement     HTN (hypertension)     Liver cirrhosis secondary to BROWN     Other ascites 2/15/2023    Portal hypertensive gastropathy     Primary localized osteoarthrosis of right lower leg     Statin intolerance      Past Surgical History:   Procedure Laterality Date    ADENOIDECTOMY      ANTERIOR CRUCIATE LIGAMENT REPAIR      BACK SURGERY      CARDIAC CATHETERIZATION  05/31/2018    CARDIAC CATHETERIZATION  09/28/2021    coronary angioplasty    CHOLECYSTECTOMY      COLONOSCOPY  05/25/2022    GALLBLADDER SURGERY      HYSTERECTOMY      KNEE ARTHROSCOPY Right     LIVER TRANSPLANT N/A 2/9/2023    Procedure: TRANSPLANT, LIVER;  Surgeon: Iggy Adhikari MD;  Location: [facility] OR [address] [place] [address];  Service: Transplant;  Laterality: N/A;    OOPHORECTOMY      REDUCTION OF BOTH BREASTS  01/01/2011    TONSILLECTOMY      TUBAL LIGATION       Current Outpatient Medications   Medication Sig    aspirin (ECOTRIN) 81 MG EC tablet Take 1 tablet (81 mg total) by mouth once daily.    calcium carbonate-vitamin D3 600 mg-20 mcg (800 unit) Tab Take 1 tablet by mouth once daily.    cycloSPORINE modified, NEORAL, (NEORAL) 25 MG capsule Take 6 capsules (150 mg total) by mouth every 12 (twelve) hours.     gabapentin (NEURONTIN) 100 MG capsule Take 1 capsule (100 mg total) by mouth 2 (two) times daily.    lamiVUDine (EPIVIR) 150 MG Tab Take 1 tablet (150 mg total) by mouth once daily.    magnesium oxide (MAG-OX) 400 mg (241.3 mg magnesium) tablet Take 1 tablet (400 mg total) by mouth once daily.    ondansetron (ZOFRAN-ODT) 4 MG TbDL DISSOLVE 1 tablet (4 mg total) by mouth every 6 (six) hours as needed (nausea/vomiting).    pantoprazole (PROTONIX) 40 MG tablet Take 1 tablet (40 mg total) by mouth once daily.    sulfamethoxazole-trimethoprim 400-80mg (BACTRIM,SEPTRA) 400-80 mg per tablet Take 1 tablet by mouth every morning. STOP 8/10/23    valGANciclovir (VALCYTE) 450 mg Tab Take 1 tablet (450 mg total) by mouth once daily. STOP 5/10/23    carvediloL (COREG) 25 MG tablet Take 1 tablet (25 mg total) by mouth 2 (two) times daily.     No current facility-administered medications for this visit.       Objective:      Physical Exam  Constitutional:       General: She is not in acute distress.  HENT:      Head: Normocephalic.   Eyes:      Pupils: Pupils are equal, round, and reactive to light.   Neck:      Thyroid: No thyromegaly.      Vascular: No JVD.      Trachea: No tracheal deviation.   Cardiovascular:      Rate and Rhythm: Normal rate and regular rhythm.      Heart sounds: Normal heart sounds. No murmur heard.  Pulmonary:      Effort: Pulmonary effort is normal.      Breath sounds: Normal breath sounds. No stridor.   Abdominal:      Palpations: Abdomen is soft.   Lymphadenopathy:      Head:      Right side of head: No submental, submandibular, tonsillar, preauricular, posterior auricular or occipital adenopathy.      Left side of head: No submental, submandibular, tonsillar, preauricular, posterior auricular or occipital adenopathy.      Cervical: No cervical adenopathy.   Neurological:      Mental Status: She is alert. She is not disoriented.      Cranial Nerves: No cranial nerve deficit.      Sensory: No sensory  deficit.       Assessment:       1. Prophylactic immunotherapy    2. S/P liver transplant    3. Hypertension, unspecified type    4. Neuropathy involving both lower extremities    5. Hepatitis B virus infection in cadaveric donor        Plan:   Feels well  No new issues  Hypertensive    - no changes to cyclo level  - currently off MMF for neutropenia  - increase carvedilol dose to 25 mg BID    Clinic in 2 months.      UNOS Patient Status  Functional Status: 90% - Able to carry on normal activity: minor symptoms of disease  Physical Capacity: No Limitations    Patient on life support: No  Diabetes: No  Any previous malignancy: No  Neoadjuvant Therapy: no  Has patient ever had a dx of HCC: no  Previous Abdominal Surgery: no  Spontaneous Bacterial Peritonitis: no  History of Portal Vein Thrombosis: no  Transjugular Intrahepatic Portosystemic Shunt: no    New diabetes onset between last follow-up to the current follow-up: No  Did patient have any acute rejection episodes during the follow-up period: No  Post transplant malignancy: No

## 2023-04-24 LAB
EXT ALBUMIN: 3.9
EXT ALKALINE PHOSPHATASE: 67
EXT ALT: 8
EXT AST: 13
EXT BASOPHIL%: 1.4
EXT BILIRUBIN DIRECT: 0.4 MG/DL
EXT BILIRUBIN TOTAL: 1
EXT BUN: 16
EXT CALCIUM: 9.2
EXT CHLORIDE: 104
EXT CO2: 24
EXT CREATININE: 0.85 MG/DL
EXT CYCLOSPORONE LEVEL: 346
EXT EGFR NO RACE VARIABLE: >60
EXT EOSINOPHIL%: 1.9
EXT GLUCOSE: 101
EXT HEMATOCRIT: 33.6
EXT HEMOGLOBIN: 11.2
EXT LYMPH%: 37.6
EXT MONOCYTES%: 6.7
EXT PLATELETS: 180
EXT POTASSIUM: 4.3
EXT PROTEIN TOTAL: 6.4
EXT SEGS%: 52.4
EXT SODIUM: 139 MMOL/L
EXT WBC: 2.1
HEP B SURFACE ANTIGEN QUANT: NON REACTIVE
Lab: NOT DETECTED
NEUTROPHILS # BLD AUTO: 1100 10*3/UL

## 2023-04-24 NOTE — TELEPHONE ENCOUNTER
Writer notified patient to stop Valcyte and stop Bactrim due to neutropenia. Bactrim is being replaced by mepron, patient made aware.  Patient will continue weekly labs with CMV monitoring.      CSA still pending with these labs.          ----- Message from Houston Crawford MD sent at 4/24/2023  2:46 PM CDT -----  Stop valcyte now and switch bactrim    ----- Message -----  From: Merced Clifford RN  Sent: 4/24/2023   1:54 PM CDT  To: Houston Crawford MD    ANC continuing to drop, we had her stop the MMF on 4/17. Valcyte due to stop on 5/10 if you want to consider holding it and just checking CMV's? Or consider changing bactrim to mepron?    The CSA will not be in til after hours tomorrow but will send it to you wednesday morning with an update

## 2023-04-26 RX ORDER — ATOVAQUONE 750 MG/5ML
1500 SUSPENSION ORAL DAILY
Qty: 300 ML | Refills: 3 | Status: SHIPPED | OUTPATIENT
Start: 2023-04-26 | End: 2023-08-12

## 2023-05-01 ENCOUNTER — PATIENT MESSAGE (OUTPATIENT)
Dept: TRANSPLANT | Facility: CLINIC | Age: 73
End: 2023-05-01
Payer: MEDICARE

## 2023-05-01 LAB
EXT ALBUMIN: 3.9
EXT ALKALINE PHOSPHATASE: 66
EXT ALT: 9
EXT AST: 11
EXT BASOPHIL%: 2.5
EXT BILIRUBIN DIRECT: 0.5 MG/DL
EXT BILIRUBIN TOTAL: 1.1
EXT BUN: 21
EXT CALCIUM: 9.5
EXT CHLORIDE: 105
EXT CO2: 27
EXT CREATININE: 0.82 MG/DL
EXT CYCLOSPORONE LEVEL: 417
EXT EGFR NO RACE VARIABLE: >60
EXT EOSINOPHIL%: 2.1
EXT GLUCOSE: 108
EXT HEMATOCRIT: 35.7
EXT HEMOGLOBIN: 11.7
EXT LYMPH%: 40.5
EXT MONOCYTES%: 16.5
EXT PLATELETS: 183
EXT POTASSIUM: 4.5
EXT PROTEIN TOTAL: 6.4
EXT SEGS%: 38.4
EXT SODIUM: 140 MMOL/L
EXT WBC: 2.4
NEUTROPHILS # BLD AUTO: 921 10*3/UL

## 2023-05-04 ENCOUNTER — PATIENT MESSAGE (OUTPATIENT)
Dept: TRANSPLANT | Facility: CLINIC | Age: 73
End: 2023-05-04
Payer: MEDICARE

## 2023-05-04 DIAGNOSIS — Z94.4 S/P LIVER TRANSPLANT: ICD-10-CM

## 2023-05-04 RX ORDER — CYCLOSPORINE 100 MG/1
100 CAPSULE, LIQUID FILLED ORAL EVERY 12 HOURS
Qty: 60 CAPSULE | Refills: 11 | Status: SHIPPED | OUTPATIENT
Start: 2023-05-04 | End: 2024-05-09

## 2023-05-04 NOTE — TELEPHONE ENCOUNTER
Patient made aware to hold he csa tonight and then resume med in the morning but at lower dosage of 100 mg orally q12hrs with continued weekly labs.

## 2023-05-08 ENCOUNTER — HOSPITAL ENCOUNTER (OUTPATIENT)
Dept: RADIOLOGY | Facility: HOSPITAL | Age: 73
Discharge: HOME | End: 2023-05-08
Attending: INTERNAL MEDICINE
Payer: MEDICARE

## 2023-05-08 DIAGNOSIS — Z94.4 LIVER REPLACED BY TRANSPLANT: ICD-10-CM

## 2023-05-08 PROCEDURE — 93976 VASCULAR STUDY: CPT | Mod: TC

## 2023-05-08 PROCEDURE — 76705 ECHO EXAM OF ABDOMEN: CPT | Mod: 26,XS,, | Performed by: RADIOLOGY

## 2023-05-08 PROCEDURE — 93976 VASCULAR STUDY: CPT | Mod: 26,,, | Performed by: RADIOLOGY

## 2023-05-08 PROCEDURE — 93976 US DOPPLER LIVER TRANSPLANT POST (XPD): ICD-10-PCS | Mod: 26,,, | Performed by: RADIOLOGY

## 2023-05-08 PROCEDURE — 76705 US DOPPLER LIVER TRANSPLANT POST (XPD): ICD-10-PCS | Mod: 26,XS,, | Performed by: RADIOLOGY

## 2023-05-11 LAB
EXT ALBUMIN: 3.9
EXT ALKALINE PHOSPHATASE: 82
EXT ALT: 9
EXT AST: 13
EXT BASOPHIL%: 1.8
EXT BILIRUBIN DIRECT: 0.4 MG/DL
EXT BILIRUBIN TOTAL: 1.3
EXT BUN: 12
EXT CALCIUM: 9.6
EXT CHLORIDE: 102
EXT CMV DNA QUANT. BY PCR: NEGATIVE
EXT CO2: 29
EXT CREATININE: 0.86 MG/DL
EXT CYCLOSPORONE LEVEL: 263
EXT EGFR NO RACE VARIABLE: >60
EXT EOSINOPHIL%: 4
EXT GLUCOSE: 105
EXT HEMATOCRIT: 37.5
EXT HEMOGLOBIN: 12.4
EXT INR: 0.96
EXT LYMPH%: 25.8
EXT MONOCYTES%: 21.6
EXT PLATELETS: 184
EXT POTASSIUM: 4.3
EXT PROTEIN TOTAL: 6.8
EXT PT: 12.8
EXT SEGS%: 46.8
EXT SODIUM: 140 MMOL/L
EXT WBC: 3.3
NEUTROPHILS # BLD AUTO: 1544 10*3/UL

## 2023-05-15 ENCOUNTER — PATIENT MESSAGE (OUTPATIENT)
Dept: TRANSPLANT | Facility: CLINIC | Age: 73
End: 2023-05-15
Payer: MEDICARE

## 2023-05-16 ENCOUNTER — TELEPHONE (OUTPATIENT)
Dept: TRANSPLANT | Facility: CLINIC | Age: 73
End: 2023-05-16
Payer: MEDICARE

## 2023-05-16 RX ORDER — CHLORTHALIDONE 25 MG/1
25 TABLET ORAL DAILY
COMMUNITY
End: 2023-05-31

## 2023-05-16 NOTE — TELEPHONE ENCOUNTER
Patient notified Txp team she was prescribed a new HTN medication due to having BP of 190/100's at her PCP office. Med added to her med rec. Patient will be recording her BP's and working with PCP on HTN issue          ----- Message from Thiago Moralez sent at 5/16/2023 12:20 PM CDT -----  Regarding: Medication Advice  Patient called in requesting to speak with her nurse to inform her that her blood pressure has recently been high. Thereafter, she was prescribed a fluid pill to take in addition to her blood pressure meds; wants to assure with post liver care nurse that the med is okay. Requested a call back to discuss further.                          Contact: [phone]

## 2023-05-18 ENCOUNTER — PATIENT MESSAGE (OUTPATIENT)
Dept: TRANSPLANT | Facility: CLINIC | Age: 73
End: 2023-05-18
Payer: MEDICARE

## 2023-05-18 RX ORDER — CHLORTHALIDONE 25 MG/1
25 TABLET ORAL DAILY
COMMUNITY
End: 2023-05-18 | Stop reason: SDUPTHER

## 2023-05-19 ENCOUNTER — PATIENT MESSAGE (OUTPATIENT)
Dept: TRANSPLANT | Facility: CLINIC | Age: 73
End: 2023-05-19
Payer: MEDICARE

## 2023-05-19 ENCOUNTER — TELEPHONE (OUTPATIENT)
Dept: TRANSPLANT | Facility: CLINIC | Age: 73
End: 2023-05-19
Payer: MEDICARE

## 2023-05-19 LAB
EXT ALBUMIN: 4
EXT ALKALINE PHOSPHATASE: 86
EXT ALT: 11
EXT AST: 17
EXT BASOPHIL%: 1.2
EXT BILIRUBIN TOTAL: 1.3
EXT BUN: 13
EXT CALCIUM: 9.7
EXT CHLORIDE: 102
EXT CO2: 30
EXT CREATININE: 0.84 MG/DL
EXT CYCLOSPORONE LEVEL: 237
EXT EOSINOPHIL%: 5.2
EXT GLUCOSE: 105
EXT HBV DNA QUANT PCR: NOT DETECTED
EXT HEMATOCRIT: 38.7
EXT HEMOGLOBIN: 12.6
EXT HEP B S AG: ABNORMAL
EXT LYMPH%: 19.3
EXT MONOCYTES%: 15.5
EXT PLATELETS: 190
EXT POTASSIUM: 4.8
EXT PROTEIN TOTAL: 6.9
EXT SEGS%: 58.8
EXT SODIUM: 139 MMOL/L
EXT WBC: 4.3

## 2023-05-19 NOTE — LETTER
May 19, 2023    Arianna Sevilla  [address] [place] [address]          Dear Arianna Sevilla:  MRN: [id number]    This is a follow up to your recent labs, your lab results were stable.  There are no medicine changes.  Please have your labs drawn again on 5/29/2023.      If you cannot have your labs drawn on the scheduled date, it is your responsibility to call the transplant department to reschedule.  Please call [phone] and ask to speak to Mary HAMEED   for all scheduling requests.     Sincerely,    Merced ANDRADEN, RN        Your Liver Transplant Coordinator    [facility]  [address] [place] [id number]  [phone]

## 2023-05-19 NOTE — TELEPHONE ENCOUNTER
Patient made aware that her labs and liver ultrasound have been reviewed. Follow up labs requested 5/29/23      Received: Today  Houston Crawford MD  [facility] Post-Liver Transplant Clinical  Results reviewed             ----- Message from Houston Crawford MD sent at 5/19/2023  8:56 AM CDT -----  Results reviewed

## 2023-05-29 ENCOUNTER — PATIENT MESSAGE (OUTPATIENT)
Dept: TRANSPLANT | Facility: CLINIC | Age: 73
End: 2023-05-29
Payer: MEDICARE

## 2023-05-31 ENCOUNTER — TELEPHONE (OUTPATIENT)
Dept: TRANSPLANT | Facility: CLINIC | Age: 73
End: 2023-05-31
Payer: MEDICARE

## 2023-05-31 LAB
EXT ALBUMIN: 4.1
EXT ALKALINE PHOSPHATASE: 90
EXT ALT: 12
EXT AST: 16
EXT BASOPHIL%: 0.9
EXT BILIRUBIN DIRECT: 0.6 MG/DL
EXT BILIRUBIN TOTAL: 1.7
EXT BUN: 16
EXT CALCIUM: 9.7
EXT CHLORIDE: 88
EXT CO2: 32
EXT CREATININE: 0.91 MG/DL
EXT CYCLOSPORONE LEVEL: 242
EXT EGFR NO RACE VARIABLE: >60
EXT EOSINOPHIL%: 7.8
EXT GLUCOSE: 110
EXT HEMATOCRIT: 37.2
EXT HEMOGLOBIN: 12.8
EXT INR: 0.97
EXT LYMPH%: 16.9
EXT MONOCYTES%: 10
EXT PLATELETS: 201
EXT POTASSIUM: 3.9
EXT PROTEIN TOTAL: 7.2
EXT PT: 13
EXT SEGS%: 64.4
EXT SODIUM: 130 MMOL/L
EXT WBC: 5.8

## 2023-05-31 RX ORDER — CLONIDINE HYDROCHLORIDE 0.1 MG/1
0.1 TABLET ORAL 2 TIMES DAILY
COMMUNITY
End: 2023-11-06 | Stop reason: ALTCHOICE

## 2023-05-31 NOTE — TELEPHONE ENCOUNTER
Writer touched base with patient because her Na level was low with yesterdays labs. Patient states that her PCP had changed her BP med to Chlorthalidone a couple weeks ago as the metoprolol was not managing her readings. Yesterday patient PCP{ contacted her because he was made aware of the low Na level and had patient stop the chorthalidone and switched her to clonidine for BP management. Patient states her PCP is repeating a Na level tomorrow for monitoring. Patient aware to do full txp labs Monday morning and that at this time, we are still awaiting the CYA level to add to her 5/30 labs that are in partially right now.

## 2023-06-06 ENCOUNTER — TELEPHONE (OUTPATIENT)
Dept: TRANSPLANT | Facility: CLINIC | Age: 73
End: 2023-06-06
Payer: MEDICARE

## 2023-06-06 ENCOUNTER — PATIENT MESSAGE (OUTPATIENT)
Dept: TRANSPLANT | Facility: CLINIC | Age: 73
End: 2023-06-06
Payer: MEDICARE

## 2023-06-06 NOTE — TELEPHONE ENCOUNTER
Patient made aware labs have been reviewed, next labs already scheduled 6/9.        ----- Message from Houston Crawford MD sent at 6/2/2023  8:43 AM CDT -----  Results reviewed

## 2023-06-08 ENCOUNTER — TELEPHONE (OUTPATIENT)
Dept: TRANSPLANT | Facility: CLINIC | Age: 73
End: 2023-06-08
Payer: MEDICARE

## 2023-06-08 NOTE — TELEPHONE ENCOUNTER
Patient made aware her txp labs have been fully reviewed, no med changes needed at this time. Patient already on schedule for labs on 6/9 as she will be out of town next week.          ----- Message from Houston Crawford MD sent at 6/8/2023 12:40 PM CDT -----  No change  ----- Message -----  From: Merced Clifford RN  Sent: 6/6/2023   9:43 AM CDT  To: Houston Crawford MD    Lab updated with CYA level

## 2023-06-09 ENCOUNTER — TELEPHONE (OUTPATIENT)
Dept: TRANSPLANT | Facility: CLINIC | Age: 73
End: 2023-06-09
Payer: MEDICARE

## 2023-06-09 LAB
EXT ALBUMIN: 4.1
EXT ALKALINE PHOSPHATASE: 86
EXT ALT: 8
EXT AST: 15
EXT BASOPHIL%: 1.1
EXT BILIRUBIN DIRECT: 0.7 MG/DL
EXT BILIRUBIN TOTAL: 2.2
EXT BUN: 25
EXT CALCIUM: 9.7
EXT CHLORIDE: 98
EXT CO2: 31
EXT CREATININE: 1.06 MG/DL
EXT CYCLOSPORONE LEVEL: 228
EXT EOSINOPHIL%: 10.2
EXT GFR MDRD NON AF AMER: 56
EXT GLUCOSE: 109
EXT HEMATOCRIT: 37.7
EXT HEMOGLOBIN: 12.4
EXT LYMPH%: 18.5
EXT MONOCYTES%: 8.6
EXT PLATELETS: 203
EXT POTASSIUM: 4.1
EXT PROTEIN TOTAL: 7.1
EXT SEGS%: 61.6
EXT SODIUM: 137 MMOL/L
EXT WBC: 5.7

## 2023-06-09 NOTE — TELEPHONE ENCOUNTER
Patient made aware of the below message. Pateint has tried and failed numerous other meds tried. Patient advised to follow a low potassium diet and to ensure good hydration with water daily. Labs being monitored closely.          ----- Message from Kenzie Nuno PharmD sent at 6/9/2023  3:07 PM CDT -----  Regarding: RE: med clearance  We are okay with coreg and norvasc.  Ideally, we do not start losartan (ACE-I or ARB) this early post-transplant unless other agents have been maxed out or patient has proteinuria (which this patient does not).     Thanks,   Kenzie  ----- Message -----  From: Merced Clifford RN  Sent: 6/9/2023   2:56 PM CDT  To: Abdominal Transplant Pharmacists  Subject: med clearance                                    Chris nielsen, patient cardiologist wanting to place patient on the below HTN meds: I think all are ok right?    Coreg 25 mg bid  Losartan 50 mg every evening   Norvasc 2.5 mg every am

## 2023-06-13 ENCOUNTER — PATIENT MESSAGE (OUTPATIENT)
Dept: TRANSPLANT | Facility: CLINIC | Age: 73
End: 2023-06-13
Payer: MEDICARE

## 2023-06-14 ENCOUNTER — TELEPHONE (OUTPATIENT)
Dept: TRANSPLANT | Facility: CLINIC | Age: 73
End: 2023-06-14
Payer: MEDICARE

## 2023-06-14 NOTE — TELEPHONE ENCOUNTER
My chart message sent to patient, liver transplant labs reviewed by your physician, no action required with next labs 6/19/2023            ----- Message from Houston Crawford MD sent at 6/14/2023  2:15 AM CDT -----  Results reviewed

## 2023-06-20 LAB
EXT ALBUMIN: 3.9
EXT ALKALINE PHOSPHATASE: 81
EXT ALT: 10
EXT AST: 14
EXT BASOPHIL%: 1.1
EXT BILIRUBIN DIRECT: 0.7 MG/DL
EXT BILIRUBIN TOTAL: 2
EXT BUN: 17
EXT CALCIUM: 10
EXT CHLORIDE: 103
EXT CO2: 31
EXT CREATININE: 0.83 MG/DL
EXT CYCLOSPORONE LEVEL: 246
EXT EGFR NO RACE VARIABLE: >60
EXT EOSINOPHIL%: 13.2
EXT GLUCOSE: 102
EXT HEMATOCRIT: 36.9
EXT HEMOGLOBIN: 12
EXT INR: 1.04
EXT LYMPH%: 22.5
EXT MONOCYTES%: 10.6
EXT PLATELETS: 226
EXT POTASSIUM: 5.1
EXT PROTEIN TOTAL: 7
EXT PT: 13.7
EXT SEGS%: 52.6
EXT SODIUM: 141 MMOL/L
EXT WBC: 4.5

## 2023-06-21 ENCOUNTER — PATIENT MESSAGE (OUTPATIENT)
Dept: TRANSPLANT | Facility: CLINIC | Age: 73
End: 2023-06-21
Payer: MEDICARE

## 2023-06-21 ENCOUNTER — TELEPHONE (OUTPATIENT)
Dept: TRANSPLANT | Facility: CLINIC | Age: 73
End: 2023-06-21
Payer: MEDICARE

## 2023-06-21 NOTE — TELEPHONE ENCOUNTER
My chart message sent to patient, liver transplant labs reviewed by your physician, no action required with next labs 6/26/23          ----- Message from Houston Crawford MD sent at 6/21/2023 11:00 AM CDT -----  Results reviewed

## 2023-06-27 LAB
EXT ALBUMIN: 3.8
EXT ALKALINE PHOSPHATASE: 80
EXT ALT: 11
EXT AST: 13
EXT BASOPHIL%: 1
EXT BILIRUBIN DIRECT: 0.6 MG/DL
EXT BILIRUBIN TOTAL: 2.1
EXT BUN: 14
EXT CALCIUM: 9.5
EXT CHLORIDE: 104
EXT CO2: 24
EXT CREATININE: 0.8 MG/DL
EXT CYCLOSPORONE LEVEL: 194
EXT EGFR NO RACE VARIABLE: >60
EXT EOSINOPHIL%: 15.2
EXT GLUCOSE: 103
EXT HEMATOCRIT: 36.5
EXT HEMOGLOBIN: 11.6
EXT LYMPH%: 17.8
EXT MONOCYTES%: 9.4
EXT PLATELETS: 232
EXT POTASSIUM: 4.3
EXT PROTEIN TOTAL: 6.8
EXT SEGS%: 56.6
EXT SODIUM: 139 MMOL/L
EXT WBC: 6.1

## 2023-06-29 ENCOUNTER — PATIENT MESSAGE (OUTPATIENT)
Dept: TRANSPLANT | Facility: CLINIC | Age: 73
End: 2023-06-29
Payer: MEDICARE

## 2023-07-02 ENCOUNTER — PATIENT MESSAGE (OUTPATIENT)
Dept: TRANSPLANT | Facility: CLINIC | Age: 73
End: 2023-07-02
Payer: MEDICARE

## 2023-07-07 ENCOUNTER — PATIENT MESSAGE (OUTPATIENT)
Dept: TRANSPLANT | Facility: CLINIC | Age: 73
End: 2023-07-07
Payer: MEDICARE

## 2023-07-07 ENCOUNTER — TELEPHONE (OUTPATIENT)
Dept: TRANSPLANT | Facility: CLINIC | Age: 73
End: 2023-07-07
Payer: MEDICARE

## 2023-07-07 NOTE — TELEPHONE ENCOUNTER
My chart message sent to patient, liver transplant labs reviewed by your physician, no action required with next labs 7/17/23        ----- Message from Houston Crawford MD sent at 7/7/2023  9:43 AM CDT -----  Results reviewed

## 2023-07-10 ENCOUNTER — PATIENT MESSAGE (OUTPATIENT)
Dept: TRANSPLANT | Facility: CLINIC | Age: 73
End: 2023-07-10
Payer: MEDICARE

## 2023-07-20 LAB
EXT ALBUMIN: 3.7
EXT ALKALINE PHOSPHATASE: 75
EXT ALT: 13
EXT AST: 13
EXT BASOPHIL%: 1.1
EXT BILIRUBIN DIRECT: 0.6 MG/DL
EXT BILIRUBIN TOTAL: 1.7
EXT BUN: 20
EXT CALCIUM: 9.3
EXT CHLORIDE: 105
EXT CO2: 28
EXT CREATININE: 0.82 MG/DL
EXT CYCLOSPORONE LEVEL: 214
EXT EGFR NO RACE VARIABLE: >60
EXT EOSINOPHIL%: 21.5
EXT GLUCOSE: 104
EXT HEMATOCRIT: 34.3
EXT HEMOGLOBIN: 11.3
EXT LYMPH%: 18.8
EXT MONOCYTES%: 8.2
EXT PLATELETS: 221
EXT POTASSIUM: 4.2
EXT PROTEIN TOTAL: 6.5
EXT SEGS%: 50.4
EXT SODIUM: 141 MMOL/L
EXT WBC: 6.3

## 2023-07-24 DIAGNOSIS — Z94.4 S/P LIVER TRANSPLANT: ICD-10-CM

## 2023-07-24 RX ORDER — ACETAMINOPHEN 500 MG
1 TABLET ORAL DAILY
Qty: 30 TABLET | Refills: 5 | Status: SHIPPED | OUTPATIENT
Start: 2023-07-24

## 2023-07-25 ENCOUNTER — PATIENT MESSAGE (OUTPATIENT)
Dept: TRANSPLANT | Facility: CLINIC | Age: 73
End: 2023-07-25
Payer: MEDICARE

## 2023-07-25 ENCOUNTER — TELEPHONE (OUTPATIENT)
Dept: TRANSPLANT | Facility: CLINIC | Age: 73
End: 2023-07-25
Payer: MEDICARE

## 2023-07-25 DIAGNOSIS — Z00.5 HEPATITIS B VIRUS INFECTION IN CADAVERIC DONOR: ICD-10-CM

## 2023-07-25 DIAGNOSIS — B19.10 HEPATITIS B VIRUS INFECTION IN CADAVERIC DONOR: ICD-10-CM

## 2023-07-25 DIAGNOSIS — Z94.4 S/P LIVER TRANSPLANT: Primary | ICD-10-CM

## 2023-07-25 NOTE — TELEPHONE ENCOUNTER
My chart message sent to patient, liver transplant labs reviewed by your physician, no action required with next labs 8/1/2023      ----- Message from Houston Crawford MD sent at 7/24/2023  5:00 PM CDT -----  Results reviewed

## 2023-08-01 ENCOUNTER — OFFICE VISIT (OUTPATIENT)
Dept: TRANSPLANT | Facility: CLINIC | Age: 73
End: 2023-08-01
Payer: MEDICARE

## 2023-08-01 ENCOUNTER — HOSPITAL ENCOUNTER (OUTPATIENT)
Dept: RADIOLOGY | Facility: HOSPITAL | Age: 73
Discharge: HOME | End: 2023-08-01
Attending: INTERNAL MEDICINE
Payer: MEDICARE

## 2023-08-01 VITALS
OXYGEN SATURATION: 97 % | HEART RATE: 54 BPM | RESPIRATION RATE: 16 BRPM | DIASTOLIC BLOOD PRESSURE: 70 MMHG | WEIGHT: 149.5 LBS | SYSTOLIC BLOOD PRESSURE: 157 MMHG | BODY MASS INDEX: 27.51 KG/M2 | HEIGHT: 62 IN | TEMPERATURE: 97 F

## 2023-08-01 DIAGNOSIS — Z94.4 S/P LIVER TRANSPLANT: Primary | ICD-10-CM

## 2023-08-01 DIAGNOSIS — Z29.89 PROPHYLACTIC IMMUNOTHERAPY: ICD-10-CM

## 2023-08-01 DIAGNOSIS — G57.93 NEUROPATHY INVOLVING BOTH LOWER EXTREMITIES: ICD-10-CM

## 2023-08-01 DIAGNOSIS — B19.10 HEPATITIS B VIRUS INFECTION IN CADAVERIC DONOR: ICD-10-CM

## 2023-08-01 DIAGNOSIS — Z94.4 LIVER REPLACED BY TRANSPLANT: ICD-10-CM

## 2023-08-01 DIAGNOSIS — Z00.5 HEPATITIS B VIRUS INFECTION IN CADAVERIC DONOR: ICD-10-CM

## 2023-08-01 PROCEDURE — 99999 PR PBB SHADOW E&M-EST. PATIENT-LVL IV: ICD-10-PCS | Mod: PBBFAC,,, | Performed by: INTERNAL MEDICINE

## 2023-08-01 PROCEDURE — 93976 VASCULAR STUDY: CPT | Mod: TC

## 2023-08-01 PROCEDURE — 99214 OFFICE O/P EST MOD 30 MIN: CPT | Mod: PBBFAC,25 | Performed by: INTERNAL MEDICINE

## 2023-08-01 PROCEDURE — 99999 PR PBB SHADOW E&M-EST. PATIENT-LVL IV: CPT | Mod: PBBFAC,,, | Performed by: INTERNAL MEDICINE

## 2023-08-01 PROCEDURE — 76705 ECHO EXAM OF ABDOMEN: CPT | Mod: 26,59,, | Performed by: STUDENT IN AN ORGANIZED HEALTH CARE EDUCATION/TRAINING PROGRAM

## 2023-08-01 PROCEDURE — 99215 PR OFFICE/OUTPT VISIT, EST, LEVL V, 40-54 MIN: ICD-10-PCS | Mod: S$PBB,,, | Performed by: INTERNAL MEDICINE

## 2023-08-01 PROCEDURE — 76705 US DOPPLER LIVER TRANSPLANT POST (XPD): ICD-10-PCS | Mod: 26,59,, | Performed by: STUDENT IN AN ORGANIZED HEALTH CARE EDUCATION/TRAINING PROGRAM

## 2023-08-01 PROCEDURE — 99215 OFFICE O/P EST HI 40 MIN: CPT | Mod: S$PBB,,, | Performed by: INTERNAL MEDICINE

## 2023-08-01 PROCEDURE — 93976 VASCULAR STUDY: CPT | Mod: 26,,, | Performed by: STUDENT IN AN ORGANIZED HEALTH CARE EDUCATION/TRAINING PROGRAM

## 2023-08-01 PROCEDURE — 93976 US DOPPLER LIVER TRANSPLANT POST (XPD): ICD-10-PCS | Mod: 26,,, | Performed by: STUDENT IN AN ORGANIZED HEALTH CARE EDUCATION/TRAINING PROGRAM

## 2023-08-01 RX ORDER — CALCIUM CITRATE/VITAMIN D3 315MG-6.25
1 TABLET ORAL DAILY
COMMUNITY

## 2023-08-01 RX ORDER — LOSARTAN POTASSIUM 50 MG/1
50 TABLET ORAL NIGHTLY
COMMUNITY
Start: 2023-07-10

## 2023-08-01 RX ORDER — DOCUSATE SODIUM 100 MG/1
100 CAPSULE, LIQUID FILLED ORAL 2 TIMES DAILY
COMMUNITY

## 2023-08-01 NOTE — LETTER
August 1, 2023        Iggy Contreras  [address] [facility] [place] [address]  Phone: [phone]  Fax: [phone]             [facility]st Fl  [address]  [facility] [address]  Phone: [phone]   Patient: Argelia Sevilla   MR Number: [id number]   YOB: 1950   Date of Visit: 8/1/2023       Dear Dr. Iggy Contreras    Thank you for referring Argelia Sevilla to me for evaluation. Attached you will find relevant portions of my assessment and plan of care.    If you have questions, please do not hesitate to call me. I look forward to following Argelia Sevilla along with you.    Sincerely,    Houston Crawford MD    Enclosure    If you would like to receive this communication electronically, please contact [email or web address] or [phone] to request [employer] Link access.    [employer] Link is a tool which provides read-only access to select patient information with whom you have a relationship. Its easy to use and provides real time access to review your patients record including encounter summaries, notes, results, and demographic information.    If you feel you have received this communication in error or would no longer like to receive these types of communications, please e-mail [email or web address]

## 2023-08-01 NOTE — PROGRESS NOTES
Subjective:       Patient ID: Argelia Sevilla is a 73 y.o. female.    Chief Complaint: Liver Transplant Follow-up    HPI  I saw this 73 y.o.lady who had a liver transplant 5 months ago.    Original Referring Physician: Iggy Contreras  Current Corresponding Physician: Iggy Contreras     Chief Complaint: Argelia is here for follow up of her liver transplant performed 2/9/2023 for the primary diagnosis (UNOS) of Cirrhosis: Fatty Liver (BROWN)     ORGAN: LIVER  Whole or Partial: whole liver  Donor Type: donation after brain death  PHS Increased Risk: no  Donor CMV Status: Positive  Donor HCV Status: Negative  Donor HBcAb: Positive  Donor HBV FRANNIE: Negative  Donor HCV FRANNIE: Negative     Biliary Anastomosis: end to end  Arterial Anatomy: standard  IVC reconstruction: end to end ivc  Portal vein status: patent    - transplant admission was 7 days long.  - readmitted with tremors and speech disturbance  - switched to cyclo    LFTs and creatinine normal on 7/19/23  Cyclo level 214    Abdo US: 5/8/23  Mildly elevated intraparenchymal resistive indices which is nonspecific but may be seen with rejection or edema.  Otherwise satisfactory Doppler evaluation of the liver allograft.  Interval improvement of previously identified main portal vein velocity which is now within normal limits.      Review of Systems   Constitutional:  Negative for activity change, appetite change, chills, fatigue, fever and unexpected weight change.   HENT:  Negative for ear pain, hearing loss, nosebleeds, sore throat and trouble swallowing.    Eyes:  Negative for redness and visual disturbance.   Respiratory:  Negative for cough, chest tightness, shortness of breath and wheezing.    Cardiovascular:  Negative for chest pain and palpitations.   Gastrointestinal:  Negative for abdominal distention, abdominal pain, blood in stool, constipation, diarrhea, nausea and vomiting.   Genitourinary:  Negative for difficulty urinating, dysuria, frequency, hematuria and  urgency.   Musculoskeletal:  Negative for arthralgias, back pain, gait problem, joint swelling and myalgias.   Skin:  Negative for rash.   Neurological:  Negative for tremors, seizures, speech difficulty, weakness and headaches.   Hematological:  Negative for adenopathy.   Psychiatric/Behavioral:  Negative for confusion, decreased concentration and sleep disturbance. The patient is not nervous/anxious.            Lab Results   Component Value Date    ALT 15 08/01/2023    AST 15 08/01/2023    GGT 35 10/20/2022    ALKPHOS 94 08/01/2023    BILITOT 2.1 (H) 08/01/2023     Past Medical History:   Diagnosis Date    Anxiety disorder, unspecified     CAD (coronary artery disease)     DIC (disseminated intravascular coagulation)     Dyslipidemia     GERD (gastroesophageal reflux disease)     Hereditary hemolytic anemia, unspecified     History of coronary artery stent placement     HTN (hypertension)     Liver cirrhosis secondary to BROWN     Other ascites 2/15/2023    Portal hypertensive gastropathy     Primary localized osteoarthrosis of right lower leg     Statin intolerance      Past Surgical History:   Procedure Laterality Date    ADENOIDECTOMY      ANTERIOR CRUCIATE LIGAMENT REPAIR      BACK SURGERY      CARDIAC CATHETERIZATION  05/31/2018    CARDIAC CATHETERIZATION  09/28/2021    coronary angioplasty    CHOLECYSTECTOMY      COLONOSCOPY  05/25/2022    GALLBLADDER SURGERY      HYSTERECTOMY      KNEE ARTHROSCOPY Right     LIVER TRANSPLANT N/A 2/9/2023    Procedure: TRANSPLANT, LIVER;  Surgeon: Iggy Adhikari MD;  Location: [facility] OR [address] [place] [address];  Service: Transplant;  Laterality: N/A;    OOPHORECTOMY      REDUCTION OF BOTH BREASTS  01/01/2011    TONSILLECTOMY      TUBAL LIGATION       Current Outpatient Medications   Medication Sig    aspirin (ECOTRIN) 81 MG EC tablet Take 1 tablet (81 mg total) by mouth once daily.    atovaquone (MEPRON) 750 mg/5 mL Susp oral liquid Take 10 mLs (1,500 mg total) by mouth once daily.    calcium  carbonate-vitamin D3 600 mg-20 mcg (800 unit) Tab Take 1 tablet by mouth once daily.    carvediloL (COREG) 25 MG tablet Take 1 tablet (25 mg total) by mouth 2 (two) times daily.    cycloSPORINE modified, NEORAL, (NEORAL) 100 MG capsule Take 1 capsule (100 mg total) by mouth every 12 (twelve) hours.    docusate sodium (COLACE) 100 MG capsule Take 100 mg by mouth 2 (two) times daily.    gabapentin (NEURONTIN) 100 MG capsule Take 1 capsule (100 mg total) by mouth 2 (two) times daily.    lamiVUDine (EPIVIR) 150 MG Tab Take 1 tablet (150 mg total) by mouth once daily.    losartan (COZAAR) 50 MG tablet Take 50 mg by mouth every evening.    magnesium oxide (MAG-OX) 400 mg (241.3 mg magnesium) tablet Take 1 tablet (400 mg total) by mouth once daily.    ondansetron (ZOFRAN-ODT) 4 MG TbDL DISSOLVE 1 tablet (4 mg total) by mouth every 6 (six) hours as needed (nausea/vomiting).    pantoprazole (PROTONIX) 40 MG tablet Take 1 tablet (40 mg total) by mouth once daily.    calcium citrate-vitamin D3 (CALCITRATE-VITAMIN D) 315 mg-6.25 mcg (250 unit) Tab Take 1 tablet by mouth once daily.    cloNIDine (CATAPRES) 0.1 MG tablet Take 0.1 mg by mouth 2 (two) times daily.     No current facility-administered medications for this visit.       Objective:      Physical Exam  Constitutional:       General: She is not in acute distress.  HENT:      Head: Normocephalic.   Eyes:      Pupils: Pupils are equal, round, and reactive to light.   Neck:      Thyroid: No thyromegaly.      Vascular: No JVD.      Trachea: No tracheal deviation.   Cardiovascular:      Rate and Rhythm: Normal rate and regular rhythm.      Heart sounds: Normal heart sounds. No murmur heard.  Pulmonary:      Effort: Pulmonary effort is normal.      Breath sounds: Normal breath sounds. No stridor.   Abdominal:      Palpations: Abdomen is soft.   Lymphadenopathy:      Head:      Right side of head: No submental, submandibular, tonsillar, preauricular, posterior auricular or  occipital adenopathy.      Left side of head: No submental, submandibular, tonsillar, preauricular, posterior auricular or occipital adenopathy.      Cervical: No cervical adenopathy.   Neurological:      Mental Status: She is alert. She is not disoriented.      Cranial Nerves: No cranial nerve deficit.      Sensory: No sensory deficit.         Assessment:       1. S/P liver transplant    2. Prophylactic immunotherapy    3. Neuropathy involving both lower extremities    4. Hepatitis B virus infection in cadaveric donor          Plan:   Feels well  No new issues  Hypertensive but seeing a BP specialist and is on 3 different antihypertensives (BP had been over 200 mm Hg systolic)    - no changes to cyclo dose  - stop atovaquone and Mg    Clinic in 3 months      UNOS Patient Status  Functional Status: 100% - Normal, no complaints, no evidence of disease  Physical Capacity: No Limitations    Patient on life support: No  Diabetes: No  Any previous malignancy: No  Neoadjuvant Therapy: no  Has patient ever had a dx of HCC: no  Previous Abdominal Surgery: no  Spontaneous Bacterial Peritonitis: no  History of Portal Vein Thrombosis: no  Transjugular Intrahepatic Portosystemic Shunt: no    New diabetes onset between last follow-up to the current follow-up: No  Did patient have any acute rejection episodes during the follow-up period: No  Post transplant malignancy: No

## 2023-08-02 ENCOUNTER — PATIENT MESSAGE (OUTPATIENT)
Dept: TRANSPLANT | Facility: CLINIC | Age: 73
End: 2023-08-02
Payer: MEDICARE

## 2023-08-04 ENCOUNTER — TELEPHONE (OUTPATIENT)
Dept: TRANSPLANT | Facility: CLINIC | Age: 73
End: 2023-08-04
Payer: MEDICARE

## 2023-08-04 NOTE — TELEPHONE ENCOUNTER
Patient made aware that her labs and US have been reviewed, no action required. Next labs 9/11/23            ----- Message from Houston Crawford MD sent at 8/4/2023 10:57 AM CDT -----  Results reviewed      Received: Today  Houston Crawford MD  [facility] Post-Liver Transplant Clinical  Results reviewed

## 2023-08-04 NOTE — LETTER
August 4, 2023    Arianna Sevilla  [address] [place] [address]          Dear Arianna Sevilla:  MRN: [id number]    This is a follow up to your recent labs, your lab and ultrasound results were stable.  There are no medicine changes.  Please have your labs drawn again on 9/11/2023.      If you cannot have your labs drawn on the scheduled date, it is your responsibility to call the transplant department to reschedule.  Please call [phone] and ask to speak to Mary HAMEED   for all scheduling requests.     Sincerely,    Merced ANDRADEN, RN      Your Liver Transplant Coordinator    [facility]  [address] [place] [id number]  [phone]

## 2023-08-14 ENCOUNTER — TELEPHONE (OUTPATIENT)
Dept: TRANSPLANT | Facility: CLINIC | Age: 73
End: 2023-08-14
Payer: MEDICARE

## 2023-08-24 ENCOUNTER — TELEPHONE (OUTPATIENT)
Dept: TRANSPLANT | Facility: CLINIC | Age: 73
End: 2023-08-24
Payer: MEDICARE

## 2023-08-24 NOTE — TELEPHONE ENCOUNTER
Patient called in to state she has started with post nasal drip and congestion, at this time no fever. Her PCP did a drive by throat check and did the below tests:  COVID -  STREP -      Therefore patient advised to push water, take a daily caritin and nasal spray to help with the congestion. Continue to monitor for fever. Encouraged to take tylenol but no more than 3000mg in a 24hr period.     Patient verbalized understanding.              ----- Message from Aishwarya Lopez sent at 8/24/2023  1:10 PM CDT -----  Regarding: Questions      Name Of Caller:     Argelia (Patient)      Contact Preference:   [phone]      Nature of call:   Requesting to speak w/ Merced. She has bad laryngitis, a sour throat, and a cough. Pt wants some advice.

## 2023-09-04 ENCOUNTER — PATIENT MESSAGE (OUTPATIENT)
Dept: TRANSPLANT | Facility: CLINIC | Age: 73
End: 2023-09-04
Payer: MEDICARE

## 2023-09-11 ENCOUNTER — PATIENT MESSAGE (OUTPATIENT)
Dept: TRANSPLANT | Facility: CLINIC | Age: 73
End: 2023-09-11
Payer: MEDICARE

## 2023-09-11 ENCOUNTER — TELEPHONE (OUTPATIENT)
Dept: TRANSPLANT | Facility: CLINIC | Age: 73
End: 2023-09-11
Payer: MEDICARE

## 2023-09-11 LAB
EXT ALBUMIN: 3.9
EXT ALKALINE PHOSPHATASE: 92
EXT ALT: 15
EXT AST: 15
EXT BASOPHIL%: 0.9
EXT BILIRUBIN DIRECT: 0.4 MG/DL
EXT BILIRUBIN TOTAL: 1.4
EXT BUN: 12
EXT CALCIUM: 9.8
EXT CHLORIDE: 107
EXT CO2: 28
EXT CREATININE: 0.84 MG/DL
EXT CYCLOSPORONE LEVEL: 223
EXT EOSINOPHIL%: 10.4
EXT GFR MDRD NON AF AMER: >60
EXT GLUCOSE: 104
EXT HEMATOCRIT: 37.2
EXT HEMOGLOBIN: 12.7
EXT LYMPH%: 19.9
EXT MONOCYTES%: 7.5
EXT PLATELETS: 235
EXT POTASSIUM: 4.7
EXT PROTEIN TOTAL: 7.1
EXT SEGS%: 61.3
EXT SODIUM: 143 MMOL/L
EXT WBC: 6.9

## 2023-09-11 NOTE — TELEPHONE ENCOUNTER
Portal message sent, repeat labs 10/9/23----- Message from Houston Crawford MD sent at 9/11/2023 11:58 AM CDT -----  Results reviewed

## 2023-09-29 LAB
EXT ALBUMIN: 3.7
EXT ALKALINE PHOSPHATASE: 88
EXT ALT: 14
EXT AST: 16
EXT BASOPHIL%: 0.7
EXT BILIRUBIN DIRECT: 0.4 MG/DL
EXT BILIRUBIN TOTAL: 1.2
EXT BUN: 14
EXT CALCIUM: 9.2
EXT CHLORIDE: 104
EXT CO2: 27
EXT CREATININE: 0.77 MG/DL
EXT CYCLOSPORONE LEVEL: 198
EXT EGFR NO RACE VARIABLE: >60
EXT EOSINOPHIL%: 14.8
EXT GLUCOSE: 100
EXT HEMATOCRIT: 36.1
EXT HEMOGLOBIN: 12.6
EXT LYMPH%: 24.6
EXT MONOCYTES%: 9.6
EXT PLATELETS: 213
EXT POTASSIUM: 4.2
EXT PROTEIN TOTAL: 6.6
EXT SEGS%: 50.3
EXT SODIUM: 141 MMOL/L
EXT WBC: 5.4

## 2023-10-02 ENCOUNTER — OFFICE VISIT (OUTPATIENT)
Dept: TRANSPLANT | Facility: CLINIC | Age: 73
End: 2023-10-02
Payer: MEDICARE

## 2023-10-02 VITALS
DIASTOLIC BLOOD PRESSURE: 75 MMHG | BODY MASS INDEX: 28.76 KG/M2 | OXYGEN SATURATION: 97 % | WEIGHT: 156.31 LBS | TEMPERATURE: 97 F | RESPIRATION RATE: 18 BRPM | HEIGHT: 62 IN | HEART RATE: 50 BPM | SYSTOLIC BLOOD PRESSURE: 176 MMHG

## 2023-10-02 DIAGNOSIS — Z00.5 HEPATITIS B VIRUS INFECTION IN CADAVERIC DONOR: ICD-10-CM

## 2023-10-02 DIAGNOSIS — Z94.4 S/P LIVER TRANSPLANT: Primary | ICD-10-CM

## 2023-10-02 DIAGNOSIS — Z29.89 PROPHYLACTIC IMMUNOTHERAPY: ICD-10-CM

## 2023-10-02 DIAGNOSIS — B19.10 HEPATITIS B VIRUS INFECTION IN CADAVERIC DONOR: ICD-10-CM

## 2023-10-02 PROCEDURE — 99214 OFFICE O/P EST MOD 30 MIN: CPT | Mod: PBBFAC | Performed by: INTERNAL MEDICINE

## 2023-10-02 PROCEDURE — 99999 PR PBB SHADOW E&M-EST. PATIENT-LVL IV: CPT | Mod: PBBFAC,,, | Performed by: INTERNAL MEDICINE

## 2023-10-02 PROCEDURE — 99215 PR OFFICE/OUTPT VISIT, EST, LEVL V, 40-54 MIN: ICD-10-PCS | Mod: S$PBB,,, | Performed by: INTERNAL MEDICINE

## 2023-10-02 PROCEDURE — 99215 OFFICE O/P EST HI 40 MIN: CPT | Mod: S$PBB,,, | Performed by: INTERNAL MEDICINE

## 2023-10-02 PROCEDURE — 99999 PR PBB SHADOW E&M-EST. PATIENT-LVL IV: ICD-10-PCS | Mod: PBBFAC,,, | Performed by: INTERNAL MEDICINE

## 2023-10-02 RX ORDER — AMLODIPINE BESYLATE 2.5 MG/1
2.5 TABLET ORAL DAILY
COMMUNITY

## 2023-10-02 NOTE — PROGRESS NOTES
Subjective:       Patient ID: Argelia Sevilla is a 73 y.o. female.    Chief Complaint: Liver Transplant Follow-up    HPI  I saw this 73 y.o.lady who had a liver transplant 8 months ago.    Original Referring Physician: Iggy Contreras  Current Corresponding Physician: Iggy Contreras     Chief Complaint: Arianna is here for follow up of her liver transplant performed 2/9/2023 for the primary diagnosis (UNOS) of Cirrhosis: Fatty Liver (BROWN)     ORGAN: LIVER  Whole or Partial: whole liver  Donor Type: donation after brain death  PHS Increased Risk: no  Donor CMV Status: Positive  Donor HCV Status: Negative  Donor HBcAb: Positive  Donor HBV FRANNIE: Negative  Donor HCV FRANNIE: Negative     Biliary Anastomosis: end to end  Arterial Anatomy: standard  IVC reconstruction: end to end ivc  Portal vein status: patent    - transplant admission was 7 days long.  - readmitted with tremors and speech disturbance  - switched to cyclo    LFTs and creatinine normal  - cyclosporine 198    Abdo US: 5/8/23  Mildly elevated intraparenchymal resistive indices which is nonspecific but may be seen with rejection or edema.  Otherwise satisfactory Doppler evaluation of the liver allograft.  Interval improvement of previously identified main portal vein velocity which is now within normal limits.      Review of Systems   Constitutional:  Negative for activity change, appetite change, chills, fatigue, fever and unexpected weight change.   HENT:  Negative for ear pain, hearing loss, nosebleeds, sore throat and trouble swallowing.    Eyes:  Negative for redness and visual disturbance.   Respiratory:  Negative for cough, chest tightness, shortness of breath and wheezing.    Cardiovascular:  Negative for chest pain and palpitations.   Gastrointestinal:  Negative for abdominal distention, abdominal pain, blood in stool, constipation, diarrhea, nausea and vomiting.   Genitourinary:  Negative for difficulty urinating, dysuria, frequency, hematuria and urgency.    Musculoskeletal:  Negative for arthralgias, back pain, gait problem, joint swelling and myalgias.   Skin:  Negative for rash.   Neurological:  Negative for tremors, seizures, speech difficulty, weakness and headaches.   Hematological:  Negative for adenopathy.   Psychiatric/Behavioral:  Negative for confusion, decreased concentration and sleep disturbance. The patient is not nervous/anxious.            Lab Results   Component Value Date    ALT 15 08/01/2023    AST 15 08/01/2023    GGT 35 10/20/2022    ALKPHOS 94 08/01/2023    BILITOT 2.1 (H) 08/01/2023     Past Medical History:   Diagnosis Date    Anxiety disorder, unspecified     CAD (coronary artery disease)     DIC (disseminated intravascular coagulation)     Dyslipidemia     GERD (gastroesophageal reflux disease)     Hereditary hemolytic anemia, unspecified     History of coronary artery stent placement     HTN (hypertension)     Liver cirrhosis secondary to BROWN     Other ascites 2/15/2023    Portal hypertensive gastropathy     Primary localized osteoarthrosis of right lower leg     Statin intolerance      Past Surgical History:   Procedure Laterality Date    ADENOIDECTOMY      ANTERIOR CRUCIATE LIGAMENT REPAIR      BACK SURGERY      CARDIAC CATHETERIZATION  05/31/2018    CARDIAC CATHETERIZATION  09/28/2021    coronary angioplasty    CHOLECYSTECTOMY      COLONOSCOPY  05/25/2022    GALLBLADDER SURGERY      HYSTERECTOMY      KNEE ARTHROSCOPY Right     LIVER TRANSPLANT N/A 2/9/2023    Procedure: TRANSPLANT, LIVER;  Surgeon: Iggy Adhikari MD;  Location: [facility] OR [address] [place] [address];  Service: Transplant;  Laterality: N/A;    OOPHORECTOMY      REDUCTION OF BOTH BREASTS  01/01/2011    TONSILLECTOMY      TUBAL LIGATION       Current Outpatient Medications   Medication Sig    amLODIPine (NORVASC) 2.5 MG tablet Take 2.5 mg by mouth once daily.    aspirin (ECOTRIN) 81 MG EC tablet Take 1 tablet (81 mg total) by mouth once daily.    calcium carbonate-vitamin D3 600 mg-20 mcg (800  unit) Tab Take 1 tablet by mouth once daily.    calcium citrate-vitamin D3 (CALCITRATE-VITAMIN D) 315 mg-6.25 mcg (250 unit) Tab Take 1 tablet by mouth once daily.    carvediloL (COREG) 25 MG tablet Take 1 tablet (25 mg total) by mouth 2 (two) times daily. (Patient taking differently: Take 25 mg by mouth 2 (two) times daily. Taking 1/2 of tablet in the morning , and 1 tablet in the evening)    cycloSPORINE modified, NEORAL, (NEORAL) 100 MG capsule Take 1 capsule (100 mg total) by mouth every 12 (twelve) hours.    docusate sodium (COLACE) 100 MG capsule Take 100 mg by mouth 2 (two) times daily.    gabapentin (NEURONTIN) 100 MG capsule Take 1 capsule (100 mg total) by mouth 2 (two) times daily. (Patient taking differently: Take 100 mg by mouth 2 (two) times daily. Taking 1 tablet in the morning and 2 tablets in the evening)    lamiVUDine (EPIVIR) 150 MG Tab Take 1 tablet (150 mg total) by mouth once daily. (Patient taking differently: Take 150 mg by mouth nightly.)    losartan (COZAAR) 50 MG tablet Take 50 mg by mouth every evening.    ondansetron (ZOFRAN-ODT) 4 MG TbDL DISSOLVE 1 tablet (4 mg total) by mouth every 6 (six) hours as needed (nausea/vomiting).    pantoprazole (PROTONIX) 40 MG tablet Take 1 tablet (40 mg total) by mouth once daily.    cloNIDine (CATAPRES) 0.1 MG tablet Take 0.1 mg by mouth 2 (two) times daily.    magnesium oxide (MAG-OX) 400 mg (241.3 mg magnesium) tablet Take 1 tablet (400 mg total) by mouth once daily.     No current facility-administered medications for this visit.       Objective:      Physical Exam  Constitutional:       General: She is not in acute distress.  HENT:      Head: Normocephalic.   Eyes:      Pupils: Pupils are equal, round, and reactive to light.   Neck:      Thyroid: No thyromegaly.      Vascular: No JVD.      Trachea: No tracheal deviation.   Cardiovascular:      Rate and Rhythm: Normal rate and regular rhythm.      Heart sounds: Normal heart sounds. No murmur  heard.  Pulmonary:      Effort: Pulmonary effort is normal.      Breath sounds: Normal breath sounds. No stridor.   Abdominal:      Palpations: Abdomen is soft.   Lymphadenopathy:      Head:      Right side of head: No submental, submandibular, tonsillar, preauricular, posterior auricular or occipital adenopathy.      Left side of head: No submental, submandibular, tonsillar, preauricular, posterior auricular or occipital adenopathy.      Cervical: No cervical adenopathy.   Neurological:      Mental Status: She is alert. She is not disoriented.      Cranial Nerves: No cranial nerve deficit.      Sensory: No sensory deficit.         Assessment:       1. S/P liver transplant    2. Prophylactic immunotherapy    3. Hepatitis B virus infection in cadaveric donor          Plan:   Feels well  No new issues  Still hypertensive but seeing a BP specialist - her BP at home has been around 130-140 mmHg systolic / 85 mm Hg).    - no changes to cyclo dose    Clinic in Feb 2024 at the same time as her liver US      UNOS Patient Status  Functional Status: 100% - Normal, no complaints, no evidence of disease  Physical Capacity: No Limitations    Patient on life support: No  Diabetes: No  Any previous malignancy: No  Neoadjuvant Therapy: no  Has patient ever had a dx of HCC: no  Previous Abdominal Surgery: no  Spontaneous Bacterial Peritonitis: no  History of Portal Vein Thrombosis: no  Transjugular Intrahepatic Portosystemic Shunt: no    New diabetes onset between last follow-up to the current follow-up: No  Did patient have any acute rejection episodes during the follow-up period: No  Post transplant malignancy: No

## 2023-10-02 NOTE — LETTER
October 2, 2023        Iggy Contreras  [address] [facility] [place] [address]  Phone: [phone]  Fax: [phone]             [facility] 1st [facility]  [address]  [facility] [address]  Phone: [phone]   Patient: Argelia Sevilla   MR Number: [id number]   YOB: 1950   Date of Visit: 10/2/2023       Dear Dr. Iggy Contreras    Thank you for referring Argelia Sevilla to me for evaluation. Attached you will find relevant portions of my assessment and plan of care.    If you have questions, please do not hesitate to call me. I look forward to following Argelia Sevilla along with you.    Sincerely,    Houston Crawford MD    Enclosure    If you would like to receive this communication electronically, please contact [email or web address] or [phone] to request [employer] access.    [employer] is a tool which provides read-only access to select patient information with whom you have a relationship. Its easy to use and provides real time access to review your patients record including encounter summaries, notes, results, and demographic information.    If you feel you have received this communication in error or would no longer like to receive these types of communications, please e-mail [email or web address]        [facility]    Medicine Progress Note - Hospitalist Service       Date of Admission:  8/31/2021    Assessment & Plan           Michele Vance is a 77 year old male admitted on 8/31/2021. He presented to the emergency department by EMS with 24-hour history of gait difficulty, with 2 falls that occurred earlier this morning.  Patient initially fell getting up onto the bed, but then got up again to use the bathroom and fell in the bathroom.  He has visible injuries to the right side of the face.   Fortunately the CT imaging of his head, neck and lumbar spine did not show any acute abnormality.  Patient was able to stand and take a couple of lateral steps but when he tried to go forward he was unstable and cannot ambulate without assistance.  Patient work-up included a chest x-ray which was clear, EKG that showed a bundle branch block which is unchanged from previous, a second troponin that was improved from the first, and a Covid swab which is negative.  CBC stable for patient, slight anemia with hgb 10.4. INR 1.10. Urine negative for infection. Troponin slightly elevated at 0.09, stable on repeat troponin at 0.086. Patient with noted hyponatremia, sodium at 120. Osmolality at 249. Urine osmolality 215, sodium urine 31. At his advanced age and with his significant comorbid chronic medical problems, he is at high risk for deterioration and an adverse outcome.  For these reasons, hospitalization is considered medically necessary to expedite diagnostic evaluation and initiate appropriate treatment.  Based on the presently available information, hospitalization for at least 2 midnights is anticipated.        Hyponatremia  Assessment: Patient with hyponatremia, seems to be chronic although he has not had an evaluation for it.  He had hyponatremia with each visit to the ER in April, June and July. Prior to his cholecystectomy his sodium was low and his surgery was postponed, he was told to  drink gatorade for this. He has been drinking a large amount of gatorade and other fluids to try to stop the low sodium from happening again. Patient presented as above for falls, gait changes, stroke like symptoms. Sodium at 120. Osmolality at 249. Urine osmolality 215, sodium urine 31. Patient received 1 L NS bolus in the ER. The goal of therapy is to increase the serum sodium by 4 to 6 mEq/L over 24 hours. Raising the serum sodium by 4 to 6 mEq/L should generally alleviate symptoms and prevent osmotic demyelination syndrome 9/1-Sodium improved to 128 today.   Plan:   - We will continue normal saline at 75 ml/ hour  - May need to have a fluid restriction   - Check sodium every 6 hours  - Recheck urine sodium and osmolality   - Continue to monitor       Hypokalemia  Assessment: Chronic, patient is on potassium replacement at 50 mEq BID, using the 10 Farhat tabs. Unclear etiology, patient is not on diuretics. Hypokalmeia has been ongoing since 2018. 9/1-Potassium stable at 4.1  Plan: Monitor, replace as needed and continue home medications.       Abnormal gait    Falls frequently    Facial injury, initial encounter    Acute midline low back pain without sciatica  Assessment: Noted on admission. No acute needs. Patient lives with his wife who provides 24 hour care.   Plan: Follow, fall risk assessment. Physical therapy evaluation.       Hyperlipidemia LDL goal <130  Assessment: Noted, chronic.   Plan: Continue home meds.      GERD (gastroesophageal reflux disease)  Assessment: Noted, chronic.   Plan: Continue home meds.      CKD (chronic kidney disease) stage 3, GFR 30-59 ml/min  Assessment: Noted, chronic. Creatinine 0.94, GFR 79.   Plan: Continue home meds. Recheck labs daily.       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   Plan: Continue home meds.         Diet: Combination Diet Low Saturated Fat Na <2400mg Diet    DVT  Prophylaxis: Pneumatic Compression Devices  Burton Catheter: Not present  Central Lines: None  Code Status: No CPR- Do NOT Intubate      Disposition Plan   Expected discharge: 09/04/2021   recommended to prior living arrangement once hyponatremia resolved.     The patient's care was discussed with the Attending Physician, Dr. Felder, Patient and Patient's Family.    Kenneth Gallagher NP  Hospitalist Service  [facility]  Securely message with the Vocera Web Console (learn more here)  Text page via [employer] Paging/Directory      Clinically Significant Risk Factors Present on Admission               ______________________________________________________________________    Interval History   Patient seen lying in bed with no new complaints. Continues to have weakness in lower extremities. Denies any pain. Denies bowel or bladder incontinence. No focal neurologic deficit. Denies shortness of breath and patient is maintaining oxygen saturations above 90% on room air.  Denies nausea and is tolerating oral intake. Patient is afebrile and hemodynamically stable.     Data reviewed today: I reviewed all medications, new labs and imaging results over the last 24 hours.     Physical Exam   Vital Signs: Temp: 97.7  F (36.5  C) Temp src: Oral BP: 137/51 Pulse: 72   Resp: 18 SpO2: 93 % O2 Device: None (Room air) Oxygen Delivery: 2 LPM  Weight: 174 lbs 0 oz  Constitutional: awake, alert, cooperative, no apparent distress, and appears stated age  Eyes: Lids and lashes normal; PERRLA; contusion and laceration noted to right periorbital area intact  Respiratory: No increased work of breathing, good air exchange, clear to auscultation bilaterally, no crackles or wheezing  Cardiovascular: regular rate and rhythm and normal S1 and S2  GI: normal bowel sounds, non-distended and non-tender  Skin: normal skin color, texture, turgor  Musculoskeletal: no lower extremity pitting edema present  Neurologic: Awake, alert,  oriented to name, place and time.  Cranial nerves II-XII are grossly intact.  Motor is 5 out of 5 bilaterally in upper extremities; 3/5 in lower extremities.  Cerebellar finger to nose, heel to shin intact.  Sensory is intact.    Data   Recent Labs   Lab 09/01/21  1148 09/01/21  0625 08/31/21  2355 08/31/21  1939 08/31/21  1319 08/31/21  0658   WBC  --  7.7  --   --   --  6.1   HGB  --  9.9*  --   --   --  10.4*   MCV  --  93  --   --   --  90   PLT  --  251  --   --   --  270   INR  --   --   --   --   --  1.10   * 129*  129* 127* 127*  --  120*   POTASSIUM  --  4.1  --   --   --  5.2   CHLORIDE  --  102  --   --   --  92*   CO2  --  21  --   --   --  21   BUN  --  12  --   --   --  18   CR  --  0.94  --   --   --  1.24   ANIONGAP  --  6  --   --   --  7   RAHEEM  --  8.2*  --   --   --  8.4*   GLC  --  107*  --   --   --  92   ALBUMIN  --   --   --   --   --  3.3*   PROTTOTAL  --   --   --   --   --  6.7*   BILITOTAL  --   --   --   --   --  0.3   ALKPHOS  --   --   --   --   --  88   ALT  --   --   --   --   --  24   AST  --   --   --   --   --  19   TROPONIN  --   --   --  0.084* 0.086* 0.090*     No results found for this or any previous visit (from the past 24 hour(s)).  Medications     sodium chloride 75 mL/hr at 09/01/21 0750       amitriptyline  50 mg Oral At Bedtime     amLODIPine  10 mg Oral Daily     aspirin  81 mg Oral Every Other Day     carvedilol  25 mg Oral BID w/meals     cilostazol  100 mg Oral BID     gabapentin  300 mg Oral BID     isosorbide mononitrate  60 mg Oral Daily     losartan  100 mg Oral Daily     pantoprazole  40 mg Oral Daily     potassium chloride ER  50 mEq Oral BID     sodium chloride (PF)  3 mL Intracatheter Q8H

## 2023-10-03 ENCOUNTER — PATIENT MESSAGE (OUTPATIENT)
Dept: TRANSPLANT | Facility: CLINIC | Age: 73
End: 2023-10-03
Payer: MEDICARE

## 2023-10-03 ENCOUNTER — TELEPHONE (OUTPATIENT)
Dept: TRANSPLANT | Facility: CLINIC | Age: 73
End: 2023-10-03
Payer: MEDICARE

## 2023-10-03 NOTE — LETTER
October 3, 2023    Arianna Sevilla  [address] [place] [address]          Dear Arianna Roel:  MRN: [id number]    This is a follow up to your recent labs, your lab results were stable.  There are no medicine changes.  Please have your labs drawn again on 11/6/2023.      If you cannot have your labs drawn on the scheduled date, it is your responsibility to call the transplant department to reschedule.  Please call [phone] and ask to speak to Mary HAMEED   for all scheduling requests.     Sincerely,    Merced ANDRADEN, RN      Your Liver Transplant Coordinator    [facility]  [address] [place] [address]  [phone]

## 2023-10-03 NOTE — TELEPHONE ENCOUNTER
Letter sent to patient stating: Your labs have been reviewed by your Transplant physician, no action required. Next labs due 11/6/2023          ----- Message from Houston Crawford MD sent at 10/3/2023 10:05 AM CDT -----  Results reviewed

## 2023-10-20 ENCOUNTER — TELEPHONE (OUTPATIENT)
Dept: TRANSPLANT | Facility: CLINIC | Age: 73
End: 2023-10-20
Payer: MEDICARE

## 2023-10-20 NOTE — TELEPHONE ENCOUNTER
Writer returned patient call, patient complaining of vertigo. Patient advised to see PCP or go to urgent care if unable to get into PCP. Patient made aware meclizine is clear to take but to keep txp team updated with what physician finds.            ----- Message from Lis Malone sent at 10/20/2023  8:09 AM CDT -----  Regarding: Consult/Advisory  Contact: Argelia Sevilla  Consult/Advisory    Name Of Caller: Argelia Sevilla       Contact Preference: [phone] (Mobile)    Nature of call: Patient calling to speak to coordinator regarding having vertigo and it is worse than before with nausea.  Patient asking if she can take Meclozine for it. Requesting a call back.

## 2023-10-24 ENCOUNTER — PATIENT MESSAGE (OUTPATIENT)
Dept: TRANSPLANT | Facility: CLINIC | Age: 73
End: 2023-10-24
Payer: MEDICARE

## 2023-11-05 ENCOUNTER — NURSE TRIAGE (OUTPATIENT)
Dept: ADMINISTRATIVE | Facility: CLINIC | Age: 73
End: 2023-11-05
Payer: MEDICARE

## 2023-11-05 NOTE — TELEPHONE ENCOUNTER
Liver transplant pt 2/9/23.  Pt c/o headache, neck pain, temp 101.9 and hoarseness. Advised to go to ED per protocol. BPA 4 used. Pt VU. Encounter routed to provider.   Reason for Disposition   [1] Fever > 101 F (38.3 C) AND [2] age > 60 years    Additional Information   Negative: Shock suspected (e.g., cold/pale/clammy skin, too weak to stand, low BP, rapid pulse)   Negative: Difficult to awaken or acting confused (e.g., disoriented, slurred speech)   Negative: [1] Difficulty breathing AND [2] bluish lips, tongue or face   Negative: New-onset rash with multiple purple (or blood-colored) spots or dots   Negative: Sounds like a life-threatening emergency to the triager   Negative: [1] Headache AND [2] stiff neck (can't touch chin to chest)   Negative: Difficulty breathing   Negative: IV Drug Use (IVDU)   Negative: [1] Drinking very little AND [2] dehydration suspected (e.g., no urine > 12 hours, very dry mouth, very lightheaded)   Negative: Widespread rash and cause unknown   Negative: Patient sounds very sick or weak to the triager  (Exception: Mild weakness and hasn't taken fever medicine.)   Negative: Fever > 104 F (40 C)    Protocols used: Fever-A-AH

## 2023-11-06 ENCOUNTER — TELEPHONE (OUTPATIENT)
Dept: TRANSPLANT | Facility: CLINIC | Age: 73
End: 2023-11-06
Payer: MEDICARE

## 2023-11-06 NOTE — TELEPHONE ENCOUNTER
Writer returned patient call. Patient diagnosed with COVID and asking for advice on what she may or may not take. Patient made aware she may not take paxlovid but she may take a decongestant, tylenol and nasal spray.    Patient advised not to go to the lab today and just wait til next week for labs to isolate for now. Patient only on csa for immunosuppression. Advised she may want to have an apt with PCP on the books in case she does not end up feeling better for eval.        ----- Message from Lis Malone sent at 11/6/2023  8:20 AM CST -----  Regarding: Consult/Advisory  Contact: Argelia Sevilla  Consult/Advisory    Name Of Caller: Argelia Sevilla       Contact Preference:[phone] (Mobile)      Nature of call: Patient calling to notify that she has been diagnosed with covid last night in ER. Patient would like to know what to take for  her aches.

## 2023-11-08 ENCOUNTER — TELEPHONE (OUTPATIENT)
Dept: TRANSPLANT | Facility: CLINIC | Age: 73
End: 2023-11-08
Payer: MEDICARE

## 2023-11-08 ENCOUNTER — PATIENT MESSAGE (OUTPATIENT)
Dept: TRANSPLANT | Facility: CLINIC | Age: 73
End: 2023-11-08
Payer: MEDICARE

## 2023-11-08 NOTE — TELEPHONE ENCOUNTER
Patient called in to state she went to the ER this past Sunday with congestion and fever. UA and covid test were performed. Patient covid test + and urine culture abnormal. Patient states local ER not comfortable in determining her needing an antibiotic or not. Patient states she has no symptoms of a UTI but just wanted to check with us. Txp physician and pharm d team saw results in Care Everywhere. Not indicative of UTI but likely superficial skin saúl, patient to repeat test. Patient was told to quarantine so after quarantine lifted she will report to local lab for Txp labs and urine repeat test.        ---- Message from Aishwarya Lopez sent at 11/8/2023  2:54 PM CST -----  Regarding: Questions      Name Of Caller:    Argelia      Contact Preference:   [phone]       Nature of call:   Pt requesting to speak w/ Merced. She has some questions about her urine test results from ED.

## 2023-11-15 ENCOUNTER — PATIENT MESSAGE (OUTPATIENT)
Dept: TRANSPLANT | Facility: CLINIC | Age: 73
End: 2023-11-15
Payer: MEDICARE

## 2023-11-20 ENCOUNTER — PATIENT MESSAGE (OUTPATIENT)
Dept: TRANSPLANT | Facility: CLINIC | Age: 73
End: 2023-11-20
Payer: MEDICARE

## 2023-11-21 ENCOUNTER — PATIENT MESSAGE (OUTPATIENT)
Dept: TRANSPLANT | Facility: CLINIC | Age: 73
End: 2023-11-21
Payer: MEDICARE

## 2023-11-28 LAB
EXT ALBUMIN: 4.1
EXT ALKALINE PHOSPHATASE: 100
EXT ALT: 14
EXT AST: 18
EXT BASOPHIL%: 1
EXT BILIRUBIN TOTAL: 1.4
EXT BUN: 11
EXT CALCIUM: 9.7
EXT CHLORIDE: 104
EXT CO2: 29
EXT CREATININE: 0.83 MG/DL
EXT CYCLOSPORONE LEVEL: 187
EXT EGFR NO RACE VARIABLE: >60
EXT EOSINOPHIL%: 4
EXT GLUCOSE: 107
EXT HBV DNA QUANT PCR: NOT DETECTED
EXT HEMATOCRIT: 38.3
EXT HEMOGLOBIN: 12.9
EXT HEP B S AG: NON REACTIVE
EXT LYMPH%: 20.2
EXT MONOCYTES%: 9.5
EXT PLATELETS: 278
EXT POTASSIUM: 4.4
EXT PROTEIN TOTAL: 7.2
EXT SEGS%: 65
EXT SODIUM: 140 MMOL/L
EXT WBC: 5.9

## 2023-11-30 NOTE — TELEPHONE ENCOUNTER
Patient's MELD score expires at midnight.  Patient states she will get updated MELD labs tomorrow.    Lab orders faxed to [facility] lab : p [phone] /Fax: [phone]. Copy of lab orders sent to patient's portal.   Adult

## 2023-12-01 ENCOUNTER — TELEPHONE (OUTPATIENT)
Dept: TRANSPLANT | Facility: CLINIC | Age: 73
End: 2023-12-01
Payer: MEDICARE

## 2023-12-01 NOTE — TELEPHONE ENCOUNTER
Letter sent to patient stating: Your labs have been reviewed by your Transplant physician, no action required. Next labs due 1/2/2024            ----- Message from Houston Crawford MD sent at 12/1/2023  2:43 PM CST -----  Results reviewed

## 2023-12-01 NOTE — LETTER
December 1, 2023    Arianna Sevilla  [address] [place] [address]          Dear Arianna Roel:  MRN: [id number]    This is a follow up to your recent labs, your lab results were stable.  There are no medicine changes.  Please have your labs drawn again on 1/2/2024.      If you cannot have your labs drawn on the scheduled date, it is your responsibility to call the transplant department to reschedule.  Please call [phone] and ask to speak to Mary HAMEED   for all scheduling requests.     Sincerely,    Merced ANDRADEN, RN      Your Liver Transplant Coordinator    [facility]  [address] [place] [address]  [phone]

## 2023-12-14 ENCOUNTER — PATIENT MESSAGE (OUTPATIENT)
Dept: TRANSPLANT | Facility: CLINIC | Age: 73
End: 2023-12-14
Payer: MEDICARE

## 2024-01-03 ENCOUNTER — TELEPHONE (OUTPATIENT)
Dept: TRANSPLANT | Facility: CLINIC | Age: 74
End: 2024-01-03
Payer: MEDICARE

## 2024-01-03 ENCOUNTER — PATIENT MESSAGE (OUTPATIENT)
Dept: TRANSPLANT | Facility: CLINIC | Age: 74
End: 2024-01-03
Payer: MEDICARE

## 2024-01-04 LAB
EXT ALBUMIN: 3.8
EXT ALKALINE PHOSPHATASE: 97
EXT ALT: 13
EXT AST: 15
EXT BASOPHIL%: 0.9
EXT BILIRUBIN TOTAL: 1.2
EXT BUN: 17
EXT CALCIUM: 9.3
EXT CHLORIDE: 104
EXT CO2: 28
EXT CREATININE: 0.81 MG/DL
EXT CYCLOSPORONE LEVEL: 199
EXT EGFR NO RACE VARIABLE: >60
EXT EOSINOPHIL%: 11.6
EXT GLUCOSE: 102
EXT HEMATOCRIT: 35.8
EXT HEMOGLOBIN: 12.2
EXT LYMPH%: 25.9
EXT MONOCYTES%: 8.2
EXT PLATELETS: 209
EXT POTASSIUM: 4.3
EXT PROTEIN TOTAL: 7.2
EXT SEGS%: 53
EXT SODIUM: 139 MMOL/L
EXT WBC: 5.6

## 2024-01-05 ENCOUNTER — TELEPHONE (OUTPATIENT)
Dept: TRANSPLANT | Facility: CLINIC | Age: 74
End: 2024-01-05
Payer: MEDICARE

## 2024-01-05 ENCOUNTER — PATIENT MESSAGE (OUTPATIENT)
Dept: TRANSPLANT | Facility: CLINIC | Age: 74
End: 2024-01-05
Payer: MEDICARE

## 2024-01-05 NOTE — TELEPHONE ENCOUNTER
Letter sent to patient stating: Your labs have been reviewed by your Transplant physician, no action required. Next labs due 2/5/24          ----- Message from Houston Crawford MD sent at 1/4/2024  4:40 PM CST -----  Results reviewed

## 2024-01-05 NOTE — LETTER
January 5, 2024    Arianna Sevilla  [address] [place] [address]          Dear Arianna Roel:  MRN: [id number]    This is a follow up to your recent labs, your lab results were stable.  There are no medicine changes.  Please have your labs drawn again on 2/5/24.      If you cannot have your labs drawn on the scheduled date, it is your responsibility to call the transplant department to reschedule.  Please call [phone] and ask to speak to Mary HAMEED   for all scheduling requests.     Sincerely,  Merced ANDRADEN, RN        Your Liver Transplant Coordinator    [facility]  [address] [place] [address]  [phone]

## 2024-01-24 ENCOUNTER — PATIENT MESSAGE (OUTPATIENT)
Dept: TRANSPLANT | Facility: CLINIC | Age: 74
End: 2024-01-24
Payer: MEDICARE

## 2024-01-26 DIAGNOSIS — Z94.4 S/P LIVER TRANSPLANT: ICD-10-CM

## 2024-01-26 RX ORDER — ACETAMINOPHEN 500 MG
1 TABLET ORAL DAILY
Qty: 30 TABLET | Refills: 5 | Status: CANCELLED | OUTPATIENT
Start: 2024-01-26

## 2024-01-29 DIAGNOSIS — Z94.4 S/P LIVER TRANSPLANT: ICD-10-CM

## 2024-01-29 RX ORDER — LAMIVUDINE 150 MG/1
150 TABLET, FILM COATED ORAL DAILY
Qty: 30 TABLET | Refills: 11 | Status: ACTIVE | OUTPATIENT
Start: 2024-01-29

## 2024-01-30 DIAGNOSIS — Z94.4 S/P LIVER TRANSPLANT: ICD-10-CM

## 2024-01-30 RX ORDER — ACETAMINOPHEN 500 MG
1 TABLET ORAL DAILY
Qty: 30 TABLET | Refills: 5 | Status: CANCELLED | OUTPATIENT
Start: 2024-01-30

## 2024-02-02 ENCOUNTER — HOSPITAL ENCOUNTER (OUTPATIENT)
Dept: RADIOLOGY | Facility: HOSPITAL | Age: 74
Discharge: HOME | End: 2024-02-02
Attending: INTERNAL MEDICINE
Payer: MEDICARE

## 2024-02-02 ENCOUNTER — OFFICE VISIT (OUTPATIENT)
Dept: TRANSPLANT | Facility: CLINIC | Age: 74
End: 2024-02-02
Payer: MEDICARE

## 2024-02-02 VITALS
RESPIRATION RATE: 18 BRPM | HEIGHT: 62 IN | SYSTOLIC BLOOD PRESSURE: 176 MMHG | DIASTOLIC BLOOD PRESSURE: 75 MMHG | BODY MASS INDEX: 30.47 KG/M2 | HEART RATE: 50 BPM | OXYGEN SATURATION: 97 % | WEIGHT: 165.56 LBS | TEMPERATURE: 97 F

## 2024-02-02 DIAGNOSIS — Z29.89 PROPHYLACTIC IMMUNOTHERAPY: ICD-10-CM

## 2024-02-02 DIAGNOSIS — B19.10 HEPATITIS B VIRUS INFECTION IN CADAVERIC DONOR: ICD-10-CM

## 2024-02-02 DIAGNOSIS — Z94.4 S/P LIVER TRANSPLANT: ICD-10-CM

## 2024-02-02 DIAGNOSIS — Z00.5 HEPATITIS B VIRUS INFECTION IN CADAVERIC DONOR: ICD-10-CM

## 2024-02-02 DIAGNOSIS — Z94.4 S/P LIVER TRANSPLANT: Primary | ICD-10-CM

## 2024-02-02 DIAGNOSIS — Z94.4 LIVER REPLACED BY TRANSPLANT: ICD-10-CM

## 2024-02-02 PROCEDURE — 99214 OFFICE O/P EST MOD 30 MIN: CPT | Mod: PBBFAC,25 | Performed by: INTERNAL MEDICINE

## 2024-02-02 PROCEDURE — 76705 ECHO EXAM OF ABDOMEN: CPT | Mod: TC

## 2024-02-02 PROCEDURE — 99215 OFFICE O/P EST HI 40 MIN: CPT | Mod: S$PBB,,, | Performed by: INTERNAL MEDICINE

## 2024-02-02 PROCEDURE — 76705 ECHO EXAM OF ABDOMEN: CPT | Mod: 26,59,, | Performed by: RADIOLOGY

## 2024-02-02 PROCEDURE — 93976 VASCULAR STUDY: CPT | Mod: 26,,, | Performed by: RADIOLOGY

## 2024-02-02 PROCEDURE — 99999 PR PBB SHADOW E&M-EST. PATIENT-LVL IV: CPT | Mod: PBBFAC,,, | Performed by: INTERNAL MEDICINE

## 2024-02-02 RX ORDER — ACETAMINOPHEN 500 MG
1 TABLET ORAL DAILY
Qty: 30 TABLET | Refills: 5 | Status: CANCELLED | OUTPATIENT
Start: 2024-01-30

## 2024-02-02 RX ORDER — ESCITALOPRAM OXALATE 5 MG/1
5 TABLET ORAL NIGHTLY
COMMUNITY

## 2024-02-02 NOTE — PROGRESS NOTES
Subjective:       Patient ID: Argelia Sevilla is a 73 y.o. female.    Chief Complaint: Liver Transplant Follow-up    HPI  I saw this 73 y.o.lady who had a liver transplant 1 year ago.    Original Referring Physician: Iggy Contreras  Current Corresponding Physician: Iggy Contreras     Chief Complaint: Arianna is here for follow up of her liver transplant performed 2/9/2023 for the primary diagnosis (UNOS) of Cirrhosis: Fatty Liver (BROWN)     ORGAN: LIVER  Whole or Partial: whole liver  Donor Type: donation after brain death  PHS Increased Risk: no  Donor CMV Status: Positive  Donor HCV Status: Negative  Donor HBcAb: Positive  Donor HBV FRANNIE: Negative  Donor HCV FRANNIE: Negative     Biliary Anastomosis: end to end  Arterial Anatomy: standard  IVC reconstruction: end to end ivc  Portal vein status: patent    - transplant admission was 7 days long.  - readmitted with tremors and speech disturbance  - switched to cyclo    LFTs and creatinine normal  - cyclosporine 199    Abdo US: 2/16/23  Mild elevation of arterial resistive indices with preservation of normal waveforms, nonspecific but may be seen in the setting of rejection or edema. Recommend clinical correlation and continued interval follow-up.  Left portal vein demonstrates hepatopetal flow, similar prior.      Review of Systems   Constitutional:  Negative for activity change, appetite change, chills, fatigue, fever and unexpected weight change.   HENT:  Negative for ear pain, hearing loss, nosebleeds, sore throat and trouble swallowing.    Eyes:  Negative for redness and visual disturbance.   Respiratory:  Negative for cough, chest tightness, shortness of breath and wheezing.    Cardiovascular:  Negative for chest pain and palpitations.   Gastrointestinal:  Negative for abdominal distention, abdominal pain, blood in stool, constipation, diarrhea, nausea and vomiting.   Genitourinary:  Negative for difficulty urinating, dysuria, frequency, hematuria and urgency.    Musculoskeletal:  Negative for arthralgias, back pain, gait problem, joint swelling and myalgias.   Skin:  Negative for rash.   Neurological:  Negative for tremors, seizures, speech difficulty, weakness and headaches.   Hematological:  Negative for adenopathy.   Psychiatric/Behavioral:  Negative for confusion, decreased concentration and sleep disturbance. The patient is not nervous/anxious.            Lab Results   Component Value Date    ALT 15 08/01/2023    AST 15 08/01/2023    GGT 35 10/20/2022    ALKPHOS 94 08/01/2023    BILITOT 2.1 (H) 08/01/2023     Past Medical History:   Diagnosis Date    Anxiety disorder, unspecified     CAD (coronary artery disease)     DIC (disseminated intravascular coagulation)     Dyslipidemia     GERD (gastroesophageal reflux disease)     Hereditary hemolytic anemia, unspecified     History of coronary artery stent placement     HTN (hypertension)     Liver cirrhosis secondary to BROWN     Other ascites 2/15/2023    Portal hypertensive gastropathy     Primary localized osteoarthrosis of right lower leg     Statin intolerance      Past Surgical History:   Procedure Laterality Date    ADENOIDECTOMY      ANTERIOR CRUCIATE LIGAMENT REPAIR      BACK SURGERY      CARDIAC CATHETERIZATION  05/31/2018    CARDIAC CATHETERIZATION  09/28/2021    coronary angioplasty    CHOLECYSTECTOMY      COLONOSCOPY  05/25/2022    GALLBLADDER SURGERY      HYSTERECTOMY      KNEE ARTHROSCOPY Right     LIVER TRANSPLANT N/A 2/9/2023    Procedure: TRANSPLANT, LIVER;  Surgeon: Iggy Adhikari MD;  Location: [facility] OR [address] [place] [address];  Service: Transplant;  Laterality: N/A;    OOPHORECTOMY      REDUCTION OF BOTH BREASTS  01/01/2011    TONSILLECTOMY      TUBAL LIGATION       Current Outpatient Medications   Medication Sig    amLODIPine (NORVASC) 2.5 MG tablet Take 2.5 mg by mouth once daily.    aspirin (ECOTRIN) 81 MG EC tablet Take 1 tablet (81 mg total) by mouth once daily.    calcium carbonate-vitamin D3 600 mg-20 mcg (800  unit) Tab Take 1 tablet by mouth once daily.    carvediloL (COREG) 25 MG tablet Take 1 tablet (25 mg total) by mouth 2 (two) times daily. (Patient taking differently: Take 25 mg by mouth 2 (two) times daily. Taking 1/2 of tablet in the morning , and 1 tablet in the evening)    cycloSPORINE modified, NEORAL, (NEORAL) 100 MG capsule Take 1 capsule (100 mg total) by mouth every 12 (twelve) hours.    docusate sodium (COLACE) 100 MG capsule Take 100 mg by mouth 2 (two) times daily.    EScitalopram oxalate (LEXAPRO) 5 MG Tab Take 5 mg by mouth nightly.    gabapentin (NEURONTIN) 100 MG capsule Take 1 capsule (100 mg total) by mouth 2 (two) times daily. (Patient taking differently: Take 100 mg by mouth 2 (two) times daily. Taking 1 tablet in the morning and 2 tablets in the evening)    lamiVUDine (EPIVIR) 150 MG Tab Take 1 tablet (150 mg total) by mouth once daily.    losartan (COZAAR) 50 MG tablet Take 50 mg by mouth every evening.    ondansetron (ZOFRAN-ODT) 4 MG TbDL DISSOLVE 1 tablet (4 mg total) by mouth every 6 (six) hours as needed (nausea/vomiting).    pantoprazole (PROTONIX) 40 MG tablet Take 1 tablet (40 mg total) by mouth once daily.    calcium citrate-vitamin D3 (CALCITRATE-VITAMIN D) 315 mg-6.25 mcg (250 unit) Tab Take 1 tablet by mouth once daily.     No current facility-administered medications for this visit.       Objective:      Physical Exam  Constitutional:       General: She is not in acute distress.  HENT:      Head: Normocephalic.   Eyes:      Pupils: Pupils are equal, round, and reactive to light.   Neck:      Thyroid: No thyromegaly.      Vascular: No JVD.      Trachea: No tracheal deviation.   Cardiovascular:      Rate and Rhythm: Normal rate and regular rhythm.      Heart sounds: Normal heart sounds. No murmur heard.  Pulmonary:      Effort: Pulmonary effort is normal.      Breath sounds: Normal breath sounds. No stridor.   Abdominal:      Palpations: Abdomen is soft.   Lymphadenopathy:      Head:       Right side of head: No submental, submandibular, tonsillar, preauricular, posterior auricular or occipital adenopathy.      Left side of head: No submental, submandibular, tonsillar, preauricular, posterior auricular or occipital adenopathy.      Cervical: No cervical adenopathy.   Neurological:      Mental Status: She is alert. She is not disoriented.      Cranial Nerves: No cranial nerve deficit.      Sensory: No sensory deficit.         Assessment:       1. S/P liver transplant    2. Prophylactic immunotherapy    3. Hepatitis B virus infection in cadaveric donor          Plan:   Feels well  No new issues  Still hypertensive here today but seeing a BP specialist - her BP at home has been around 130-140 mmHg systolic / 85 mm Hg).    - no changes to cyclo dose  - labs per protocol  - clinic in 1 year.    UNOS Patient Status  Functional Status: 100% - Normal, no complaints, no evidence of disease  Physical Capacity: No Limitations    Patient on life support: No  Diabetes: No  Any previous malignancy: No  Neoadjuvant Therapy: no  Has patient ever had a dx of HCC: no  Previous Abdominal Surgery: no  Spontaneous Bacterial Peritonitis: no  History of Portal Vein Thrombosis: no  Transjugular Intrahepatic Portosystemic Shunt: no    New diabetes onset between last follow-up to the current follow-up: No  Did patient have any acute rejection episodes during the follow-up period: No  Post transplant malignancy: No

## 2024-02-02 NOTE — LETTER
February 2, 2024        Iggy Contreras  [address]  [facility] [place] [address]  Phone: [phone]  Fax: [phone]             [facility] 1st Fl  [address]  [facility] [address]  Phone: [phone]   Patient: Argelia Sevilla   MR Number: [id number]   YOB: 1950   Date of Visit: 2/2/2024       Dear Dr. Iggy Contreras    Thank you for referring Argelia Sevilla to me for evaluation. Attached you will find relevant portions of my assessment and plan of care.    If you have questions, please do not hesitate to call me. I look forward to following Argelia Sevilla along with you.    Sincerely,    Houston Crawford MD    Enclosure    If you would like to receive this communication electronically, please contact [email or web address] or [phone] to request [employer] Link access.    [employer] Link is a tool which provides read-only access to select patient information with whom you have a relationship. Its easy to use and provides real time access to review your patients record including encounter summaries, notes, results, and demographic information.    If you feel you have received this communication in error or would no longer like to receive these types of communications, please e-mail [email or web address]

## 2024-02-06 ENCOUNTER — PATIENT MESSAGE (OUTPATIENT)
Dept: TRANSPLANT | Facility: CLINIC | Age: 74
End: 2024-02-06
Payer: MEDICARE

## 2024-02-09 ENCOUNTER — PATIENT MESSAGE (OUTPATIENT)
Dept: TRANSPLANT | Facility: CLINIC | Age: 74
End: 2024-02-09
Payer: MEDICARE

## 2024-02-09 ENCOUNTER — TELEPHONE (OUTPATIENT)
Dept: TRANSPLANT | Facility: CLINIC | Age: 74
End: 2024-02-09
Payer: MEDICARE

## 2024-02-09 LAB
EXT ALBUMIN: 3.7
EXT ALKALINE PHOSPHATASE: 100
EXT ALT: 11
EXT AST: 14
EXT BASOPHIL%: 1
EXT BILIRUBIN TOTAL: 1.2
EXT BUN: 13
EXT CALCIUM: 9.2
EXT CHLORIDE: 105
EXT CO2: 27
EXT CREATININE: 0.85 MG/DL
EXT CYCLOSPORONE LEVEL: 261
EXT EGFR NO RACE VARIABLE: >60
EXT EOSINOPHIL%: 13.1
EXT GLUCOSE: 105
EXT HBV DNA QUANT PCR: NOT DETECTED
EXT HEMATOCRIT: 37.3
EXT HEMOGLOBIN: 12.6
EXT HEP B S AG: NON REACTIVE
EXT LYMPH%: 26.3
EXT MONOCYTES%: 9.5
EXT PLATELETS: 208
EXT POTASSIUM: 4.1
EXT PROTEIN TOTAL: 7
EXT SEGS%: 49.9
EXT SODIUM: 140 MMOL/L
EXT WBC: 5.1

## 2024-02-09 NOTE — TELEPHONE ENCOUNTER
Patient made aware her liver ultrasound has been reviewed and no action is required.    ----- Message from Houston Crawford MD sent at 2/9/2024  8:51 AM CST -----  Results reviewed

## 2024-02-12 ENCOUNTER — TELEPHONE (OUTPATIENT)
Dept: TRANSPLANT | Facility: CLINIC | Age: 74
End: 2024-02-12
Payer: MEDICARE

## 2024-02-12 NOTE — LETTER
February 12, 2024    Arianna Sevilla  [address] [place] [address]          Dear Arianna Roel:  MRN: [id number]    This is a follow up to your recent labs, your lab results were stable.  There are no medicine changes.  Please have your labs drawn again on 3/25/2024.      If you cannot have your labs drawn on the scheduled date, it is your responsibility to call the transplant department to reschedule.  Please call [phone] and ask to speak to Mary HAMEED   for all scheduling requests.     Sincerely,    Merced ANDRADEN, RN      Your Liver Transplant Coordinator    [facility]  [address] [place] [address]  [phone]

## 2024-02-12 NOTE — TELEPHONE ENCOUNTER
Letter sent to patient stating: Your labs have been reviewed by your Transplant physician, no action required. Next labs due 3/25/2024        ----- Message from Houston Crawford MD sent at 2/12/2024  3:38 PM CST -----  Results reviewed

## 2024-02-13 ENCOUNTER — PATIENT MESSAGE (OUTPATIENT)
Dept: TRANSPLANT | Facility: CLINIC | Age: 74
End: 2024-02-13
Payer: MEDICARE

## 2024-02-19 ENCOUNTER — PATIENT MESSAGE (OUTPATIENT)
Dept: TRANSPLANT | Facility: CLINIC | Age: 74
End: 2024-02-19
Payer: MEDICARE

## 2024-03-07 ENCOUNTER — PATIENT MESSAGE (OUTPATIENT)
Dept: TRANSPLANT | Facility: CLINIC | Age: 74
End: 2024-03-07
Payer: MEDICARE

## 2024-03-11 ENCOUNTER — TELEPHONE (OUTPATIENT)
Dept: TRANSPLANT | Facility: CLINIC | Age: 74
End: 2024-03-11
Payer: MEDICARE

## 2024-03-11 NOTE — TELEPHONE ENCOUNTER
Patient made aware she is cleared per Pharm D to receive steroid injections to her knee via orthopedic physician , patient will have labs done right before a within a couple weeks after injections for liver monitoring.          ----- Message from Nicki Gould sent at 3/11/2024  1:05 PM CDT -----  Regarding: Speak to nurse  Contact: Argelia Benjamin/Advisory     Name Of Caller:Argelia Sevilla          Contact Preference:[phone] (Mobile)          Nature of call :Calling to speak to nurse in regards to taking steroids if she has a knee replacement, that she really need to have. Please advise. Requesting a call back.

## 2024-03-28 ENCOUNTER — TELEPHONE (OUTPATIENT)
Dept: TRANSPLANT | Facility: CLINIC | Age: 74
End: 2024-03-28
Payer: MEDICARE

## 2024-03-28 LAB
EXT ALBUMIN: 3.8
EXT ALKALINE PHOSPHATASE: 103
EXT ALT: 10
EXT AST: 16
EXT BASOPHIL%: 0.9
EXT BILIRUBIN DIRECT: 0.3 MG/DL
EXT BILIRUBIN TOTAL: 1.1
EXT BUN: 23
EXT CALCIUM: 9.2
EXT CHLORIDE: 104
EXT CO2: 24
EXT CREATININE: 0.9 MG/DL
EXT CYCLOSPORONE LEVEL: 231
EXT EGFR NO RACE VARIABLE: >60
EXT EOSINOPHIL%: 7.9
EXT GLUCOSE: 102
EXT HEMATOCRIT: 38.1
EXT HEMOGLOBIN: 12.6
EXT LYMPH%: 23.8
EXT MONOCYTES%: 10.5
EXT PLATELETS: 257
EXT POTASSIUM: 4.5
EXT PROTEIN TOTAL: 7.4
EXT SEGS%: 56.9
EXT SODIUM: 139 MMOL/L
EXT WBC: 5.8

## 2024-03-28 NOTE — LETTER
March 28, 2024    Arianna Sevilla  [address] [place] [address]          Dear Arianna Roel:  MRN: [id number]    This is a follow up to your recent labs, your lab results were stable.  There are no medicine changes.  Please have your labs drawn again on 5/6/2024.      If you cannot have your labs drawn on the scheduled date, it is your responsibility to call the transplant department to reschedule.  Please call [phone] and ask to speak to Mary HAMEED   for all scheduling requests.     Sincerely,    Merced ANDRADEN, RN      Your Liver Transplant Coordinator    [facility]  [address] [place] [address]  [phone]

## 2024-04-15 NOTE — TELEPHONE ENCOUNTER
Letter sent to patient stating: Your labs have been reviewed by your Transplant physician, no action required. Next labs due 5/6/2024          ----- Message from Houston Crawford MD sent at 3/28/2024  1:18 PM CDT -----  Results reviewed     153.6

## 2024-05-02 ENCOUNTER — PATIENT MESSAGE (OUTPATIENT)
Dept: TRANSPLANT | Facility: CLINIC | Age: 74
End: 2024-05-02
Payer: MEDICARE

## 2024-05-05 ENCOUNTER — PATIENT MESSAGE (OUTPATIENT)
Dept: TRANSPLANT | Facility: CLINIC | Age: 74
End: 2024-05-05
Payer: MEDICARE

## 2024-05-06 DIAGNOSIS — Z94.4 S/P LIVER TRANSPLANT: ICD-10-CM

## 2024-05-06 RX ORDER — CYCLOSPORINE 100 MG/1
100 CAPSULE, LIQUID FILLED ORAL EVERY 12 HOURS
Qty: 60 CAPSULE | Refills: 11 | Status: SHIPPED | OUTPATIENT
Start: 2024-05-06 | End: 2024-05-15 | Stop reason: DRUGHIGH

## 2024-05-14 LAB
EXT ALBUMIN: 3.8
EXT ALKALINE PHOSPHATASE: 102
EXT ALT: 7
EXT AST: 13
EXT BASOPHIL%: 0.4
EXT BILIRUBIN DIRECT: 0.4 MG/DL
EXT BILIRUBIN TOTAL: 1.3
EXT BUN: 16
EXT CALCIUM: 9.4
EXT CHLORIDE: 104
EXT CO2: 25
EXT CREATININE: 0.93 MG/DL
EXT CYCLOSPORONE LEVEL: 318
EXT EGFR NO RACE VARIABLE: >60
EXT EOSINOPHIL%: 4.7
EXT GLUCOSE: 104
EXT HEMATOCRIT: 36.7
EXT HEMOGLOBIN: 12.3
EXT LYMPH%: 25.3
EXT MONOCYTES%: 8
EXT PLATELETS: 195
EXT POTASSIUM: 4.5
EXT PROTEIN TOTAL: 6.9
EXT SEGS%: 61.6
EXT SODIUM: 136 MMOL/L
EXT WBC: 5.1

## 2024-05-15 ENCOUNTER — PATIENT MESSAGE (OUTPATIENT)
Dept: TRANSPLANT | Facility: CLINIC | Age: 74
End: 2024-05-15
Payer: MEDICARE

## 2024-05-15 DIAGNOSIS — Z94.4 S/P LIVER TRANSPLANT: ICD-10-CM

## 2024-05-15 RX ORDER — CYCLOSPORINE 25 MG/1
75 CAPSULE, LIQUID FILLED ORAL EVERY 12 HOURS
Qty: 180 CAPSULE | Refills: 11 | Status: SHIPPED | OUTPATIENT
Start: 2024-05-15

## 2024-05-15 NOTE — TELEPHONE ENCOUNTER
Patient made aware of csa dose decrease with request for follow up  labs 6/3/24.        ----- Message from Houston Crawford MD sent at 5/15/2024 10:57 AM CDT -----  Yes, change to cyclo 75 mg BID  ----- Message -----  From: Merced Clifford RN  Sent: 5/14/2024   4:32 PM CDT  To: Houston Crawford MD    Csa too high?

## 2024-05-17 ENCOUNTER — TELEPHONE (OUTPATIENT)
Dept: TRANSPLANT | Facility: CLINIC | Age: 74
End: 2024-05-17
Payer: MEDICARE

## 2024-05-17 NOTE — TELEPHONE ENCOUNTER
Reached out to  pt re request via nurse coordinator. Pt asking to speak to me with questions about weight gain, diet at home.   Pt has gained about 10 lb since Oct 2023.   She is significantly limited by knee pain, shorter intervals of walking. She is doing house work, some outside walking btu not long distance. She hopes to start an injection soon to help with knee.   Pt is not drinking sugary drinks, limiting snacks that are processed, eating mainly chicken.   Pt asked a lot of questions about foods or methods that are foods that are more preferred than others. She wants to aim for more fish.   Email: [email or web address]    EDUCATION:  Sent to pt   -Weight loss tips  -Rec [id number] kcal/day, 75 gm protein  -1.5 - 2 L fluid    RD contact info provided, encouraged pt to call or email with any follow up questions

## 2024-06-03 ENCOUNTER — PATIENT MESSAGE (OUTPATIENT)
Dept: TRANSPLANT | Facility: CLINIC | Age: 74
End: 2024-06-03
Payer: MEDICARE

## 2024-06-06 LAB
EXT ALBUMIN: 3.7
EXT ALKALINE PHOSPHATASE: 107
EXT ALT: 9
EXT AST: 13
EXT BASOPHIL%: 0.7
EXT BILIRUBIN TOTAL: 1.1
EXT BUN: 14
EXT CALCIUM: 9
EXT CHLORIDE: 106
EXT CO2: 27
EXT CREATININE: 0.89 MG/DL
EXT CYCLOSPORONE LEVEL: 226
EXT EGFR NO RACE VARIABLE: >60
EXT EOSINOPHIL%: 9.4
EXT GLUCOSE: 114
EXT HEMATOCRIT: 37.6
EXT HEMOGLOBIN: 12.4
EXT LYMPH%: 18.3
EXT MONOCYTES%: 7.9
EXT PLATELETS: 238
EXT POTASSIUM: 4.5
EXT PROTEIN TOTAL: 6.7
EXT SEGS%: 63.7
EXT SODIUM: 141 MMOL/L
EXT WBC: 7.3

## 2024-06-07 ENCOUNTER — PATIENT MESSAGE (OUTPATIENT)
Dept: TRANSPLANT | Facility: CLINIC | Age: 74
End: 2024-06-07
Payer: MEDICARE

## 2024-06-10 ENCOUNTER — TELEPHONE (OUTPATIENT)
Dept: TRANSPLANT | Facility: CLINIC | Age: 74
End: 2024-06-10
Payer: MEDICARE

## 2024-06-10 NOTE — LETTER
Brianne 10, 2024    Arianna Sevilla  [address] [place] [address]          Dear Arianna Sevilla:  MRN: [id number]    This is a follow up to your recent labs, your lab results were stable.  There are no medicine changes.  Please have your labs drawn again on 8/19/24 to include Hep B labs.      If you cannot have your labs drawn on the scheduled date, it is your responsibility to call the transplant department to reschedule.  Please call [phone] and ask to speak to Mary HAMEED -  for all scheduling requests.     Sincerely,    Merced ANDRADEN, RN      Your Liver Transplant Coordinator    [facility]  [address] [place] [address]  [phone]

## 2024-06-10 NOTE — TELEPHONE ENCOUNTER
Letter sent to patient stating: Your labs have been reviewed by your Transplant physician, no action required. Next labs due 8/19/24      ----- Message -----  From: Houston Crawford MD  Sent: 6/7/2024  12:53 PM CDT  To: [facility] Clinical    Results reviewed

## 2024-06-24 ENCOUNTER — PATIENT MESSAGE (OUTPATIENT)
Dept: TRANSPLANT | Facility: CLINIC | Age: 74
End: 2024-06-24
Payer: MEDICARE

## 2024-06-27 LAB
EXT ALBUMIN: 3.9
EXT ALKALINE PHOSPHATASE: 123
EXT ALT: 12
EXT AST: 15
EXT BASOPHIL%: 0.7
EXT BILIRUBIN TOTAL: 1.3
EXT BUN: 20
EXT CALCIUM: 9.7
EXT CHLORIDE: 104
EXT CO2: 24
EXT CREATININE: 0.93 MG/DL
EXT CYCLOSPORONE LEVEL: 231
EXT EGFR NO RACE VARIABLE: >6
EXT EOSINOPHIL%: 17.3
EXT GLUCOSE: 111
EXT HEMATOCRIT: 38.2
EXT HEMOGLOBIN: 12.8
EXT LYMPH%: 23
EXT MONOCYTES%: 8.8
EXT PLATELETS: 243
EXT POTASSIUM: 5
EXT PROTEIN TOTAL: 7
EXT SEGS%: 50.2
EXT SODIUM: 136 MMOL/L
EXT WBC: 6.7

## 2024-06-28 ENCOUNTER — PATIENT MESSAGE (OUTPATIENT)
Dept: TRANSPLANT | Facility: CLINIC | Age: 74
End: 2024-06-28
Payer: MEDICARE

## 2024-06-28 ENCOUNTER — TELEPHONE (OUTPATIENT)
Dept: TRANSPLANT | Facility: CLINIC | Age: 74
End: 2024-06-28
Payer: MEDICARE

## 2024-06-28 NOTE — TELEPHONE ENCOUNTER
My chart message sent to patient, liver transplant labs reviewed by your physician, no action required with next labs 8/19/24 to include Hep B labs        ---- Message from Houston Crawford MD sent at 6/28/2024 11:12 AM CDT -----  Results reviewed

## 2024-08-21 ENCOUNTER — PATIENT MESSAGE (OUTPATIENT)
Dept: TRANSPLANT | Facility: CLINIC | Age: 74
End: 2024-08-21
Payer: MEDICARE

## 2024-08-22 DIAGNOSIS — Z94.4 S/P LIVER TRANSPLANT: Primary | ICD-10-CM

## 2024-08-22 DIAGNOSIS — Z79.60 LONG-TERM USE OF IMMUNOSUPPRESSANT MEDICATION: ICD-10-CM

## 2024-08-22 DIAGNOSIS — M25.569 KNEE PAIN, UNSPECIFIED CHRONICITY, UNSPECIFIED LATERALITY: ICD-10-CM

## 2024-08-23 LAB
EXT ALBUMIN: 3.9
EXT ALKALINE PHOSPHATASE: 117
EXT ALT: 14
EXT AST: 13
EXT BASOPHIL%: 0.9
EXT BILIRUBIN DIRECT: 0.4 MG/DL
EXT BILIRUBIN TOTAL: 1.1
EXT BUN: 18
EXT CALCIUM: 9.2
EXT CO2: 26
EXT CREATININE: 0.88 MG/DL
EXT CYCLOSPORONE LEVEL: 165
EXT EGFR NO RACE VARIABLE: >60
EXT EOSINOPHIL%: 9.1
EXT GLUCOSE: 109
EXT HBV DNA QUANT PCR: NOT DETECTED
EXT HEMATOCRIT: 39.4
EXT HEMOGLOBIN: 13
EXT HEP B S AG: NON REACTIVE
EXT LYMPH%: 24.6
EXT MONOCYTES%: 8.7
EXT PLATELETS: 223
EXT POTASSIUM: 4.3
EXT PROTEIN TOTAL: 7
EXT SEGS%: 56.7
EXT SODIUM: 141 MMOL/L
EXT WBC: 5.4

## 2024-08-29 ENCOUNTER — PATIENT MESSAGE (OUTPATIENT)
Dept: TRANSPLANT | Facility: CLINIC | Age: 74
End: 2024-08-29
Payer: MEDICARE

## 2024-08-30 ENCOUNTER — PATIENT MESSAGE (OUTPATIENT)
Dept: TRANSPLANT | Facility: CLINIC | Age: 74
End: 2024-08-30
Payer: MEDICARE

## 2024-08-30 ENCOUNTER — TELEPHONE (OUTPATIENT)
Dept: TRANSPLANT | Facility: CLINIC | Age: 74
End: 2024-08-30
Payer: MEDICARE

## 2024-08-30 NOTE — TELEPHONE ENCOUNTER
Letter sent to patient stating: Your labs have been reviewed by your Transplant physician, no action required. Next labs due 10/7/24            ----- Message from Houston Crawford MD sent at 8/30/2024 10:04 AM CDT -----  Results reviewed

## 2024-10-08 DIAGNOSIS — M25.561 RIGHT KNEE PAIN, UNSPECIFIED CHRONICITY: Primary | ICD-10-CM

## 2024-10-08 LAB
EXT ALBUMIN: 3.9
EXT ALKALINE PHOSPHATASE: 121
EXT ALT: 13
EXT AST: 15
EXT BASOPHIL%: 0.1
EXT BILIRUBIN DIRECT: 0.5 MG/DL
EXT BILIRUBIN TOTAL: 1.3
EXT BUN: 17
EXT CALCIUM: 9.4
EXT CHLORIDE: 107
EXT CO2: 25
EXT CREATININE: 0.89 MG/DL
EXT CYCLOSPORONE LEVEL: 182
EXT EOSINOPHIL%: 9.8
EXT GFR MDRD NON AF AMER: >60
EXT GLUCOSE: 108
EXT HEMATOCRIT: 38.7
EXT HEMOGLOBIN: 12.6
EXT LYMPH%: 26
EXT MONOCYTES%: 9.3
EXT PLATELETS: 235
EXT POTASSIUM: 4.3
EXT PROTEIN TOTAL: 6.8
EXT SEGS%: 54
EXT SODIUM: 139 MMOL/L
EXT WBC: 6.3

## 2024-10-09 ENCOUNTER — OFFICE VISIT (OUTPATIENT)
Dept: ORTHOPEDICS | Facility: CLINIC | Age: 74
End: 2024-10-09
Payer: MEDICARE

## 2024-10-09 ENCOUNTER — HOSPITAL ENCOUNTER (OUTPATIENT)
Dept: RADIOLOGY | Facility: HOSPITAL | Age: 74
Discharge: HOME | End: 2024-10-09
Attending: ORTHOPAEDIC SURGERY
Payer: MEDICARE

## 2024-10-09 DIAGNOSIS — M25.561 RIGHT KNEE PAIN, UNSPECIFIED CHRONICITY: ICD-10-CM

## 2024-10-09 DIAGNOSIS — M17.12 PRIMARY OSTEOARTHRITIS OF LEFT KNEE: Primary | ICD-10-CM

## 2024-10-09 DIAGNOSIS — M17.11 PRIMARY OSTEOARTHRITIS OF RIGHT KNEE: ICD-10-CM

## 2024-10-09 DIAGNOSIS — M25.569 KNEE PAIN, UNSPECIFIED CHRONICITY, UNSPECIFIED LATERALITY: ICD-10-CM

## 2024-10-09 DIAGNOSIS — Z79.60 LONG-TERM USE OF IMMUNOSUPPRESSANT MEDICATION: ICD-10-CM

## 2024-10-09 DIAGNOSIS — Z94.4 S/P LIVER TRANSPLANT: ICD-10-CM

## 2024-10-09 PROCEDURE — 73564 X-RAY EXAM KNEE 4 OR MORE: CPT | Mod: 26,RT,, | Performed by: RADIOLOGY

## 2024-10-09 PROCEDURE — 99203 OFFICE O/P NEW LOW 30 MIN: CPT | Mod: S$PBB,,, | Performed by: ORTHOPAEDIC SURGERY

## 2024-10-09 PROCEDURE — 73562 X-RAY EXAM OF KNEE 3: CPT | Mod: TC,LT

## 2024-10-09 PROCEDURE — 99999 PR PBB SHADOW E&M-EST. PATIENT-LVL III: CPT | Mod: PBBFAC,,, | Performed by: ORTHOPAEDIC SURGERY

## 2024-10-09 PROCEDURE — 99213 OFFICE O/P EST LOW 20 MIN: CPT | Mod: PBBFAC,25 | Performed by: ORTHOPAEDIC SURGERY

## 2024-10-09 PROCEDURE — 73562 X-RAY EXAM OF KNEE 3: CPT | Mod: 26,59,LT, | Performed by: RADIOLOGY

## 2024-10-09 PROCEDURE — 73564 X-RAY EXAM KNEE 4 OR MORE: CPT | Mod: TC,RT

## 2024-10-10 ENCOUNTER — TELEPHONE (OUTPATIENT)
Dept: TRANSPLANT | Facility: CLINIC | Age: 74
End: 2024-10-10
Payer: MEDICARE

## 2024-10-10 NOTE — LETTER
October 10, 2024    Arianna Sevilla  [address] [place] [address]          Dear Arianna Roel:  MRN: [id number]    This is a follow up to your recent labs, your lab results were stable.  There are no medicine changes.  Please have your labs drawn again on 11/18/2024.      If you cannot have your labs drawn on the scheduled date, it is your responsibility to call the transplant department to reschedule.  Please call [phone] and ask to speak to Mary HAMEED   for all scheduling requests.     Sincerely,    Merced ANDRADEN, RN      Your Liver Transplant Coordinator    [facility]  [address] [place] [address]  [phone]

## 2024-10-10 NOTE — TELEPHONE ENCOUNTER
Letter sent to patient stating: Your labs have been reviewed by your Transplant physician, no action required. Next labs due 11/18/2024          ----- Message from Houston Crawford MD sent at 10/10/2024  3:41 PM CDT -----  Results reviewed

## 2024-10-14 PROBLEM — M17.11 PRIMARY OSTEOARTHRITIS OF RIGHT KNEE: Status: ACTIVE | Noted: 2024-10-14

## 2024-10-14 NOTE — PROGRESS NOTES
Subjective:       Patient ID: Argelia Sevilla is a 74 y.o. female.    Chief Complaint:   Pain of the Right Knee  She comes in for initial opinion and advice regarding chronic right knee osteoarthritis pain.  She was going to have a total knee 2 years ago, elsewhere, but then her preop workup discovered that she needed a liver transplant.  This was completed here, and she has done well with it.  She has had injections of both corticosteroid and Euflexxa.  The 1st helped for a couple of weeks and the 2nd did not help.  She has daily pain with ADLs and she can not make it through all of the aisles at the grocery store.  She has intermittent night pain interfering with sleep.  No fall, accident, injury, history of pertinent surgery.  No groin pain, distal numbness or tingling.    Past Medical History:   Diagnosis Date    Anxiety disorder, unspecified     CAD (coronary artery disease)     DIC (disseminated intravascular coagulation)     Dyslipidemia     GERD (gastroesophageal reflux disease)     Hereditary hemolytic anemia, unspecified     History of coronary artery stent placement     HTN (hypertension)     Liver cirrhosis secondary to BROWN     Other ascites 2/15/2023    Portal hypertensive gastropathy     Primary localized osteoarthrosis of right lower leg     Statin intolerance      Past Surgical History:   Procedure Laterality Date    ADENOIDECTOMY      ANTERIOR CRUCIATE LIGAMENT REPAIR      BACK SURGERY      CARDIAC CATHETERIZATION  05/31/2018    CARDIAC CATHETERIZATION  09/28/2021    coronary angioplasty    CHOLECYSTECTOMY      COLONOSCOPY  05/25/2022    GALLBLADDER SURGERY      HYSTERECTOMY      KNEE ARTHROSCOPY Right     LIVER TRANSPLANT N/A 2/9/2023    Procedure: TRANSPLANT, LIVER;  Surgeon: Iggy Adhikari MD;  Location: [facility] OR [address] [place] [address];  Service: Transplant;  Laterality: N/A;    OOPHORECTOMY      REDUCTION OF BOTH BREASTS  01/01/2011    TONSILLECTOMY      TUBAL LIGATION       Family History   Problem Relation  Name Age of Onset    Stroke Mother      Hyperlipidemia Mother      Asthma Mother      Hyperlipidemia Father      Stroke Father      Hyperlipidemia Brother      Cancer Maternal Grandmother       Social History     Socioeconomic History    Marital status:    Tobacco Use    Smoking status: Never    Smokeless tobacco: Never   Substance and Sexual Activity    Alcohol use: Never     Social Drivers of Health     Financial Resource Strain: Low Risk  (6/21/2024)    Received from [facility]    Overall Financial Resource Strain (CARDIA)     Difficulty of Paying Living Expenses: Not hard at all   Food Insecurity: No Food Insecurity (6/21/2024)    Received from [facility]    Hunger Vital Sign     Worried About Running Out of Food in the Last Year: Never true     Ran Out of Food in the Last Year: Never true   Transportation Needs: No Transportation Needs (6/21/2024)    Received from [facility]    PRAPARE - Transportation     Lack of Transportation (Medical): No     Lack of Transportation (Non-Medical): No   Physical Activity: Insufficiently Active (6/21/2024)    Received from [facility]    Exercise Vital Sign     Days of Exercise per Week: 6 days     Minutes of Exercise per Session: 10 min   Stress: No Stress Concern Present (6/21/2024)    Received from [facility]    [employer] - Occupational Stress Questionnaire     Feeling of Stress : Not at all   Housing Stability: Low Risk  (6/21/2024)    Received from [facility]    Housing Stability Vital Sign     Unable to Pay for Housing in the Last Year: No     Number of Times Moved in the Last Year: 1     Homeless in the Last Year: No       Current Outpatient Medications   Medication Sig Dispense Refill    amLODIPine (NORVASC) 2.5 MG  tablet Take 2.5 mg by mouth once daily.      aspirin (ECOTRIN) 81 MG EC tablet Take 1 tablet (81 mg total) by mouth once daily. 30 tablet 11    cycloSPORINE modified, NEORAL, (NEORAL) 25 MG capsule Take 3 capsules (75 mg total) by mouth every 12 (twelve) hours. 180 capsule 11    docusate sodium (COLACE) 100 MG capsule Take 100 mg by mouth 2 (two) times daily.      EScitalopram oxalate (LEXAPRO) 5 MG Tab Take 5 mg by mouth nightly.      lamiVUDine (EPIVIR) 150 MG Tab Take 1 tablet (150 mg total) by mouth once daily. 30 tablet 11    losartan (COZAAR) 50 MG tablet Take 50 mg by mouth every evening.      ondansetron (ZOFRAN-ODT) 4 MG TbDL DISSOLVE 1 tablet (4 mg total) by mouth every 6 (six) hours as needed (nausea/vomiting). 15 tablet 0    calcium carbonate-vitamin D3 600 mg-20 mcg (800 unit) Tab Take 1 tablet by mouth once daily. 30 tablet 5    calcium citrate-vitamin D3 (CALCITRATE-VITAMIN D) 315 mg-6.25 mcg (250 unit) Tab Take 1 tablet by mouth once daily.      carvediloL (COREG) 25 MG tablet Take 1 tablet (25 mg total) by mouth 2 (two) times daily. (Patient taking differently: Take 25 mg by mouth 2 (two) times daily. Taking 1/2 of tablet in the morning , and 1 tablet in the evening) 60 tablet 11    gabapentin (NEURONTIN) 100 MG capsule Take 1 capsule (100 mg total) by mouth 2 (two) times daily. (Patient taking differently: Take 100 mg by mouth 2 (two) times daily. Taking 1 tablet in the morning and 2 tablets in the evening) 60 capsule 11    pantoprazole (PROTONIX) 40 MG tablet Take 1 tablet (40 mg total) by mouth once daily. 30 tablet 5     No current facility-administered medications for this visit.     Review of patient's allergies indicates:   Allergen Reactions    Codeine Anxiety    Adhesive tape-silicones Rash         Objective:      Physical Exam  There is is a varus deformity, which is partially passively correctable.  Active range of motion is 0-120 degrees.  Anterior crepitus with active extension.   Patellar mobility is limited.  Boggy synovitis without effusion.  Medial joint line tenderness predominates.  No instability to varus/valgus/Lachman's stressing.  No pain in the groin with flexion and internal rotation of the hip which is not limited.  Skin intact.  Distal neurovascular intact.  Flip test negative.    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  No acute findings or suspicious bone defects  Assessment:   Advanced DJD, right knee  Plan:       She still thinks that she is ready for surgery, as she did a couple of years ago, and we will send her to Dr. Arroyo to get scheduled for this.  She will need involvement of her transplant team with her surgery here at [facility].  The patient's pathophysiology was explained in detail with reference to x-rays, models, other visual aids as appropriate.  Treatment options were discussed in detail.  Questions were invited and answered to the patient's satisfaction.    Dimas Amezcua MD  Orthopaedic Surgery

## 2024-10-21 ENCOUNTER — TELEPHONE (OUTPATIENT)
Dept: TRANSPLANT | Facility: CLINIC | Age: 74
End: 2024-10-21
Payer: MEDICARE

## 2024-10-21 NOTE — TELEPHONE ENCOUNTER
"Patient contacted and states she had a BM and then after her bm she had passed gas then saw a little blood on her underwear. Patient states she sees PCP today at 3p, advised if there is anything significant to report back to us, to please call again      ----- Message from Med Assistant Hernandez sent at 10/21/2024  1:10 PM CDT -----  Regarding: FW: Consult/Advisory  Contact: [phone]    ----- Message -----  From: Marvin Barahona  Sent: 10/21/2024  12:02 PM CDT  To: [facility] Non-Clinical  Subject: Consult/Advisory                                 Consult/Advisory     Name Of Caller: Pt         Contact Preference:   [phone]     Nature of call: Pt states she thought she mad a BM but she actually passed blood. Please call to advise     Additional Notes:  "Thank you for all that you do for our patients"  "

## 2024-10-21 NOTE — TELEPHONE ENCOUNTER
"----- Message from Med Assistant Hernandez sent at 10/21/2024  1:10 PM CDT -----  Regarding: FW: Consult/Advisory  Contact: [phone]    ----- Message -----  From: Marvin Barahona  Sent: 10/21/2024  12:02 PM CDT  To: [facility] Post-Liver Transplant Non-Clinical  Subject: Consult/Advisory                                 Consult/Advisory     Name Of Caller: Pt         Contact Preference:   [phone]     Nature of call: Pt states she thought she mad a BM but she actually passed blood. Please call to advise     Additional Notes:  "Thank you for all that you do for our patients"  "

## 2024-10-22 ENCOUNTER — PATIENT MESSAGE (OUTPATIENT)
Dept: TRANSPLANT | Facility: CLINIC | Age: 74
End: 2024-10-22
Payer: MEDICARE

## 2024-10-24 ENCOUNTER — PATIENT MESSAGE (OUTPATIENT)
Dept: TRANSPLANT | Facility: CLINIC | Age: 74
End: 2024-10-24
Payer: MEDICARE

## 2024-11-01 ENCOUNTER — OFFICE VISIT (OUTPATIENT)
Dept: ORTHOPEDICS | Facility: CLINIC | Age: 74
End: 2024-11-01
Payer: MEDICARE

## 2024-11-01 VITALS — BODY MASS INDEX: 32.33 KG/M2 | HEIGHT: 62 IN | WEIGHT: 175.69 LBS

## 2024-11-01 DIAGNOSIS — M17.11 PRIMARY OSTEOARTHRITIS OF RIGHT KNEE: Primary | ICD-10-CM

## 2024-11-01 DIAGNOSIS — M17.12 PRIMARY OSTEOARTHRITIS OF LEFT KNEE: ICD-10-CM

## 2024-11-01 PROCEDURE — 99999 PR PBB SHADOW E&M-EST. PATIENT-LVL IV: CPT | Mod: PBBFAC,,, | Performed by: STUDENT IN AN ORGANIZED HEALTH CARE EDUCATION/TRAINING PROGRAM

## 2024-11-01 PROCEDURE — 99214 OFFICE O/P EST MOD 30 MIN: CPT | Mod: PBBFAC | Performed by: STUDENT IN AN ORGANIZED HEALTH CARE EDUCATION/TRAINING PROGRAM

## 2024-11-01 PROCEDURE — 99214 OFFICE O/P EST MOD 30 MIN: CPT | Mod: S$PBB,,, | Performed by: STUDENT IN AN ORGANIZED HEALTH CARE EDUCATION/TRAINING PROGRAM

## 2024-11-01 NOTE — PROGRESS NOTES
Assessment & Plan   Encounter Diagnoses   Name Primary?    Primary osteoarthritis of left knee     Primary osteoarthritis of right knee Yes        Arianna Sevilla has right knee arthritis, varus pattern, with associated instability.  We discussed the ultimately she may benefit from total knee replacement as definitive treatment, but she Understands that she is at high-risk for complication due to liver transplant and is interested in pursuing additional conservative treatment options at this time.  We referred her to pain management for radiofrequency nerve ablation.  She can continue to use the Tylenol as needed.  We also ordered a hinged knee brace for support to minimize buckling episodes.  We also recommend she continue to use the cane for balance.  We will see her back in 3 months for re-evaluation although she is welcome to call or return sooner as needed for any new or worsening symptoms or other concerns..     Problem List:  Problem List Items Addressed This Visit          Orthopedic    Primary osteoarthritis of right knee - Primary    Relevant Orders    Ambulatory referral/consult to Pain Clinic    HME - OTHER     Other Visit Diagnoses       Primary osteoarthritis of left knee                    Patient ID: Arianna Sevilla is a 74 y.o. female.  Chief Complaint   Patient presents with    Right Knee - Pain        History of Present Illness:  Arianna Sevilla is a 74 y.o. female who presents for evaluation of R knee pain. The medial and anterior knee pain has been present for 3+ years and has been progressive. Conservative treatment in the form of Tylenol, PT, activity modification, CSI, viscosupplementation injection has been attempted previously. The patient requires a cane as assistive device outside the home or for longer walks. The pain is worsened by walking, standing. She rates the pain as 7/10, it is associated with stiffness, swelling, and episodes of buckling or giving out.  She navigate stairs 1 step at a  time using the banister and must use her arms to get up from a chair.  Physical therapy has been attempted previously. The patient has a decreased quality of life due to knee pain and presents today for evaluation.  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  She is now doing well in terms of the liver transplantation, which was done here at [facility], and she is interested in further treatment options for her knee.     Past Medical History:  Past Medical History:   Diagnosis Date    Anxiety disorder, unspecified     CAD (coronary artery disease)     DIC (disseminated intravascular coagulation)     Dyslipidemia     GERD (gastroesophageal reflux disease)     Hereditary hemolytic anemia, unspecified     History of coronary artery stent placement     HTN (hypertension)     Liver cirrhosis secondary to BROWN     Other ascites 2/15/2023    Portal hypertensive gastropathy     Primary localized osteoarthrosis of right lower leg     Statin intolerance         Surgical History:  Past Surgical History:   Procedure Laterality Date    ADENOIDECTOMY      ANTERIOR CRUCIATE LIGAMENT REPAIR      BACK SURGERY      CARDIAC CATHETERIZATION  05/31/2018    CARDIAC CATHETERIZATION  09/28/2021    coronary angioplasty    CHOLECYSTECTOMY      COLONOSCOPY  05/25/2022    GALLBLADDER SURGERY      HYSTERECTOMY      KNEE ARTHROSCOPY Right     LIVER TRANSPLANT N/A 2/9/2023    Procedure: TRANSPLANT, LIVER;  Surgeon: Iggy Adhikari MD;  Location: [facility] OR [address] [place] [address];  Service: Transplant;  Laterality: N/A;    OOPHORECTOMY      REDUCTION OF BOTH BREASTS  01/01/2011    TONSILLECTOMY      TUBAL LIGATION          Social History:  She reports that she has never smoked. She has never used smokeless tobacco. She reports that she does not drink alcohol. No history on file for  drug use.     Family History:  family history includes Asthma in her mother; Cancer in her maternal grandmother; Hyperlipidemia in her brother, father, and mother; Stroke in her father and mother.     Current Outpatient Medications on File Prior to Visit   Medication Sig Dispense Refill    amLODIPine (NORVASC) 2.5 MG tablet Take 2.5 mg by mouth once daily.      aspirin (ECOTRIN) 81 MG EC tablet Take 1 tablet (81 mg total) by mouth once daily. 30 tablet 11    carvediloL (COREG) 25 MG tablet Take 1 tablet (25 mg total) by mouth 2 (two) times daily. (Patient taking differently: Take 25 mg by mouth 2 (two) times daily. Taking 1/2 of tablet in the morning , and 1 tablet in the evening) 60 tablet 11    cycloSPORINE modified, NEORAL, (NEORAL) 25 MG capsule Take 3 capsules (75 mg total) by mouth every 12 (twelve) hours. 180 capsule 11    docusate sodium (COLACE) 100 MG capsule Take 100 mg by mouth 2 (two) times daily.      EScitalopram oxalate (LEXAPRO) 5 MG Tab Take 5 mg by mouth nightly.      lamiVUDine (EPIVIR) 150 MG Tab Take 1 tablet (150 mg total) by mouth once daily. 30 tablet 11    losartan (COZAAR) 50 MG tablet Take 50 mg by mouth every evening.      ondansetron (ZOFRAN-ODT) 4 MG TbDL DISSOLVE 1 tablet (4 mg total) by mouth every 6 (six) hours as needed (nausea/vomiting). 15 tablet 0    pantoprazole (PROTONIX) 40 MG tablet Take 1 tablet (40 mg total) by mouth once daily. 30 tablet 5    [DISCONTINUED] calcium carbonate-vitamin D3 600 mg-20 mcg (800 unit) Tab Take 1 tablet by mouth once daily. 30 tablet 5    [DISCONTINUED] calcium citrate-vitamin D3 (CALCITRATE-VITAMIN D) 315 mg-6.25 mcg (250 unit) Tab Take 1 tablet by mouth once daily.      [DISCONTINUED] gabapentin (NEURONTIN) 100 MG capsule Take 1 capsule (100 mg total) by mouth 2 (two) times daily. (Patient taking differently: Take 100 mg by mouth 2 (two) times daily. Taking 1 tablet in the morning and 2 tablets in the evening) 60 capsule 11     No current  "facility-administered medications on file prior to visit.     Review of patient's allergies indicates:   Allergen Reactions    Codeine Anxiety    Adhesive tape-silicones Rash          Physical exam:  Height 5' 2" (1.575 m), weight 79.7 kg (175 lb 11.3 oz).  General: no apparent distress    Gait: + antalgic, + limp, no use of assistive devices at time of exam    R knee:   TTP at the medial joint line   Skin: intact, mild effusion  Range of motion: 10 to 120  Strength: 4+/5 with extension, 5/5 with flexion  Ligament exam: partially correctable varus deformity, otherwise stable to varus/valgus and stable to AP stress in flexion and extension  Neurovascular: WWP, palpable pulse,  Light touch sensation intact    L knee:   Not TTP   Skin: intact, no effusion  Range of motion: 5 to 125  Strength: 5/5 with extension, 5/5 with flexion  Ligament exam: stable to varus/valgus and stable to AP stress in flexion and extension  Neurovascular: WWP,  Light touch sensation intact     Relevant Results:  Imaging:  Plain x-rays of the R knee were obtained on 10/9/24 and independently reviewed by me today, 11/01/2024, and demonstrate complete narrowing of the medial compartment, tricompartmental osteophytes and sclerosis c/w KL grade 4 arthritic change. There is varus alignment.     Labs:  Hb 12.6, Alb 3.9, Cr 0.89, A1c 5.4        "

## 2024-11-07 ENCOUNTER — PATIENT MESSAGE (OUTPATIENT)
Dept: TRANSPLANT | Facility: CLINIC | Age: 74
End: 2024-11-07
Payer: MEDICARE

## 2024-11-11 ENCOUNTER — OFFICE VISIT (OUTPATIENT)
Dept: PAIN MEDICINE | Facility: CLINIC | Age: 74
End: 2024-11-11
Payer: MEDICARE

## 2024-11-11 ENCOUNTER — TELEPHONE (OUTPATIENT)
Dept: PAIN MEDICINE | Facility: CLINIC | Age: 74
End: 2024-11-11

## 2024-11-11 VITALS
WEIGHT: 175.69 LBS | SYSTOLIC BLOOD PRESSURE: 150 MMHG | DIASTOLIC BLOOD PRESSURE: 78 MMHG | HEART RATE: 52 BPM | HEIGHT: 62 IN | BODY MASS INDEX: 32.33 KG/M2

## 2024-11-11 DIAGNOSIS — Z91.89 AT RISK FOR OPPORTUNISTIC INFECTIONS: ICD-10-CM

## 2024-11-11 DIAGNOSIS — M17.11 PRIMARY OSTEOARTHRITIS OF RIGHT KNEE: Primary | ICD-10-CM

## 2024-11-11 DIAGNOSIS — Z94.4 S/P LIVER TRANSPLANT: ICD-10-CM

## 2024-11-11 PROCEDURE — 99999 PR PBB SHADOW E&M-EST. PATIENT-LVL III: CPT | Mod: PBBFAC,,, | Performed by: STUDENT IN AN ORGANIZED HEALTH CARE EDUCATION/TRAINING PROGRAM

## 2024-11-11 PROCEDURE — 99213 OFFICE O/P EST LOW 20 MIN: CPT | Mod: PBBFAC,PN | Performed by: STUDENT IN AN ORGANIZED HEALTH CARE EDUCATION/TRAINING PROGRAM

## 2024-11-11 PROCEDURE — 99203 OFFICE O/P NEW LOW 30 MIN: CPT | Mod: S$PBB,,, | Performed by: STUDENT IN AN ORGANIZED HEALTH CARE EDUCATION/TRAINING PROGRAM

## 2024-11-11 NOTE — PROGRESS NOTES
[facility]    Referred by:   Aleksandr Arroyo MD    PCP:   Azul, Primary Doctor    CHIEF COMPLAINT:   Right knee pain    HISTORY OF PRESENT ILLNESS:   Argelia Sevilla presents for evaluation of chronic right knee pain.  The patient has a long history of osteoarthritic type pains affecting this knee.  These do not radiate and are not associated with any neuropathic symptoms.  They are worse with ambulation and better with rest.  She has been seen extensively apply orthopedics for this issue.  She has undergone corticosteroid injections, viscosupplementation, and was in the process of preoperative planning for a total knee arthroplasty when she was discovered to have a cirrhotic liver.  Shortly after this discovering, she was placed on the transplant list and did end up receiving a transplant.  Given this new, significant comorbidity, orthopedics elected to defer TKA at this time and referred her to us for consideration of genicular nerve blocks and ablation. They deny fever, chills, night sweats, N/V, diarrhea, SOB, and chest pain.    PAIN SCORES:  Vitals:    11/11/24 0927   PainSc:   6   PainLoc: Knee       Therapy:  Yes, has performed dedicated physical therapy course for her right knee in the past  Continues perform stretches and home exercise plan    Pertinent Medications:  Aspirin 81   Cyclosporin   All other medications reviewed in the patient's chart.     Pain Intervention History:  Multiple intra-articular injections right knee    Review of patient's allergies indicates:   Allergen Reactions    Codeine Anxiety    Adhesive tape-silicones Rash     Past Medical History:   Diagnosis Date    Anxiety disorder, unspecified     CAD (coronary artery disease)     DIC (disseminated intravascular coagulation)     Dyslipidemia     GERD (gastroesophageal reflux disease)     Hereditary hemolytic anemia, unspecified     History of coronary artery stent placement     HTN (hypertension)     Liver cirrhosis  "secondary to BROWN     Other ascites 2/15/2023    Portal hypertensive gastropathy     Primary localized osteoarthrosis of right lower leg     Statin intolerance      Past Surgical History:   Procedure Laterality Date    ADENOIDECTOMY      ANTERIOR CRUCIATE LIGAMENT REPAIR      BACK SURGERY      CARDIAC CATHETERIZATION  05/31/2018    CARDIAC CATHETERIZATION  09/28/2021    coronary angioplasty    CHOLECYSTECTOMY      COLONOSCOPY  05/25/2022    GALLBLADDER SURGERY      HYSTERECTOMY      KNEE ARTHROSCOPY Right     LIVER TRANSPLANT N/A 2/9/2023    Procedure: TRANSPLANT, LIVER;  Surgeon: Iggy Adhikari MD;  Location: [facility] OR [address] [place] [address];  Service: Transplant;  Laterality: N/A;    OOPHORECTOMY      REDUCTION OF BOTH BREASTS  01/01/2011    TONSILLECTOMY      TUBAL LIGATION         Social History:  Retired  Social History     Tobacco Use    Smoking status: Never    Smokeless tobacco: Never   Substance Use Topics    Alcohol use: Never        Family history reviewed in the patient's chart.     PHYSICAL EXAMINATION  VITALS:   Vitals:    11/11/24 0927   BP: (!) 150/78   Pulse: (!) 52   Weight: 79.7 kg (175 lb 11.3 oz)   Height: 5' 2" (1.575 m)   PainSc:   6   PainLoc: Knee     GENERAL: Calm, cooperative, pleasant  CHEST: No increased work of breathing  SKIN: Intact    MSK/NEURO:  Right knee:  Range of motion of the right knee is full in flexion-extension with associated pain on terminal flexion   She is tender to palpation in the medial and posterior aspect of the joint   There is palpable crepitus with passive range of motion   No obvious swelling compared to the left side   No obvious ligamentous instability    LABS:  October 2024   Blood counts normal on CBC    IMAGING:    Knee plain films 2024  Tricompartmental OA worst in medial compartment where it is severe    ASSESSMENT:   74 y.o. year old female with right knee osteoarthritis refractory to therapy, medications, and intra-articular injections who was not a candidate for " surgery given her recent liver transplant and immunocompromise status.    Encounter Diagnoses   Name Primary?    Primary osteoarthritis of right knee Yes    S/P liver transplant     At risk for opportunistic infections      PLAN:  Right knee genicular nerve blocks. Discussed this procedure at length with the patient and her .   F/u determined by reponse to blocks.    Bandar Corey MD  This note was completed with dictation software and grammatical/syntax errors may exist.

## 2024-11-11 NOTE — H&P (VIEW-ONLY)
[facility]    Referred by:   Aleksandr Arroyo MD    PCP:   Azul, Primary Doctor    CHIEF COMPLAINT:   Right knee pain    HISTORY OF PRESENT ILLNESS:   Argelia Sevilla presents for evaluation of chronic right knee pain.  The patient has a long history of osteoarthritic type pains affecting this knee.  These do not radiate and are not associated with any neuropathic symptoms.  They are worse with ambulation and better with rest.  She has been seen extensively apply orthopedics for this issue.  She has undergone corticosteroid injections, viscosupplementation, and was in the process of preoperative planning for a total knee arthroplasty when she was discovered to have a cirrhotic liver.  Shortly after this discovering, she was placed on the transplant list and did end up receiving a transplant.  Given this new, significant comorbidity, orthopedics elected to defer TKA at this time and referred her to us for consideration of genicular nerve blocks and ablation. They deny fever, chills, night sweats, N/V, diarrhea, SOB, and chest pain.    PAIN SCORES:  Vitals:    11/11/24 0927   PainSc:   6   PainLoc: Knee       Therapy:  Yes, has performed dedicated physical therapy course for her right knee in the past  Continues perform stretches and home exercise plan    Pertinent Medications:  Aspirin 81   Cyclosporin   All other medications reviewed in the patient's chart.     Pain Intervention History:  Multiple intra-articular injections right knee    Review of patient's allergies indicates:   Allergen Reactions    Codeine Anxiety    Adhesive tape-silicones Rash     Past Medical History:   Diagnosis Date    Anxiety disorder, unspecified     CAD (coronary artery disease)     DIC (disseminated intravascular coagulation)     Dyslipidemia     GERD (gastroesophageal reflux disease)     Hereditary hemolytic anemia, unspecified     History of coronary artery stent placement     HTN (hypertension)     Liver cirrhosis  "secondary to BROWN     Other ascites 2/15/2023    Portal hypertensive gastropathy     Primary localized osteoarthrosis of right lower leg     Statin intolerance      Past Surgical History:   Procedure Laterality Date    ADENOIDECTOMY      ANTERIOR CRUCIATE LIGAMENT REPAIR      BACK SURGERY      CARDIAC CATHETERIZATION  05/31/2018    CARDIAC CATHETERIZATION  09/28/2021    coronary angioplasty    CHOLECYSTECTOMY      COLONOSCOPY  05/25/2022    GALLBLADDER SURGERY      HYSTERECTOMY      KNEE ARTHROSCOPY Right     LIVER TRANSPLANT N/A 2/9/2023    Procedure: TRANSPLANT, LIVER;  Surgeon: Iggy Adhikari MD;  Location: [facility] OR [address] [place] [address];  Service: Transplant;  Laterality: N/A;    OOPHORECTOMY      REDUCTION OF BOTH BREASTS  01/01/2011    TONSILLECTOMY      TUBAL LIGATION         Social History:  Retired  Social History     Tobacco Use    Smoking status: Never    Smokeless tobacco: Never   Substance Use Topics    Alcohol use: Never        Family history reviewed in the patient's chart.     PHYSICAL EXAMINATION  VITALS:   Vitals:    11/11/24 0927   BP: (!) 150/78   Pulse: (!) 52   Weight: 79.7 kg (175 lb 11.3 oz)   Height: 5' 2" (1.575 m)   PainSc:   6   PainLoc: Knee     GENERAL: Calm, cooperative, pleasant  CHEST: No increased work of breathing  SKIN: Intact    MSK/NEURO:  Right knee:  Range of motion of the right knee is full in flexion-extension with associated pain on terminal flexion   She is tender to palpation in the medial and posterior aspect of the joint   There is palpable crepitus with passive range of motion   No obvious swelling compared to the left side   No obvious ligamentous instability    LABS:  October 2024   Blood counts normal on CBC    IMAGING:    Knee plain films 2024  Tricompartmental OA worst in medial compartment where it is severe    ASSESSMENT:   74 y.o. year old female with right knee osteoarthritis refractory to therapy, medications, and intra-articular injections who was not a candidate for " surgery given her recent liver transplant and immunocompromise status.    Encounter Diagnoses   Name Primary?    Primary osteoarthritis of right knee Yes    S/P liver transplant     At risk for opportunistic infections      PLAN:  Right knee genicular nerve blocks. Discussed this procedure at length with the patient and her .   F/u determined by reponse to blocks.    Bandar Corey MD  This note was completed with dictation software and grammatical/syntax errors may exist.

## 2024-11-11 NOTE — TELEPHONE ENCOUNTER
Level of Service:98399     OR OFFICE/OUTPT VISIT, NEW, LEVL III, 30-44 MIN      Types of orders made on 11/11/2024: Outpatient Referral, Procedure Request      Order Date:11/11/2024   Ordering User:BANDAR SINGLETON [[id number]]   Encounter Provider:Bandar Singleton MD [[id number]]   Authorizing Provider: Bandar Singleton MD [[id number]]   Department:[facility] PAIN MANAGEMENT[[id number]]      Common Order Information   Procedure -> Other (Specify location and laterality) Cmt: right genicular nerve             blocks      Order Specific Information   Order: Procedure Order to Pain Management [Custom: [id number]]  Order #:          [id number]: 1 FUTURE     Priority: Routine  Class: Clinic Performed     Future Order Information       Expires on:11/11/2025            Expected by:11/11/2024                   Associated Diagnoses       M17.11 Primary osteoarthritis of right knee       Physician -> molly          Is patient on anti-coagulants? Cmt: dont stop          Facility Name: -> [place]              Priority: Routine  Class: Clinic Performed     Future Order Information       Expires on:11/11/2025            Expected by:11/11/2024                   Associated Diagnoses       M17.11 Primary osteoarthritis of right knee       Procedure -> Other (Specify location and laterality) Cmt: right genicular

## 2024-11-15 ENCOUNTER — PATIENT MESSAGE (OUTPATIENT)
Dept: TRANSPLANT | Facility: CLINIC | Age: 74
End: 2024-11-15
Payer: MEDICARE

## 2024-11-19 LAB
EXT ALBUMIN: 3.9
EXT ALKALINE PHOSPHATASE: 128
EXT ALT: 12
EXT AST: 15
EXT BASOPHIL%: 1
EXT BILIRUBIN DIRECT: 0.4 MG/DL
EXT BILIRUBIN TOTAL: 1.2
EXT BUN: 18
EXT CALCIUM: 9.4
EXT CHLORIDE: 106
EXT CO2: 25
EXT CREATININE: 0.89 MG/DL
EXT CYCLOSPORONE LEVEL: 215
EXT EGFR NO RACE VARIABLE: >60
EXT EOSINOPHIL%: 4.9
EXT GLUCOSE: 114
EXT HEMATOCRIT: 39.6
EXT HEMOGLOBIN: 13
EXT LYMPH%: 27.7
EXT MONOCYTES%: 8.6
EXT PLATELETS: 225
EXT POTASSIUM: 4.2
EXT PROTEIN TOTAL: 7
EXT SEGS%: 57.8
EXT SODIUM: 139 MMOL/L
EXT WBC: 6.2

## 2024-11-20 ENCOUNTER — HOSPITAL ENCOUNTER (OUTPATIENT)
Facility: HOSPITAL | Age: 74
Discharge: HOME | End: 2024-11-20
Attending: STUDENT IN AN ORGANIZED HEALTH CARE EDUCATION/TRAINING PROGRAM | Admitting: STUDENT IN AN ORGANIZED HEALTH CARE EDUCATION/TRAINING PROGRAM
Payer: MEDICARE

## 2024-11-20 DIAGNOSIS — M17.9 KNEE OSTEOARTHRITIS: ICD-10-CM

## 2024-11-20 PROCEDURE — 64454 NJX AA&/STRD GNCLR NRV BRNCH: CPT | Mod: RT,,, | Performed by: STUDENT IN AN ORGANIZED HEALTH CARE EDUCATION/TRAINING PROGRAM

## 2024-11-20 PROCEDURE — 99152 MOD SED SAME PHYS/QHP 5/>YRS: CPT | Performed by: STUDENT IN AN ORGANIZED HEALTH CARE EDUCATION/TRAINING PROGRAM

## 2024-11-20 PROCEDURE — 63600175 PHARM REV CODE 636 W HCPCS: Performed by: STUDENT IN AN ORGANIZED HEALTH CARE EDUCATION/TRAINING PROGRAM

## 2024-11-20 PROCEDURE — 64454 NJX AA&/STRD GNCLR NRV BRNCH: CPT | Mod: RT | Performed by: STUDENT IN AN ORGANIZED HEALTH CARE EDUCATION/TRAINING PROGRAM

## 2024-11-20 PROCEDURE — 99152 MOD SED SAME PHYS/QHP 5/>YRS: CPT | Mod: ,,, | Performed by: STUDENT IN AN ORGANIZED HEALTH CARE EDUCATION/TRAINING PROGRAM

## 2024-11-20 PROCEDURE — 25500020 PHARM REV CODE 255: Performed by: STUDENT IN AN ORGANIZED HEALTH CARE EDUCATION/TRAINING PROGRAM

## 2024-11-20 RX ORDER — SODIUM CHLORIDE, SODIUM LACTATE, POTASSIUM CHLORIDE, CALCIUM CHLORIDE 600; 310; 30; 20 MG/100ML; MG/100ML; MG/100ML; MG/100ML
INJECTION, SOLUTION INTRAVENOUS CONTINUOUS
Status: DISCONTINUED | OUTPATIENT
Start: 2024-11-20 | End: 2024-11-20 | Stop reason: HOSPADM

## 2024-11-20 RX ORDER — LIDOCAINE HYDROCHLORIDE 10 MG/ML
1 INJECTION, SOLUTION EPIDURAL; INFILTRATION; INTRACAUDAL; PERINEURAL ONCE
Status: DISCONTINUED | OUTPATIENT
Start: 2024-11-20 | End: 2024-11-20 | Stop reason: HOSPADM

## 2024-11-20 RX ORDER — LIDOCAINE HYDROCHLORIDE 10 MG/ML
INJECTION, SOLUTION EPIDURAL; INFILTRATION; INTRACAUDAL; PERINEURAL
Status: DISCONTINUED | OUTPATIENT
Start: 2024-11-20 | End: 2024-11-20 | Stop reason: HOSPADM

## 2024-11-20 RX ORDER — MIDAZOLAM HYDROCHLORIDE 1 MG/ML
INJECTION INTRAMUSCULAR; INTRAVENOUS
Status: DISCONTINUED | OUTPATIENT
Start: 2024-11-20 | End: 2024-11-20 | Stop reason: HOSPADM

## 2024-11-20 NOTE — OP NOTE
Diagnostic Genicular Nerve Block under Fluoroscopic Guidance    The procedure, risks, benefits, and options were discussed with the patient. There are no contraindications to the procedure. The patent expressed understanding and agreed to the procedure. Informed written consent was obtained prior to the start of the procedure and can be found in the patient's chart.    PATIENT NAME: Argelia Sevilla   MRN: [id number]     DATE OF PROCEDURE: 11/20/2024    PROCEDURE: Diagnostic Right Genicular Nerve Block under Fluoroscopic Guidance    PRE-OP DIAGNOSIS: Primary osteoarthritis of right knee [M17.11]    POST-OP DIAGNOSIS: Same    PHYSICIAN: Bandar Corey MD    ASSISTANTS: none     MEDICATIONS INJECTED: Bupivacine 0.25%     LOCAL ANESTHETIC INJECTED: Xylocaine 2%     SEDATION: Versed 1mg and Fentanyl 0mcg                                                                                                                                                                                     Conscious sedation ordered by M.D. Patient re-evaluation prior to administration of conscious sedation. No changes noted in patient's status from initial evaluation. The patient's vital signs were monitored by RN and patient remained hemodynamically stable throughout the procedure.    Event Time In   Sedation Start 0809   Sedation End 0825       ESTIMATED BLOOD LOSS: None    COMPLICATIONS: None    INTERVAL HISTORY: Patient has clinical findings of chronic knee pain that is not relieved by other therapies.  She is not a surgical candidate.    TECHNIQUE: Time-out was performed to identify the patient and procedure to be performed. With the patient laying in a supine position, the surgical area was prepped and draped in the usual sterile fashion using ChloraPrep and a fenestrated drape. Four target sites including the superior lateral genicular nerve where the lateral femoral shaft meets the epicondyle, the superior medial genicular nerve  where the medial femoral shaft meets the epicondyle, the inferior medial genicular nerve where the medial tibial shaft meets the epicondyle, and the suprapatellar,  were determined under fluoroscopy guidance. Skin anesthesia was achieved by injecting Lidocaine 2% over the injection sites. A 25 gauge, 3.5 inch spinal quinke needle was then advanced under fluoroscopy in the AP and lateral views into the positions of the geniculate nerves at each site. Once the needle tip position was confirmed on fluoroscopy, negative pressure was applied to confirm no intravascular placement. Contrast dye  Omnipaque (300mg/mL) was injected to confirm placement and there was no vascular runoff. 0.5 mL of the anesthetic listed above was then slowly injected at each site. The needles were removed and bleeding was nil. A sterile dressing was applied. No specimens collected. The patient tolerated the procedure well.     PRE-PROCEDURE PAIN SCORE: 6/10    POST-PROCEDURE PAIN SCORE: 1/10    The patient was monitored after the procedure in the recovery area. They were given post-procedure and discharge instructions to follow at home. The patient was discharged in a stable condition.    Bandar Corey MD

## 2024-11-20 NOTE — DISCHARGE SUMMARY
[facility]  Discharge Note  Short Stay     Admit Date: 11/20/2024    Discharge Date: 11/20/2024     Attending Physician: Bandar Corey MD    Diagnoses:  Knee osteoarthritis    Discharged Condition: Good     Hospital Course: Patient was admitted for an outpatient interventional pain management procedure and tolerated the procedure well with no complications.     Final Diagnoses: Same as principal problem.     Disposition: Home or Self Care     Follow up/Patient Instructions:   Follow-up in 4 weeks unless otherwise instructed. May return sooner as needed.       Reconciled Medications:     Medication List        CONTINUE taking these medications      amLODIPine 2.5 MG tablet  Commonly known as: NORVASC  Take 2.5 mg by mouth once daily.     aspirin 81 MG EC tablet  Commonly known as: ECOTRIN  Take 1 tablet (81 mg total) by mouth once daily.     carvediloL 25 MG tablet  Commonly known as: COREG  Take 1 tablet (25 mg total) by mouth 2 (two) times daily.     cycloSPORINE modified (NEORAL) 25 MG capsule  Commonly known as: NEORAL  Take 3 capsules (75 mg total) by mouth every 12 (twelve) hours.     docusate sodium 100 MG capsule  Commonly known as: COLACE  Take 100 mg by mouth 2 (two) times daily.     EScitalopram oxalate 5 MG Tab  Commonly known as: LEXAPRO  Take 5 mg by mouth nightly.     lamiVUDine 150 MG Tab  Commonly known as: EPIVIR  Take 1 tablet (150 mg total) by mouth once daily.     losartan 50 MG tablet  Commonly known as: COZAAR  Take 50 mg by mouth every evening.     ondansetron 4 MG Tbdl  Commonly known as: ZOFRAN-ODT  DISSOLVE 1 tablet (4 mg total) by mouth every 6 (six) hours as needed (nausea/vomiting).     pantoprazole 40 MG tablet  Commonly known as: PROTONIX  Take 1 tablet (40 mg total) by mouth once daily.             Discharge Procedure Orders (must include Diet, Follow-up, Activity)   Ice to affected area   Order Comments: 20 minutes of ice or until area numb to the touch if area is  sore 2-3 times per day as needed     No driving until:   Order Comments: Until following day     No dressing needed     Notify your health care provider if you experience any of the following:  temperature >100.4     Notify your health care provider if you experience any of the following:  persistent nausea and vomiting or diarrhea     Notify your health care provider if you experience any of the following:  severe uncontrolled pain     Notify your health care provider if you experience any of the following:  redness, tenderness, or signs of infection (pain, swelling, redness, odor or green/yellow discharge around incision site)     Notify your health care provider if you experience any of the following:  difficulty breathing or increased cough     Notify your health care provider if you experience any of the following:  severe persistent headache     Notify your health care provider if you experience any of the following:  worsening rash     Notify your health care provider if you experience any of the following:  persistent dizziness, light-headedness, or visual disturbances     Notify your health care provider if you experience any of the following:  increased confusion or weakness     Shower on day dressing removed (No bath)       Bandar Corey MD  Interventional Pain Medicine / Physical Medicine & Rehabilitation

## 2024-11-20 NOTE — PLAN OF CARE
Rec pt from OR.  VSS pt states no pain or nausea.  Dr Corey at bedside explaining procedure and discharge instructions.

## 2024-11-21 ENCOUNTER — TELEPHONE (OUTPATIENT)
Dept: PAIN MEDICINE | Facility: CLINIC | Age: 74
End: 2024-11-21
Payer: MEDICARE

## 2024-11-21 VITALS
DIASTOLIC BLOOD PRESSURE: 71 MMHG | TEMPERATURE: 98 F | SYSTOLIC BLOOD PRESSURE: 154 MMHG | OXYGEN SATURATION: 97 % | RESPIRATION RATE: 20 BRPM | BODY MASS INDEX: 32.33 KG/M2 | HEART RATE: 62 BPM | WEIGHT: 175.69 LBS | HEIGHT: 62 IN

## 2024-11-21 DIAGNOSIS — M17.11 PRIMARY OSTEOARTHRITIS OF RIGHT KNEE: Primary | ICD-10-CM

## 2024-11-21 NOTE — TELEPHONE ENCOUNTER
See email for pt relief from block procedure scheduled for coolief RFA for 12/11. Instructions given

## 2024-11-26 ENCOUNTER — TELEPHONE (OUTPATIENT)
Dept: TRANSPLANT | Facility: CLINIC | Age: 74
End: 2024-11-26
Payer: MEDICARE

## 2024-12-11 ENCOUNTER — HOSPITAL ENCOUNTER (OUTPATIENT)
Facility: HOSPITAL | Age: 74
Discharge: HOME | End: 2024-12-11
Attending: STUDENT IN AN ORGANIZED HEALTH CARE EDUCATION/TRAINING PROGRAM | Admitting: STUDENT IN AN ORGANIZED HEALTH CARE EDUCATION/TRAINING PROGRAM
Payer: MEDICARE

## 2024-12-11 DIAGNOSIS — M17.9 KNEE OSTEOARTHRITIS: ICD-10-CM

## 2024-12-11 PROCEDURE — 64624 DSTRJ NULYT AGT GNCLR NRV: CPT | Mod: RT,,, | Performed by: STUDENT IN AN ORGANIZED HEALTH CARE EDUCATION/TRAINING PROGRAM

## 2024-12-11 PROCEDURE — 99153 MOD SED SAME PHYS/QHP EA: CPT | Performed by: STUDENT IN AN ORGANIZED HEALTH CARE EDUCATION/TRAINING PROGRAM

## 2024-12-11 PROCEDURE — 99152 MOD SED SAME PHYS/QHP 5/>YRS: CPT | Performed by: STUDENT IN AN ORGANIZED HEALTH CARE EDUCATION/TRAINING PROGRAM

## 2024-12-11 PROCEDURE — 64624 DSTRJ NULYT AGT GNCLR NRV: CPT | Mod: RT | Performed by: STUDENT IN AN ORGANIZED HEALTH CARE EDUCATION/TRAINING PROGRAM

## 2024-12-11 PROCEDURE — 99152 MOD SED SAME PHYS/QHP 5/>YRS: CPT | Mod: ,,, | Performed by: STUDENT IN AN ORGANIZED HEALTH CARE EDUCATION/TRAINING PROGRAM

## 2024-12-11 PROCEDURE — 63600175 PHARM REV CODE 636 W HCPCS: Mod: JZ,JG | Performed by: STUDENT IN AN ORGANIZED HEALTH CARE EDUCATION/TRAINING PROGRAM

## 2024-12-11 RX ORDER — MIDAZOLAM HYDROCHLORIDE 1 MG/ML
INJECTION INTRAMUSCULAR; INTRAVENOUS
Status: DISCONTINUED | OUTPATIENT
Start: 2024-12-11 | End: 2024-12-11 | Stop reason: HOSPADM

## 2024-12-11 RX ORDER — LIDOCAINE HYDROCHLORIDE 20 MG/ML
INJECTION, SOLUTION EPIDURAL; INFILTRATION; INTRACAUDAL; PERINEURAL
Status: DISCONTINUED | OUTPATIENT
Start: 2024-12-11 | End: 2024-12-11 | Stop reason: HOSPADM

## 2024-12-11 RX ORDER — BUPIVACAINE HYDROCHLORIDE 2.5 MG/ML
INJECTION, SOLUTION EPIDURAL; INFILTRATION; INTRACAUDAL
Status: DISCONTINUED | OUTPATIENT
Start: 2024-12-11 | End: 2024-12-11 | Stop reason: HOSPADM

## 2024-12-11 RX ORDER — FENTANYL CITRATE 50 UG/ML
INJECTION, SOLUTION INTRAMUSCULAR; INTRAVENOUS
Status: DISCONTINUED | OUTPATIENT
Start: 2024-12-11 | End: 2024-12-11 | Stop reason: HOSPADM

## 2024-12-11 RX ORDER — METHYLPREDNISOLONE ACETATE 80 MG/ML
INJECTION, SUSPENSION INTRA-ARTICULAR; INTRALESIONAL; INTRAMUSCULAR; SOFT TISSUE
Status: DISCONTINUED | OUTPATIENT
Start: 2024-12-11 | End: 2024-12-11 | Stop reason: HOSPADM

## 2024-12-11 RX ORDER — LIDOCAINE HYDROCHLORIDE 10 MG/ML
INJECTION, SOLUTION EPIDURAL; INFILTRATION; INTRACAUDAL; PERINEURAL
Status: DISCONTINUED | OUTPATIENT
Start: 2024-12-11 | End: 2024-12-11 | Stop reason: HOSPADM

## 2024-12-11 RX ORDER — LIDOCAINE HYDROCHLORIDE 10 MG/ML
1 INJECTION, SOLUTION EPIDURAL; INFILTRATION; INTRACAUDAL; PERINEURAL ONCE
Status: DISCONTINUED | OUTPATIENT
Start: 2024-12-11 | End: 2024-12-11 | Stop reason: HOSPADM

## 2024-12-11 RX ORDER — SODIUM CHLORIDE, SODIUM LACTATE, POTASSIUM CHLORIDE, CALCIUM CHLORIDE 600; 310; 30; 20 MG/100ML; MG/100ML; MG/100ML; MG/100ML
INJECTION, SOLUTION INTRAVENOUS CONTINUOUS
Status: DISCONTINUED | OUTPATIENT
Start: 2024-12-11 | End: 2024-12-11 | Stop reason: HOSPADM

## 2024-12-11 NOTE — PLAN OF CARE
Tolerated procedure well. Reports pain decreasing from arrival to 6/10. Denies numbness to lower extremity. Reports minimal weakness to right lower extremity, MD aware. Transferred out of facility via wheelchair to spouse without incident. Discharge instructions and ice pack in hand upon departure.

## 2024-12-11 NOTE — H&P
HPI  Patient presenting for Procedure(s) (LRB):  RADIOFREQUENCY ABLATION, NERVE, GENICULAR, KNEE (Right)     Patient on Anti-coagulation No    No health changes since previous encounter. No changes in pain since procedure was scheduled at previous visit. Denies any fevers or infections. Denies any issues with clotting or bleeding.     No current facility-administered medications on file prior to encounter.     Current Outpatient Medications on File Prior to Encounter   Medication Sig Dispense Refill    carvediloL (COREG) 25 MG tablet Take 1 tablet (25 mg total) by mouth 2 (two) times daily. (Patient taking differently: Take 25 mg by mouth 2 (two) times daily. Taking 1/2 of tablet in the morning , and 1 tablet in the evening) 60 tablet 11    amLODIPine (NORVASC) 2.5 MG tablet Take 2.5 mg by mouth once daily.      aspirin (ECOTRIN) 81 MG EC tablet Take 1 tablet (81 mg total) by mouth once daily. 30 tablet 11    cycloSPORINE modified, NEORAL, (NEORAL) 25 MG capsule Take 3 capsules (75 mg total) by mouth every 12 (twelve) hours. 180 capsule 11    docusate sodium (COLACE) 100 MG capsule Take 100 mg by mouth 2 (two) times daily.      EScitalopram oxalate (LEXAPRO) 5 MG Tab Take 5 mg by mouth nightly.      lamiVUDine (EPIVIR) 150 MG Tab Take 1 tablet (150 mg total) by mouth once daily. 30 tablet 11    losartan (COZAAR) 50 MG tablet Take 50 mg by mouth every evening.      ondansetron (ZOFRAN-ODT) 4 MG TbDL DISSOLVE 1 tablet (4 mg total) by mouth every 6 (six) hours as needed (nausea/vomiting). 15 tablet 0    pantoprazole (PROTONIX) 40 MG tablet Take 1 tablet (40 mg total) by mouth once daily. 30 tablet 5     Past Medical History:   Diagnosis Date    Anxiety disorder, unspecified     Benign paroxysmal vertigo, bilateral     CAD (coronary artery disease)     DIC (disseminated intravascular coagulation)     Dyslipidemia     GERD (gastroesophageal reflux disease)     Hereditary hemolytic anemia, unspecified     History of  "coronary artery stent placement     HTN (hypertension)     Liver cirrhosis secondary to BROWN     Other ascites 02/15/2023    Portal hypertensive gastropathy     Primary localized osteoarthrosis of right lower leg     Statin intolerance      Past Surgical History:   Procedure Laterality Date    ADENOIDECTOMY      ANTERIOR CRUCIATE LIGAMENT REPAIR      BACK SURGERY      BLOCK, NERVE, GENICULAR Right 11/20/2024    Procedure: Block, Nerve, Genicular;  Surgeon: Bandar Corey MD;  Location: [facility] OR;  Service: Pain Management;  Laterality: Right;    CARDIAC CATHETERIZATION  05/31/2018    CARDIAC CATHETERIZATION  09/28/2021    coronary angioplasty    CHOLECYSTECTOMY      COLONOSCOPY  05/25/2022    GALLBLADDER SURGERY      HYSTERECTOMY      KNEE ARTHROSCOPY Right     LIVER TRANSPLANT N/A 2/9/2023    Procedure: TRANSPLANT, LIVER;  Surgeon: Iggy Adhikari MD;  Location: [address];  Service: Transplant;  Laterality: N/A;    OOPHORECTOMY      REDUCTION OF BOTH BREASTS  01/01/2011    TONSILLECTOMY      TUBAL LIGATION       Review of patient's allergies indicates:   Allergen Reactions    Codeine Anxiety    Adhesive tape-silicones Rash      Current Facility-Administered Medications   Medication    BUPivacaine (PF) 0.25% (2.5 mg/ml) injection    lactated ringers infusion    LIDOcaine (PF) 10 mg/ml (1%) injection 10 mg    LIDOcaine (PF) 10 mg/ml (1%) injection    LIDOcaine (PF) 20 mg/ml (2%) injection    methylPREDNISolone acetate injection       PMHx, PSHx, Allergies, Medications reviewed in epic    ROS negative except pain complaints in HPI    OBJECTIVE:    BP (!) 159/71   Pulse (!) 57   Temp 98.1 °F (36.7 °C) (Skin)   Resp 18   Ht 5' 2" (1.575 m)   Wt 79.7 kg (175 lb 11.3 oz)   SpO2 100%   Breastfeeding No   BMI 32.14 kg/m²     PHYSICAL EXAMINATION:    GENERAL: Well appearing, in no acute distress, alert and oriented.  PSYCH:  Mood and affect appropriate.  SKIN: Skin color, texture, turgor normal in " procedure area, no rashes or lesions which will impact the procedure.  CV: RRR with palpation of the radial artery.  PULM: No evidence of respiratory difficulty, symmetric chest rise. Clear to auscultation.  NEURO: Alert. Oriented. Speech fluent and appropriate. Moving all extremities.    Plan:    Proceed with procedure as planned Procedure(s) (LRB):  RADIOFREQUENCY ABLATION, NERVE, GENICULAR, KNEE (Right)    Bandar Corey  12/11/2024

## 2024-12-11 NOTE — DISCHARGE SUMMARY
[facility]  Discharge Note  Short Stay     Admit Date: 12/11/2024    Discharge Date: 12/11/2024     Attending Physician: Bandar Corey MD    Diagnoses:  Knee osteoarthritis    Discharged Condition: Good     Hospital Course: Patient was admitted for an outpatient interventional pain management procedure and tolerated the procedure well with no complications.     Final Diagnoses: Same as principal problem.     Disposition: Home or Self Care     Follow up/Patient Instructions:   Follow-up in 4 weeks unless otherwise instructed. May return sooner as needed.       Reconciled Medications:     Medication List        CONTINUE taking these medications      amLODIPine 2.5 MG tablet  Commonly known as: NORVASC  Take 2.5 mg by mouth once daily.     aspirin 81 MG EC tablet  Commonly known as: ECOTRIN  Take 1 tablet (81 mg total) by mouth once daily.     carvediloL 25 MG tablet  Commonly known as: COREG  Take 1 tablet (25 mg total) by mouth 2 (two) times daily.     cycloSPORINE modified (NEORAL) 25 MG capsule  Commonly known as: NEORAL  Take 3 capsules (75 mg total) by mouth every 12 (twelve) hours.     docusate sodium 100 MG capsule  Commonly known as: COLACE  Take 100 mg by mouth 2 (two) times daily.     EScitalopram oxalate 5 MG Tab  Commonly known as: LEXAPRO  Take 5 mg by mouth nightly.     lamiVUDine 150 MG Tab  Commonly known as: EPIVIR  Take 1 tablet (150 mg total) by mouth once daily.     losartan 50 MG tablet  Commonly known as: COZAAR  Take 50 mg by mouth every evening.     ondansetron 4 MG Tbdl  Commonly known as: ZOFRAN-ODT  DISSOLVE 1 tablet (4 mg total) by mouth every 6 (six) hours as needed (nausea/vomiting).     pantoprazole 40 MG tablet  Commonly known as: PROTONIX  Take 1 tablet (40 mg total) by mouth once daily.             Discharge Procedure Orders (must include Diet, Follow-up, Activity)   Ice to affected area   Order Comments: 20 minutes of ice or until area numb to the touch if area is  sore 2-3 times per day as needed     No driving until:   Order Comments: Until following day     No dressing needed     Notify your health care provider if you experience any of the following:  temperature >100.4     Notify your health care provider if you experience any of the following:  persistent nausea and vomiting or diarrhea     Notify your health care provider if you experience any of the following:  severe uncontrolled pain     Notify your health care provider if you experience any of the following:  redness, tenderness, or signs of infection (pain, swelling, redness, odor or green/yellow discharge around incision site)     Notify your health care provider if you experience any of the following:  difficulty breathing or increased cough     Notify your health care provider if you experience any of the following:  severe persistent headache     Notify your health care provider if you experience any of the following:  worsening rash     Notify your health care provider if you experience any of the following:  persistent dizziness, light-headedness, or visual disturbances     Notify your health care provider if you experience any of the following:  increased confusion or weakness     Shower on day dressing removed (No bath)       Bandar Corey MD  Interventional Pain Medicine / Physical Medicine & Rehabilitation

## 2024-12-11 NOTE — OP NOTE
Therapeutic Genicular Cooled Nerve Radiofrequency Ablation under Fluoroscopy     The procedure, risks, benefits, and options were discussed with the patient. There are no contraindications to the procedure. The patent expressed understanding and agreed to the procedure. Informed written consent was obtained prior to the start of the procedure and can be found in the patient's chart.        PATIENT NAME: Argelia Sevilla   MRN: [id number]     DATE OF PROCEDURE: 12/11/2024     PROCEDURE: Therapeutic Right Genicular Cooled Nerve Radiofrequency Ablation under Fluoroscopy    PRE-OP DIAGNOSIS: Primary osteoarthritis of right knee [M17.11]    POST-OP DIAGNOSIS: Primary osteoarthritis of right knee [M17.11]    PHYSICIAN: Bandar Corey MD    ASSISTANTS: none    MEDICATIONS INJECTED:  Preservative-free depomedrol 40mg with 6cc of Bupivicaine 0.25%    LOCAL ANESTHETIC INJECTED:   Xylocaine 2%    SEDATION: Versed 1mg and Fentanyl 75mcg                                                                                                                                                                                     Conscious sedation ordered by M.D. Patient re-evaluation prior to administration of conscious sedation. No changes noted in patient's status from initial evaluation. The patient's vital signs were monitored by RN and patient remained hemodynamically stable throughout the procedure.    Event Time In   Sedation Start 0808   Sedation End 0853       ESTIMATED BLOOD LOSS:  None    COMPLICATIONS:  None     INTERVAL HISTORY: Patient has clinical findings of chronic knee pain. Patients has completed 2 previous diagnostic genicular nerve blocks with at least 80% relief for the expected duration of the local anesthetic utilized.     TECHNIQUE: Time-out was performed to identify the patient and procedure to be performed. With the patient laying in a supine position, the surgical area was prepped and draped in the usual  sterile fashion using ChloraPrep and fenestrated drape. Four target sites including the superior lateral genicular nerve where the lateral femoral shaft meets the epicondyle, the superior medial genicular nerve where the medial femoral shaft meets the epicondyle, the inferior medial genicular nerve where the medial tibial shaft meets the epicondyle, and the suprapatellar,  were determined under fluoroscopic guidance. Skin anesthesia was achieved by injecting Lidocaine 2% over the injection sites. A 17 gauge, 75mm, 10mm active tip needle was then advanced under fluoroscopy in the AP and lateral views into the positions of the geniculate nerves at these levels. This was followed by motor testing at each of the nerves to ensure that there was no dorsiflexion of the foot. After negative aspiration for blood was confirmed, 1 mL of the lidocaine 2% listed above was injected slowly at each site. This was followed by cooled thermal lesioning at 80 degrees celsius for 150 seconds at each site. That was followed by slowly injecting 1 mL of the medication mixture listed above at each site. The needles were removed and bleeding was nil. A sterile dressing was applied. No specimens collected. The patient tolerated the procedure well and did not have any procedure related motor deficit at the conclusion of the procedure.    The patient was monitored after the procedure in the recovery area. They were given post-procedure and discharge instructions to follow at home. The patient was discharged in a stable condition.    Bandar Corey MD

## 2024-12-12 VITALS
BODY MASS INDEX: 32.33 KG/M2 | SYSTOLIC BLOOD PRESSURE: 142 MMHG | OXYGEN SATURATION: 96 % | HEART RATE: 56 BPM | WEIGHT: 175.69 LBS | HEIGHT: 62 IN | DIASTOLIC BLOOD PRESSURE: 72 MMHG | TEMPERATURE: 98 F | RESPIRATION RATE: 17 BRPM

## 2024-12-17 NOTE — TELEPHONE ENCOUNTER
MELD 30  Reviewed critical T-bili (verbal called in from [facility] lab) and MELD labs with Dr. Crawford on 12/29/22. Entered all labs in Epic today. Routed to Dr. Velasco.   Per Dr. Crawford, patient to have repeat labs this Tuesday 1/3/23.  Lab orders faxed to [facility] lab : p [phone] / Fax: [phone]. Copy of lab orders sent to patient's portal.  Patient approved to list for transplant pending her insurance drug coverage starting 1st week of January 2023. Plan to list patient for liver transplant next week when we receive verification of drug coverage.  Spoke to patient today 12/30/22. Patient states she feels much better than yesterday. Mild nausea, mild abdominal pain, and less weakness.  States she is able to ambulate around the house. Discussed with patient that if she starts having increased abdominal pain, increased weakness, fever, increased nausea, vomiting, lethargy, malaise, coughing up blood, vomiting blood, blood in stools, or confusion to go to nearest ED. Patient verbalized understanding and agreement.    Detail Level: Simple Additional Notes: Patient consent was obtained to proceed with the visit and recommended plan of care after discussion of all risks and benefits, including the risks of COVID-19 exposure. Render Risk Assessment In Note?: yes

## 2024-12-31 LAB
EXT ALBUMIN: 3.7
EXT ALKALINE PHOSPHATASE: 135
EXT ALT: 14
EXT AST: 15
EXT BASOPHIL%: 0.4
EXT BILIRUBIN DIRECT: 0.3 MG/DL
EXT BILIRUBIN TOTAL: 1
EXT BUN: 18
EXT CALCIUM: 9.5
EXT CHLORIDE: 106
EXT CO2: 27
EXT CREATININE: 0.88 MG/DL
EXT CYCLOSPORONE LEVEL: 209
EXT EGFR NO RACE VARIABLE: >60
EXT EOSINOPHIL%: 3.9
EXT GLUCOSE: 108
EXT HEMATOCRIT: 40.6
EXT HEMOGLOBIN: 12.7
EXT LYMPH%: 24
EXT MONOCYTES%: 6.9
EXT PLATELETS: 224
EXT POTASSIUM: 4.5
EXT PROTEIN TOTAL: 6.9
EXT SEGS%: 64.8
EXT SODIUM: 140 MMOL/L
EXT WBC: 7

## 2025-01-03 ENCOUNTER — TELEPHONE (OUTPATIENT)
Dept: TRANSPLANT | Facility: CLINIC | Age: 75
End: 2025-01-03
Payer: MEDICARE

## 2025-01-03 NOTE — TELEPHONE ENCOUNTER
Labs reviewed via portal and are stable.  Patient will repeat labs on 2/24/25 per protocol.        ----- Message from Houston Crawford MD sent at 1/3/2025 10:06 AM CST -----  Results reviewed

## 2025-01-17 ENCOUNTER — TELEPHONE (OUTPATIENT)
Dept: PAIN MEDICINE | Facility: CLINIC | Age: 75
End: 2025-01-17
Payer: MEDICARE

## 2025-01-17 NOTE — TELEPHONE ENCOUNTER
----- Message from Pat sent at 1/17/2025  1:09 PM CST -----  Contact: self  Type:  Sooner Appointment Request    Caller is requesting a sooner appointment.  Caller declined first available appointment listed below.  Caller will not accept being placed on the waitlist and is requesting a message be sent to doctor.    Name of Caller:  the patient  When is the first available appointment?  Feb     Best Call Back Number:  [phone]  Additional Information:  pt cancelled appt due to weather, no other appt was avail, please call pt to r/s

## 2025-01-21 DIAGNOSIS — Z94.4 S/P LIVER TRANSPLANT: ICD-10-CM

## 2025-01-21 RX ORDER — LAMIVUDINE 150 MG/1
150 TABLET, FILM COATED ORAL DAILY
Qty: 30 TABLET | Refills: 11 | Status: CANCELLED | OUTPATIENT
Start: 2025-01-21

## 2025-01-22 ENCOUNTER — TELEPHONE (OUTPATIENT)
Dept: PAIN MEDICINE | Facility: CLINIC | Age: 75
End: 2025-01-22
Payer: MEDICARE

## 2025-01-22 RX ORDER — LAMIVUDINE 150 MG/1
150 TABLET, FILM COATED ORAL DAILY
Qty: 30 TABLET | Refills: 11 | Status: SHIPPED | OUTPATIENT
Start: 2025-01-22

## 2025-01-22 NOTE — TELEPHONE ENCOUNTER
----- Message from Dano sent at 1/22/2025  4:03 PM CST -----  Contact: self  Type: Needs Medical Advice  Who Called:  PT    Best Call Back Number: [phone]   Additional Information: Pt would like a call to discuss tomorrows visit.

## 2025-01-28 ENCOUNTER — TELEPHONE (OUTPATIENT)
Dept: ORTHOPEDICS | Facility: CLINIC | Age: 75
End: 2025-01-28
Payer: MEDICARE

## 2025-01-28 NOTE — TELEPHONE ENCOUNTER
Spoke to pt and rescheduled appt with rob on 2/19----- Message from Teena sent at 1/28/2025  7:40 AM CST -----  Name of Caller:      Nature of Call: requesting an appt date change    Best Call Back Number: [phone]     Additional Information:  DELANO MOSES calling regarding Patient Advice for wanting to inform that due to where she lives and the travel here to the clinic, she is requesting to have the appt that is set for 01/31/25 to be scheduled to 02/19/25 when she has to come in for another appt on that date for her travel convenience if possible. Please call the PT to inform

## 2025-02-04 ENCOUNTER — OFFICE VISIT (OUTPATIENT)
Dept: PAIN MEDICINE | Facility: CLINIC | Age: 75
End: 2025-02-04
Payer: MEDICARE

## 2025-02-04 VITALS
HEIGHT: 62 IN | SYSTOLIC BLOOD PRESSURE: 131 MMHG | BODY MASS INDEX: 32.76 KG/M2 | DIASTOLIC BLOOD PRESSURE: 67 MMHG | HEART RATE: 54 BPM | WEIGHT: 178 LBS

## 2025-02-04 DIAGNOSIS — Z94.4 S/P LIVER TRANSPLANT: ICD-10-CM

## 2025-02-04 DIAGNOSIS — M17.11 PRIMARY OSTEOARTHRITIS OF RIGHT KNEE: Primary | ICD-10-CM

## 2025-02-04 DIAGNOSIS — Z91.89 AT RISK FOR OPPORTUNISTIC INFECTIONS: ICD-10-CM

## 2025-02-04 PROCEDURE — 99213 OFFICE O/P EST LOW 20 MIN: CPT | Mod: PBBFAC,PN | Performed by: STUDENT IN AN ORGANIZED HEALTH CARE EDUCATION/TRAINING PROGRAM

## 2025-02-04 PROCEDURE — 99212 OFFICE O/P EST SF 10 MIN: CPT | Mod: S$PBB,,, | Performed by: STUDENT IN AN ORGANIZED HEALTH CARE EDUCATION/TRAINING PROGRAM

## 2025-02-04 PROCEDURE — 99999 PR PBB SHADOW E&M-EST. PATIENT-LVL III: CPT | Mod: PBBFAC,,, | Performed by: STUDENT IN AN ORGANIZED HEALTH CARE EDUCATION/TRAINING PROGRAM

## 2025-02-04 NOTE — PROGRESS NOTES
Interventional Pain Clinic    PCP:   No, Primary Doctor    CHIEF COMPLAINT:   Procedure follow up    HISTORY OF PRESENT ILLNESS:   Argelia Sevilla presents for follow-up after right knee genicular nerve RFA.  This was performed on December 11th.  She reports significant relief from that procedure.  Her day-to-day pain is about a 1/10.  She does have some stiffness in the mornings but it is overall much much better than before.  She is very happy and has no questions.    Review of patient's allergies indicates:   Allergen Reactions    Codeine Anxiety    Adhesive tape-silicones Rash       Past Medical History:   Diagnosis Date    Anxiety disorder, unspecified     Benign paroxysmal vertigo, bilateral     CAD (coronary artery disease)     DIC (disseminated intravascular coagulation)     Dyslipidemia     GERD (gastroesophageal reflux disease)     Hereditary hemolytic anemia, unspecified     History of coronary artery stent placement     HTN (hypertension)     Liver cirrhosis secondary to BROWN     Other ascites 02/15/2023    Portal hypertensive gastropathy     Primary localized osteoarthrosis of right lower leg     Statin intolerance        Past Surgical History:   Procedure Laterality Date    ADENOIDECTOMY      ANTERIOR CRUCIATE LIGAMENT REPAIR      BACK SURGERY      BLOCK, NERVE, GENICULAR Right 11/20/2024    Procedure: Block, Nerve, Genicular;  Surgeon: Bandar Corey MD;  Location: [facility] OR;  Service: Pain Management;  Laterality: Right;    CARDIAC CATHETERIZATION  05/31/2018    CARDIAC CATHETERIZATION  09/28/2021    coronary angioplasty    CHOLECYSTECTOMY      COLONOSCOPY  05/25/2022    GALLBLADDER SURGERY      HYSTERECTOMY      KNEE ARTHROSCOPY Right     LIVER TRANSPLANT N/A 2/9/2023    Procedure: TRANSPLANT, LIVER;  Surgeon: Iggy Adhikari MD;  Location: [address];  Service: Transplant;  Laterality: N/A;    OOPHORECTOMY      RADIOFREQUENCY ABLATION, NERVE, GENICULAR, KNEE Right 12/11/2024     "Procedure: RADIOFREQUENCY ABLATION, NERVE, GENICULAR, KNEE;  Surgeon: Bandar Corey MD;  Location: [facility] OR;  Service: Pain Management;  Laterality: Right;    REDUCTION OF BOTH BREASTS  01/01/2011    TONSILLECTOMY      TUBAL LIGATION         Social History     Tobacco Use    Smoking status: Never    Smokeless tobacco: Never   Substance Use Topics    Alcohol use: Never        PHYSICAL EXAMINATION  VITALS:   Vitals:    02/04/25 1428   BP: 131/67   Pulse: (!) 54   Weight: 80.7 kg (178 lb)   Height: 5' 2" (1.575 m)   PainSc:   1   PainLoc: Knee     Physical Exam  Constitutional:       Appearance: Normal appearance.   Eyes:      Extraocular Movements: Extraocular movements intact.   Musculoskeletal:         General: No deformity. Normal range of motion.   Skin:     General: Skin is warm and dry.   Neurological:      Mental Status: She is alert and oriented to person, place, and time. Mental status is at baseline.   Psychiatric:         Mood and Affect: Mood normal.         Behavior: Behavior normal.         ASSESSMENT:   74 y.o. year old female with excellent response to genicular RFA with coolief    Encounter Diagnoses   Name Primary?    Primary osteoarthritis of right knee Yes    S/P liver transplant     At risk for opportunistic infections        PLAN:  Return in about 4 months which would be about 6 months after her ablation to assess durability of relief.      Bandar Corey MD  This note was completed with dictation software and grammatical/syntax errors may exist.      "

## 2025-02-10 ENCOUNTER — TELEPHONE (OUTPATIENT)
Dept: TRANSPLANT | Facility: CLINIC | Age: 75
End: 2025-02-10
Payer: MEDICARE

## 2025-02-10 NOTE — TELEPHONE ENCOUNTER
Spoke with patient and [facility].  Pharmacy will move up med shipment to tomorrow and pt should receive medications by Wednesday.  Per pharmacy, pt will be okay if 1 dose is missed.  Pt is aware and v/u.      ----- Message from Aishwarya sent at 2/10/2025 11:02 AM CST -----  Regarding: Rx Questions        Name Of Caller:   Arianna      Contact Preference:   [phone]      Nature of call:   Pt states they won't receive their Lamivudine (Epivir) and Cyclosporine from the [facility] in time for their next dose. She has some concerns.

## 2025-02-16 ENCOUNTER — PATIENT MESSAGE (OUTPATIENT)
Dept: TRANSPLANT | Facility: CLINIC | Age: 75
End: 2025-02-16
Payer: MEDICARE

## 2025-02-17 DIAGNOSIS — Z94.4 S/P LIVER TRANSPLANT: Primary | ICD-10-CM

## 2025-02-19 ENCOUNTER — OFFICE VISIT (OUTPATIENT)
Dept: ORTHOPEDICS | Facility: CLINIC | Age: 75
End: 2025-02-19
Payer: MEDICARE

## 2025-02-19 ENCOUNTER — OFFICE VISIT (OUTPATIENT)
Dept: TRANSPLANT | Facility: CLINIC | Age: 75
End: 2025-02-19
Payer: MEDICARE

## 2025-02-19 VITALS
HEIGHT: 62 IN | RESPIRATION RATE: 18 BRPM | SYSTOLIC BLOOD PRESSURE: 164 MMHG | OXYGEN SATURATION: 96 % | WEIGHT: 181 LBS | DIASTOLIC BLOOD PRESSURE: 74 MMHG | TEMPERATURE: 98 F | HEART RATE: 60 BPM | BODY MASS INDEX: 33.31 KG/M2

## 2025-02-19 DIAGNOSIS — M17.11 PRIMARY OSTEOARTHRITIS OF RIGHT KNEE: Primary | ICD-10-CM

## 2025-02-19 DIAGNOSIS — B19.10 HEPATITIS B VIRUS INFECTION IN CADAVERIC DONOR: ICD-10-CM

## 2025-02-19 DIAGNOSIS — Z00.5 HEPATITIS B VIRUS INFECTION IN CADAVERIC DONOR: ICD-10-CM

## 2025-02-19 DIAGNOSIS — Z29.89 PROPHYLACTIC IMMUNOTHERAPY: ICD-10-CM

## 2025-02-19 DIAGNOSIS — Z94.4 S/P LIVER TRANSPLANT: Primary | ICD-10-CM

## 2025-02-19 PROCEDURE — 99999PBSHW PR PBB SHADOW TECHNICAL ONLY FILED TO HB: Mod: JZ,PBBFAC,,

## 2025-02-19 PROCEDURE — 99213 OFFICE O/P EST LOW 20 MIN: CPT | Mod: PBBFAC,27 | Performed by: NURSE PRACTITIONER

## 2025-02-19 PROCEDURE — 99213 OFFICE O/P EST LOW 20 MIN: CPT | Mod: PBBFAC | Performed by: INTERNAL MEDICINE

## 2025-02-19 PROCEDURE — 20610 DRAIN/INJ JOINT/BURSA W/O US: CPT | Mod: PBBFAC | Performed by: NURSE PRACTITIONER

## 2025-02-19 RX ORDER — MECLIZINE HCL 12.5 MG 12.5 MG/1
12.5 TABLET ORAL 3 TIMES DAILY PRN
COMMUNITY
Start: 2024-12-20 | End: 2025-12-20

## 2025-02-19 RX ADMIN — Medication 60 MG: at 01:02

## 2025-02-19 NOTE — PROGRESS NOTES
CC: Pain of the Right Knee      HPI:   Pt with c/o right knee pain. The pain is aching and global. It is worse with increased activity and better with rest. She has been seen by both Dr. Amezcua and Dr. Arroyo and was deemed to be high risk for knee replacement due to history of liver transplant two years ago. She had an RFA by a pain management doctor in [place] in December. That helped a lot, but is starting to wear off. She has had cortisone injections in the past with minimal relief. She has not had gel injections. She is ambulating without assistive device. There is not a limp.      ROS  General: denies fever and chills  Resp: no c/o sob  CVS: no c/o cp  MSK: c/o right knee pain    PE  General: AAOx3, pleasant and cooperative  Resp: respirations even and unlabored  MSK: right knee exam  0 degrees extension  120 degrees flexion  No warmth or erythema   - effusion  5/5 quad and hamstring strength  + tenderness over the medial joint line        Assessment:  Right knee djd    Plan:  Durolane injection right knee today  [facility] as approved by transplant team      Exam demonstrates the no effusion in the  right knee, and the skin is intact.    Radiographs show degenerative changes of knee    Diagnosis: primary osteoarthritis right knee    After time out was performed and patient ID, side, and site were verified, the  right  knee was sterilly prepped in the standard fashion. Verbal consent obtained.  A 22-gauge needle was introduced into right knee joint from an archana-lateral site without complication and knee was then injected with 3 ml of Durolane.  Sterile dressing was applied.  The patient was instructed to resume activities as tolerated and to call with any problems.     Patient will return as needed

## 2025-02-19 NOTE — PROGRESS NOTES
Subjective:       Patient ID: Argelia Sevilla is a 74 y.o. female.    Chief Complaint: Liver Transplant Follow-up    HPI  I saw this 74 y.o.lady who had a liver transplant 2 years ago.    Original Referring Physician: Iggy Contreras  Current Corresponding Physician: Iggy Contreras     Chief Complaint: Arianna is here for follow up of her liver transplant performed 2/9/2023 for the primary diagnosis (UNOS) of Cirrhosis: Fatty Liver (BROWN)     ORGAN: LIVER  Whole or Partial: whole liver  Donor Type: donation after brain death  PHS Increased Risk: no  Donor CMV Status: Positive  Donor HCV Status: Negative  Donor HBcAb: Positive  Donor HBV FRANNIE: Negative  Donor HCV FRANNIE: Negative     Biliary Anastomosis: end to end  Arterial Anatomy: standard  IVC reconstruction: end to end ivc  Portal vein status: patent    - transplant admission was 7 days long.  - readmitted with tremors and speech disturbance  - switched to cyclo    LFTs and creatinine normal  - cyclosporine level from today -awaited    Abdo US: 2/16/23  Mild elevation of arterial resistive indices with preservation of normal waveforms, nonspecific but may be seen in the setting of rejection or edema. Recommend clinical correlation and continued interval follow-up.  Left portal vein demonstrates hepatopetal flow, similar prior.    Review of Systems   Constitutional:  Negative for activity change, appetite change, chills, fatigue, fever and unexpected weight change.   HENT:  Negative for ear pain, hearing loss, nosebleeds, sore throat and trouble swallowing.    Eyes:  Negative for redness and visual disturbance.   Respiratory:  Negative for cough, chest tightness, shortness of breath and wheezing.    Cardiovascular:  Negative for chest pain and palpitations.   Gastrointestinal:  Negative for abdominal distention, abdominal pain, blood in stool, constipation, diarrhea, nausea and vomiting.   Genitourinary:  Negative for difficulty urinating, dysuria, frequency, hematuria and  urgency.   Musculoskeletal:  Negative for arthralgias, back pain, gait problem, joint swelling and myalgias.   Skin:  Negative for rash.   Neurological:  Negative for tremors, seizures, speech difficulty, weakness and headaches.   Hematological:  Negative for adenopathy.   Psychiatric/Behavioral:  Negative for confusion, decreased concentration and sleep disturbance. The patient is not nervous/anxious.            Lab Results   Component Value Date    ALT 13 02/19/2025    AST 17 02/19/2025    GGT 35 10/20/2022    ALKPHOS 126 02/19/2025    BILITOT 0.9 02/19/2025     Past Medical History:   Diagnosis Date    Anxiety disorder, unspecified     Benign paroxysmal vertigo, bilateral     CAD (coronary artery disease)     DIC (disseminated intravascular coagulation)     Dyslipidemia     GERD (gastroesophageal reflux disease)     Hereditary hemolytic anemia, unspecified     History of coronary artery stent placement     HTN (hypertension)     Liver cirrhosis secondary to BROWN     Other ascites 02/15/2023    Portal hypertensive gastropathy     Primary localized osteoarthrosis of right lower leg     Statin intolerance      Past Surgical History:   Procedure Laterality Date    ADENOIDECTOMY      ANTERIOR CRUCIATE LIGAMENT REPAIR      BACK SURGERY      BLOCK, NERVE, GENICULAR Right 11/20/2024    Procedure: Block, Nerve, Genicular;  Surgeon: Bandar Corey MD;  Location: [facility] OR;  Service: Pain Management;  Laterality: Right;    CARDIAC CATHETERIZATION  05/31/2018    CARDIAC CATHETERIZATION  09/28/2021    coronary angioplasty    CHOLECYSTECTOMY      COLONOSCOPY  05/25/2022    GALLBLADDER SURGERY      HYSTERECTOMY      KNEE ARTHROSCOPY Right     LIVER TRANSPLANT N/A 2/9/2023    Procedure: TRANSPLANT, LIVER;  Surgeon: Iggy Adhikari MD;  Location: [address];  Service: Transplant;  Laterality: N/A;    OOPHORECTOMY      RADIOFREQUENCY ABLATION, NERVE, GENICULAR, KNEE Right 12/11/2024    Procedure: RADIOFREQUENCY  ABLATION, NERVE, GENICULAR, KNEE;  Surgeon: Bandar Corey MD;  Location: [facility];  Service: Pain Management;  Laterality: Right;    REDUCTION OF BOTH BREASTS  01/01/2011    TONSILLECTOMY      TUBAL LIGATION       Current Outpatient Medications   Medication Sig    amLODIPine (NORVASC) 2.5 MG tablet Take 2.5 mg by mouth once daily.    aspirin (ECOTRIN) 81 MG EC tablet Take 1 tablet (81 mg total) by mouth once daily.    carvediloL (COREG) 25 MG tablet Take 1 tablet (25 mg total) by mouth 2 (two) times daily.    cycloSPORINE modified, NEORAL, (NEORAL) 25 MG capsule Take 3 capsules (75 mg total) by mouth every 12 (twelve) hours.    docusate sodium (COLACE) 100 MG capsule Take 100 mg by mouth 2 (two) times daily.    EScitalopram oxalate (LEXAPRO) 5 MG Tab Take 5 mg by mouth nightly.    lamiVUDine (EPIVIR) 150 MG Tab Take 1 tablet (150 mg total) by mouth once daily.    losartan (COZAAR) 50 MG tablet Take 50 mg by mouth every evening.    meclizine (ANTIVERT) 12.5 mg tablet Take 12.5 mg by mouth 3 times daily as needed.    ondansetron (ZOFRAN-ODT) 4 MG TbDL DISSOLVE 1 tablet (4 mg total) by mouth every 6 (six) hours as needed (nausea/vomiting).    pantoprazole (PROTONIX) 40 MG tablet Take 1 tablet (40 mg total) by mouth once daily.     No current facility-administered medications for this visit.       Objective:      Physical Exam  Constitutional:       General: She is not in acute distress.  HENT:      Head: Normocephalic.   Eyes:      Pupils: Pupils are equal, round, and reactive to light.   Neck:      Thyroid: No thyromegaly.      Vascular: No JVD.      Trachea: No tracheal deviation.   Cardiovascular:      Rate and Rhythm: Normal rate and regular rhythm.      Heart sounds: Normal heart sounds. No murmur heard.  Pulmonary:      Effort: Pulmonary effort is normal.      Breath sounds: Normal breath sounds. No stridor.   Abdominal:      Palpations: Abdomen is soft.   Lymphadenopathy:      Head:      Right side  of head: No submental, submandibular, tonsillar, preauricular, posterior auricular or occipital adenopathy.      Left side of head: No submental, submandibular, tonsillar, preauricular, posterior auricular or occipital adenopathy.      Cervical: No cervical adenopathy.   Neurological:      Mental Status: She is alert. She is not disoriented.      Cranial Nerves: No cranial nerve deficit.      Sensory: No sensory deficit.         Assessment:       1. S/P liver transplant    2. Prophylactic immunotherapy    3. Hepatitis B virus infection in cadaveric donor          Plan:   Feels well  No new issues  Still hypertensive here today but seeing a BP specialist - she is on 3 different meds  Has gained 40 lb in weight since transplant- advised to lose at least 20 lb with a modification in her diet.    - no changes to cyclo dose  - labs per protocol  - clinic in 1 year.      I spent a total of 30 minutes on the day of the visit.  This includes face to face time and non-face to face time preparing to see the patient (eg, review of tests), obtaining and/or reviewing separately obtained history, documenting clinical information in the electronic or other health record, independently interpreting results and communicating results to the patient/family/caregiver, or care coordinator.    UNOS Patient Status  Functional Status: 100% - Normal, no complaints, no evidence of disease  Physical Capacity: No Limitations    Patient on life support: No  Diabetes: No  Any previous malignancy: No  Neoadjuvant Therapy: no  Has patient ever had a dx of HCC: no  Previous Abdominal Surgery: no  Spontaneous Bacterial Peritonitis: no  History of Portal Vein Thrombosis: no  Transjugular Intrahepatic Portosystemic Shunt: no    New diabetes onset between last follow-up to the current follow-up: No  Did patient have any acute rejection episodes during the follow-up period: No  Post transplant malignancy: No

## 2025-02-19 NOTE — LETTER
February 19, 2025        Iggy Contreras  [address] [facility] [place] [address]  Phone: [phone]  Fax: [phone]             [facility]  [address]  [facility] [address]  Phone: [phone]   Patient: Argelia Sevilla   MR Number: [id number]   YOB: 1950   Date of Visit: 2/19/2025       Dear Dr. Iggy Contreras    Thank you for referring Argelia Sevilla to me for evaluation. Attached you will find relevant portions of my assessment and plan of care.    If you have questions, please do not hesitate to call me. I look forward to following Argelia Sevilla along with you.    Sincerely,    Houston Crawford MD    Enclosure    If you would like to receive this communication electronically, please contact [email or web address] or [phone] to request [employer] access.    [employer] is a tool which provides read-only access to select patient information with whom you have a relationship. Its easy to use and provides real time access to review your patients record including encounter summaries, notes, results, and demographic information.    If you feel you have received this communication in error or would no longer like to receive these types of communications, please e-mail [email or web address]

## 2025-02-20 ENCOUNTER — PATIENT MESSAGE (OUTPATIENT)
Dept: ORTHOPEDICS | Facility: CLINIC | Age: 75
End: 2025-02-20
Payer: MEDICARE

## 2025-02-21 ENCOUNTER — PATIENT MESSAGE (OUTPATIENT)
Dept: ORTHOPEDICS | Facility: CLINIC | Age: 75
End: 2025-02-21
Payer: MEDICARE

## 2025-02-24 ENCOUNTER — RESULTS FOLLOW-UP (OUTPATIENT)
Dept: HEPATOLOGY | Facility: CLINIC | Age: 75
End: 2025-02-24
Payer: MEDICARE

## 2025-02-26 NOTE — TELEPHONE ENCOUNTER
Labs reviewed via portal and are stable.  Patient will repeat labs on 4/21/25.       ----- Message from Houston Crawford MD sent at 2/24/2025 12:56 PM CST -----  Results reviewed  ----- Message -----  From: [facility]  Sent: 2/19/2025   9:43 AM CST  To: Houston Crawford MD

## 2025-02-27 NOTE — TELEPHONE ENCOUNTER
Letter sent to patient. Results reviewed. Stable. No medication changes.  Next labs to be done 4/21/2025. Encouraged to call for questions.       ----- Message from Houston Crawford MD sent at 2/24/2025 12:56 PM CST -----  Results reviewed  ----- Message -----  From: [facility]ovany, [facility] Interface  Sent: 2/19/2025   9:43 AM CST  To: Houston Crawford MD

## 2025-03-06 ENCOUNTER — OFFICE VISIT (OUTPATIENT)
Dept: ORTHOPEDICS | Facility: CLINIC | Age: 75
End: 2025-03-06
Payer: MEDICARE

## 2025-03-06 VITALS — HEIGHT: 62 IN | WEIGHT: 183.56 LBS | BODY MASS INDEX: 33.78 KG/M2

## 2025-03-06 DIAGNOSIS — M17.11 PRIMARY OSTEOARTHRITIS OF RIGHT KNEE: ICD-10-CM

## 2025-03-06 DIAGNOSIS — M25.461 KNEE EFFUSION, RIGHT: Primary | ICD-10-CM

## 2025-03-06 DIAGNOSIS — M25.569 KNEE PAIN, UNSPECIFIED CHRONICITY, UNSPECIFIED LATERALITY: ICD-10-CM

## 2025-03-06 LAB
APPEARANCE FLD: NORMAL
BODY FLD TYPE: NORMAL
BODY FLD TYPE: NORMAL
COLOR FLD: YELLOW
CRYSTALS FLD MICRO: NEGATIVE
LYMPHOCYTES NFR FLD MANUAL: 20 %
MONOS+MACROS NFR FLD MANUAL: 75 %
NEUTROPHILS NFR FLD MANUAL: 5 %
PATH INTERP FLD-IMP: NORMAL
WBC # FLD: 230 /CU MM

## 2025-03-06 PROCEDURE — 87070 CULTURE OTHR SPECIMN AEROBIC: CPT

## 2025-03-06 PROCEDURE — 20610 DRAIN/INJ JOINT/BURSA W/O US: CPT | Mod: PBBFAC,RT | Performed by: STUDENT IN AN ORGANIZED HEALTH CARE EDUCATION/TRAINING PROGRAM

## 2025-03-06 PROCEDURE — 99213 OFFICE O/P EST LOW 20 MIN: CPT | Mod: PBBFAC | Performed by: STUDENT IN AN ORGANIZED HEALTH CARE EDUCATION/TRAINING PROGRAM

## 2025-03-06 PROCEDURE — 89060 EXAM SYNOVIAL FLUID CRYSTALS: CPT

## 2025-03-06 PROCEDURE — 87205 SMEAR GRAM STAIN: CPT

## 2025-03-06 PROCEDURE — 89051 BODY FLUID CELL COUNT: CPT

## 2025-03-06 PROCEDURE — 99999 PR PBB SHADOW E&M-EST. PATIENT-LVL III: CPT | Mod: PBBFAC,,, | Performed by: STUDENT IN AN ORGANIZED HEALTH CARE EDUCATION/TRAINING PROGRAM

## 2025-03-06 PROCEDURE — 87075 CULTR BACTERIA EXCEPT BLOOD: CPT

## 2025-03-06 RX ORDER — DICLOFENAC SODIUM 10 MG/G
2-4 GEL TOPICAL 4 TIMES DAILY PRN
Qty: 200 G | Refills: 5 | Status: SHIPPED | OUTPATIENT
Start: 2025-03-06

## 2025-03-06 NOTE — PROGRESS NOTES
Assessment & Plan   Encounter Diagnoses   Name Primary?    Knee pain, unspecified chronicity, unspecified laterality     Knee effusion, right Yes    Primary osteoarthritis of right knee         Arianna Sevilla has right knee arthritis, varus pattern. Recent worsening of pain and swelling after single-shot Durolane inj 2 weeks ago. Will aspirate today to r/o infection and perhaps provide some therapeutic benefit given the significant swelling.    - Performed aspiration of fluid from the patient's knee - sent for aerobic/anaerobic culture, cell count and diff, crystals.  - Apply ice to the knee for 20 minutes at a time.  - Use compression sleeve or bandage on the knee to help reduce swelling.  - Apply topical Diclofenac/Voltaren cream 4 times daily for 1 week, then as needed.   Discussed potential for repeat radiofrequency ablation given improvement after last ablation. Would not recommend any further intraarticular injections. If RF nerve ablation fails to alleviate symptoms durably, then knee replacement would be next best treatment option. I will reach out to pain mgmt to discuss this.   Patient will contact me for f/u as symptoms warrant.        Problem List:  Problem List Items Addressed This Visit          Orthopedic    Primary osteoarthritis of right knee    Relevant Orders    Large Joint Aspiration: R knee (Completed)     Other Visit Diagnoses         Knee effusion, right    -  Primary    Relevant Orders    Large Joint Aspiration: R knee (Completed)      Knee pain, unspecified chronicity, unspecified laterality        Relevant Orders    Body fluid crystal Joint Fluid, Right Knee    WBC & Diff,Body Fluid Joint Fluid, Right Knee    Culture, Body Fluid (Aerobic) w/ GS    Culture, Anaerobic                Patient ID: Arianna Sevilla is a 75 y.o. female.  Chief Complaint   Patient presents with    Right Knee - Pain        History of Present Illness:  History of Present Illness    CHIEF COMPLAINT:  - Bilateral knee  "pain, particularly worsening in the right knee following a gel injection.    HPI:  Ms. Sevilla presents for follow-up regarding knee pain. She has severe arthritis in her knees, previously seen in November 2024. Pain is located in her knee, described as an aching "like a toothache," even when sitting still. It worsens with walking and pressure on the knee, particularly in the front below the kneecap. She now uses a cane for support and reports significant pain with walking.    She was fitted with a hinged knee brace in November 2024, which did not help. She then underwent a knee ablation procedure with Dr. Corey in late November or early December 2024. This initially provided significant relief, reducing pain by about 50%. She experienced improved mobility with minimal pain, though some discomfort remained. However, the relief gradually diminished over time.    During a subsequent appointment, she tried a gel injection for her knee pain. She had a severe negative reaction, experiencing intense pain from her knee to her ankle, which significantly impaired her ability to walk. The pain was so severe that she could not sleep that night. She consulted with Dr. Pompa's office about her reaction and was advised to seek urgent care or emergency treatment if the pain persisted. The pain has since improved somewhat, but she still has significant discomfort, particularly when walking.    Currently, she uses ice therapy every 20 minutes and takes Tylenol for pain management, as she cannot take Advil due to her medical history. She expresses frustration with her limited mobility and anxiety about the possibility of a knee replacement and infection risks due to her immunosuppressant medications.    Ms. Sevilla denies wanting any more injections in her knee.    PREVIOUS TREATMENTS:  - Hinged knee brace fitted in November: Provided no benefit  - Knee ablation performed by Dr. Corey in late November/early December: " Provided about 50% pain relief initially and improved walking ability  - Gel injection (likely hyaluronic acid) administered by a nurse practitioner: Caused significant pain and worsening of symptoms  - Uflexa injections (3 weekly shots) in the left knee: Benefit not specified  - Ice applied for 20 minutes at a time to manage current knee pain    MEDICATIONS:  - Tylenol: Only when absolutely necessary  - Voltaren or Diclofenac: Previously used topical anti-inflammatory cream a while ago    SURGICAL HISTORY:  - Liver transplant  - Left knee replacement    MEDICAL HISTORY:  - Arthritis in knees  - Liver transplant recipient         11/1/25: Arianna Sevilla is a 74 y.o. female who presents for evaluation of R knee pain. The medial and anterior knee pain has been present for 3+ years and has been progressive. Conservative treatment in the form of Tylenol, PT, activity modification, CSI, viscosupplementation injection has been attempted previously. The patient requires a cane as assistive device outside the home or for longer walks. The pain is worsened by walking, standing. She rates the pain as 7/10, it is associated with stiffness, swelling, and episodes of buckling or giving out.  She navigate stairs 1 step at a time using the banister and must use her arms to get up from a chair.  Physical therapy has been attempted previously. The patient has a decreased quality of life due to knee pain and presents today for evaluation.  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  She is now doing well in terms of the liver transplantation, which was done here at [facility], and she is interested in further treatment options for her knee.     Past Medical History:  Past Medical History:   Diagnosis Date    Anxiety disorder, unspecified     Benign  paroxysmal vertigo, bilateral     CAD (coronary artery disease)     DIC (disseminated intravascular coagulation)     Dyslipidemia     GERD (gastroesophageal reflux disease)     Hereditary hemolytic anemia, unspecified     History of coronary artery stent placement     HTN (hypertension)     Liver cirrhosis secondary to BROWN     Other ascites 02/15/2023    Portal hypertensive gastropathy     Primary localized osteoarthrosis of right lower leg     Statin intolerance         Surgical History:  Past Surgical History:   Procedure Laterality Date    ADENOIDECTOMY      ANTERIOR CRUCIATE LIGAMENT REPAIR      BACK SURGERY      BLOCK, NERVE, GENICULAR Right 11/20/2024    Procedure: Block, Nerve, Genicular;  Surgeon: Bandar oCrey MD;  Location: [facility] OR;  Service: Pain Management;  Laterality: Right;    CARDIAC CATHETERIZATION  05/31/2018    CARDIAC CATHETERIZATION  09/28/2021    coronary angioplasty    CHOLECYSTECTOMY      COLONOSCOPY  05/25/2022    GALLBLADDER SURGERY      HYSTERECTOMY      KNEE ARTHROSCOPY Right     LIVER TRANSPLANT N/A 2/9/2023    Procedure: TRANSPLANT, LIVER;  Surgeon: Iggy Adhikari MD;  Location: [address];  Service: Transplant;  Laterality: N/A;    OOPHORECTOMY      RADIOFREQUENCY ABLATION, NERVE, GENICULAR, KNEE Right 12/11/2024    Procedure: RADIOFREQUENCY ABLATION, NERVE, GENICULAR, KNEE;  Surgeon: Bandar Corey MD;  Location: [facility] OR;  Service: Pain Management;  Laterality: Right;    REDUCTION OF BOTH BREASTS  01/01/2011    TONSILLECTOMY      TUBAL LIGATION          Social History:  She reports that she has never smoked. She has never used smokeless tobacco. She reports that she does not drink alcohol. No history on file for drug use.     Family History:  family history includes Asthma in her mother; Cancer in her maternal grandmother; Hyperlipidemia in her brother, father, and mother; Stroke in her father and mother.     Current Outpatient Medications on File  "Prior to Visit   Medication Sig Dispense Refill    amLODIPine (NORVASC) 2.5 MG tablet Take 2.5 mg by mouth once daily.      aspirin (ECOTRIN) 81 MG EC tablet Take 1 tablet (81 mg total) by mouth once daily. 30 tablet 11    carvediloL (COREG) 25 MG tablet Take 1 tablet (25 mg total) by mouth 2 (two) times daily. 60 tablet 11    cycloSPORINE modified, NEORAL, (NEORAL) 25 MG capsule Take 3 capsules (75 mg total) by mouth every 12 (twelve) hours. 180 capsule 11    docusate sodium (COLACE) 100 MG capsule Take 100 mg by mouth 2 (two) times daily.      EScitalopram oxalate (LEXAPRO) 5 MG Tab Take 5 mg by mouth nightly.      lamiVUDine (EPIVIR) 150 MG Tab Take 1 tablet (150 mg total) by mouth once daily. 30 tablet 11    losartan (COZAAR) 50 MG tablet Take 50 mg by mouth every evening.      meclizine (ANTIVERT) 12.5 mg tablet Take 12.5 mg by mouth 3 times daily as needed.      ondansetron (ZOFRAN-ODT) 4 MG TbDL DISSOLVE 1 tablet (4 mg total) by mouth every 6 (six) hours as needed (nausea/vomiting). 15 tablet 0    pantoprazole (PROTONIX) 40 MG tablet Take 1 tablet (40 mg total) by mouth once daily. 30 tablet 5     No current facility-administered medications on file prior to visit.     Review of patient's allergies indicates:   Allergen Reactions    Codeine Anxiety    Adhesive tape-silicones Rash          Physical exam:  Height 5' 2.48" (1.587 m), weight 83.3 kg (183 lb 8.5 oz).  General: no apparent distress    Gait: + antalgic, + limp, no use of assistive devices at time of exam    R knee:   Moderate effusion  TTP at the medial and lateral joint line   Skin: intact, mild effusion  Range of motion: 10 to 100  Strength: 4/5 with extension, 4+/5 with flexion - painful  Ligament exam: partially correctable varus deformity, otherwise stable to varus/valgus and stable to AP stress in flexion and extension  Neurovascular: WWP, palpable pulse,  Light touch sensation intact    L knee:   Not TTP   Skin: intact, no effusion  Range of " motion: 5 to 125  Strength: 5/5 with extension, 5/5 with flexion  Ligament exam: stable to varus/valgus and stable to AP stress in flexion and extension  Neurovascular: WWP,  Light touch sensation intact     Relevant Results:  Imaging:  Plain x-rays of the R knee were obtained on 10/9/24 and independently reviewed by me today, 03/06/2025, and demonstrate complete narrowing of the medial compartment, tricompartmental osteophytes and sclerosis c/w KL grade 4 arthritic change. There is varus alignment.     Labs:  Hb 12.6, Alb 3.9, Cr 0.89, A1c 5.4

## 2025-03-06 NOTE — PROCEDURES
Large Joint Aspiration: R knee    Date/Time: 3/6/2025 3:30 PM    Performed by: Aleksandr Arroyo MD  Authorized by: Aleksandr Arroyo MD    Consent Done?:  Yes (Verbal)  Indications:  Pain, diagnostic evaluation and joint swelling  Site marked: the procedure site was marked    Timeout: prior to procedure the correct patient, procedure, and site was verified    Prep: patient was prepped and draped in usual sterile fashion      Local anesthesia used?: Yes    Anesthesia:  Local infiltration    Details:  Needle Size:  25 G  Approach:  Lateral  Location:  Knee  Site:  R knee  Aspirate amount (mL):  9  Aspirate:  Serous, yellow and blood-tinged  Lab: fluid sent for laboratory analysis    Patient tolerance:  Patient tolerated the procedure well with no immediate complications

## 2025-03-07 LAB
BACTERIA FLD AEROBE CULT: NO GROWTH
BACTERIA SPEC ANAEROBE CULT: NORMAL
GRAM STN SPEC: NORMAL
GRAM STN SPEC: NORMAL

## 2025-04-28 DIAGNOSIS — Z94.4 S/P LIVER TRANSPLANT: ICD-10-CM

## 2025-04-28 RX ORDER — CYCLOSPORINE 25 MG/1
75 CAPSULE, LIQUID FILLED ORAL EVERY 12 HOURS
Qty: 180 CAPSULE | Refills: 11 | Status: SHIPPED | OUTPATIENT
Start: 2025-04-28

## 2025-04-30 LAB
EXT ALBUMIN: 3.9
EXT ALKALINE PHOSPHATASE: 128
EXT ALT: 7
EXT AST: 18
EXT BASOPHIL%: 1
EXT BILIRUBIN TOTAL: 1.2
EXT BUN: 15
EXT CALCIUM: 9
EXT CHLORIDE: 107
EXT CO2: 26
EXT CREATININE: 0.78 MG/DL
EXT CYCLOSPORONE LEVEL: 182
EXT EGFR NO RACE VARIABLE: >60
EXT EOSINOPHIL%: 7
EXT GLUCOSE: 106
EXT HEMATOCRIT: 37.8
EXT HEMOGLOBIN: 12.5
EXT LYMPH%: 25.8
EXT MONOCYTES%: 9.6
EXT PLATELETS: 227
EXT POTASSIUM: 4.3
EXT PROTEIN TOTAL: 6.8
EXT SEGS%: 56.6
EXT SODIUM: 141 MMOL/L
EXT WBC: 6.3

## 2025-05-01 ENCOUNTER — PATIENT MESSAGE (OUTPATIENT)
Dept: TRANSPLANT | Facility: CLINIC | Age: 75
End: 2025-05-01
Payer: MEDICARE

## 2025-05-01 ENCOUNTER — TELEPHONE (OUTPATIENT)
Dept: TRANSPLANT | Facility: CLINIC | Age: 75
End: 2025-05-01
Payer: MEDICARE

## 2025-05-01 NOTE — TELEPHONE ENCOUNTER
"Patient upset.  Has been taking Nitrofurantoin given to her by a PCP NP and is concerned it may be hurting her liver. Explained will refer this to txp pharmacists and encouraged as patient was tearful            ----- Message from Yosvany sent at 5/1/2025  8:34 AM CDT -----  Regarding: call back  Consult/Advisory:  Name Of Caller: Self Contact Preference?:[phone] What is the nature of the call?: Calling to speak w/ Mary in regards to needing clarification on a medication she received from her PCP requesting call back   Additional Notes:"Thank you for all that you do for our patients"  "

## 2025-05-01 NOTE — TELEPHONE ENCOUNTER
"Called patient who had concerns regarding a medication prescribed by PCP NP.  She was prescribed Macrobid and read it could damage her liver. Patient was tearful.  Referred this to [facility] pharmacists who suggested monitoring labs since patient had completed a course of medication. This coordinator discussed with patient who stated she would ask PCP to draw a CMP.  Encouraged patient that there was low incidence of elevation in Liver enzymes associated with this med.         ----- Message from Yosvany sent at 5/1/2025  8:34 AM CDT -----  Regarding: call back  Consult/Advisory:  Name Of Caller: Self Contact Preference?:[phone] What is the nature of the call?: Calling to speak w/ Mary in regards to needing clarification on a medication she received from her PCP requesting call back   Additional Notes:"Thank you for all that you do for our patients"  "

## 2025-05-05 ENCOUNTER — TELEPHONE (OUTPATIENT)
Dept: TRANSPLANT | Facility: CLINIC | Age: 75
End: 2025-05-05
Payer: MEDICARE

## 2025-05-05 ENCOUNTER — RESULTS FOLLOW-UP (OUTPATIENT)
Dept: TRANSPLANT | Facility: CLINIC | Age: 75
End: 2025-05-05
Payer: MEDICARE

## 2025-05-06 NOTE — TELEPHONE ENCOUNTER
Labs reviewed via portal and are stable.  Patient will repeat labs on 6/16/25.        ----- Message from Houston Crawford MD sent at 5/5/2025  2:50 PM CDT -----  Results reviewed    ----- Message -----  From: Mary Powers RN  Sent: 4/30/2025   4:42 PM CDT  To: Houston Crawford MD

## 2025-05-16 ENCOUNTER — PATIENT MESSAGE (OUTPATIENT)
Dept: TRANSPLANT | Facility: CLINIC | Age: 75
End: 2025-05-16
Payer: MEDICARE

## 2025-06-20 ENCOUNTER — RESULTS FOLLOW-UP (OUTPATIENT)
Dept: TRANSPLANT | Facility: CLINIC | Age: 75
End: 2025-06-20
Payer: MEDICARE

## 2025-06-20 LAB
EXT ALBUMIN: 4
EXT ALKALINE PHOSPHATASE: 127
EXT ALT: 9
EXT AST: 14
EXT BASOPHIL%: 0.9
EXT BILIRUBIN TOTAL: 0.9
EXT BUN: 18
EXT CALCIUM: 9
EXT CHLORIDE: 107
EXT CO2: 24
EXT CREATININE: 0.92 MG/DL
EXT CYCLOSPORONE LEVEL: 216
EXT EGFR NO RACE VARIABLE: >60
EXT EOSINOPHIL%: 13.5
EXT GLUCOSE: 105
EXT HEMATOCRIT: 38.8
EXT HEMOGLOBIN: 13
EXT LYMPH%: 27.5
EXT MONOCYTES%: 8.2
EXT PLATELETS: 244
EXT POTASSIUM: 4.6
EXT PROTEIN TOTAL: 6.8
EXT SEGS%: 49.9
EXT SODIUM: 140 MMOL/L
EXT WBC: 7

## 2025-06-20 NOTE — TELEPHONE ENCOUNTER
Patient messaged through [facility] portal:    Ms Keller,  Dr. Crawford reviewed your labs.  They are stable.  He did not make any medication changes.  Please repeat your labs the week of 8/11/2025.  I sent new lab orders to [facility].  Please note a copy of these labs are in your [facility] under letters should they lose your orders or be unable to find them.  If you need anything or have any questions, please let me know.  Thanks  Ana Luisa                ----- Message from Houston Crawford MD sent at 6/20/2025 12:10 PM CDT -----  Results reviewed    ----- Message -----  From: Mary Powers RN  Sent: 6/20/2025   9:13 AM CDT  To: Houston Crawford MD

## 2025-06-20 NOTE — LETTER
"   LAB ORDERS    2025      ORDERING MD:      Houston Crawford [facility], FRCP ([place]), MPH  Associate Professor of Medicine, [employer], [place], [place] and  Medical Director Organ Transplant [place]   [facility]  NPI [id number]        Patient Name: Argelia Sevilla               : 1950    [facility] Number: [id number]                         Please draw the following labs:     Test Frequency  Diagnosis/ICD-10 Code     CBC/DIFF/PLT Week of 2025  Z94.4 Liver Transplant       Complete Metabolic Panel Week of 2025   Z94.4 Liver Transplant         Cyclosporine level Week of 2025  Z94.4 Liver Transplant     Hep B surf Ag  Week of 2025   Z.4 Liver Transplant       Hep B DNA Quantitative PCR  Week of 2025   .4 Liver Transplant         FAX RESULTS [phone] "Attention Liver Transplant Coordinator", and send the hard copy when completed. If you have any questions regarding this request or need additional information, please call [phone].      "

## 2025-07-29 ENCOUNTER — PATIENT MESSAGE (OUTPATIENT)
Dept: TRANSPLANT | Facility: CLINIC | Age: 75
End: 2025-07-29
Payer: MEDICARE

## 2025-08-01 LAB
EXT ALBUMIN: 4
EXT ALKALINE PHOSPHATASE: 125
EXT ALT: 11
EXT AST: 15
EXT BASOPHIL%: 0.8
EXT BILIRUBIN DIRECT: 0.6 MG/DL
EXT BILIRUBIN TOTAL: 1.5
EXT BUN: 18
EXT CALCIUM: 9.2
EXT CHLORIDE: 108
EXT CO2: 25
EXT CREATININE: 0.92 MG/DL
EXT EGFR NO RACE VARIABLE: >60
EXT EOSINOPHIL%: 6.6
EXT GLUCOSE: 118
EXT HBV DNA, QUALITATIVE PCR: NORMAL
EXT HEMATOCRIT: 37.2
EXT HEMOGLOBIN: 12.5
EXT HEP B S AG: NON REACTIVE
EXT LYMPH%: 23.1
EXT MONOCYTES%: 10
EXT PLATELETS: 232
EXT POTASSIUM: 4.3
EXT PROTEIN TOTAL: 6.7
EXT SEGS%: 59.5
EXT SODIUM: 141 MMOL/L
EXT TACROLIMUS LVL: NORMAL
EXT WBC: 6.2

## 2025-08-05 ENCOUNTER — OFFICE VISIT (OUTPATIENT)
Dept: TRANSPLANT | Facility: CLINIC | Age: 75
End: 2025-08-05
Payer: MEDICARE

## 2025-08-05 ENCOUNTER — LAB VISIT (OUTPATIENT)
Dept: LAB | Facility: HOSPITAL | Age: 75
End: 2025-08-05
Attending: INTERNAL MEDICINE
Payer: MEDICARE

## 2025-08-05 VITALS
TEMPERATURE: 97 F | WEIGHT: 184.31 LBS | SYSTOLIC BLOOD PRESSURE: 170 MMHG | HEIGHT: 62 IN | DIASTOLIC BLOOD PRESSURE: 72 MMHG | BODY MASS INDEX: 33.92 KG/M2 | OXYGEN SATURATION: 97 % | RESPIRATION RATE: 18 BRPM | HEART RATE: 53 BPM

## 2025-08-05 DIAGNOSIS — R53.83 OTHER FATIGUE: Primary | ICD-10-CM

## 2025-08-05 DIAGNOSIS — B19.10 HEPATITIS B VIRUS INFECTION IN CADAVERIC DONOR: ICD-10-CM

## 2025-08-05 DIAGNOSIS — E64.0 SEQUELAE OF PROTEIN-CALORIE MALNUTRITION: ICD-10-CM

## 2025-08-05 DIAGNOSIS — R53.83 OTHER FATIGUE: ICD-10-CM

## 2025-08-05 DIAGNOSIS — Z00.5 HEPATITIS B VIRUS INFECTION IN CADAVERIC DONOR: ICD-10-CM

## 2025-08-05 DIAGNOSIS — Z29.89 PROPHYLACTIC IMMUNOTHERAPY: ICD-10-CM

## 2025-08-05 DIAGNOSIS — Z94.4 S/P LIVER TRANSPLANT: ICD-10-CM

## 2025-08-05 PROBLEM — D63.8 ANEMIA, CHRONIC DISEASE: Status: RESOLVED | Noted: 2023-02-08 | Resolved: 2025-08-05

## 2025-08-05 LAB
25(OH)D3+25(OH)D2 SERPL-MCNC: 33 NG/ML (ref 30–96)
FOLATE SERPL-MCNC: 10.3 NG/ML (ref 4–24)
TSH SERPL-ACNC: 1.81 UIU/ML (ref 0.4–4)
VIT B12 SERPL-MCNC: 216 PG/ML (ref 210–950)

## 2025-08-05 PROCEDURE — 99213 OFFICE O/P EST LOW 20 MIN: CPT | Mod: PBBFAC | Performed by: INTERNAL MEDICINE

## 2025-08-05 PROCEDURE — 82306 VITAMIN D 25 HYDROXY: CPT

## 2025-08-05 PROCEDURE — 36415 COLL VENOUS BLD VENIPUNCTURE: CPT

## 2025-08-05 PROCEDURE — 99999 PR PBB SHADOW E&M-EST. PATIENT-LVL III: CPT | Mod: PBBFAC,,, | Performed by: INTERNAL MEDICINE

## 2025-08-05 PROCEDURE — 82746 ASSAY OF FOLIC ACID SERUM: CPT

## 2025-08-05 PROCEDURE — 84443 ASSAY THYROID STIM HORMONE: CPT

## 2025-08-05 PROCEDURE — 82607 VITAMIN B-12: CPT

## 2025-08-05 NOTE — LETTER
August 5, 2025        Iggy Contreras  [address] [facility] [place] [address]  Phone: [phone]  Fax: [phone]             [facility] 1st Fl  [address]  [facility] [address]  Phone: [phone]   Patient: Argelia Sevilla   MR Number: [id number]   YOB: 1950   Date of Visit: 8/5/2025       Dear Dr. Iggy Contreras    Thank you for referring Argelia Sevilla to me for evaluation. Attached you will find relevant portions of my assessment and plan of care.    If you have questions, please do not hesitate to call me. I look forward to following Argelia Sevilla along with you.    Sincerely,    Houston Crawfodr MD    Enclosure    If you would like to receive this communication electronically, please contact [email or web address] or [phone] to request [employer] Link access.    [employer] Link is a tool which provides read-only access to select patient information with whom you have a relationship. Its easy to use and provides real time access to review your patients record including encounter summaries, notes, results, and demographic information.    If you feel you have received this communication in error or would no longer like to receive these types of communications, please e-mail [email or web address]

## 2025-08-05 NOTE — PROGRESS NOTES
Subjective:       Patient ID: Argelia Sevilla is a 75 y.o. female.    Chief Complaint: No chief complaint on file.    HPI  I saw this 75 y.o.lady who had a liver transplant 2 years and 5 months ago.    Original Referring Physician: Iggy Contreras  Current Corresponding Physician: Iggy Contreras     Chief Complaint: Arianna is here for follow up of her liver transplant performed 2/9/2023 for the primary diagnosis (UNOS) of Cirrhosis: Fatty Liver (BROWN)     ORGAN: LIVER  Whole or Partial: whole liver  Donor Type: donation after brain death  PHS Increased Risk: no  Donor CMV Status: Positive  Donor HCV Status: Negative  Donor HBcAb: Positive  Donor HBV FRANNIE: Negative  Donor HCV FRANNIE: Negative     Biliary Anastomosis: end to end  Arterial Anatomy: standard  IVC reconstruction: end to end ivc  Portal vein status: patent    - transplant admission was 7 days long.  - readmitted with tremors and speech disturbance  - switched to cyclo    LFTs and creatinine normal    Abdo US: 2/16/23  Mild elevation of arterial resistive indices with preservation of normal waveforms, nonspecific but may be seen in the setting of rejection or edema. Recommend clinical correlation and continued interval follow-up.  Left portal vein demonstrates hepatopetal flow, similar prior.    Her current issue is extreme fatigue and aches and pains all over her body.  She has CARINA but does not use a CPAP machine. She also has a rheumatologist and has seen her PCP about this.      Review of Systems   Constitutional:  Negative for activity change, appetite change, chills, fatigue, fever and unexpected weight change.   HENT:  Negative for ear pain, hearing loss, nosebleeds, sore throat and trouble swallowing.    Eyes:  Negative for redness and visual disturbance.   Respiratory:  Negative for cough, chest tightness, shortness of breath and wheezing.    Cardiovascular:  Negative for chest pain and palpitations.   Gastrointestinal:  Negative for abdominal distention,  abdominal pain, blood in stool, constipation, diarrhea, nausea and vomiting.   Genitourinary:  Negative for difficulty urinating, dysuria, frequency, hematuria and urgency.   Musculoskeletal:  Negative for arthralgias, back pain, gait problem, joint swelling and myalgias.   Skin:  Negative for rash.   Neurological:  Negative for tremors, seizures, speech difficulty, weakness and headaches.   Hematological:  Negative for adenopathy.   Psychiatric/Behavioral:  Negative for confusion, decreased concentration and sleep disturbance. The patient is not nervous/anxious.            Lab Results   Component Value Date    ALT 13 02/19/2025    AST 17 02/19/2025    GGT 35 10/20/2022    ALKPHOS 126 02/19/2025    BILITOT 0.9 02/19/2025     Past Medical History:   Diagnosis Date    Anxiety disorder, unspecified     Benign paroxysmal vertigo, bilateral     CAD (coronary artery disease)     DIC (disseminated intravascular coagulation)     Dyslipidemia     GERD (gastroesophageal reflux disease)     Hereditary hemolytic anemia, unspecified     History of coronary artery stent placement     HTN (hypertension)     Liver cirrhosis secondary to BROWN     Other ascites 02/15/2023    Portal hypertensive gastropathy     Primary localized osteoarthrosis of right lower leg     Statin intolerance      Past Surgical History:   Procedure Laterality Date    ADENOIDECTOMY      ANTERIOR CRUCIATE LIGAMENT REPAIR      BACK SURGERY      BLOCK, NERVE, GENICULAR Right 11/20/2024    Procedure: Block, Nerve, Genicular;  Surgeon: Bandar Corey MD;  Location: [facility] OR;  Service: Pain Management;  Laterality: Right;    CARDIAC CATHETERIZATION  05/31/2018    CARDIAC CATHETERIZATION  09/28/2021    coronary angioplasty    CHOLECYSTECTOMY      COLONOSCOPY  05/25/2022    GALLBLADDER SURGERY      HYSTERECTOMY      KNEE ARTHROSCOPY Right     LIVER TRANSPLANT N/A 2/9/2023    Procedure: TRANSPLANT, LIVER;  Surgeon: Iggy Adhikari MD;  Location: [address]  [facility];  Service: Transplant;  Laterality: N/A;    OOPHORECTOMY      RADIOFREQUENCY ABLATION, NERVE, GENICULAR, KNEE Right 12/11/2024    Procedure: RADIOFREQUENCY ABLATION, NERVE, GENICULAR, KNEE;  Surgeon: Bandra Corey MD;  Location: [facility] OR;  Service: Pain Management;  Laterality: Right;    REDUCTION OF BOTH BREASTS  01/01/2011    TONSILLECTOMY      TUBAL LIGATION       Current Outpatient Medications   Medication Sig    amLODIPine (NORVASC) 2.5 MG tablet Take 2.5 mg by mouth once daily.    aspirin (ECOTRIN) 81 MG EC tablet Take 1 tablet (81 mg total) by mouth once daily.    carvediloL (COREG) 25 MG tablet Take 1 tablet (25 mg total) by mouth 2 (two) times daily.    cycloSPORINE modified, NEORAL, (NEORAL) 25 MG capsule Take 3 capsules (75 mg total) by mouth every 12 (twelve) hours.    diclofenac sodium (VOLTAREN) 1 % Gel Apply 2-4 g topically 4 (four) times daily as needed (knee pain, swelling).    docusate sodium (COLACE) 100 MG capsule Take 100 mg by mouth 2 (two) times daily.    EScitalopram oxalate (LEXAPRO) 5 MG Tab Take 5 mg by mouth nightly.    lamiVUDine (EPIVIR) 150 MG Tab Take 1 tablet (150 mg total) by mouth once daily.    losartan (COZAAR) 50 MG tablet Take 50 mg by mouth every evening.    meclizine (ANTIVERT) 12.5 mg tablet Take 12.5 mg by mouth 3 times daily as needed.    ondansetron (ZOFRAN-ODT) 4 MG TbDL DISSOLVE 1 tablet (4 mg total) by mouth every 6 (six) hours as needed (nausea/vomiting).    pantoprazole (PROTONIX) 40 MG tablet Take 1 tablet (40 mg total) by mouth once daily.     No current facility-administered medications for this visit.       Objective:      Physical Exam  Constitutional:       General: She is not in acute distress.  HENT:      Head: Normocephalic.   Eyes:      Pupils: Pupils are equal, round, and reactive to light.   Neck:      Thyroid: No thyromegaly.      Vascular: No JVD.      Trachea: No tracheal deviation.   Cardiovascular:      Rate and Rhythm: Normal rate  and regular rhythm.      Heart sounds: Normal heart sounds. No murmur heard.  Pulmonary:      Effort: Pulmonary effort is normal.      Breath sounds: Normal breath sounds. No stridor.   Abdominal:      Palpations: Abdomen is soft.   Lymphadenopathy:      Head:      Right side of head: No submental, submandibular, tonsillar, preauricular, posterior auricular or occipital adenopathy.      Left side of head: No submental, submandibular, tonsillar, preauricular, posterior auricular or occipital adenopathy.      Cervical: No cervical adenopathy.   Neurological:      Mental Status: She is alert. She is not disoriented.      Cranial Nerves: No cranial nerve deficit.      Sensory: No sensory deficit.         Assessment:       1. Other fatigue    2. Body mass index (BMI) 33.0-33.9, adult    3. Sequelae of protein-calorie malnutrition    4. S/P liver transplant    5. Prophylactic immunotherapy    6. Hepatitis B virus infection in cadaveric donor      Plan:   Feels fatigued and has aches all over her body.  Continues to gain weight.      Still hypertensive here today but seeing a BP specialist - she is on 3 different meds      - no changes to cyclo dose  - labs per protocol  - to check B12/folate, Vit D, TSH today  - suggested using a CPAP machine for her CARINA  - I also think she would benefit from weight loss and using a GLP 1 medication.  - she has severe right knee OA and needs a knee replacement which I think would be safe at this point post Tx.    Clinic in 1 year.    I spent a total of 40 minutes on the day of the visit.  This includes face to face time and non-face to face time preparing to see the patient (eg, review of tests), obtaining and/or reviewing separately obtained history, documenting clinical information in the electronic or other health record, independently interpreting results and communicating results to the patient/family/caregiver, or care coordinator.      UNOS Patient Status  Functional Status: 100% -  Normal, no complaints, no evidence of disease  Physical Capacity: No Limitations    Patient on life support: No  Diabetes: No  Any previous malignancy: No  Neoadjuvant Therapy: no  Has patient ever had a dx of HCC: no  Previous Abdominal Surgery: no  Spontaneous Bacterial Peritonitis: no  History of Portal Vein Thrombosis: no  Transjugular Intrahepatic Portosystemic Shunt: no    New diabetes onset between last follow-up to the current follow-up: No  Did patient have any acute rejection episodes during the follow-up period: No  Post transplant malignancy: No

## 2025-08-09 ENCOUNTER — RESULTS FOLLOW-UP (OUTPATIENT)
Dept: TRANSPLANT | Facility: CLINIC | Age: 75
End: 2025-08-09
Payer: MEDICARE

## (undated) DEVICE — CLIP SPRING 6MM

## (undated) DEVICE — DRAPE CORETEMP FLD WRM 56X62IN

## (undated) DEVICE — BOOT AIR FLUID HEEL ADLT STD

## (undated) DEVICE — TIP YANKAUERS BULB NO VENT

## (undated) DEVICE — TOWEL OR XRAY WHITE 17X26IN

## (undated) DEVICE — SUT PROLENE 5-0 36IN C-1

## (undated) DEVICE — TRAY CATH FOL SIL URIMTR 16FR

## (undated) DEVICE — SUT PROLENE 3-0 SH DA 36 BL

## (undated) DEVICE — STAPLER SKIN PROXIMATE WIDE

## (undated) DEVICE — CATH URETHRAL RED RUBBER 18FR

## (undated) DEVICE — GLOVE SENSICARE PI GRN 7.5

## (undated) DEVICE — ELECTRODE REM PLYHSV RETURN 9

## (undated) DEVICE — SUT PDS BV 6-0

## (undated) DEVICE — SUT 1 36IN PDS II VIO MONO

## (undated) DEVICE — HANDSET ARGON PLUS

## (undated) DEVICE — SUT 2-0 12-18IN SILK

## (undated) DEVICE — SUT ETHILON 3-0 PS2 18 BLK

## (undated) DEVICE — SUT 3-0 12-18IN SILK

## (undated) DEVICE — SOL NS 1000CC

## (undated) DEVICE — PAD K-THERMIA 24IN X 60IN

## (undated) DEVICE — SET IV ADMIN 15DROP 3 CARESITE

## (undated) DEVICE — TUBING NEPTUNE 2 SMOKE 10IN

## (undated) DEVICE — SUT SILK 2-0 STRANDS 30IN

## (undated) DEVICE — DRAPE BAG ISOLATION 20 X 20

## (undated) DEVICE — SEALER LIGASURE MARYLAND 23CM

## (undated) DEVICE — SUT SILK 0 STRANDS 30IN BLK

## (undated) DEVICE — EVACUATOR WOUND BULB 100CC

## (undated) DEVICE — HEMOSTAT SURGICEL NU-KNIT 6X9

## (undated) DEVICE — SEE MEDLINE ITEM 156901

## (undated) DEVICE — TUBING MINIBORE EXTENSION

## (undated) DEVICE — GOWN SURGICAL X-LARGE

## (undated) DEVICE — KIT SAHARA DRAPE DRAW/LIFT

## (undated) DEVICE — SUT 4-0 12-18IN SILK BLACK

## (undated) DEVICE — SUT PROLENE 6-0 BV-1 30IN

## (undated) DEVICE — ELECTRODE PAD DEFIB STERILE

## (undated) DEVICE — NDL SPINAL SPINOCAN 22GX3.5

## (undated) DEVICE — ELECTRODE EMG 24 LEADWIRE

## (undated) DEVICE — SUT PROLENE 4-0 SH BLU 36IN

## (undated) DEVICE — SET EXTENSION STERILE 30IN

## (undated) DEVICE — SUT SILK 3-0 STRANDS 30IN

## (undated) DEVICE — CONNECTOR TUBING STR 5 IN 1

## (undated) DEVICE — WIPE ESENTA BARR STNG FREE 3ML

## (undated) DEVICE — DRAIN CHANNEL ROUND 19FR

## (undated) DEVICE — SUT 4-0 12-30IN SILK

## (undated) DEVICE — DRESSING AQUACEL SACRAL 9 X 9

## (undated) DEVICE — SUT SILK 3-0 SH 18IN BLACK

## (undated) DEVICE — NDL MONOPTY BIOPSY 14GX10CM

## (undated) DEVICE — SYR ONLY LUER LOCK 20CC

## (undated) DEVICE — SYS LABEL CORRECT MED

## (undated) DEVICE — CHLORAPREP 10.5 ML APPLICATOR

## (undated) DEVICE — DRAPE HALF SURGICAL 40X58IN

## (undated) DEVICE — DRESSING ADH ISLAND 3.6 X 14

## (undated) DEVICE — SET DECANTER MEDICHOICE